# Patient Record
Sex: FEMALE | Race: WHITE | NOT HISPANIC OR LATINO | Employment: OTHER | URBAN - METROPOLITAN AREA
[De-identification: names, ages, dates, MRNs, and addresses within clinical notes are randomized per-mention and may not be internally consistent; named-entity substitution may affect disease eponyms.]

---

## 2017-02-02 ENCOUNTER — ALLSCRIPTS OFFICE VISIT (OUTPATIENT)
Dept: OTHER | Facility: OTHER | Age: 57
End: 2017-02-02

## 2017-03-06 ENCOUNTER — ALLSCRIPTS OFFICE VISIT (OUTPATIENT)
Dept: OTHER | Facility: OTHER | Age: 57
End: 2017-03-06

## 2017-03-06 DIAGNOSIS — J42 CHRONIC BRONCHITIS (HCC): ICD-10-CM

## 2017-03-06 DIAGNOSIS — R06.00 DYSPNEA: ICD-10-CM

## 2017-03-06 DIAGNOSIS — R06.89 OTHER ABNORMALITIES OF BREATHING: ICD-10-CM

## 2017-03-06 DIAGNOSIS — R06.02 SHORTNESS OF BREATH: ICD-10-CM

## 2017-03-13 ENCOUNTER — HOSPITAL ENCOUNTER (OUTPATIENT)
Dept: CT IMAGING | Facility: HOSPITAL | Age: 57
Discharge: HOME | End: 2017-03-13
Attending: INTERNAL MEDICINE
Payer: COMMERCIAL

## 2017-03-13 DIAGNOSIS — R06.02 SHORTNESS OF BREATH: ICD-10-CM

## 2017-03-13 PROCEDURE — 71250 CT THORAX DX C-: CPT

## 2017-04-04 ENCOUNTER — APPOINTMENT (OUTPATIENT)
Dept: LAB | Facility: CLINIC | Age: 57
End: 2017-04-04
Payer: COMMERCIAL

## 2017-04-04 ENCOUNTER — TRANSCRIBE ORDERS (OUTPATIENT)
Dept: LAB | Facility: CLINIC | Age: 57
End: 2017-04-04

## 2017-04-04 DIAGNOSIS — J42 CHRONIC BRONCHITIS (HCC): ICD-10-CM

## 2017-04-04 DIAGNOSIS — R06.89 OTHER ABNORMALITIES OF BREATHING: ICD-10-CM

## 2017-04-04 DIAGNOSIS — R06.00 DYSPNEA: ICD-10-CM

## 2017-04-04 PROCEDURE — 86003 ALLG SPEC IGE CRUDE XTRC EA: CPT

## 2017-04-04 PROCEDURE — 36415 COLL VENOUS BLD VENIPUNCTURE: CPT

## 2017-04-04 PROCEDURE — 82785 ASSAY OF IGE: CPT

## 2017-04-05 LAB
A ALTERNATA IGE QN: <0.1 KUA/I
A FUMIGATUS IGE QN: <0.1 KUA/I
ALLERGEN COMMENT: ABNORMAL
BERMUDA GRASS IGE QN: <0.1 KUA/I
BOXELDER IGE QN: <0.1 KUA/I
C HERBARUM IGE QN: <0.1 KUA/I
CAT DANDER IGE QN: <0.1 KUA/I
CMN PIGWEED IGE QN: <0.1 KUA/I
COMMON RAGWEED IGE QN: <0.1 KUA/I
COTTONWOOD IGE QN: <0.1 KUA/I
D FARINAE IGE QN: <0.1 KUA/I
D PTERONYSS IGE QN: 0.18 KUA/I
DOG DANDER IGE QN: <0.1 KUA/I
LONDON PLANE IGE QN: <0.1 KUA/I
MOUSE URINE PROT IGE QN: <0.1 KUA/I
MT JUNIPER IGE QN: <0.1 KUA/I
MUGWORT IGE QN: <0.1 KUA/I
P NOTATUM IGE QN: <0.1 KUA/I
ROACH IGE QN: <0.1 KUA/I
SHEEP SORREL IGE QN: <0.1 KUA/I
SILVER BIRCH IGE QN: <0.1 KUA/I
TIMOTHY IGE QN: <0.1 KUA/I
TOTAL IGE SMQN RAST: 485 KU/L (ref 0–113)
WALNUT IGE QN: <0.1 KUA/I
WHITE ASH IGE QN: <0.1 KUA/I
WHITE ELM IGE QN: <0.1 KUA/I
WHITE MULBERRY IGE QN: <0.1 KUA/I
WHITE OAK IGE QN: <0.1 KUA/I

## 2017-04-13 ENCOUNTER — ALLSCRIPTS OFFICE VISIT (OUTPATIENT)
Dept: OTHER | Facility: OTHER | Age: 57
End: 2017-04-13

## 2017-04-18 ENCOUNTER — HOSPITAL ENCOUNTER (OUTPATIENT)
Dept: RADIOLOGY | Facility: CLINIC | Age: 57
Discharge: HOME | End: 2017-04-18
Payer: COMMERCIAL

## 2017-04-18 ENCOUNTER — ALLSCRIPTS OFFICE VISIT (OUTPATIENT)
Dept: OTHER | Facility: OTHER | Age: 57
End: 2017-04-18

## 2017-04-18 DIAGNOSIS — M25.552 PAIN IN LEFT HIP: ICD-10-CM

## 2017-04-18 DIAGNOSIS — M25.551 PAIN IN RIGHT HIP: ICD-10-CM

## 2017-04-18 DIAGNOSIS — M54.9 DORSALGIA: ICD-10-CM

## 2017-04-18 PROCEDURE — 73502 X-RAY EXAM HIP UNI 2-3 VIEWS: CPT

## 2017-04-18 PROCEDURE — 72110 X-RAY EXAM L-2 SPINE 4/>VWS: CPT

## 2017-05-04 ENCOUNTER — ALLSCRIPTS OFFICE VISIT (OUTPATIENT)
Dept: OTHER | Facility: OTHER | Age: 57
End: 2017-05-04

## 2017-05-04 ENCOUNTER — APPOINTMENT (OUTPATIENT)
Dept: LAB | Facility: CLINIC | Age: 57
End: 2017-05-04
Payer: COMMERCIAL

## 2017-05-04 ENCOUNTER — TRANSCRIBE ORDERS (OUTPATIENT)
Dept: LAB | Facility: CLINIC | Age: 57
End: 2017-05-04

## 2017-05-04 DIAGNOSIS — M18.12 ARTHRITIS OF CARPOMETACARPAL (CMC) JOINT OF LEFT THUMB: ICD-10-CM

## 2017-05-04 DIAGNOSIS — M18.12 ARTHRITIS OF CARPOMETACARPAL (CMC) JOINT OF LEFT THUMB: Primary | ICD-10-CM

## 2017-05-04 LAB
ALBUMIN SERPL BCP-MCNC: 3.5 G/DL (ref 3.5–5)
ALP SERPL-CCNC: 115 U/L (ref 46–116)
ALT SERPL W P-5'-P-CCNC: 24 U/L (ref 12–78)
ANION GAP SERPL CALCULATED.3IONS-SCNC: 8 MMOL/L (ref 4–13)
AST SERPL W P-5'-P-CCNC: 17 U/L (ref 5–45)
BASOPHILS # BLD AUTO: 0.03 THOUSANDS/ΜL (ref 0–0.1)
BASOPHILS NFR BLD AUTO: 0 % (ref 0–1)
BILIRUB SERPL-MCNC: 0.4 MG/DL (ref 0.2–1)
BUN SERPL-MCNC: 17 MG/DL (ref 5–25)
CALCIUM SERPL-MCNC: 8.7 MG/DL (ref 8.3–10.1)
CHLORIDE SERPL-SCNC: 105 MMOL/L (ref 100–108)
CO2 SERPL-SCNC: 27 MMOL/L (ref 21–32)
CREAT SERPL-MCNC: 0.87 MG/DL (ref 0.6–1.3)
EOSINOPHIL # BLD AUTO: 0.15 THOUSAND/ΜL (ref 0–0.61)
EOSINOPHIL NFR BLD AUTO: 2 % (ref 0–6)
ERYTHROCYTE [DISTWIDTH] IN BLOOD BY AUTOMATED COUNT: 17.8 % (ref 11.6–15.1)
GFR SERPL CREATININE-BSD FRML MDRD: >60 ML/MIN/1.73SQ M
GLUCOSE P FAST SERPL-MCNC: 149 MG/DL (ref 65–99)
HCT VFR BLD AUTO: 34.6 % (ref 34.8–46.1)
HGB BLD-MCNC: 11.2 G/DL (ref 11.5–15.4)
LYMPHOCYTES # BLD AUTO: 3.16 THOUSANDS/ΜL (ref 0.6–4.47)
LYMPHOCYTES NFR BLD AUTO: 33 % (ref 14–44)
MCH RBC QN AUTO: 27.6 PG (ref 26.8–34.3)
MCHC RBC AUTO-ENTMCNC: 32.4 G/DL (ref 31.4–37.4)
MCV RBC AUTO: 85 FL (ref 82–98)
MONOCYTES # BLD AUTO: 0.64 THOUSAND/ΜL (ref 0.17–1.22)
MONOCYTES NFR BLD AUTO: 7 % (ref 4–12)
NEUTROPHILS # BLD AUTO: 5.73 THOUSANDS/ΜL (ref 1.85–7.62)
NEUTS SEG NFR BLD AUTO: 58 % (ref 43–75)
NRBC BLD AUTO-RTO: 0 /100 WBCS
PLATELET # BLD AUTO: 268 THOUSANDS/UL (ref 149–390)
PMV BLD AUTO: 11 FL (ref 8.9–12.7)
POTASSIUM SERPL-SCNC: 4.5 MMOL/L (ref 3.5–5.3)
PROT SERPL-MCNC: 7.3 G/DL (ref 6.4–8.2)
RBC # BLD AUTO: 4.06 MILLION/UL (ref 3.81–5.12)
SODIUM SERPL-SCNC: 140 MMOL/L (ref 136–145)
WBC # BLD AUTO: 9.73 THOUSAND/UL (ref 4.31–10.16)

## 2017-05-04 PROCEDURE — 85025 COMPLETE CBC W/AUTO DIFF WBC: CPT

## 2017-05-04 PROCEDURE — 36415 COLL VENOUS BLD VENIPUNCTURE: CPT

## 2017-05-04 PROCEDURE — 80053 COMPREHEN METABOLIC PANEL: CPT

## 2017-06-15 ENCOUNTER — ALLSCRIPTS OFFICE VISIT (OUTPATIENT)
Dept: OTHER | Facility: OTHER | Age: 57
End: 2017-06-15

## 2017-07-21 ENCOUNTER — APPOINTMENT (OUTPATIENT)
Dept: LAB | Facility: CLINIC | Age: 57
End: 2017-07-21
Payer: COMMERCIAL

## 2017-07-21 ENCOUNTER — ALLSCRIPTS OFFICE VISIT (OUTPATIENT)
Dept: OTHER | Facility: OTHER | Age: 57
End: 2017-07-21

## 2017-07-21 ENCOUNTER — TRANSCRIBE ORDERS (OUTPATIENT)
Dept: ADMINISTRATIVE | Facility: HOSPITAL | Age: 57
End: 2017-07-21

## 2017-07-21 DIAGNOSIS — S09.93XA INJURY OF FACE: ICD-10-CM

## 2017-07-21 DIAGNOSIS — R07.9 CHEST PAIN, UNSPECIFIED TYPE: Primary | ICD-10-CM

## 2017-07-21 DIAGNOSIS — R91.8 OTHER NONSPECIFIC ABNORMAL FINDING OF LUNG FIELD: ICD-10-CM

## 2017-07-21 LAB
ANION GAP SERPL CALCULATED.3IONS-SCNC: 5 MMOL/L (ref 4–13)
BASOPHILS # BLD AUTO: 0.05 THOUSANDS/ΜL (ref 0–0.1)
BASOPHILS NFR BLD AUTO: 1 % (ref 0–1)
BUN SERPL-MCNC: 13 MG/DL (ref 5–25)
CALCIUM SERPL-MCNC: 8.8 MG/DL (ref 8.3–10.1)
CHLORIDE SERPL-SCNC: 106 MMOL/L (ref 100–108)
CO2 SERPL-SCNC: 28 MMOL/L (ref 21–32)
CREAT SERPL-MCNC: 0.75 MG/DL (ref 0.6–1.3)
EOSINOPHIL # BLD AUTO: 0.17 THOUSAND/ΜL (ref 0–0.61)
EOSINOPHIL NFR BLD AUTO: 2 % (ref 0–6)
ERYTHROCYTE [DISTWIDTH] IN BLOOD BY AUTOMATED COUNT: 16.2 % (ref 11.6–15.1)
GFR SERPL CREATININE-BSD FRML MDRD: >60 ML/MIN/1.73SQ M
GLUCOSE SERPL-MCNC: 68 MG/DL (ref 65–140)
HCT VFR BLD AUTO: 34 % (ref 34.8–46.1)
HGB BLD-MCNC: 11 G/DL (ref 11.5–15.4)
LYMPHOCYTES # BLD AUTO: 4.15 THOUSANDS/ΜL (ref 0.6–4.47)
LYMPHOCYTES NFR BLD AUTO: 43 % (ref 14–44)
MCH RBC QN AUTO: 26.7 PG (ref 26.8–34.3)
MCHC RBC AUTO-ENTMCNC: 32.4 G/DL (ref 31.4–37.4)
MCV RBC AUTO: 83 FL (ref 82–98)
MONOCYTES # BLD AUTO: 0.85 THOUSAND/ΜL (ref 0.17–1.22)
MONOCYTES NFR BLD AUTO: 9 % (ref 4–12)
NEUTROPHILS # BLD AUTO: 4.4 THOUSANDS/ΜL (ref 1.85–7.62)
NEUTS SEG NFR BLD AUTO: 45 % (ref 43–75)
NRBC BLD AUTO-RTO: 0 /100 WBCS
PLATELET # BLD AUTO: 269 THOUSANDS/UL (ref 149–390)
PMV BLD AUTO: 11.9 FL (ref 8.9–12.7)
POTASSIUM SERPL-SCNC: 4.8 MMOL/L (ref 3.5–5.3)
RBC # BLD AUTO: 4.12 MILLION/UL (ref 3.81–5.12)
SODIUM SERPL-SCNC: 139 MMOL/L (ref 136–145)
WBC # BLD AUTO: 9.64 THOUSAND/UL (ref 4.31–10.16)

## 2017-07-21 PROCEDURE — 85025 COMPLETE CBC W/AUTO DIFF WBC: CPT

## 2017-07-21 PROCEDURE — 36415 COLL VENOUS BLD VENIPUNCTURE: CPT

## 2017-07-21 PROCEDURE — 80048 BASIC METABOLIC PNL TOTAL CA: CPT

## 2017-07-22 ENCOUNTER — HOSPITAL ENCOUNTER (OUTPATIENT)
Dept: CT IMAGING | Facility: HOSPITAL | Age: 57
Discharge: HOME | End: 2017-07-22
Payer: COMMERCIAL

## 2017-07-22 DIAGNOSIS — R91.8 OTHER NONSPECIFIC ABNORMAL FINDING OF LUNG FIELD: ICD-10-CM

## 2017-07-22 PROCEDURE — 71250 CT THORAX DX C-: CPT

## 2017-07-25 ENCOUNTER — ALLSCRIPTS OFFICE VISIT (OUTPATIENT)
Dept: OTHER | Facility: OTHER | Age: 57
End: 2017-07-25

## 2017-08-07 ENCOUNTER — ALLSCRIPTS OFFICE VISIT (OUTPATIENT)
Dept: OTHER | Facility: OTHER | Age: 57
End: 2017-08-07

## 2017-08-17 ENCOUNTER — ALLSCRIPTS OFFICE VISIT (OUTPATIENT)
Dept: OTHER | Facility: OTHER | Age: 57
End: 2017-08-17

## 2017-11-06 ENCOUNTER — ALLSCRIPTS OFFICE VISIT (OUTPATIENT)
Dept: OTHER | Facility: OTHER | Age: 57
End: 2017-11-06

## 2017-11-06 DIAGNOSIS — Z13.29 ENCOUNTER FOR SCREENING FOR OTHER SUSPECTED ENDOCRINE DISORDER: ICD-10-CM

## 2017-11-06 DIAGNOSIS — N63.20 MASS OF LEFT BREAST: ICD-10-CM

## 2017-11-06 DIAGNOSIS — Z13.228 ENCOUNTER FOR SCREENING FOR OTHER METABOLIC DISORDERS: ICD-10-CM

## 2017-11-06 DIAGNOSIS — R58 HEMORRHAGE, NOT ELSEWHERE CLASSIFIED (CODE): ICD-10-CM

## 2017-11-06 DIAGNOSIS — Z13.220 ENCOUNTER FOR SCREENING FOR LIPOID DISORDERS: ICD-10-CM
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
## 2017-11-14 ENCOUNTER — GENERIC CONVERSION - ENCOUNTER (OUTPATIENT)
Dept: OTHER | Facility: OTHER | Age: 57
End: 2017-11-14

## 2017-11-30 ENCOUNTER — ALLSCRIPTS OFFICE VISIT (OUTPATIENT)
Dept: OTHER | Facility: OTHER | Age: 57
End: 2017-11-30
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
## 2017-12-11 ENCOUNTER — APPOINTMENT (OUTPATIENT)
Dept: LAB | Facility: CLINIC | Age: 57
End: 2017-12-11
Payer: COMMERCIAL

## 2017-12-11 DIAGNOSIS — R58 HEMORRHAGE, NOT ELSEWHERE CLASSIFIED (CODE): ICD-10-CM

## 2017-12-11 DIAGNOSIS — Z13.228 ENCOUNTER FOR SCREENING FOR OTHER METABOLIC DISORDERS: ICD-10-CM

## 2017-12-11 DIAGNOSIS — Z13.220 ENCOUNTER FOR SCREENING FOR LIPOID DISORDERS: ICD-10-CM

## 2017-12-11 DIAGNOSIS — Z13.29 ENCOUNTER FOR SCREENING FOR OTHER SUSPECTED ENDOCRINE DISORDER: ICD-10-CM

## 2017-12-11 LAB
ALBUMIN SERPL BCP-MCNC: 3.3 G/DL (ref 3.5–5)
ALP SERPL-CCNC: 113 U/L (ref 46–116)
ALT SERPL W P-5'-P-CCNC: 15 U/L (ref 12–78)
ANION GAP SERPL CALCULATED.3IONS-SCNC: 5 MMOL/L (ref 4–13)
APTT PPP: 33 SECONDS (ref 23–35)
AST SERPL W P-5'-P-CCNC: 18 U/L (ref 5–45)
BASOPHILS # BLD AUTO: 0.02 THOUSANDS/ΜL (ref 0–0.1)
BASOPHILS NFR BLD AUTO: 0 % (ref 0–1)
BILIRUB SERPL-MCNC: 0.49 MG/DL (ref 0.2–1)
BUN SERPL-MCNC: 16 MG/DL (ref 5–25)
CALCIUM SERPL-MCNC: 8.5 MG/DL (ref 8.3–10.1)
CHLORIDE SERPL-SCNC: 107 MMOL/L (ref 100–108)
CHOLEST SERPL-MCNC: 181 MG/DL (ref 50–200)
CO2 SERPL-SCNC: 27 MMOL/L (ref 21–32)
CREAT SERPL-MCNC: 0.76 MG/DL (ref 0.6–1.3)
EOSINOPHIL # BLD AUTO: 0.12 THOUSAND/ΜL (ref 0–0.61)
EOSINOPHIL NFR BLD AUTO: 1 % (ref 0–6)
ERYTHROCYTE [DISTWIDTH] IN BLOOD BY AUTOMATED COUNT: 17.5 % (ref 11.6–15.1)
GFR SERPL CREATININE-BSD FRML MDRD: 88 ML/MIN/1.73SQ M
GLUCOSE P FAST SERPL-MCNC: 79 MG/DL (ref 65–99)
HCT VFR BLD AUTO: 34.8 % (ref 34.8–46.1)
HDLC SERPL-MCNC: 43 MG/DL (ref 40–60)
HGB BLD-MCNC: 11.1 G/DL (ref 11.5–15.4)
INR PPP: 0.94 (ref 0.86–1.16)
LDLC SERPL CALC-MCNC: 102 MG/DL (ref 0–100)
LYMPHOCYTES # BLD AUTO: 3.81 THOUSANDS/ΜL (ref 0.6–4.47)
LYMPHOCYTES NFR BLD AUTO: 45 % (ref 14–44)
MCH RBC QN AUTO: 26.1 PG (ref 26.8–34.3)
MCHC RBC AUTO-ENTMCNC: 31.9 G/DL (ref 31.4–37.4)
MCV RBC AUTO: 82 FL (ref 82–98)
MONOCYTES # BLD AUTO: 0.56 THOUSAND/ΜL (ref 0.17–1.22)
MONOCYTES NFR BLD AUTO: 7 % (ref 4–12)
NEUTROPHILS # BLD AUTO: 3.9 THOUSANDS/ΜL (ref 1.85–7.62)
NEUTS SEG NFR BLD AUTO: 47 % (ref 43–75)
NRBC BLD AUTO-RTO: 0 /100 WBCS
PLATELET # BLD AUTO: 320 THOUSANDS/UL (ref 149–390)
PMV BLD AUTO: 11.4 FL (ref 8.9–12.7)
POTASSIUM SERPL-SCNC: 4.7 MMOL/L (ref 3.5–5.3)
PROT SERPL-MCNC: 7.2 G/DL (ref 6.4–8.2)
PROTHROMBIN TIME: 12.6 SECONDS (ref 12.1–14.4)
RBC # BLD AUTO: 4.26 MILLION/UL (ref 3.81–5.12)
SODIUM SERPL-SCNC: 139 MMOL/L (ref 136–145)
TRIGL SERPL-MCNC: 181 MG/DL
TSH SERPL DL<=0.05 MIU/L-ACNC: 2.22 UIU/ML (ref 0.36–3.74)
WBC # BLD AUTO: 8.42 THOUSAND/UL (ref 4.31–10.16)

## 2017-12-11 PROCEDURE — 85730 THROMBOPLASTIN TIME PARTIAL: CPT

## 2017-12-11 PROCEDURE — 80053 COMPREHEN METABOLIC PANEL: CPT

## 2017-12-11 PROCEDURE — 80061 LIPID PANEL: CPT

## 2017-12-11 PROCEDURE — 36415 COLL VENOUS BLD VENIPUNCTURE: CPT

## 2017-12-11 PROCEDURE — 84443 ASSAY THYROID STIM HORMONE: CPT

## 2017-12-11 PROCEDURE — 85025 COMPLETE CBC W/AUTO DIFF WBC: CPT

## 2017-12-11 PROCEDURE — 85610 PROTHROMBIN TIME: CPT

## 2017-12-12 ENCOUNTER — ALLSCRIPTS OFFICE VISIT (OUTPATIENT)
Dept: OTHER | Facility: OTHER | Age: 57
End: 2017-12-12

## 2017-12-12 ENCOUNTER — GENERIC CONVERSION - ENCOUNTER (OUTPATIENT)
Dept: OTHER | Facility: OTHER | Age: 57
End: 2017-12-12
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
## 2017-12-18 ENCOUNTER — GENERIC CONVERSION - ENCOUNTER (OUTPATIENT)
Dept: OTHER | Facility: OTHER | Age: 57
End: 2017-12-18

## 2017-12-19 ENCOUNTER — GENERIC CONVERSION - ENCOUNTER (OUTPATIENT)
Dept: OTHER | Facility: OTHER | Age: 57
End: 2017-12-19

## 2017-12-19 DIAGNOSIS — Z00.00 ENCOUNTER FOR GENERAL ADULT MEDICAL EXAMINATION WITHOUT ABNORMAL FINDINGS: ICD-10-CM

## 2017-12-19 DIAGNOSIS — L98.499 NON-PRESSURE CHRONIC ULCER OF SKIN OF OTHER SITES WITH UNSPECIFIED SEVERITY (HCC): ICD-10-CM

## 2017-12-19 DIAGNOSIS — M25.571 PAIN IN RIGHT ANKLE: ICD-10-CM

## 2017-12-19 DIAGNOSIS — M79.675 PAIN OF TOE OF LEFT FOOT: ICD-10-CM

## 2017-12-19 DIAGNOSIS — K91.2 POSTSURGICAL MALABSORPTION, NOT ELSEWHERE CLASSIFIED (CODE): ICD-10-CM

## 2017-12-22 ENCOUNTER — TRANSCRIBE ORDERS (OUTPATIENT)
Dept: MRI IMAGING | Facility: CLINIC | Age: 57
End: 2017-12-22

## 2017-12-22 ENCOUNTER — GENERIC CONVERSION - ENCOUNTER (OUTPATIENT)
Dept: OTHER | Facility: OTHER | Age: 57
End: 2017-12-22

## 2017-12-22 ENCOUNTER — APPOINTMENT (OUTPATIENT)
Dept: LAB | Facility: CLINIC | Age: 57
End: 2017-12-22
Payer: COMMERCIAL

## 2017-12-22 DIAGNOSIS — Z00.00 ENCOUNTER FOR GENERAL ADULT MEDICAL EXAMINATION WITHOUT ABNORMAL FINDINGS: ICD-10-CM

## 2017-12-22 DIAGNOSIS — R19.7 DIARRHEA OF PRESUMED INFECTIOUS ORIGIN: ICD-10-CM

## 2017-12-22 DIAGNOSIS — R19.7 DIARRHEA OF PRESUMED INFECTIOUS ORIGIN: Primary | ICD-10-CM

## 2017-12-22 DIAGNOSIS — R14.0 ABDOMINAL DISTENTION: ICD-10-CM

## 2017-12-22 DIAGNOSIS — K91.2 POSTSURGICAL MALABSORPTION, NOT ELSEWHERE CLASSIFIED (CODE): ICD-10-CM

## 2017-12-22 LAB
EST. AVERAGE GLUCOSE BLD GHB EST-MCNC: 128 MG/DL
HBA1C MFR BLD: 6.1 % (ref 4.2–6.3)

## 2017-12-22 PROCEDURE — 36415 COLL VENOUS BLD VENIPUNCTURE: CPT

## 2017-12-22 PROCEDURE — 87493 C DIFF AMPLIFIED PROBE: CPT

## 2017-12-22 PROCEDURE — 87505 NFCT AGENT DETECTION GI: CPT

## 2017-12-22 PROCEDURE — 83036 HEMOGLOBIN GLYCOSYLATED A1C: CPT

## 2017-12-23 LAB
C DIFF TOX GENS STL QL NAA+PROBE: NORMAL
CAMPYLOBACTER DNA SPEC NAA+PROBE: NORMAL
SALMONELLA DNA SPEC QL NAA+PROBE: NORMAL
SHIGA TOXIN STX GENE SPEC NAA+PROBE: NORMAL
SHIGELLA DNA SPEC QL NAA+PROBE: NORMAL

## 2018-01-12 ENCOUNTER — TRANSCRIBE ORDERS (OUTPATIENT)
Dept: ADMINISTRATIVE | Facility: HOSPITAL | Age: 58
End: 2018-01-12

## 2018-01-12 ENCOUNTER — HOSPITAL ENCOUNTER (OUTPATIENT)
Dept: RADIOLOGY | Facility: HOSPITAL | Age: 58
Discharge: HOME | End: 2018-01-12
Payer: COMMERCIAL

## 2018-01-12 VITALS
BODY MASS INDEX: 26.05 KG/M2 | SYSTOLIC BLOOD PRESSURE: 130 MMHG | HEART RATE: 78 BPM | WEIGHT: 147 LBS | DIASTOLIC BLOOD PRESSURE: 78 MMHG | HEIGHT: 63 IN | OXYGEN SATURATION: 99 %

## 2018-01-12 VITALS
WEIGHT: 146.13 LBS | DIASTOLIC BLOOD PRESSURE: 70 MMHG | BODY MASS INDEX: 25.89 KG/M2 | HEIGHT: 63 IN | OXYGEN SATURATION: 95 % | HEART RATE: 83 BPM | SYSTOLIC BLOOD PRESSURE: 106 MMHG

## 2018-01-12 DIAGNOSIS — M25.571 PAIN IN RIGHT ANKLE: ICD-10-CM

## 2018-01-12 DIAGNOSIS — M79.675 PAIN OF TOE OF LEFT FOOT: ICD-10-CM

## 2018-01-12 PROCEDURE — 73660 X-RAY EXAM OF TOE(S): CPT

## 2018-01-12 PROCEDURE — 73610 X-RAY EXAM OF ANKLE: CPT

## 2018-01-13 VITALS
WEIGHT: 148.13 LBS | BODY MASS INDEX: 26.25 KG/M2 | HEIGHT: 63 IN | SYSTOLIC BLOOD PRESSURE: 124 MMHG | OXYGEN SATURATION: 99 % | HEART RATE: 75 BPM | DIASTOLIC BLOOD PRESSURE: 78 MMHG

## 2018-01-13 VITALS
BODY MASS INDEX: 25.76 KG/M2 | SYSTOLIC BLOOD PRESSURE: 104 MMHG | DIASTOLIC BLOOD PRESSURE: 70 MMHG | HEIGHT: 63 IN | HEART RATE: 80 BPM | WEIGHT: 145.38 LBS | OXYGEN SATURATION: 97 %

## 2018-01-13 VITALS
BODY MASS INDEX: 21.97 KG/M2 | WEIGHT: 124 LBS | HEART RATE: 80 BPM | OXYGEN SATURATION: 97 % | SYSTOLIC BLOOD PRESSURE: 160 MMHG | HEIGHT: 63 IN | DIASTOLIC BLOOD PRESSURE: 100 MMHG

## 2018-01-13 VITALS
SYSTOLIC BLOOD PRESSURE: 110 MMHG | BODY MASS INDEX: 24.8 KG/M2 | DIASTOLIC BLOOD PRESSURE: 78 MMHG | WEIGHT: 140 LBS | OXYGEN SATURATION: 98 % | HEART RATE: 76 BPM

## 2018-01-14 VITALS
SYSTOLIC BLOOD PRESSURE: 118 MMHG | HEIGHT: 63 IN | OXYGEN SATURATION: 97 % | HEART RATE: 95 BPM | DIASTOLIC BLOOD PRESSURE: 72 MMHG | WEIGHT: 145.5 LBS | BODY MASS INDEX: 25.78 KG/M2

## 2018-01-14 VITALS
WEIGHT: 156 LBS | SYSTOLIC BLOOD PRESSURE: 102 MMHG | BODY MASS INDEX: 28.71 KG/M2 | HEIGHT: 62 IN | DIASTOLIC BLOOD PRESSURE: 58 MMHG

## 2018-01-14 VITALS
BODY MASS INDEX: 24.8 KG/M2 | HEART RATE: 79 BPM | DIASTOLIC BLOOD PRESSURE: 75 MMHG | HEIGHT: 63 IN | SYSTOLIC BLOOD PRESSURE: 107 MMHG | WEIGHT: 140 LBS

## 2018-01-14 VITALS
BODY MASS INDEX: 24.45 KG/M2 | DIASTOLIC BLOOD PRESSURE: 78 MMHG | WEIGHT: 138 LBS | SYSTOLIC BLOOD PRESSURE: 110 MMHG | HEART RATE: 97 BPM

## 2018-01-14 VITALS
DIASTOLIC BLOOD PRESSURE: 70 MMHG | TEMPERATURE: 98.8 F | WEIGHT: 140.25 LBS | HEIGHT: 63 IN | SYSTOLIC BLOOD PRESSURE: 110 MMHG | OXYGEN SATURATION: 98 % | HEART RATE: 88 BPM | BODY MASS INDEX: 24.85 KG/M2

## 2018-01-14 VITALS
HEIGHT: 62 IN | SYSTOLIC BLOOD PRESSURE: 100 MMHG | BODY MASS INDEX: 28.89 KG/M2 | TEMPERATURE: 98 F | WEIGHT: 157 LBS | HEART RATE: 78 BPM | DIASTOLIC BLOOD PRESSURE: 66 MMHG | OXYGEN SATURATION: 97 %

## 2018-01-14 NOTE — RESULT NOTES
PFT Results v2:   Diagnosis/Reason For Study: dyspnea   Referring Provider: Dr Samantha Alaniz   Spirometry: Forced vital capacity: 3 28L and 104% Predicted Values  Forced expiratory volume in one second: 2 58L and 105% Predicted Value  FEV1/FVC ratio is 99% Predicted Values  Post Bronchodilator Spirometry: Forced vital capacity : 3 37L and 107% Predicted Values  Forced expiratory volume in one second : 2 73L and 111% Predicted Value  FEV1/FVC ratio is 102% Predicted Values  Lung Volumes: Total lung capacity : 5 98L and 127% Predicted Values  RV: 148% Predicted Values  RV/T% Predicted Values  DLCO:   DLCO 75% Predicted Values  PFT Interpretation:   patient had a full lung function testing with spirometry lung volumes and DLCO  Patient gave a good effort  Results meet the ATS standards for acceptability and repeat ability  The flow volume curve is normal   There is no obstructive or restrictive ventilatory limitation  the lung volumes are normal   The DLCO is mildly decreased  isolated decrease in DLCO consider possible pulmonary hypertension  Clinical correlation is required  Future Appointments    Date/Time Provider Specialty Site   2016 12:30 PM LORI Durand   Pulmonary Medicine [facility]      Electronically signed by : LORI Dumont ; Nov 15 2016  9:14AM EST                       (Author)

## 2018-01-15 ENCOUNTER — GENERIC CONVERSION - ENCOUNTER (OUTPATIENT)
Dept: OTHER | Facility: OTHER | Age: 58
End: 2018-01-15

## 2018-01-22 VITALS
HEIGHT: 62 IN | DIASTOLIC BLOOD PRESSURE: 60 MMHG | SYSTOLIC BLOOD PRESSURE: 106 MMHG | BODY MASS INDEX: 28.59 KG/M2 | WEIGHT: 155.38 LBS

## 2018-01-23 VITALS
WEIGHT: 160.13 LBS | SYSTOLIC BLOOD PRESSURE: 98 MMHG | DIASTOLIC BLOOD PRESSURE: 68 MMHG | OXYGEN SATURATION: 98 % | HEART RATE: 78 BPM | BODY MASS INDEX: 29.47 KG/M2 | HEIGHT: 62 IN

## 2018-01-23 NOTE — RESULT NOTES
Verified Results  (1) CBC/PLT/DIFF 19XHL1511 09:30AM Lisa Gruber    Order Number: [id number]     Test Name Result Flag Reference   WBC COUNT 8 42 Thousand/uL  4 31-10 16   RBC COUNT 4 26 Million/uL  3 81-5 12   HEMOGLOBIN 11 1 g/dL L 11 5-15 4   HEMATOCRIT 34 8 %  34 8-46  1   MCV 82 fL  82-98   MCH 26 1 pg L 26 8-34 3   MCHC 31 9 g/dL  31 4-37 4   RDW 17 5 % H 11 6-15 1   MPV 11 4 fL  8 9-12 7   PLATELET COUNT 342 Thousands/uL  149-390   nRBC AUTOMATED 0 /100 WBCs     NEUTROPHILS RELATIVE PERCENT 47 %  43-75   LYMPHOCYTES RELATIVE PERCENT 45 % H 14-44   MONOCYTES RELATIVE PERCENT 7 %  4-12   EOSINOPHILS RELATIVE PERCENT 1 %  0-6   BASOPHILS RELATIVE PERCENT 0 %  0-1   NEUTROPHILS ABSOLUTE COUNT 3 90 Thousands/? ??L  1 85-7 62   LYMPHOCYTES ABSOLUTE COUNT 3 81 Thousands/? ??L  0 60-4 47   MONOCYTES ABSOLUTE COUNT 0 56 Thousand/? ??L  0 17-1 22   EOSINOPHILS ABSOLUTE COUNT 0 12 Thousand/? ??L  0 00-0 61   BASOPHILS ABSOLUTE COUNT 0 02 Thousands/? ??L  0 00-0 10     (1) COMPREHENSIVE METABOLIC PANEL 88CBI2384 23:35TR Lisa Gruber    Order Number: [id number]     Test Name Result Flag Reference   SODIUM 139 mmol/L  136-145   POTASSIUM 4 7 mmol/L  3 5-5 3   CHLORIDE 107 mmol/L  100-108   CARBON DIOXIDE 27 mmol/L  21-32   ANION GAP (CALC) 5 mmol/L  4-13   BLOOD UREA NITROGEN 16 mg/dL  5-25   CREATININE 0 76 mg/dL  0 60-1 30   Standardized to IDMS reference method   CALCIUM 8 5 mg/dL  8 3-10 1   BILI, TOTAL 0 49 mg/dL  0 20-1 00   ALK PHOSPHATAS 113 U/L     ALT (SGPT) 15 U/L  12-78   Specimen collection should occur prior to Sulfasalazine and/or Sulfapyridine administration due to the potential for falsely depressed results  AST(SGOT) 18 U/L  5-45   Specimen collection should occur prior to Sulfasalazine administration due to the potential for falsely depressed results     ALBUMIN 3 3 g/dL L 3 5-5 0   TOTAL PROTEIN 7 2 g/dL  6 4-8 2   eGFR 88 ml/min/1 73sq m     [employer] Disease Education Program recommendations are as follows:  GFR calculation is accurate only with a steady state creatinine  Chronic Kidney disease less than 60 ml/min/1 73 sq  meters  Kidney failure less than 15 ml/min/1 73 sq  meters  GLUCOSE FASTING 79 mg/dL  65-99   Specimen collection should occur prior to Sulfasalazine administration due to the potential for falsely depressed results  Specimen collection should occur prior to Sulfapyridine administration due to the potential for falsely elevated results  (1) LIPID PANEL, FASTING 23Zqp7189 09:30AM Sharron Ruiz   TW Order Number: [id number]     Test Name Result Flag Reference   CHOLESTEROL 181 mg/dL     HDL,DIRECT 43 mg/dL  40-60   Specimen collection should occur prior to Metamizole administration due to the potential for falsley depressed results  LDL CHOLESTEROL CALCULATED 102 mg/dL H 0-100   Triglyceride:        Normal <150 mg/dl   Borderline High 150-199 mg/dl   High 200-499 mg/dl   Very High >499 mg/dl      Cholesterol:       Desirable <200 mg/dl    Borderline High 200-239 mg/dl    High >239 mg/dl      HDL Cholesterol:       High>59 mg/dL    Low <41 mg/dL      This screening LDL is a calculated result  It does not have the accuracy of the Direct Measured LDL in the monitoring of patients with hyperlipidemia and/or statin therapy  Direct Measure LDL (YJU846) must be ordered separately in these patients  TRIGLYCERIDES 181 mg/dL H <=150   Specimen collection should occur prior to N-Acetylcysteine or Metamizole administration due to the potential for falsely depressed results  (1) TSH 17OEX7199 09:30AM Carlisle Caden Rebolledo   TW Order Number: [id number]     Test Name Result Flag Reference   TSH 2 220 uIU/mL  0 358-3 740   Patients undergoing fluorescein dye angiography may retain small amounts of fluorescein in the body for 48-72 hours post procedure   Samples containing fluorescein can produce falsely depressed TSH values  If the patient had this procedure,a specimen should be resubmitted post fluorescein clearance            The recommended reference ranges for TSH during pregnancy are as follows:  First trimester 0 1 to 2 5 uIU/mL  Second trimester  0 2 to 3 0 uIU/mL  Third trimester 0 3 to 3 0 uIU/m     (1) PT WITH INR 78Uqn2194 09:30AM Pervasip    Order Number: [id number]     Test Name Result Flag Reference   INR 0 94  0 86-1 16   PT 12 6 seconds  12 1-14 4     (1) APTT 70POS0886 09:30AM Pervasip    Order Number: [id number]     Test Name Result Flag Reference   PARTIAL THROMBOPLASTIN TIME 33 seconds  23-35   Therapeutic Heparin Range = 60-90 seconds

## 2018-01-24 VITALS
HEART RATE: 83 BPM | OXYGEN SATURATION: 97 % | DIASTOLIC BLOOD PRESSURE: 80 MMHG | BODY MASS INDEX: 29.47 KG/M2 | HEIGHT: 62 IN | SYSTOLIC BLOOD PRESSURE: 104 MMHG | WEIGHT: 160.13 LBS

## 2018-01-24 VITALS
TEMPERATURE: 97.7 F | DIASTOLIC BLOOD PRESSURE: 70 MMHG | HEART RATE: 78 BPM | HEIGHT: 62 IN | WEIGHT: 160 LBS | SYSTOLIC BLOOD PRESSURE: 110 MMHG | RESPIRATION RATE: 16 BRPM | BODY MASS INDEX: 29.44 KG/M2

## 2018-01-24 VITALS — HEART RATE: 72 BPM | SYSTOLIC BLOOD PRESSURE: 104 MMHG | OXYGEN SATURATION: 98 % | DIASTOLIC BLOOD PRESSURE: 68 MMHG

## 2018-01-24 VITALS — SYSTOLIC BLOOD PRESSURE: 98 MMHG | DIASTOLIC BLOOD PRESSURE: 64 MMHG | HEART RATE: 120 BPM

## 2018-02-07 DIAGNOSIS — R10.13 EPIGASTRIC PAIN: Primary | ICD-10-CM

## 2018-02-07 RX ORDER — PANTOPRAZOLE SODIUM 40 MG/1
TABLET, DELAYED RELEASE ORAL
Qty: 90 TABLET | Refills: 3 | Status: SHIPPED | OUTPATIENT
Start: 2018-02-07 | End: 2019-03-13 | Stop reason: SDUPTHER

## 2018-02-07 RX ORDER — FAMOTIDINE 20 MG/1
TABLET, FILM COATED ORAL
Qty: 90 TABLET | Refills: 3 | Status: SHIPPED | OUTPATIENT
Start: 2018-02-07 | End: 2019-03-11 | Stop reason: SDUPTHER

## 2018-02-14 ENCOUNTER — APPOINTMENT (OUTPATIENT)
Dept: RADIOLOGY | Facility: CLINIC | Age: 58
End: 2018-02-14
Payer: COMMERCIAL

## 2018-02-14 ENCOUNTER — OFFICE VISIT (OUTPATIENT)
Dept: OBGYN CLINIC | Facility: CLINIC | Age: 58
End: 2018-02-14

## 2018-02-14 VITALS
SYSTOLIC BLOOD PRESSURE: 94 MMHG | DIASTOLIC BLOOD PRESSURE: 68 MMHG | HEART RATE: 74 BPM | HEIGHT: 63 IN | BODY MASS INDEX: 28.35 KG/M2 | WEIGHT: 160 LBS

## 2018-02-14 DIAGNOSIS — M79.672 PAIN IN LEFT FOOT: ICD-10-CM

## 2018-02-14 DIAGNOSIS — M25.571 PAIN, JOINT, ANKLE AND FOOT, RIGHT: ICD-10-CM

## 2018-02-14 DIAGNOSIS — S82.64XG CLOSED NONDISPLACED FRACTURE OF LATERAL MALLEOLUS OF RIGHT FIBULA WITH DELAYED HEALING, SUBSEQUENT ENCOUNTER: Primary | ICD-10-CM

## 2018-02-14 DIAGNOSIS — M79.671 PAIN IN RIGHT FOOT: ICD-10-CM

## 2018-02-14 PROCEDURE — 73660 X-RAY EXAM OF TOE(S): CPT

## 2018-02-14 PROCEDURE — 99024 POSTOP FOLLOW-UP VISIT: CPT | Performed by: FAMILY MEDICINE

## 2018-02-14 PROCEDURE — 73610 X-RAY EXAM OF ANKLE: CPT

## 2018-02-14 RX ORDER — LIDOCAINE 50 MG/G
OINTMENT TOPICAL
Refills: 1 | COMMUNITY
Start: 2017-12-18 | End: 2020-02-07 | Stop reason: SDUPTHER

## 2018-02-14 RX ORDER — NAPROXEN 500 MG/1
500 TABLET ORAL EVERY 12 HOURS PRN
Refills: 1 | COMMUNITY
Start: 2018-02-04 | End: 2018-03-16 | Stop reason: SDUPTHER

## 2018-02-14 RX ORDER — ROPINIROLE 0.25 MG/1
0.25 TABLET, FILM COATED ORAL 3 TIMES DAILY
Refills: 4 | COMMUNITY
Start: 2018-02-07 | End: 2018-12-19 | Stop reason: SDUPTHER

## 2018-02-14 RX ORDER — FENTANYL 75 UG/H
PATCH TRANSDERMAL
Refills: 0 | COMMUNITY
Start: 2018-01-20 | End: 2019-05-28 | Stop reason: HOSPADM

## 2018-02-14 RX ORDER — FAMOTIDINE 20 MG/1
1 TABLET, FILM COATED ORAL
COMMUNITY
Start: 2016-08-26 | End: 2018-04-30 | Stop reason: ALTCHOICE

## 2018-02-14 RX ORDER — GABAPENTIN 800 MG/1
800 TABLET ORAL 4 TIMES DAILY
Refills: 3 | COMMUNITY
Start: 2018-01-16

## 2018-02-14 RX ORDER — VENLAFAXINE HYDROCHLORIDE 225 MG/1
225 TABLET, EXTENDED RELEASE ORAL EVERY MORNING
Refills: 1 | COMMUNITY
Start: 2018-01-30 | End: 2020-07-09 | Stop reason: ALTCHOICE

## 2018-02-14 RX ORDER — CLONAZEPAM 0.5 MG/1
0.5 TABLET ORAL 3 TIMES DAILY
Refills: 1 | COMMUNITY
Start: 2018-01-18

## 2018-02-14 RX ORDER — ACETAMINOPHEN 650 MG
TABLET, EXTENDED RELEASE ORAL
COMMUNITY
Start: 2017-12-19 | End: 2018-06-20

## 2018-02-14 RX ORDER — DOXEPIN HYDROCHLORIDE 150 MG/1
150 CAPSULE ORAL
Refills: 1 | COMMUNITY
Start: 2018-01-18 | End: 2020-07-11 | Stop reason: SDUPTHER

## 2018-02-14 RX ORDER — PANTOPRAZOLE SODIUM 40 MG/1
TABLET, DELAYED RELEASE ORAL
COMMUNITY
Start: 2016-02-05 | End: 2018-04-30 | Stop reason: SDUPTHER

## 2018-02-14 RX ORDER — OXYCODONE HYDROCHLORIDE AND ACETAMINOPHEN 5; 325 MG/1; MG/1
1-2 TABLET ORAL EVERY 4 HOURS
COMMUNITY
Start: 2015-11-17 | End: 2019-05-28 | Stop reason: HOSPADM

## 2018-02-14 NOTE — PROGRESS NOTES
Assessment/Plan:  Assessment/Plan   Diagnoses and all orders for this visit:    Closed nondisplaced fracture of lateral malleolus of right fibula with delayed healing, subsequent encounter    Pain in right foot  -     Cancel: XR foot 3+ vw right  -     XR ankle 3+ vw right    Pain in left foot  -     XR toe left great min 2 views; Future    Pain, joint, ankle and foot, right    Other orders  -     povidone-iodine (BETADINE) 10 % external solution; Apply topically  -     fluticasone-salmeterol (ADVAIR DISKUS) 250-50 mcg/dose inhaler; Inhale 1 puff every 12 (twelve) hours  -     Cholecalciferol 1000 UNIT/10ML LIQD;   -     clonazePAM (KlonoPIN) 0 5 mg tablet; Take 0 5 mg by mouth 2 (two) times a day  -     doxepin (SINEquan) 150 MG capsule; 150 mg daily at bedtime  -     fentaNYL (DURAGESIC) 75 mcg/hr; APPLY 1 PATCH EVERY 72 HOURS  -     gabapentin (NEURONTIN) 800 mg tablet; Take 800 mg by mouth 4 (four) times a day  -     lidocaine (XYLOCAINE) 5 % ointment; apply locally FOUR TIMES DAILY  -     oxyCODONE-acetaminophen (PERCOCET) 5-325 mg per tablet; Take 1-2 tablets by mouth every 4 (four) hours  -     naproxen (NAPROSYN) 500 mg tablet; 500 mg every 12 (twelve) hours as needed  -     rOPINIRole (REQUIP) 0 25 mg tablet; Take 0 25 mg by mouth 3 (three) times a day  -     venlafaxine 225 MG TB24; Take 225 mg by mouth daily  -     famotidine (PEPCID) 20 mg tablet; Take 1 tablet by mouth  -     pantoprazole (PROTONIX) 40 mg tablet;         65year old female with multiple injuries from fall at home  Discussed with patient and her accompanying  physical exam, radiographs, impression, and plan  X-rays show healing of the right ankle lateral malleolus, and little change in the left great toe distal phalanx  I will transition her out of CAM boot from the right ankle at this time, and have her start wearing post-op shoe on the left foot   She is to ambulate in the post-op shoe at all times except for hygiene purposes  She is a current smoker and I discussed with her that smoking causes delayed bone healing  She will return for follow-up in 3 weeks at which point she will be re-evaluated  Subjective:   Patient ID: Ben Lo is a 65 y o  female  65year old female accompanied by her  for follow-up of right ankle and left foot injury  She was seen one month ago a few days after a fall injury at home and was found to have fractures of the right lateral malleolus and left great toe distal phalanx fracture  She has history of multiple falls so she was placed in CAM boot and post-op shoe was deferred at that visit, as wearing post-op shoe and CAM boot simultaneously would predispose her to more falls  She's been wearing the CAM boot as directed and states that right ankle pain has improved  She has been wearing sneakers on the left foot and reports there is still pain of the left great toe  She has not had any new injury since previous visit  Ankle Pain   This is a new problem  The current episode started more than 1 month ago  The problem occurs constantly  The problem has been gradually improving  Associated symptoms include arthralgias and joint swelling  Pertinent negatives include no abdominal pain, chest pain, chills, fever, numbness, rash, sore throat or weakness  The symptoms are aggravated by walking  She has tried immobilization, rest and NSAIDs for the symptoms  The treatment provided moderate relief  Review of Systems   Constitutional: Negative for chills and fever  HENT: Negative for sore throat  Eyes: Negative for visual disturbance  Respiratory: Negative for shortness of breath  Cardiovascular: Negative for chest pain  Gastrointestinal: Negative for abdominal pain  Genitourinary: Negative for difficulty urinating  Musculoskeletal: Positive for arthralgias and joint swelling  Skin: Negative for rash and wound  Neurological: Negative for weakness and numbness  Hematological: Does not bruise/bleed easily  Psychiatric/Behavioral: Negative for self-injury  Objective:    Vitals:    02/14/18 1613   BP: 94/68   BP Location: Right arm   Patient Position: Sitting   Cuff Size: Standard   Pulse: 74   Weight: 72 6 kg (160 lb)   Height: 5' 3" (1 6 m)     Right Ankle Exam     Muscle Strength   Dorsiflexion:  4+/5  Plantar flexion:  4+/5      Left Ankle Exam     Muscle Strength   Dorsiflexion:  4+/5   Plantar flexion:  4+/5           Observations   Left Ankle/Foot   Negative for deformity  Right Ankle/Foot   Negative for deformity  Tenderness     Right Ankle/Foot   Tenderness in the lateral malleolus (Mild )  No tenderness in the medial malleolus  Additional Tenderness Details  Left great toe distal phalanx and IP joint tenderness    Active Range of Motion   Left Ankle/Foot   Great toe flexion: WFL and with pain  Great toe extension: WFL and with pain    Right Ankle/Foot   Normal active range of motion    Strength/Myotome Testing     Left Ankle/Foot   Dorsiflexion: 4+  Plantar flexion: 4+  Inversion: 4+  Eversion: 4+  Great toe flexion: 4  Great toe extension: 4    Right Ankle/Foot   Dorsiflexion: 4+  Plantar flexion: 4+  Inversion: 4+  Eversion: 4+      Physical Exam   Constitutional: She is oriented to person, place, and time  She appears well-developed  No distress  HENT:   Head: Normocephalic  Eyes: Conjunctivae are normal    Neck: No tracheal deviation present  Cardiovascular: Normal rate  Pulmonary/Chest: Effort normal  No respiratory distress  Abdominal: She exhibits no distension  Musculoskeletal:        Right ankle: Lateral malleolus (Mild ) tenderness found  No medial malleolus tenderness found  Right foot: There is no deformity  Left foot: There is no deformity  Neurological: She is alert and oriented to person, place, and time  Skin: Skin is warm and dry  Psychiatric: She has a normal mood and affect   Her behavior is normal        I have personally reviewed pertinent films in PACS and my interpretation is Callus formation and healing of right lateral malleolus  Persistent fracture line of left great toe distal phalanx fracture

## 2018-02-23 DIAGNOSIS — R91.8 GROUND GLASS OPACITY PRESENT ON IMAGING OF LUNG: Primary | ICD-10-CM

## 2018-03-14 ENCOUNTER — OFFICE VISIT (OUTPATIENT)
Dept: OBGYN CLINIC | Facility: CLINIC | Age: 58
End: 2018-03-14

## 2018-03-14 VITALS
DIASTOLIC BLOOD PRESSURE: 73 MMHG | WEIGHT: 160.05 LBS | SYSTOLIC BLOOD PRESSURE: 103 MMHG | BODY MASS INDEX: 28.36 KG/M2 | HEIGHT: 63 IN | HEART RATE: 80 BPM

## 2018-03-14 DIAGNOSIS — S82.64XG CLOSED NONDISPLACED FRACTURE OF LATERAL MALLEOLUS OF RIGHT FIBULA WITH DELAYED HEALING, SUBSEQUENT ENCOUNTER: Primary | ICD-10-CM

## 2018-03-14 DIAGNOSIS — R29.898 ANKLE WEAKNESS: ICD-10-CM

## 2018-03-14 PROCEDURE — 99024 POSTOP FOLLOW-UP VISIT: CPT | Performed by: FAMILY MEDICINE

## 2018-03-14 NOTE — PROGRESS NOTES
Assessment/Plan:  Assessment/Plan   Diagnoses and all orders for this visit:    Closed nondisplaced fracture of lateral malleolus of right fibula with delayed healing, subsequent encounter  -     Ambulatory referral to Physical Therapy; Future    Ankle weakness  -     Ambulatory referral to Physical Therapy; Future      15-year-old female with right lateral malleolus fracture  Discussed with patient physical exam, impression, and plan  She does not have bony tenderness of the lateral malleolus  She is noted to have weakness and stiffness of the ankle  At this time I will refer her to physical therapy for ankle rehab and proprioceptive training  Regards to her left great toe fracture, she still continues to have pain with ambulation so recommend she wear her short Cam boot on the left foot  She will follow up with me in 3 weeks at which point she will be re-evaluated  Subjective:   Patient ID: Baylee Brito is a 65 y o  female  Chief Complaint   Patient presents with    Right Knee - Pain, Follow-up    Left Foot - Toe Injury, Pain       15-year-old female here for follow-up of right lateral malleolus fracture and left great toe fracture  Today she reports improved pain of the right ankle  She has been able to bear weight with minimal discomfort  However she reports more discomfort of the left great toe  She has not had any new injury since previous visit  Ankle Injury   This is a new problem  The current episode started more than 1 month ago  The problem occurs daily  The problem has been gradually improving  Associated symptoms include arthralgias  The symptoms are aggravated by walking  She has tried rest and immobilization for the symptoms  The treatment provided moderate relief  Review of Systems   Musculoskeletal: Positive for arthralgias         Objective:  Vitals:    03/14/18 1500   BP: 103/73   Pulse: 80   Weight: 72 6 kg (160 lb 0 9 oz)   Height: 5' 2 99" (1 6 m) Observations   Left Ankle/Foot   Negative for deformity  Right Ankle/Foot   Negative for deformity  Tenderness     Right Ankle/Foot   No tenderness in the anterior talofibular ligament, calcaneofibular ligament and deltoid ligament  Fibula: Mild  Lateral malleolus: Mild  Additional Tenderness Details    Left: Great toe    Active Range of Motion     Right Ankle/Foot   Dorsiflexion (kf): 15 degrees   Plantar flexion: 45 degrees     Passive Range of Motion     Right Ankle/Foot    Dorsiflexion (kf): 30 degrees    Plantar flexion: 60 degrees       Physical Exam   Constitutional: She is oriented to person, place, and time  She appears well-developed  No distress  HENT:   Head: Normocephalic  Eyes: Conjunctivae are normal    Neck: No tracheal deviation present  Cardiovascular: Normal rate  Pulmonary/Chest: Effort normal  No respiratory distress  Abdominal: She exhibits no distension  Musculoskeletal:        Right ankle: No CF ligament tenderness found  Lateral malleolus: Mild  Right foot: There is no deformity  Left foot: There is no deformity  Neurological: She is alert and oriented to person, place, and time  Skin: Skin is warm and dry  Psychiatric: She has a normal mood and affect   Her behavior is normal

## 2018-03-15 ENCOUNTER — TELEPHONE (OUTPATIENT)
Dept: OBGYN CLINIC | Facility: HOSPITAL | Age: 58
End: 2018-03-15

## 2018-03-15 NOTE — TELEPHONE ENCOUNTER
C[place], [facility]   [phone]  Patient sees Dr Laureen Petty is calling requesting a refill of prescription:    naproxen (NAPROSYN) 500 mg tablet [[id number]]

## 2018-03-16 DIAGNOSIS — M25.571 PAIN, JOINT, ANKLE AND FOOT, RIGHT: Primary | ICD-10-CM

## 2018-03-16 RX ORDER — NAPROXEN 500 MG/1
500 TABLET ORAL 2 TIMES DAILY WITH MEALS
Qty: 30 TABLET | Refills: 0 | Status: SHIPPED | OUTPATIENT
Start: 2018-03-16 | End: 2019-02-27 | Stop reason: SDDI

## 2018-03-26 ENCOUNTER — HOSPITAL ENCOUNTER (OUTPATIENT)
Dept: CT IMAGING | Facility: HOSPITAL | Age: 58
Discharge: HOME | End: 2018-03-26
Attending: INTERNAL MEDICINE
Payer: COMMERCIAL

## 2018-03-26 ENCOUNTER — OFFICE VISIT (OUTPATIENT)
Dept: OBGYN CLINIC | Facility: CLINIC | Age: 58
End: 2018-03-26

## 2018-03-26 VITALS
HEART RATE: 81 BPM | DIASTOLIC BLOOD PRESSURE: 63 MMHG | SYSTOLIC BLOOD PRESSURE: 85 MMHG | BODY MASS INDEX: 28.36 KG/M2 | WEIGHT: 160.05 LBS | HEIGHT: 63 IN

## 2018-03-26 DIAGNOSIS — M25.671 ANKLE STIFFNESS, RIGHT: Primary | ICD-10-CM

## 2018-03-26 DIAGNOSIS — S82.64XD CLOSED TRAUMATIC NONDISPLACED FRACTURE OF LATERAL MALLEOLUS OF RIGHT FIBULA WITH ROUTINE HEALING, SUBSEQUENT ENCOUNTER: ICD-10-CM

## 2018-03-26 DIAGNOSIS — R91.8 GROUND GLASS OPACITY PRESENT ON IMAGING OF LUNG: ICD-10-CM

## 2018-03-26 DIAGNOSIS — S92.425D CLOSED NONDISPLACED FRACTURE OF DISTAL PHALANX OF LEFT GREAT TOE WITH ROUTINE HEALING, SUBSEQUENT ENCOUNTER: ICD-10-CM

## 2018-03-26 DIAGNOSIS — M25.571 PAIN, JOINT, ANKLE AND FOOT, RIGHT: ICD-10-CM

## 2018-03-26 PROCEDURE — 73610 X-RAY EXAM OF ANKLE: CPT

## 2018-03-26 PROCEDURE — 71250 CT THORAX DX C-: CPT

## 2018-03-26 PROCEDURE — 99024 POSTOP FOLLOW-UP VISIT: CPT | Performed by: FAMILY MEDICINE

## 2018-03-26 NOTE — PATIENT INSTRUCTIONS
Ankle Exercises   AMBULATORY CARE:   What you need to know about ankle exercises: Ankle exercises help strengthen your ankle and improve its function after injury  These are beginning exercises  Ask your healthcare provider if you need to see a physical therapist for more advanced exercises  · Do these exercises 3 to 5 days a week , or as directed by your healthcare provider  Ask if you should perform the exercises on each ankle  · Do the exercises in the order that your healthcare provider recommends  This will help prevent swelling, chronic pain, and reinjury  Start with range of motion exercises  Then progress to strengthening exercises, and finally to balancing exercises  · Warm up before you do ankle exercises  Walk or ride a stationary bike for 5 to 10 minutes to prepare your ankle for movement  · Stop if you feel pain  It is normal to feel some discomfort at first  Regular exercise will help decrease your discomfort over time  How to perform range of motion exercises safely:  Begin with range of motion exercises to improve flexibility  Ask your healthcare provider when you can progress to strengthening exercises  · Ankle alphabet:  Sit on a chair so that your feet do not touch the floor  Use your big toe to write each letter of the alphabet  Use only your foot and ankle, and keep your movements small  Do 2 sets  · Calf stretches:      ¨ Sitting calf stretches with a towel:  Sit on the floor with both legs out straight in front of you  Loop a towel around the ball of your injured foot  Grasp the ends of the towel and pull it toward you  Keep your leg and back straight  Do not lean forward as you pull the towel  Hold for 30 seconds  Then relax for 30 seconds  Do 2 sets of 10  ¨ Standing calf stretches:  Stand facing a wall with the foot that is not injured forward and your knee slightly bent   Keep the leg with the injured foot straight and behind you with your toes pointed in slightly  With both heels flat on the floor, press your hips forward  Do not arch your back  Hold for 30 seconds, and then relax for 30 seconds  Do 2 sets of 10  Repeat with your leg bent  Do 2 sets of 10  How to perform strengthening exercises safely:  After you can perform range of motion exercises without pain, you may begin strengthening exercises  Ask your healthcare provider when you can progress to balancing exercises  · Ankle movement in 4 directions:  Sit on the floor with your legs straight in front of you  Keep your heels on the floor for support  ¨ Dorsiflexion:  Begin with your toes pointing straight up  Pull your toes toward your body  Slowly return to the starting position  Do 3 sets of 5      ¨ Plantar flexion:  Begin with your toes pointing straight up  Push your toes away from your body  Slowly return to the starting position  Do 3 sets of 5            ¨ Inversion:  Begin with your toes pointing straight up  Push your toes inward, toward each other  Slowly return to the starting position  Do 3 sets of 5      ¨ Eversion:  Begin with your toes pointing straight up  Push your toes outward, away from each other  Slowly return to the starting position  Do 3 sets of 5          · Toe curls with a towel:  Sit on a chair so that both of your feet are flat on the floor  Place a small towel on the floor in front of your injured foot  Grab the center of the towel with your toes and curl the towel toward you  Relax and repeat  Do 1 set of 5            · [place] pick-ups:  Sit on a chair so that both of your feet are flat on the floor  Place 20 marbles on the floor in front of your injured foot  Use your toes to  one marble at a time and place it into a bowl  Repeat until you have picked up all the marbles  Do 1 set  · Heel raises:      ¨ Single leg heel raises:  Stand with your weight evenly on both feet  Hold on to a chair or a wall for balance   Lift the foot that is not injured off the floor so all your weight is placed on your injured foot  Raise the heel of your injured foot as high as you can  Slowly lower your heel to the floor  Do 1 set of 10  ¨ Double leg heel raises:  Stand with your weight evenly on both feet  Hold on to a chair or a wall for balance  Raise both of your heels as high as you can  Slowly lower your heels to the floor  Do 1 set of 10  · Heel and toe walks:      ¨ Heel walks:  Begin in a standing position  Lift your toes off the floor and walk on your heels  Keep your toes lifted as high as possible  Do 2 sets of 10  ¨ Toe walks:  Begin in a standing position  Lift your heels off the floor and walk on the balls and toes of your feet  Keep your heels lifted as high as possible  Do 2 sets of 10  How to perform a balance exercise safely:  After you can perform strengthening exercises without pain, you may do this beginning balancing exercise  Ask your healthcare provider for more advanced balance exercises  · Single leg stance:  Stand with your weight evenly on both feet, or hold on to a chair or a wall  Do not lean to the side  Lift the foot that is not injured off the floor so all your weight is placed on your injured foot  Balance on your injured foot  Ask your healthcare provider how long to hold this position  Contact your healthcare provider if:   · Your pain becomes worse  · You have new pain  · You have questions or concerns about your condition, care, or exercise program   © 2017 [address] [employer] Information is for End User's use only and may not be sold, redistributed or otherwise used for commercial purposes  All illustrations and images included in CareNotes® are the copyrighted property of [employer] , [employer]  or [employer]  The above information is an  only  It is not intended as medical advice for individual conditions or treatments   Talk to your doctor, nurse or pharmacist before following any medical regimen to see if it is safe and effective for you

## 2018-03-26 NOTE — PROGRESS NOTES
Assessment/Plan:  Assessment/Plan   Diagnoses and all orders for this visit:    Ankle stiffness, right    Closed traumatic nondisplaced fracture of lateral malleolus of right fibula with routine healing, subsequent encounter  -     XR ankle 3+ vw right; Future    Closed nondisplaced fracture of distal phalanx of left great toe with routine healing, subsequent encounter    Pain, joint, ankle and foot, right      52-year-old female with right lateral malleolus fracture  Discussed with patient physical exam, radiographs, impression, and plan  Radiographs are noted for healing of the fracture, however there is mild persistence of the fracture line lucency  I recommend the patient resume wearing of Cam walker boot on the right lower extremity  I have also discussed with patient the importance of cardiac workup for the etiology of her fainting/syncopal spells  She is also to follow up with her primary care physician as her blood pressure is usually low and this may also be contributing factor  I will see her for follow-up in 4 weeks  Subjective:   Patient ID: Gatito Dougherty is a 65 y o  female  Chief Complaint   Patient presents with    Right Knee - Pain, Follow-up       52-year-old female following up for fracture right lateral malleolus  Patient states that while at home she fell twice 2 days ago  She was standing in the kitchen when she suddenly felt her legs shaking and she couldn't see  She fell back norman as she walked to try to sit down  She states she was either wearing slippers of nothing  She was on the ground for about 10 minutes  She felt her ankle twist when she fell  She got up and made it to the bed using her walker  Few hours later she woke up and when she got up out of bed she hit her foot on the wheelchair and fell backward  She states she rested in bed for 2 days  She reports swelling has worsened      She has seen neurologist for work-up of her fainting spells but has not been worked up by cardiology      Ankle Pain   This is a new problem  The current episode started more than 1 month ago  The problem occurs intermittently  The problem has been gradually improving  Associated symptoms include arthralgias and joint swelling  The symptoms are aggravated by walking  She has tried rest and immobilization for the symptoms  The treatment provided moderate relief  Review of Systems   Musculoskeletal: Positive for arthralgias and joint swelling  Objective:  Vitals:    03/26/18 1534   BP: (!) 85/63   Pulse: 81   Weight: 72 6 kg (160 lb 0 9 oz)   Height: 5' 2 99" (1 6 m)     Right Ankle Exam     Muscle Strength   Dorsiflexion:  4+/5  Plantar flexion:  4+/5          Observations     Right Ankle/Foot   Negative for deformity  Tenderness     Right Ankle/Foot   Tenderness in the lateral malleolus  No tenderness in the anterior ankle, anterior talofibular ligament, calcaneofibular ligament, deltoid ligament, dorsum foot, fibula, medial malleolus and peroneal tendon  Active Range of Motion     Right Ankle/Foot   Dorsiflexion (kf): 15 degrees   Plantar flexion: 30 degrees     Strength/Myotome Testing     Right Ankle/Foot   Dorsiflexion: 4+  Plantar flexion: 4+  Inversion: 4+  Eversion: 4+  Great toe flexion: 4+  Great toe extension: 4+    Tests     Right Ankle/Foot   Negative for anterior drawer, calcaneal squeeze, posterior drawer, syndesmosis squeeze and syndesmosis external rotation  Physical Exam   Constitutional: She is oriented to person, place, and time  She appears well-developed  No distress  HENT:   Head: Normocephalic  Eyes: Conjunctivae are normal    Neck: No tracheal deviation present  Cardiovascular: Normal rate  Pulmonary/Chest: Effort normal  No respiratory distress  Abdominal: She exhibits no distension  Musculoskeletal:        Right ankle: Lateral malleolus tenderness found  No medial malleolus and no CF ligament tenderness found          Right foot: There is no deformity  Neurological: She is alert and oriented to person, place, and time  Skin: Skin is warm and dry  Psychiatric: She has a normal mood and affect   Her behavior is normal        Review of imaging in PACS:  Healing of right lateral malleolus fracture

## 2018-03-30 ENCOUNTER — TELEPHONE (OUTPATIENT)
Dept: PULMONOLOGY | Facility: CLINIC | Age: 58
End: 2018-03-30

## 2018-03-30 NOTE — TELEPHONE ENCOUNTER
Radiology called to inform you of the significant findings that were found from the ct of the chest    Thank you

## 2018-04-02 ENCOUNTER — TELEPHONE (OUTPATIENT)
Dept: PULMONOLOGY | Facility: CLINIC | Age: 58
End: 2018-04-02

## 2018-04-04 ENCOUNTER — OFFICE VISIT (OUTPATIENT)
Dept: OBGYN CLINIC | Facility: CLINIC | Age: 58
End: 2018-04-04
Payer: COMMERCIAL

## 2018-04-04 VITALS
BODY MASS INDEX: 28.36 KG/M2 | WEIGHT: 160.05 LBS | DIASTOLIC BLOOD PRESSURE: 65 MMHG | HEART RATE: 78 BPM | HEIGHT: 63 IN | SYSTOLIC BLOOD PRESSURE: 100 MMHG

## 2018-04-04 DIAGNOSIS — G89.29 CHRONIC MIDLINE LOW BACK PAIN WITH LEFT-SIDED SCIATICA: Primary | ICD-10-CM

## 2018-04-04 DIAGNOSIS — M54.42 CHRONIC MIDLINE LOW BACK PAIN WITH LEFT-SIDED SCIATICA: Primary | ICD-10-CM

## 2018-04-04 DIAGNOSIS — M62.9 HAMSTRING TIGHTNESS OF BOTH LOWER EXTREMITIES: ICD-10-CM

## 2018-04-04 DIAGNOSIS — G57.02 PIRIFORMIS SYNDROME, LEFT: ICD-10-CM

## 2018-04-04 DIAGNOSIS — M62.830 LUMBAR PARASPINAL MUSCLE SPASM: ICD-10-CM

## 2018-04-04 PROCEDURE — 99213 OFFICE O/P EST LOW 20 MIN: CPT | Performed by: FAMILY MEDICINE

## 2018-04-04 RX ORDER — AMOXICILLIN 500 MG/1
CAPSULE ORAL
Refills: 0 | COMMUNITY
Start: 2018-03-12 | End: 2018-04-30 | Stop reason: ALTCHOICE

## 2018-04-04 RX ORDER — NAPROXEN 500 MG/1
500 TABLET ORAL 2 TIMES DAILY WITH MEALS
Qty: 30 TABLET | Refills: 1 | Status: SHIPPED | OUTPATIENT
Start: 2018-04-04 | End: 2018-04-30 | Stop reason: SDUPTHER

## 2018-04-04 NOTE — PROGRESS NOTES
Assessment/Plan:  Assessment/Plan   Diagnoses and all orders for this visit:    Chronic midline low back pain with left-sided sciatica  -     Ambulatory referral to Physical Therapy; Future  -     naproxen (NAPROSYN) 500 mg tablet; Take 1 tablet (500 mg total) by mouth 2 (two) times a day with meals    Piriformis syndrome, left  -     Ambulatory referral to Physical Therapy; Future    Hamstring tightness of both lower extremities  -     Ambulatory referral to Physical Therapy; Future    Lumbar paraspinal muscle spasm  -     Ambulatory referral to Physical Therapy; Future    Other orders  -     amoxicillin (AMOXIL) 500 mg capsule; TAKE ONE CAPSULE BY MOUTH EVERY 8 HOURS FOR 10 DAYS     78-year-old female with acute worsening of low back pain  Discussed with patient physical exam, impression, and plan  Her physical exam is noted for bilateral lumbar paraspinal hypertonicity, left piriformis tenderness, and tightness of hamstrings of both lower extremities  Clinical impression is lumbar strain, left-sided piriformis syndrome, and hamstring tightness contributing to her pain  I discussed with her treatment in the form of anti-inflammatory and physical therapy  She is to take naproxen 500 milligrams twice daily with food consistently for 2 weeks  She is to start physical therapy as soon as possible and do home exercises as directed  Due to her history of recurrent falls I have deferred muscle relaxant as this can cause drowsiness  She is to keep her appointment with pain management as scheduled  She will follow up with me in 6 weeks at which point she will be re-evaluated  Subjective:   Patient ID: Filomena Ulloa is a 65 y o  female  Chief complaint: Back pain    78-year-old female presents for evaluation of low back pain  Patient reports history of low back pain for more than 10 years and has history of posterior lumbar fusion L3-5    She has history of recurrent falls and states that she fell 2 months ago landing on her buttock and low back area  Since then she has had worsening of back pain described as localized to the lumbosacral area, radiating to the left buttock and lower leg, constant, worse with prolonged standing and twisting  She states that she is currently being followed by pain management and is scheduled for appointment on 04/16/2018  Back Pain   This is a recurrent problem  The current episode started more than 1 month ago  The problem occurs constantly  The problem has been gradually worsening  Pertinent negatives include no numbness  The symptoms are aggravated by twisting and standing  She has tried rest and NSAIDs for the symptoms  The treatment provided mild relief  The following portions of the patient's history were reviewed and updated as appropriate: She  has a past medical history of Anemia; Arthritis; Asthma; Depression; Diabetes insipidus ([address] ); Migraines; and Osteoarthritis  She  has a past surgical history that includes Gallbladder surgery; Tonsillectomy; Gastric bypass; and Back surgery  Her family history includes Cancer in her mother; Heart disease in her brother and father  She  reports that she has been smoking  She uses smokeless tobacco  Her alcohol and drug histories are not on file  She is allergic to morphine and related       Review of Systems   Musculoskeletal: Positive for back pain  Neurological: Negative for numbness  Objective:  Vitals:    04/04/18 1424   BP: 100/65   Pulse: 78   Weight: 72 6 kg (160 lb 0 9 oz)   Height: 5' 2 99" (1 6 m)     Right Hip Exam     Range of Motion   The patient has normal right hip ROM  Muscle Strength   Abduction: 4/5   Adduction: 4/5   Flexion: 4/5     Tests   BRAXTON: negative    Comments:    Negative FADDIR      Left Hip Exam     Tenderness   Left hip tenderness location: Piriformis  Range of Motion   The patient has normal left hip ROM      Muscle Strength   Abduction: 4/5   Adduction: 4/5   Flexion: 4/5 Tests   BRAXTON: negative    Comments:    Negative FADDIR      Back Exam     Tenderness   The patient is experiencing tenderness in the lumbar  Range of Motion   Extension: normal   Flexion: normal   Lateral Bend Right: abnormal   Lateral Bend Left: normal   Rotation Right: abnormal   Rotation Left: normal     Muscle Strength   Right Quadriceps:  4/5   Left Quadriceps:  4/5   Right Hamstrings:  4/5   Left Hamstrings:  4/5     Tests   Straight leg raise right: negative  Straight leg raise left: negative    Reflexes   Patellar: 2/4    Other   Back sensation: Decreased sensation L2 dermatome on the right  Gait: walking with CAM boot on right lower extremity  Comments:    Thoracolumbar levoscoliosis            Physical Exam   Constitutional: She is oriented to person, place, and time  She appears well-developed  No distress  HENT:   Head: Normocephalic  Eyes: Conjunctivae are normal    Neck: No tracheal deviation present  Cardiovascular: Normal rate  Pulmonary/Chest: Effort normal  No respiratory distress  Abdominal: She exhibits no distension  Neurological: She is alert and oriented to person, place, and time  Skin: Skin is warm and dry  Psychiatric: She has a normal mood and affect   Her behavior is normal

## 2018-04-10 NOTE — TELEPHONE ENCOUNTER
Called patient and LVM and asked her to call back to discuss CT results  Please let her know when she calls back that there is a new 7 mm lung nodule on CT chest and needs repeat CT in 6 months  She can get this order at her next appointment with Dr Cleone Boeck 6/11/18       Thanks,  Luis Soto

## 2018-04-18 ENCOUNTER — HOSPITAL ENCOUNTER (OUTPATIENT)
Dept: ULTRASOUND IMAGING | Facility: CLINIC | Age: 58
Discharge: HOME | End: 2018-04-18
Payer: COMMERCIAL

## 2018-04-18 ENCOUNTER — HOSPITAL ENCOUNTER (OUTPATIENT)
Dept: MAMMOGRAPHY | Facility: CLINIC | Age: 58
Discharge: HOME | End: 2018-04-18
Payer: COMMERCIAL

## 2018-04-18 DIAGNOSIS — R92.8 ABNORMAL MAMMOGRAM: ICD-10-CM

## 2018-04-18 DIAGNOSIS — N63.20 MASS OF LEFT BREAST: ICD-10-CM

## 2018-04-18 PROCEDURE — 76642 ULTRASOUND BREAST LIMITED: CPT

## 2018-04-18 PROCEDURE — 77066 DX MAMMO INCL CAD BI: CPT

## 2018-04-18 PROCEDURE — G0279 TOMOSYNTHESIS, MAMMO: HCPCS

## 2018-04-25 ENCOUNTER — OFFICE VISIT (OUTPATIENT)
Dept: OBGYN CLINIC | Facility: CLINIC | Age: 58
End: 2018-04-25
Payer: COMMERCIAL

## 2018-04-25 VITALS
DIASTOLIC BLOOD PRESSURE: 73 MMHG | SYSTOLIC BLOOD PRESSURE: 106 MMHG | HEIGHT: 63 IN | HEART RATE: 90 BPM | BODY MASS INDEX: 28.44 KG/M2 | WEIGHT: 160.5 LBS

## 2018-04-25 DIAGNOSIS — M77.51 TENDINITIS OF RIGHT FOOT: ICD-10-CM

## 2018-04-25 DIAGNOSIS — M79.671 PAIN IN RIGHT FOOT: Primary | ICD-10-CM

## 2018-04-25 PROCEDURE — 99213 OFFICE O/P EST LOW 20 MIN: CPT | Performed by: FAMILY MEDICINE

## 2018-04-25 RX ORDER — NAPROXEN 500 MG/1
500 TABLET ORAL 2 TIMES DAILY WITH MEALS
Qty: 30 TABLET | Refills: 0 | Status: SHIPPED | OUTPATIENT
Start: 2018-04-25 | End: 2018-04-30 | Stop reason: SDUPTHER

## 2018-04-25 NOTE — PROGRESS NOTES
Assessment/Plan:  Assessment/Plan   Diagnoses and all orders for this visit:    Pain in right foot  -     naproxen (NAPROSYN) 500 mg tablet; Take 1 tablet (500 mg total) by mouth 2 (two) times a day with meals    Tendinitis of right foot  -     naproxen (NAPROSYN) 500 mg tablet; Take 1 tablet (500 mg total) by mouth 2 (two) times a day with meals      56-year-old female status post right ankle lateral malleolus fracture from injury in January 2018  Discussed with patient physical exam, impression, and plan  She has been ambulating in regular footwear without pain of the ankle but reports pain in the foot particularly along the dorsum of the 1st and 2nd metatarsals  Clinical impression is she may be developing tendinitis of the foot  She will take naproxen 500 mg twice daily food for 2 weeks  She is to continue with wrapping her feet for comfort and she is continue with ankle and foot exercises as directed  She will follow up with me in 4 weeks for re-evaluation  Subjective:   Patient ID: Parrish King is a 65 y o  female  Chief Complaint   Patient presents with    Right Ankle - Follow-up       56-year-old female following up for fracture of the right lateral malleolus from injury 01/11/2018  She has been ambulating in regular footwear without pain of the ankle, but most reports discomfort/achiness at the dorsum of the foot particularly along the 1st and 2nd metatarsals  She has been wrapping her feet with Ace bandages which she states helps with her pain  She also been doing home exercises has had previous appointment she was provided with printed exercises for foot and ankle  She has no to have history of repeated falls and it was discussed that her at previous appointment she is to follow up with her primary care physician and to get cardiac evaluation  She is yet to see family physician or cardiologist                 Review of Systems   Musculoskeletal: Positive for myalgias  Objective:  Vitals:    04/25/18 1521   BP: 106/73   Pulse: 90   Weight: 72 8 kg (160 lb 7 9 oz)   Height: 5' 3" (1 6 m)     Right Ankle Exam     Muscle Strength   Dorsiflexion:  5/5  Plantar flexion:  4+/5          Observations     Right Ankle/Foot   Negative for deformity  Tenderness     Right Ankle/Foot   Tenderness in the dorsum foot (First and 2nd metatarsal) and lateral malleolus ( mild tenderness)  No tenderness in the anterior ankle, anterior talofibular ligament, fifth metatarsal base, calcaneofibular ligament, deltoid ligament, medial calcaneus, medial malleolus, peroneal tendon and posterior talofibular ligament  Active Range of Motion     Right Ankle/Foot   Normal active range of motion    Strength/Myotome Testing     Right Ankle/Foot   Dorsiflexion: 5  Plantar flexion: 4+  Inversion: 4+  Eversion: 4+  Great toe flexion: 4+  Great toe extension: 4+      Physical Exam   Constitutional: She is oriented to person, place, and time  She appears well-developed  No distress  HENT:   Head: Normocephalic  Eyes: Conjunctivae are normal    Neck: No tracheal deviation present  Cardiovascular: Normal rate  Pulmonary/Chest: Effort normal  No respiratory distress  Abdominal: She exhibits no distension  Musculoskeletal:        Right ankle: Lateral malleolus ( mild tenderness) tenderness found  No medial malleolus, no CF ligament and no posterior TFL tenderness found  Right foot: There is no deformity  Neurological: She is alert and oriented to person, place, and time  Skin: Skin is warm and dry  Psychiatric: She has a normal mood and affect   Her behavior is normal

## 2018-04-30 ENCOUNTER — OFFICE VISIT (OUTPATIENT)
Dept: INTERNAL MEDICINE CLINIC | Facility: CLINIC | Age: 58
End: 2018-04-30
Payer: COMMERCIAL

## 2018-04-30 VITALS
OXYGEN SATURATION: 97 % | SYSTOLIC BLOOD PRESSURE: 80 MMHG | WEIGHT: 163 LBS | RESPIRATION RATE: 18 BRPM | HEART RATE: 91 BPM | BODY MASS INDEX: 28.88 KG/M2 | TEMPERATURE: 97.9 F | HEIGHT: 63 IN | DIASTOLIC BLOOD PRESSURE: 62 MMHG

## 2018-04-30 DIAGNOSIS — G89.4 PAIN SYNDROME, CHRONIC: ICD-10-CM

## 2018-04-30 DIAGNOSIS — K04.7 DENTAL INFECTION: Primary | ICD-10-CM

## 2018-04-30 DIAGNOSIS — F32.2 SEVERE DEPRESSION (HCC): ICD-10-CM

## 2018-04-30 PROBLEM — J42 CHRONIC BRONCHITIS (HCC): Status: ACTIVE | Noted: 2017-03-06

## 2018-04-30 PROBLEM — I73.1 THROMBOANGIITIS OBLITERANS (HCC): Status: ACTIVE | Noted: 2017-12-22

## 2018-04-30 PROBLEM — E78.2 COMBINED HYPERLIPIDEMIA: Status: ACTIVE | Noted: 2017-02-02

## 2018-04-30 PROCEDURE — 99214 OFFICE O/P EST MOD 30 MIN: CPT | Performed by: PHYSICIAN ASSISTANT

## 2018-04-30 RX ORDER — CLINDAMYCIN HYDROCHLORIDE 300 MG/1
300 CAPSULE ORAL 4 TIMES DAILY
Qty: 28 CAPSULE | Refills: 0 | Status: SHIPPED | OUTPATIENT
Start: 2018-04-30 | End: 2018-05-07

## 2018-04-30 NOTE — PROGRESS NOTES
Assessment/Plan:  Obvious dental infection left lower jaw  She is currently looking for a dentist meanwhile treated with clindamycin otherwise she is stable chronic pain anxiety depression pretty well controlled       Diagnoses and all orders for this visit:    Dental infection  -     clindamycin (CLEOCIN) 300 MG capsule; Take 1 capsule (300 mg total) by mouth 4 (four) times a day for 7 days    Pain syndrome, chronic    Severe depression (HCC)        No problem-specific Assessment & Plan notes found for this encounter  Subjective:      Patient ID: Lara Downs is a 65 y o  female  Mouth pain for a week  She has been having problems with dental infections over the past year intermittently  Having trouble finding a dentist who will take care of her a lot of medical problems that are fairly controlled  She has chronic pain chronically low blood pressure occasional falls  Dental Pain    Pertinent negatives include no fever or sinus pressure  The following portions of the patient's history were reviewed and updated as appropriate:   She has a past medical history of Anemia; Arthritis; Asthma; Cardiac disorder; Depression; Diabetes insipidus ([address] ); Malignant neoplasm ([facility] [address] ); Migraines; Osteoarthritis; and Skin disorder  ,   does not have any pertinent problems on file  ,   has a past surgical history that includes Gallbladder surgery; Tonsillectomy; Gastric bypass; Back surgery; Other surgical history (Left); Arthrodesis; Bladder surgery; Foot surgery (Right); Abdominal surgery; Hand surgery; and Adenoidectomy  ,  family history includes Cancer in her mother; Heart attack in her father; Heart disease in her brother and father; Neuropathy in her father; Osteoarthritis in her family; Osteoporosis in her family; Scoliosis in her family  ,   reports that she has been smoking  She uses smokeless tobacco  She reports that she uses drugs, including Marijuana   She reports that she does not drink alcohol ,  is allergic to morphine and related; amoxicillin-pot clavulanate; quetiapine; and sulfa antibiotics     Current Outpatient Prescriptions   Medication Sig Dispense Refill    Cholecalciferol 1000 UNIT/10ML LIQD       clonazePAM (KlonoPIN) 0 5 mg tablet Take 0 5 mg by mouth 2 (two) times a day  1    doxepin (SINEquan) 150 MG capsule 150 mg daily at bedtime  1    fentaNYL (DURAGESIC) 75 mcg/hr APPLY 1 PATCH EVERY 72 HOURS  0    fluticasone-salmeterol (ADVAIR DISKUS) 250-50 mcg/dose inhaler Inhale 1 puff every 12 (twelve) hours      gabapentin (NEURONTIN) 800 mg tablet Take 800 mg by mouth 4 (four) times a day  3    lidocaine (XYLOCAINE) 5 % ointment apply locally FOUR TIMES DAILY  1    oxyCODONE-acetaminophen (PERCOCET) 5-325 mg per tablet Take 1-2 tablets by mouth every 4 (four) hours      pantoprazole (PROTONIX) 40 mg tablet TAKE 1 TABLET BY MOUTH EVERY DAY 90 tablet 3    povidone-iodine (BETADINE) 10 % external solution Apply topically      rOPINIRole (REQUIP) 0 25 mg tablet Take 0 25 mg by mouth 3 (three) times a day  4    venlafaxine 225 MG TB24 Take 225 mg by mouth daily  1    clindamycin (CLEOCIN) 300 MG capsule Take 1 capsule (300 mg total) by mouth 4 (four) times a day for 7 days 28 capsule 0    famotidine (PEPCID) 20 mg tablet TAKE 1 TABLET BY MOUTH EVERY DAY AT BEDTIME 90 tablet 3    naproxen (NAPROSYN) 500 mg tablet Take 1 tablet (500 mg total) by mouth 2 (two) times a day with meals 30 tablet 0     No current facility-administered medications for this visit  Review of Systems   Constitutional: Negative for activity change, appetite change, chills, diaphoresis, fatigue, fever and unexpected weight change  HENT: Negative for congestion, dental problem, drooling, ear discharge, ear pain, facial swelling, hearing loss, nosebleeds, postnasal drip, rhinorrhea, sinus pain, sinus pressure, sneezing, sore throat, tinnitus, trouble swallowing and voice change      Eyes: Negative for photophobia, pain, discharge, redness, itching and visual disturbance  Respiratory: Negative for apnea, cough, choking, chest tightness, shortness of breath, wheezing and stridor  Cardiovascular: Negative for chest pain, palpitations and leg swelling  Gastrointestinal: Negative for abdominal distention, abdominal pain, anal bleeding, blood in stool, constipation, diarrhea, nausea, rectal pain and vomiting  Endocrine: Negative for cold intolerance, heat intolerance, polydipsia, polyphagia and polyuria  Genitourinary: Negative for decreased urine volume, difficulty urinating, dysuria, enuresis, flank pain, frequency, genital sores, hematuria and urgency  Musculoskeletal: Positive for arthralgias, back pain, gait problem, myalgias and neck stiffness  Negative for joint swelling and neck pain  Skin: Negative for color change, pallor, rash and wound  Neurological: Positive for dizziness, tremors and weakness  Negative for seizures, syncope, facial asymmetry, speech difficulty, light-headedness, numbness and headaches  Hematological: Negative for adenopathy  Does not bruise/bleed easily  Psychiatric/Behavioral: Negative for agitation, behavioral problems, confusion, decreased concentration, dysphoric mood, hallucinations, self-injury, sleep disturbance and suicidal ideas  The patient is not nervous/anxious and is not hyperactive  Objective:  Vitals:    04/30/18 1630   BP: (!) 80/62   Pulse: 91   Resp: 18   Temp: 97 9 °F (36 6 °C)   SpO2: 97%   Weight: 73 9 kg (163 lb)   Height: 5' 3" (1 6 m)     Body mass index is 28 87 kg/m²  Physical Exam   Constitutional: She is oriented to person, place, and time  She appears well-developed  HENT:   Head: Normocephalic  Right Ear: External ear normal    Left Ear: External ear normal    Mouth/Throat: Oropharynx is clear and moist  Oropharyngeal exudate: Multiple dental caries with swelling redness left lower the canine area     Eyes: Conjunctivae are normal  Pupils are equal, round, and reactive to light  Neck: Neck supple  No thyromegaly present  Cardiovascular: Normal rate, regular rhythm, normal heart sounds and intact distal pulses  Exam reveals no gallop  No murmur heard  Pulmonary/Chest: Effort normal and breath sounds normal  She has no wheezes  She has no rales  She exhibits no tenderness  Abdominal: Soft  Bowel sounds are normal  She exhibits no distension  Musculoskeletal: Normal range of motion  She exhibits no edema or deformity  Neurological: She is alert and oriented to person, place, and time  She has normal reflexes  Abnormal coordination: Walks with a cane  Skin: Skin is warm and dry

## 2018-05-28 DIAGNOSIS — J45.30 MILD PERSISTENT ASTHMA WITHOUT COMPLICATION: Primary | ICD-10-CM

## 2018-05-30 DIAGNOSIS — J45.30 MILD PERSISTENT ASTHMA WITHOUT COMPLICATION: Primary | ICD-10-CM

## 2018-06-11 ENCOUNTER — TELEPHONE (OUTPATIENT)
Dept: INTERNAL MEDICINE CLINIC | Facility: CLINIC | Age: 58
End: 2018-06-11

## 2018-06-11 ENCOUNTER — OFFICE VISIT (OUTPATIENT)
Dept: PULMONOLOGY | Facility: CLINIC | Age: 58
End: 2018-06-11
Payer: COMMERCIAL

## 2018-06-11 ENCOUNTER — TRANSCRIBE ORDERS (OUTPATIENT)
Dept: NEUROLOGY | Facility: CLINIC | Age: 58
End: 2018-06-11

## 2018-06-11 ENCOUNTER — TELEPHONE (OUTPATIENT)
Dept: NEUROLOGY | Facility: CLINIC | Age: 58
End: 2018-06-11

## 2018-06-11 VITALS
DIASTOLIC BLOOD PRESSURE: 80 MMHG | OXYGEN SATURATION: 91 % | HEART RATE: 80 BPM | SYSTOLIC BLOOD PRESSURE: 104 MMHG | HEIGHT: 63 IN | WEIGHT: 160 LBS | BODY MASS INDEX: 28.35 KG/M2

## 2018-06-11 DIAGNOSIS — Z72.0 TOBACCO USE: ICD-10-CM

## 2018-06-11 DIAGNOSIS — J41.0 SIMPLE CHRONIC BRONCHITIS (HCC): Primary | ICD-10-CM

## 2018-06-11 DIAGNOSIS — R91.1 LUNG NODULE: ICD-10-CM

## 2018-06-11 DIAGNOSIS — R05.3 CHRONIC COUGH: ICD-10-CM

## 2018-06-11 DIAGNOSIS — J45.991 COUGH VARIANT ASTHMA: ICD-10-CM

## 2018-06-11 PROCEDURE — 99214 OFFICE O/P EST MOD 30 MIN: CPT | Performed by: INTERNAL MEDICINE

## 2018-06-11 RX ORDER — ALBUTEROL SULFATE 2.5 MG/3ML
2.5 SOLUTION RESPIRATORY (INHALATION) EVERY 6 HOURS PRN
Qty: 1080 ML | Refills: 3 | Status: SHIPPED | OUTPATIENT
Start: 2018-06-11

## 2018-06-11 NOTE — PROGRESS NOTES
Assessment/Plan:   Diagnoses and all orders for this visit:    Simple chronic bronchitis ([address] )  -     [facility] Allergy Panel, Adult; Future  -     Complete pulmonary function test; Future  -     albuterol (2 5 mg/3 mL) 0 083 % nebulizer solution; Take 1 vial (2 5 mg total) by nebulization every 6 (six) hours as needed for wheezing or shortness of breath    Chronic cough    Cough variant asthma    Tobacco use    Lung nodule  -     CT chest without contrast; Future        Chronic cough likely secondary to the chronic bronchitis but given her significant allergies, likely from cough variant asthma  Discussed with the patient to get the respiratory allergy panel with IgE  PFT with bronchodilators lung volumes and DLCO  She will continue with Advair 250/50 one puff twice daily  Continue with albuterol via the nebulizer 4 times daily as needed  We'll follow-up with the respiratory allergy panel to [facility] if she would benefit from Singulair  Lung nodule, recent CT of the chest demonstrating a new 7 mm lung nodule which needs follow-up in 6 months given her extensive smoking and active smoking history  Smoking cessation discussed  See her back in 6 weeks with the above testing  Return in about 6 weeks (around 7/23/2018)  All questions are answered to the patient's satisfaction and understanding  She verbalizes understanding  She is encouraged to call with any further questions or concerns  Portions of the record may have been created with voice recognition software  Occasional wrong word or "sound a like" substitutions may have occurred due to the inherent limitations of voice recognition software  Read the chart carefully and recognize, using context, where substitutions have occurred      ______________________________________________________________________    Chief Complaint:   Chief Complaint   Patient presents with    Follow-up       Patient ID: Denver Colander is a 65 y o  y o  female has a past medical history of Anemia; Arthritis; Asthma; Cardiac disorder; Chronic bronchitis ([facility] [address] ); Depression; Diabetes insipidus ([facility] [address] ); Malignant neoplasm ([facility] [address] ); Migraines; Mild persistent asthma; Osteoarthritis; Skin disorder; and Tobacco abuse  6/11/2018  Patient is here for follow-up of her COPD  She is has extensive smoking history who has significantly cut down her smoking since her last visit about a year ago  She states she is currently smoking about 10-11 cigarettes a day  And she wants to quit on his own  She is here for follow-up of her CT of the chest with which doesn't demonstrate a lung nodule  Review of Systems   Constitutional: Positive for fatigue  Negative for activity change, appetite change, chills, diaphoresis, fever and unexpected weight change  HENT: Negative for congestion, dental problem, drooling, nosebleeds, postnasal drip, rhinorrhea, sinus pressure, sore throat and voice change  Eyes: Negative for discharge, itching and visual disturbance  Respiratory: Positive for cough (clear sputum, no hemoptysis), shortness of breath and wheezing  Cardiovascular: Negative for chest pain, palpitations and leg swelling  Endocrine: Negative for cold intolerance and heat intolerance  Allergic/Immunologic: Negative for food allergies and immunocompromised state  Neurological: Negative for dizziness, facial asymmetry, speech difficulty and weakness  Hematological: Negative for adenopathy  Psychiatric/Behavioral: Negative for agitation, confusion and sleep disturbance  The patient is not nervous/anxious  Smoking history: She reports that she has been smoking    She uses smokeless tobacco     The following portions of the patient's history were reviewed and updated as appropriate: allergies, current medications, past family history, past medical history, past social history, past surgical history and problem list     Immunization History   Administered Date(s) Administered    Influenza Quadrivalent, 6-35 Months IM 09/23/2017     Current Outpatient Prescriptions   Medication Sig Dispense Refill    ADVAIR DISKUS 250-50 MCG/DOSE inhaler INHALE 1 DOSE BY MOUTH TWICE DAILY  RINSE MOUTH AFTER USE 3 Inhaler 3    Cholecalciferol 1000 UNIT/10ML LIQD       clonazePAM (KlonoPIN) 0 5 mg tablet Take 0 5 mg by mouth 2 (two) times a day  1    doxepin (SINEquan) 150 MG capsule 150 mg daily at bedtime  1    famotidine (PEPCID) 20 mg tablet TAKE 1 TABLET BY MOUTH EVERY DAY AT BEDTIME 90 tablet 3    fentaNYL (DURAGESIC) 75 mcg/hr APPLY 1 PATCH EVERY 72 HOURS  0    gabapentin (NEURONTIN) 800 mg tablet Take 800 mg by mouth 4 (four) times a day  3    lidocaine (XYLOCAINE) 5 % ointment apply locally FOUR TIMES DAILY  1    naproxen (NAPROSYN) 500 mg tablet Take 1 tablet (500 mg total) by mouth 2 (two) times a day with meals 30 tablet 0    oxyCODONE-acetaminophen (PERCOCET) 5-325 mg per tablet Take 1-2 tablets by mouth every 4 (four) hours      pantoprazole (PROTONIX) 40 mg tablet TAKE 1 TABLET BY MOUTH EVERY DAY 90 tablet 3    povidone-iodine (BETADINE) 10 % external solution Apply topically      rOPINIRole (REQUIP) 0 25 mg tablet Take 0 25 mg by mouth 3 (three) times a day  4    venlafaxine 225 MG TB24 Take 225 mg by mouth daily  1    VENTOLIN  (90 Base) MCG/ACT inhaler INHALE 2 PUFFS EVERY 6 HOURS AS NEEDED 18 Inhaler 1    albuterol (2 5 mg/3 mL) 0 083 % nebulizer solution Take 1 vial (2 5 mg total) by nebulization every 6 (six) hours as needed for wheezing or shortness of breath 1080 mL 3     No current facility-administered medications for this visit  Allergies: Morphine and related; Amoxicillin-pot clavulanate; Quetiapine; and Sulfa antibiotics    Objective:  Vitals:    06/11/18 1235   BP: 104/80   Pulse: 80   SpO2: 91%   Weight: 72 6 kg (160 lb)   Height: 5' 3" (1 6 m)   Oxygen Therapy  SpO2: 91 %      Wt Readings from Last 3 Encounters:   06/11/18 72 6 kg (160 lb)   04/30/18 73 9 kg (163 lb) 04/25/18 72 8 kg (160 lb 7 9 oz)     Body mass index is 28 34 kg/m²  Physical Exam   Constitutional: She is oriented to person, place, and time  She appears well-developed and well-nourished  HENT:   Head: Normocephalic and atraumatic  Eyes: Conjunctivae are normal  Pupils are equal, round, and reactive to light  Neck: Normal range of motion  Neck supple  No JVD present  No thyromegaly present  Cardiovascular: Normal rate, regular rhythm and normal heart sounds  Exam reveals no gallop and no friction rub  No murmur heard  Pulmonary/Chest: Effort normal and breath sounds normal  No respiratory distress  She has no wheezes  She has no rales  She exhibits no tenderness  Abdominal: Soft  Bowel sounds are normal    Musculoskeletal: Normal range of motion  She exhibits no edema, tenderness or deformity  Lymphadenopathy:     She has no cervical adenopathy  Neurological: She is alert and oriented to person, place, and time  Skin: Skin is warm and dry  Psychiatric: She has a normal mood and affect  Nursing note and vitals reviewed

## 2018-06-11 NOTE — TELEPHONE ENCOUNTER
PT WOULD LIKE TO CHANGE PCP DR'S FROM DR WILKERSON TO DR Mcneill Blocker    PT DOESN'T FEEL LIKE SHE HAS "GOTTEN ANYWHERE" WITH  [address] [facility], [id number]

## 2018-06-11 NOTE — TELEPHONE ENCOUNTER
Patient stopped by requesting appt with you  Patient last visit was 2013  Patient has seen Jessica Beard in the past and would to see Dr Cydney Tapia instead  Please contact [phone] [facility]  when scheduling appt for patient      Thank you

## 2018-06-13 RX ORDER — PROPRANOLOL HYDROCHLORIDE 10 MG/1
TABLET ORAL
Qty: 270 TABLET | Refills: 3 | OUTPATIENT
Start: 2018-06-13

## 2018-06-15 RX ORDER — ZAFIRLUKAST 20 MG/1
20 TABLET, FILM COATED ORAL
Refills: 3 | COMMUNITY
Start: 2018-05-02 | End: 2018-09-06 | Stop reason: SDUPTHER

## 2018-06-15 RX ORDER — BUSPIRONE HYDROCHLORIDE 10 MG/1
10 TABLET ORAL 2 TIMES DAILY
Refills: 0 | COMMUNITY
Start: 2018-05-30 | End: 2018-08-01 | Stop reason: SDDI

## 2018-06-15 RX ORDER — VENLAFAXINE HYDROCHLORIDE 75 MG/1
75 CAPSULE, EXTENDED RELEASE ORAL DAILY
Refills: 0 | COMMUNITY
Start: 2018-05-02 | End: 2018-08-01 | Stop reason: DRUGHIGH

## 2018-06-15 RX ORDER — FLUTICASONE PROPIONATE 50 MCG
1 SPRAY, SUSPENSION (ML) NASAL 2 TIMES DAILY PRN
Refills: 2 | COMMUNITY
Start: 2018-04-23

## 2018-06-18 ENCOUNTER — TELEPHONE (OUTPATIENT)
Dept: INTERNAL MEDICINE CLINIC | Facility: CLINIC | Age: 58
End: 2018-06-18

## 2018-06-18 ENCOUNTER — HOSPITAL ENCOUNTER (OUTPATIENT)
Dept: RADIOLOGY | Facility: HOSPITAL | Age: 58
Discharge: HOME | End: 2018-06-18
Attending: INTERNAL MEDICINE
Payer: COMMERCIAL

## 2018-06-18 ENCOUNTER — OFFICE VISIT (OUTPATIENT)
Dept: INTERNAL MEDICINE CLINIC | Facility: CLINIC | Age: 58
End: 2018-06-18
Payer: COMMERCIAL

## 2018-06-18 VITALS — DIASTOLIC BLOOD PRESSURE: 64 MMHG | HEIGHT: 63 IN | SYSTOLIC BLOOD PRESSURE: 92 MMHG

## 2018-06-18 DIAGNOSIS — M79.671 PAIN IN BOTH FEET: ICD-10-CM

## 2018-06-18 DIAGNOSIS — M79.672 PAIN IN BOTH FEET: ICD-10-CM

## 2018-06-18 DIAGNOSIS — R25.1 TREMOR, ANXIETY RELATED: ICD-10-CM

## 2018-06-18 DIAGNOSIS — J41.0 SIMPLE CHRONIC BRONCHITIS (HCC): ICD-10-CM

## 2018-06-18 DIAGNOSIS — R49.0 HOARSE: ICD-10-CM

## 2018-06-18 DIAGNOSIS — J45.30 MILD PERSISTENT ASTHMA WITHOUT COMPLICATION: Primary | ICD-10-CM

## 2018-06-18 DIAGNOSIS — F41.9 TREMOR, ANXIETY RELATED: ICD-10-CM

## 2018-06-18 DIAGNOSIS — R29.6 FALLS FREQUENTLY: ICD-10-CM

## 2018-06-18 DIAGNOSIS — I95.1 ORTHOSTASIS: ICD-10-CM

## 2018-06-18 PROCEDURE — 99214 OFFICE O/P EST MOD 30 MIN: CPT | Performed by: INTERNAL MEDICINE

## 2018-06-18 PROCEDURE — 73630 X-RAY EXAM OF FOOT: CPT

## 2018-06-18 NOTE — PATIENT INSTRUCTIONS
Frequent falls  Unknown cause  Concern is for affective polypharmacy on gait and possibly also orthostasis  Recommendation:  Wean off propanolol slowly  Go to the 10 mg twice a day as of now, and in 5 days go to once a day, then after 5 days stop it  X-ray of both feet  Neurological consultation for the frequent falling also  Get the x-ray done today or tomorrow and call me after it is done  See me back here again towards the end of this week

## 2018-06-18 NOTE — PROGRESS NOTES
Assessment/Plan:       Diagnoses and all orders for this visit:    Mild persistent asthma without complication    Falls frequently    Hoarse    Tremor, anxiety related    Other orders  -     busPIRone (BUSPAR) 10 mg tablet; Take 10 mg by mouth 2 (two) times a day  -     fluticasone (FLONASE) 50 mcg/act nasal spray; 1 spray 2 (two) times a day  -     venlafaxine (EFFEXOR-XR) 75 mg 24 hr capsule; Take 75 mg by mouth daily  -     zafirlukast (ACCOLATE) 20 MG tablet; Take 20 mg by mouth 2 (two) times a day before meals              Subjective:      Patient ID: Yosef Vela is a 65 y o  female  A patient presents with report of multiple falls happening up to 30 times a week; the description she gives is 1 of orthostasis where the seem to occur more when she changes position from supine or seated to standing; a few moments after standing up she develops a sensation like a rushing or whooshing in her head and N/C starts to go down  Sometimes she hits the ground, sometimes she can catch herself on, for example, and year by object of furniture  However, she has managed to injure her toes both right and left in falls this week  The patient had been given propanolol 10 p o  T i d  For medication associated tremor and has been trying to cut back that drug with the theory that that might of been contributory     on multiple psychotropics from a psychiatrist    On narcotic pain medication from a pain management specialist for chronic lumbar pain  The following portions of the patient's history were reviewed and updated as appropriate:   She has a past medical history of Anemia; Asthma; Cardiac disorder; Chronic bronchitis ([address] ); Depression; Diabetes insipidus ([address] ); Malignant neoplasm ([address] ); Migraines; Mild persistent asthma; Osteoarthritis; Skin disorder; and Tobacco abuse ,   does not have any pertinent problems on file  ,   has a past surgical history that includes Gallbladder surgery;  Tonsillectomy; Gastric bypass; Back surgery; Other surgical history (Left); Arthrodesis; Bladder surgery; Foot surgery (Right); Abdominal surgery; Hand surgery; and Adenoidectomy  ,  family history includes Cancer in her mother; Heart attack in her father; Heart disease in her brother and father; Neuropathy in her father; Osteoarthritis in her family; Osteoporosis in her family; Scoliosis in her family  ,   reports that she has been smoking  She uses smokeless tobacco  She reports that she uses drugs, including Marijuana  She reports that she does not drink alcohol ,  is allergic to morphine and related; amoxicillin-pot clavulanate; quetiapine; and sulfa antibiotics     Current Outpatient Prescriptions   Medication Sig Dispense Refill    ADVAIR DISKUS 250-50 MCG/DOSE inhaler INHALE 1 DOSE BY MOUTH TWICE DAILY   RINSE MOUTH AFTER USE 3 Inhaler 3    albuterol (2 5 mg/3 mL) 0 083 % nebulizer solution Take 1 vial (2 5 mg total) by nebulization every 6 (six) hours as needed for wheezing or shortness of breath 1080 mL 3    busPIRone (BUSPAR) 10 mg tablet Take 10 mg by mouth 2 (two) times a day  0    Cholecalciferol 1000 UNIT/10ML LIQD       clonazePAM (KlonoPIN) 0 5 mg tablet Take 0 5 mg by mouth 2 (two) times a day  1    doxepin (SINEquan) 150 MG capsule 150 mg daily at bedtime  1    famotidine (PEPCID) 20 mg tablet TAKE 1 TABLET BY MOUTH EVERY DAY AT BEDTIME 90 tablet 3    fentaNYL (DURAGESIC) 75 mcg/hr APPLY 1 PATCH EVERY 72 HOURS  0    fluticasone (FLONASE) 50 mcg/act nasal spray 1 spray 2 (two) times a day  2    gabapentin (NEURONTIN) 800 mg tablet Take 800 mg by mouth 4 (four) times a day  3    lidocaine (XYLOCAINE) 5 % ointment apply locally FOUR TIMES DAILY  1    naproxen (NAPROSYN) 500 mg tablet Take 1 tablet (500 mg total) by mouth 2 (two) times a day with meals 30 tablet 0    oxyCODONE-acetaminophen (PERCOCET) 5-325 mg per tablet Take 1-2 tablets by mouth every 4 (four) hours      pantoprazole (PROTONIX) 40 mg tablet TAKE 1 TABLET BY MOUTH EVERY DAY 90 tablet 3    povidone-iodine (BETADINE) 10 % external solution Apply topically      rOPINIRole (REQUIP) 0 25 mg tablet Take 0 25 mg by mouth 3 (three) times a day  4    venlafaxine (EFFEXOR-XR) 75 mg 24 hr capsule Take 75 mg by mouth daily  0    venlafaxine 225 MG TB24 Take 225 mg by mouth daily  1    VENTOLIN  (90 Base) MCG/ACT inhaler INHALE 2 PUFFS EVERY 6 HOURS AS NEEDED 18 Inhaler 1    zafirlukast (ACCOLATE) 20 MG tablet Take 20 mg by mouth 2 (two) times a day before meals  3     No current facility-administered medications for this visit  Review of Systems   Constitutional: Positive for fatigue  Negative for fever  Respiratory: Positive for cough, shortness of breath and wheezing  Cardiovascular: Negative for palpitations and leg swelling  Genitourinary: Negative for difficulty urinating  Musculoskeletal: Positive for arthralgias, back pain, gait problem and joint swelling  Neurological: Positive for dizziness, tremors, weakness and light-headedness  Negative for seizures  Psychiatric/Behavioral: Positive for dysphoric mood  Objective:  Vitals:    06/18/18 1510   BP: 92/64      Physical Exam   Constitutional: She is oriented to person, place, and time  She appears well-developed  A patient who appears stated age  She is in a wheelchair because she cannot walk without pain of both forefeet  Verbalizing about complaints  HENT:   Head: Normocephalic  Eyes: Pupils are equal, round, and reactive to light  Cardiovascular: Normal rate  Pulmonary/Chest: Effort normal  No respiratory distress  Abdominal: Soft  Musculoskeletal: She exhibits deformity  Soft tissue swelling of the left forefoot; there is ecchymoses around the base of toes 2,3,,4,and 5   Ecchymosis noted on the ventral aspect of the left forefoot along with some soft tissue swelling  This area is tender to palpation      Minimal soft tissue swelling of the right forefoot but also ecchymosis at the base of toes 2 and 3  Neurological: She is alert and oriented to person, place, and time  Skin: Skin is warm  Ecchymoses as noted earlier     Psychiatric: She has a normal mood and affect

## 2018-06-18 NOTE — TELEPHONE ENCOUNTER
----- Message from Glendy Pitt MD sent at 6/18/2018  5:18 PM EDT -----  Multiple metatarsal fractures  I will call the orthopedic doctor

## 2018-06-19 DIAGNOSIS — S92.302A CLOSED NONDISPLACED FRACTURE OF METATARSAL BONE OF LEFT FOOT, UNSPECIFIED METATARSAL, INITIAL ENCOUNTER: Primary | ICD-10-CM

## 2018-06-19 NOTE — TELEPHONE ENCOUNTER
Patient is calling back,, claims no one has called her back yet     Per Ifra she will call  her back later when she has more info

## 2018-06-19 NOTE — TELEPHONE ENCOUNTER
Patient called back, she said she was seeing dr Mehdi Navarro but he is not aggressive enough    She said she has number for dr Sg Luna and is going to call their office

## 2018-06-20 ENCOUNTER — OFFICE VISIT (OUTPATIENT)
Dept: OBGYN CLINIC | Facility: CLINIC | Age: 58
End: 2018-06-20
Payer: COMMERCIAL

## 2018-06-20 DIAGNOSIS — S99.192A OTHER PHYSEAL FRACTURE OF LEFT METATARSAL, INITIAL ENCOUNTER FOR CLOSED FRACTURE: ICD-10-CM

## 2018-06-20 DIAGNOSIS — M80.00XA OSTEOPOROSIS WITH CURRENT PATHOLOGICAL FRACTURE, UNSPECIFIED OSTEOPOROSIS TYPE, INITIAL ENCOUNTER: Primary | ICD-10-CM

## 2018-06-20 PROBLEM — M81.0 OSTEOPOROSIS: Status: ACTIVE | Noted: 2018-06-20

## 2018-06-20 PROCEDURE — 99203 OFFICE O/P NEW LOW 30 MIN: CPT | Performed by: ORTHOPAEDIC SURGERY

## 2018-06-20 RX ORDER — DIPHENHYDRAMINE HCL 25 MG
25 TABLET ORAL AS NEEDED
COMMUNITY

## 2018-06-20 RX ORDER — PROPRANOLOL HYDROCHLORIDE 10 MG/1
10 TABLET ORAL 3 TIMES DAILY
COMMUNITY
End: 2018-07-03

## 2018-06-20 RX ORDER — NYSTATIN 100000 [USP'U]/G
POWDER TOPICAL 4 TIMES DAILY PRN
COMMUNITY
End: 2019-06-03 | Stop reason: SDUPTHER

## 2018-06-20 RX ORDER — BUTALBITAL, ASPIRIN AND CAFFEINE 50; 325; 40 MG/1; MG/1; MG/1
1 TABLET ORAL AS NEEDED
COMMUNITY

## 2018-06-20 NOTE — PROGRESS NOTES
ASSESSMENT/PLAN:    Diagnoses and all orders for this visit:    Osteoporosis with current pathological fracture, unspecified osteoporosis type, initial encounter  -     Ambulatory referral to Endocrinology; Future    Other physeal fracture of left metatarsal, initial encounter for closed fracture  -     Ambulatory referral to Endocrinology; Future        Assessment:   Left foot Minimally displaced fractures of the distal left 2nd, 3rd and 4th metatarsal bones at the metadiaphyseal  There is also a nondisplaced fracture at the medial base of the left 5th proximal phalanx  Contusion to the right foot  Osteoporosis    Plan:   1  Referral placed for endocrine to evaluate for osteoporosis  Patient had a DEXA scan about 2 years ago, but states that she is not on any medication for this, despite her multiple fractures due to low impact injuries  2   Patient was referred to neurology for evaluation of frequent falls and was encouraged to get this worked up  3  Patient has a history of gastric ulcers and gastric bypass  For these reasons, she should not take NSAIDS  I has a lengthy discussion with the patient about this  4   The patient has 2 cam boots, a wheel chair and a walker at home  She should use the cam boot on the left foot and can WB in the cam boot  I recommended that she use the walker for fall prevention  If her right foot also hurts, she can put a cam boot on that foot as well  5   We will see the patient back in 1 week and new xrays of the left foot 3 views will be obtained  If the patient has continues right foot pain, we will also get 3 views of the right foot           _____________________________________________________  CHIEF COMPLAINT:  Chief Complaint   Patient presents with    Left Foot - Pain         SUBJECTIVE:  Fara Adrian is a 65y o  year old female who presents with a recent fall resulting in bilateral foot pain    She has very frequent falls and a referral for neurology is pending  The patient typically does not use any  assistive devices, but she has them at home  She is accompanied with a family member today  Her left foot hurts worse than her right  She currently has Ace bandages wrapped around both feet and is in slippers  She is on  narcotic pain medication from a pain management specialist for chronic lumbar pain        PAST MEDICAL HISTORY:  Past Medical History:   Diagnosis Date    Anemia     Anemia     Arthritis     Asthma     Boils     Cardiac disorder     CHF (congestive heart failure) (HCC)     Chronic bronchitis (HCC)     Constipation     COPD (chronic obstructive pulmonary disease) (HCC)     Depression     Diabetes insipidus (HCC)     Diarrhea     Fibromyalgia     Heart disorder     Malignant neoplasm (HCC)     Memory loss     Migraines     Migraines     Mild persistent asthma     Neuropathy     Occasional tremors     Osteoarthritis     Osteoporosis     Problems with swallowing     Restless leg syndrome     Skin cancer     Skin disorder     Skin disorder     Stomach problems     Tobacco abuse        PAST SURGICAL HISTORY:  Past Surgical History:   Procedure Laterality Date    ABDOMINAL SURGERY      Gastrorrhaphy for perforation of ulcer, last assessed 10/30/2014    ADENOIDECTOMY      ANKLE SURGERY Right     ARTHRODESIS      lumbar by posterolateral approach, last assessed 06/13/2017    BACK SURGERY      BLADDER SURGERY      last assessed 10/30/2014    FEMUR SURGERY Right     FOOT SURGERY Right     GALLBLADDER SURGERY      GASTRIC BYPASS      HAND SURGERY Left     HEMORRHOID SURGERY      OTHER SURGICAL HISTORY Left     arm incision    TOE SURGERY Right     TOENAIL EXCISION      TONSILLECTOMY         FAMILY HISTORY:  Family History   Problem Relation Age of Onset    Cancer Mother         uterine    Hypertension Mother    Kathya Kuhn Arthritis Mother     Obesity Mother     Heart disease Father     Neuropathy Father     Heart attack Father     Hypertension Father     Mental illness Father     Heart disease Brother     Diabetes Brother     Mental illness Brother     Osteoporosis Family     Scoliosis Family     Osteoarthritis Family     Obesity Sister     Cancer Sister        SOCIAL HISTORY:  Social History   Substance Use Topics    Smoking status: Current Every Day Smoker    Smokeless tobacco: Current User    Alcohol use No       MEDICATIONS:    Current Outpatient Prescriptions:     ADVAIR DISKUS 250-50 MCG/DOSE inhaler, INHALE 1 DOSE BY MOUTH TWICE DAILY   RINSE MOUTH AFTER USE, Disp: 3 Inhaler, Rfl: 3    albuterol (2 5 mg/3 mL) 0 083 % nebulizer solution, Take 1 vial (2 5 mg total) by nebulization every 6 (six) hours as needed for wheezing or shortness of breath, Disp: 1080 mL, Rfl: 3    BIOTIN PO, Take by mouth, Disp: , Rfl:     busPIRone (BUSPAR) 10 mg tablet, Take 10 mg by mouth 2 (two) times a day, Disp: , Rfl: 0    butalbital-aspirin-caffeine (FIORINAL) -40 MG per tablet, Take 1 tablet by mouth every 4 (four) hours as needed for headaches, Disp: , Rfl:     clonazePAM (KlonoPIN) 0 5 mg tablet, Take 0 5 mg by mouth 2 (two) times a day, Disp: , Rfl: 1    Diclofenac Sodium 3 % GEL, Place on the skin, Disp: , Rfl:     diphenhydrAMINE (BENADRYL) 25 mg tablet, Take 25 mg by mouth every 6 (six) hours as needed for itching, Disp: , Rfl:     doxepin (SINEquan) 150 MG capsule, 150 mg daily at bedtime, Disp: , Rfl: 1    famotidine (PEPCID) 20 mg tablet, TAKE 1 TABLET BY MOUTH EVERY DAY AT BEDTIME, Disp: 90 tablet, Rfl: 3    fentaNYL (DURAGESIC) 75 mcg/hr, APPLY 1 PATCH EVERY 72 HOURS, Disp: , Rfl: 0    fluticasone (FLONASE) 50 mcg/act nasal spray, 1 spray 2 (two) times a day, Disp: , Rfl: 2    gabapentin (NEURONTIN) 800 mg tablet, Take 800 mg by mouth 4 (four) times a day, Disp: , Rfl: 3    lidocaine (XYLOCAINE) 5 % ointment, apply locally FOUR TIMES DAILY, Disp: , Rfl: 1    mupirocin (BACTROBAN) 2 % ointment, Apply topically 3 (three) times a day, Disp: , Rfl:     naproxen (NAPROSYN) 500 mg tablet, Take 1 tablet (500 mg total) by mouth 2 (two) times a day with meals, Disp: 30 tablet, Rfl: 0    nystatin (MYCOSTATIN) powder, Apply topically 4 (four) times a day, Disp: , Rfl:     oxyCODONE-acetaminophen (PERCOCET) 5-325 mg per tablet, Take 1-2 tablets by mouth every 4 (four) hours, Disp: , Rfl:     pantoprazole (PROTONIX) 40 mg tablet, TAKE 1 TABLET BY MOUTH EVERY DAY, Disp: 90 tablet, Rfl: 3    propranolol (INDERAL) 10 mg tablet, Take 10 mg by mouth 3 (three) times a day, Disp: , Rfl:     rOPINIRole (REQUIP) 0 25 mg tablet, Take 0 25 mg by mouth 3 (three) times a day, Disp: , Rfl: 4    venlafaxine (EFFEXOR-XR) 75 mg 24 hr capsule, Take 75 mg by mouth daily, Disp: , Rfl: 0    venlafaxine 225 MG TB24, Take 225 mg by mouth daily, Disp: , Rfl: 1    zafirlukast (ACCOLATE) 20 MG tablet, Take 20 mg by mouth 2 (two) times a day before meals, Disp: , Rfl: 3    ALLERGIES:  Allergies   Allergen Reactions    Morphine And Related Swelling and Other (See Comments)     Only allergic to IV morphine per patient    Quetiapine     Sulfa Antibiotics Other (See Comments)     "can't take->gastric bypass"       REVIEW OF SYSTEMS:  Pertinent items are noted in HPI  A comprehensive review of systems was negative except for the following:  + loss of hearing, + sore throat, + blurry vision (has been evaluated by an eye doctor), + cough, + SOB, + wheezing (patient has a pulmonologist), + abdominal pain and history of stomach ulcer, + increased thirst, + joint pain, + joint swelling, +muscle pain, + dizziness, + headaches, + numbness (neuro consult pending), + difficulty concentrating, + anxiety, + depression  Patient is currently under the care of her PCP for many of these issues        LABS:  HgA1c:   Lab Results   Component Value Date    HGBA1C 6 1 12/22/2017     BMP:   Lab Results   Component Value Date    GLUCOSE 68 07/21/2017 CALCIUM 8 5 12/11/2017     12/11/2017    K 4 7 12/11/2017    CO2 27 12/11/2017     12/11/2017    BUN 16 12/11/2017    CREATININE 0 76 12/11/2017         _____________________________________________________  PHYSICAL EXAMINATION:  General: well developed and well nourished, alert, oriented times 3 and appears comfortable  She is in a wheelchair and has ace bandages around both feet  Psychiatric: Normal  HEENT: Trachea Midline, No torticollis  Cardiovascular: No discernable arrhythmia  Pulmonary: No wheezing or stridor  Skin: No masses, erthema, lacerations, fluctation, ulcerations    MUSCULOSKELETAL EXAMINATION:  Left foot:  Skin is intact  Minimal swelling and ecchymosis throughout midfoot  No swelling or ecchymosis around the ankle  Capillary refill less than 2 deconds  PPP  Toes are able to flex and extend  Good ankle flexion and extension without pain   + TTP over 2, 3 and 4th Metatarsal heads and at base of 5th toe  Right foot:   Skin is intact and previous surgical incision is well healed  No swelling  Mild ecchymosis at the base of the 2nd toe  No swelling or ecchymosis around the ankle  Capillary refill less than 2 deconds  PPP  Toes are able to flex and extend  Good ankle flexion and extension without pain  Minimal tenderness around base of 2nd toe     _____________________________________________________  STUDIES REVIEWED:  Left foot 3 views from 6-18-18 show acute appearing minimally displaced fractures of the distal left 2nd, 3rd and 4th metatarsal bones at the metadiaphyseal  There is also a nondisplaced fracture at the medial base of the left 5th proximal phalanx  Right foot 3 views from 6-18-18 show stable orthopedic implants from multiple metatarsal fracture that are well healed  No acute fractures noted        Xrays were reviewed with Dr Julien Obrien          PROCEDURES PERFORMED:  Procedures  No Procedures performed today

## 2018-06-26 ENCOUNTER — OFFICE VISIT (OUTPATIENT)
Dept: OBGYN CLINIC | Facility: CLINIC | Age: 58
End: 2018-06-26
Payer: COMMERCIAL

## 2018-06-26 ENCOUNTER — APPOINTMENT (OUTPATIENT)
Dept: RADIOLOGY | Facility: CLINIC | Age: 58
End: 2018-06-26
Payer: COMMERCIAL

## 2018-06-26 VITALS — SYSTOLIC BLOOD PRESSURE: 120 MMHG | HEART RATE: 101 BPM | DIASTOLIC BLOOD PRESSURE: 87 MMHG

## 2018-06-26 DIAGNOSIS — M79.671 PAIN IN BOTH FEET: ICD-10-CM

## 2018-06-26 DIAGNOSIS — M79.671 PAIN IN RIGHT FOOT: ICD-10-CM

## 2018-06-26 DIAGNOSIS — M79.672 PAIN IN BOTH FEET: ICD-10-CM

## 2018-06-26 DIAGNOSIS — S99.192D OTHER PHYSEAL FRACTURE OF LEFT METATARSAL, SUBSEQUENT ENCOUNTER FOR FRACTURE WITH ROUTINE HEALING: ICD-10-CM

## 2018-06-26 DIAGNOSIS — M80.00XD OSTEOPOROSIS WITH CURRENT PATHOLOGICAL FRACTURE WITH ROUTINE HEALING, UNSPECIFIED OSTEOPOROSIS TYPE, SUBSEQUENT ENCOUNTER: Primary | ICD-10-CM

## 2018-06-26 PROCEDURE — 73620 X-RAY EXAM OF FOOT: CPT

## 2018-06-26 PROCEDURE — 73630 X-RAY EXAM OF FOOT: CPT

## 2018-06-26 PROCEDURE — 99213 OFFICE O/P EST LOW 20 MIN: CPT | Performed by: ORTHOPAEDIC SURGERY

## 2018-06-26 NOTE — PROGRESS NOTES
ASSESSMENT/PLAN:    Assessment:   Left foot Minimally displaced fractures of the distal left 2nd, 3rd and 4th metatarsal bones at the metadiaphyseal  There is also a nondisplaced fracture at the medial base of the left 5th proximal phalanx  Contusion to the right foot  Osteoporosis    Plan:   Continue WBAT in the cam boot  She has a tall cam boot at home and will switch to using that one  She should f/u with her PCP about multiple complaints as noted on ROS  Nicotine cessation was discussed as she smokes about 1ppd and this can delay healing  She can also take an OTC calcium and vitamin D pill  F/u in 1 week with xrays 3 views of the left foot only  If her fractures have not changed in alignment at that time, she can then follow up in about 2-3 weeks  _____________________________________________________  CHIEF COMPLAINT:  Chief Complaint   Patient presents with    Left Foot - Follow-up         SUBJECTIVE:  Jaspal Mandel is a 65y o  year old female who presents for follow up regarding Metatarsal head  fractures on the left foot, on  2-4 metatarsal, nondisplaced fractures medial base of 5th PP and a contusion to the right foot  Since last visit, Jaspal Mandel has been using her short cam boot on the left leg, but does not feel like she can get it tight enough        PAST MEDICAL HISTORY:  Past Medical History:   Diagnosis Date    Anemia     Anemia     Arthritis     Asthma     Boils     Cardiac disorder     CHF (congestive heart failure) (HCC)     Chronic bronchitis (HCC)     Constipation     COPD (chronic obstructive pulmonary disease) (HCC)     Depression     Diabetes insipidus (HCC)     Diarrhea     Fibromyalgia     Heart disorder     Malignant neoplasm (HCC)     Memory loss     Migraines     Migraines     Mild persistent asthma     Neuropathy     Occasional tremors     Osteoarthritis     Osteoporosis     Problems with swallowing     Restless leg syndrome     Skin cancer     Skin disorder     Skin disorder     Stomach problems     Tobacco abuse        PAST SURGICAL HISTORY:  Past Surgical History:   Procedure Laterality Date    ABDOMINAL SURGERY      Gastrorrhaphy for perforation of ulcer, last assessed 10/30/2014    ADENOIDECTOMY      ANKLE SURGERY Right     ARTHRODESIS      lumbar by posterolateral approach, last assessed 06/13/2017    BACK SURGERY      BLADDER SURGERY      last assessed 10/30/2014    FEMUR SURGERY Right     FOOT SURGERY Right     GALLBLADDER SURGERY      GASTRIC BYPASS      HAND SURGERY Left     HEMORRHOID SURGERY      OTHER SURGICAL HISTORY Left     arm incision    TOE SURGERY Right     TOENAIL EXCISION      TONSILLECTOMY         FAMILY HISTORY:  Family History   Problem Relation Age of Onset    Cancer Mother         uterine    Hypertension Mother    Goldie Ravel Arthritis Mother     Obesity Mother     Heart disease Father     Neuropathy Father     Heart attack Father     Hypertension Father     Mental illness Father     Heart disease Brother     Diabetes Brother     Mental illness Brother     Osteoporosis Family     Scoliosis Family     Osteoarthritis Family     Obesity Sister     Cancer Sister        SOCIAL HISTORY:  Social History   Substance Use Topics    Smoking status: Current Every Day Smoker    Smokeless tobacco: Current User    Alcohol use No       MEDICATIONS:    Current Outpatient Prescriptions:     ADVAIR DISKUS 250-50 MCG/DOSE inhaler, INHALE 1 DOSE BY MOUTH TWICE DAILY   RINSE MOUTH AFTER USE, Disp: 3 Inhaler, Rfl: 3    albuterol (2 5 mg/3 mL) 0 083 % nebulizer solution, Take 1 vial (2 5 mg total) by nebulization every 6 (six) hours as needed for wheezing or shortness of breath, Disp: 1080 mL, Rfl: 3    BIOTIN PO, Take by mouth, Disp: , Rfl:     busPIRone (BUSPAR) 10 mg tablet, Take 10 mg by mouth 2 (two) times a day, Disp: , Rfl: 0    butalbital-aspirin-caffeine (FIORINAL) -40 MG per tablet, Take 1 tablet by mouth every 4 (four) hours as needed for headaches, Disp: , Rfl:     clonazePAM (KlonoPIN) 0 5 mg tablet, Take 0 5 mg by mouth 2 (two) times a day, Disp: , Rfl: 1    Diclofenac Sodium 3 % GEL, Place on the skin, Disp: , Rfl:     diphenhydrAMINE (BENADRYL) 25 mg tablet, Take 25 mg by mouth every 6 (six) hours as needed for itching, Disp: , Rfl:     doxepin (SINEquan) 150 MG capsule, 150 mg daily at bedtime, Disp: , Rfl: 1    famotidine (PEPCID) 20 mg tablet, TAKE 1 TABLET BY MOUTH EVERY DAY AT BEDTIME, Disp: 90 tablet, Rfl: 3    fentaNYL (DURAGESIC) 75 mcg/hr, APPLY 1 PATCH EVERY 72 HOURS, Disp: , Rfl: 0    fluticasone (FLONASE) 50 mcg/act nasal spray, 1 spray 2 (two) times a day, Disp: , Rfl: 2    gabapentin (NEURONTIN) 800 mg tablet, Take 800 mg by mouth 4 (four) times a day, Disp: , Rfl: 3    lidocaine (XYLOCAINE) 5 % ointment, apply locally FOUR TIMES DAILY, Disp: , Rfl: 1    mupirocin (BACTROBAN) 2 % ointment, Apply topically 3 (three) times a day, Disp: , Rfl:     naproxen (NAPROSYN) 500 mg tablet, Take 1 tablet (500 mg total) by mouth 2 (two) times a day with meals, Disp: 30 tablet, Rfl: 0    nystatin (MYCOSTATIN) powder, Apply topically 4 (four) times a day, Disp: , Rfl:     oxyCODONE-acetaminophen (PERCOCET) 5-325 mg per tablet, Take 1-2 tablets by mouth every 4 (four) hours, Disp: , Rfl:     pantoprazole (PROTONIX) 40 mg tablet, TAKE 1 TABLET BY MOUTH EVERY DAY, Disp: 90 tablet, Rfl: 3    propranolol (INDERAL) 10 mg tablet, Take 10 mg by mouth 3 (three) times a day, Disp: , Rfl:     rOPINIRole (REQUIP) 0 25 mg tablet, Take 0 25 mg by mouth 3 (three) times a day, Disp: , Rfl: 4    venlafaxine (EFFEXOR-XR) 75 mg 24 hr capsule, Take 75 mg by mouth daily, Disp: , Rfl: 0    venlafaxine 225 MG TB24, Take 225 mg by mouth daily, Disp: , Rfl: 1    zafirlukast (ACCOLATE) 20 MG tablet, Take 20 mg by mouth 2 (two) times a day before meals, Disp: , Rfl: 3    ALLERGIES:  Allergies   Allergen Reactions    Morphine And Related Swelling and Other (See Comments)     Only allergic to IV morphine per patient    Quetiapine     Sulfa Antibiotics Other (See Comments)     "can't take->gastric bypass"       REVIEW OF SYSTEMS:  Pertinent items are noted in HPI  A comprehensive review of systems was negative except for the following positive findings:  Sore throat, visual disturbances, cough, shortness of breath, wheezing, with leg syndrome, abdominal pain, thirsty, joint pain, joint swelling, muscle pain, rashes, dizziness, headaches, numbness, difficulty concentrating, anxiety  LABS:  HgA1c:   Lab Results   Component Value Date    HGBA1C 6 1 12/22/2017     BMP:   Lab Results   Component Value Date    GLUCOSE 68 07/21/2017    CALCIUM 8 5 12/11/2017     12/11/2017    K 4 7 12/11/2017    CO2 27 12/11/2017     12/11/2017    BUN 16 12/11/2017    CREATININE 0 76 12/11/2017           _____________________________________________________  PHYSICAL EXAMINATION:  General: well developed and well nourished, alert, oriented times 3 and appears comfortable  Psychiatric: Normal  HEENT: Trachea Midline, No torticollis  Cardiovascular: No discernable arrhythmia  Pulmonary: No wheezing or stridor  Skin: No masses, erthema, lacerations, fluctation, ulcerations      MUSCULOSKELETAL EXAMINATION:  Examination of the left foot shows minimal swelling throughout the foot  Probable pedal pulses  Capillary refill less than 2 seconds  Upgoing and downgoing toes  Good ankle dorsiflexion plantar flexion  Positive tenderness to palpation over 2nd 3rd and 4th metatarsal head  Nontender over the small toe  Skin intact  Examination of the right foot shows no swelling or deformity  No ecchymosis  Very minimally tender over the 2nd 3rd and 4th metatarsal bases, where she had previous surgery down going toes  Sensation intact to light touch  Capillary refill less than 2 seconds    The ankle dorsiflexion plantar flexion  _____________________________________________________  STUDIES REVIEWED:  Three views of the left foot obtained today shows stable alignment of 2nd 3rd and 4th metatarsal metadiaphyseal fractures  No callus formation  Nondisplaced fracture medial base of left 5th proximal phalanx  2 views of the right foot shows stable ORIF of the 2nd 3rd and 4th metatarsal bases  No acute fractures      Radiographs were reviewed with Dr Monica Brock:  Procedures  No Procedures performed today

## 2018-06-27 ENCOUNTER — TELEPHONE (OUTPATIENT)
Dept: NEUROLOGY | Facility: CLINIC | Age: 58
End: 2018-06-27

## 2018-07-03 ENCOUNTER — APPOINTMENT (OUTPATIENT)
Dept: RADIOLOGY | Facility: CLINIC | Age: 58
End: 2018-07-03
Payer: COMMERCIAL

## 2018-07-03 ENCOUNTER — OFFICE VISIT (OUTPATIENT)
Dept: OBGYN CLINIC | Facility: CLINIC | Age: 58
End: 2018-07-03
Payer: COMMERCIAL

## 2018-07-03 VITALS
BODY MASS INDEX: 27.11 KG/M2 | DIASTOLIC BLOOD PRESSURE: 73 MMHG | HEIGHT: 63 IN | SYSTOLIC BLOOD PRESSURE: 107 MMHG | HEART RATE: 94 BPM | WEIGHT: 153 LBS

## 2018-07-03 DIAGNOSIS — S99.192D OTHER PHYSEAL FRACTURE OF LEFT METATARSAL, SUBSEQUENT ENCOUNTER FOR FRACTURE WITH ROUTINE HEALING: Primary | ICD-10-CM

## 2018-07-03 DIAGNOSIS — S99.192D OTHER PHYSEAL FRACTURE OF LEFT METATARSAL, SUBSEQUENT ENCOUNTER FOR FRACTURE WITH ROUTINE HEALING: ICD-10-CM

## 2018-07-03 PROCEDURE — 73630 X-RAY EXAM OF FOOT: CPT

## 2018-07-03 PROCEDURE — 99213 OFFICE O/P EST LOW 20 MIN: CPT | Performed by: ORTHOPAEDIC SURGERY

## 2018-07-03 NOTE — PROGRESS NOTES
ASSESSMENT/PLAN:    Assessment:   Left foot minimally displaced fractures of 2nd, 3rd and 4th metatarsal bones as well as left  5th proximal phalanx fracture  Plan:   Patient instructed that she should be wearing her boot at all times when she is up and moving around  She is able to [facility] in the boot  Follow Up:  3  week(s)    To Do Next Visit:  X-rays of the  left  foot      _____________________________________________________  CHIEF COMPLAINT:  Chief Complaint   Patient presents with    Left Foot - Pain, Numbness, Follow-up         SUBJECTIVE:  Stephon David is a 65y o  year old female who presents for follow up regarding left foot 2nd, 3rd and 4th metatarsal fractures as well as 5th proximal phalanx fracture  Patient states she wears her CAM boot when she is out of her home  Otherwise does not wear it  States she only walks a few steps from her bed to her bathroom, putting weight only on her heel       PAST MEDICAL HISTORY:  Past Medical History:   Diagnosis Date    Anemia     Anemia     Arthritis     Asthma     Boils     Cardiac disorder     CHF (congestive heart failure) (HCC)     Chronic bronchitis (HCC)     Constipation     COPD (chronic obstructive pulmonary disease) (HCC)     Depression     Diabetes insipidus (HCC)     Diarrhea     Fibromyalgia     Heart disorder     Malignant neoplasm (HCC)     Memory loss     Migraines     Migraines     Mild persistent asthma     Neuropathy     Occasional tremors     Osteoarthritis     Osteoporosis     Problems with swallowing     Restless leg syndrome     Skin cancer     Skin disorder     Skin disorder     Stomach problems     Tobacco abuse        PAST SURGICAL HISTORY:  Past Surgical History:   Procedure Laterality Date    ABDOMINAL SURGERY      Gastrorrhaphy for perforation of ulcer, last assessed 10/30/2014    ADENOIDECTOMY      ANKLE SURGERY Right     ARTHRODESIS      lumbar by posterolateral approach, last assessed 06/13/2017    BACK SURGERY      BLADDER SURGERY      last assessed 10/30/2014    FEMUR SURGERY Right     FOOT SURGERY Right     GALLBLADDER SURGERY      GASTRIC BYPASS      HAND SURGERY Left     HEMORRHOID SURGERY      OTHER SURGICAL HISTORY Left     arm incision    TOE SURGERY Right     TOENAIL EXCISION      TONSILLECTOMY         FAMILY HISTORY:  Family History   Problem Relation Age of Onset    Cancer Mother         uterine    Hypertension Mother    Taya Day Arthritis Mother     Obesity Mother     Heart disease Father     Neuropathy Father     Heart attack Father     Hypertension Father     Mental illness Father     Heart disease Brother     Diabetes Brother     Mental illness Brother     Osteoporosis Family     Scoliosis Family     Osteoarthritis Family     Obesity Sister     Cancer Sister        SOCIAL HISTORY:  Social History   Substance Use Topics    Smoking status: Current Every Day Smoker    Smokeless tobacco: Current User    Alcohol use No       MEDICATIONS:    Current Outpatient Prescriptions:     ADVAIR DISKUS 250-50 MCG/DOSE inhaler, INHALE 1 DOSE BY MOUTH TWICE DAILY   RINSE MOUTH AFTER USE, Disp: 3 Inhaler, Rfl: 3    albuterol (2 5 mg/3 mL) 0 083 % nebulizer solution, Take 1 vial (2 5 mg total) by nebulization every 6 (six) hours as needed for wheezing or shortness of breath, Disp: 1080 mL, Rfl: 3    BIOTIN PO, Take by mouth, Disp: , Rfl:     busPIRone (BUSPAR) 10 mg tablet, Take 10 mg by mouth 2 (two) times a day, Disp: , Rfl: 0    butalbital-aspirin-caffeine (FIORINAL) -40 MG per tablet, Take 1 tablet by mouth every 4 (four) hours as needed for headaches, Disp: , Rfl:     clonazePAM (KlonoPIN) 0 5 mg tablet, Take 0 5 mg by mouth 2 (two) times a day, Disp: , Rfl: 1    Diclofenac Sodium 3 % GEL, Place on the skin, Disp: , Rfl:     diphenhydrAMINE (BENADRYL) 25 mg tablet, Take 25 mg by mouth every 6 (six) hours as needed for itching, Disp: , Rfl:     doxepin (SINEquan) 150 MG capsule, 150 mg daily at bedtime, Disp: , Rfl: 1    famotidine (PEPCID) 20 mg tablet, TAKE 1 TABLET BY MOUTH EVERY DAY AT BEDTIME, Disp: 90 tablet, Rfl: 3    fentaNYL (DURAGESIC) 75 mcg/hr, APPLY 1 PATCH EVERY 72 HOURS, Disp: , Rfl: 0    fluticasone (FLONASE) 50 mcg/act nasal spray, 1 spray 2 (two) times a day, Disp: , Rfl: 2    gabapentin (NEURONTIN) 800 mg tablet, Take 800 mg by mouth 4 (four) times a day, Disp: , Rfl: 3    lidocaine (XYLOCAINE) 5 % ointment, apply locally FOUR TIMES DAILY, Disp: , Rfl: 1    mupirocin (BACTROBAN) 2 % ointment, Apply topically 3 (three) times a day, Disp: , Rfl:     naproxen (NAPROSYN) 500 mg tablet, Take 1 tablet (500 mg total) by mouth 2 (two) times a day with meals, Disp: 30 tablet, Rfl: 0    nystatin (MYCOSTATIN) powder, Apply topically 4 (four) times a day, Disp: , Rfl:     oxyCODONE-acetaminophen (PERCOCET) 5-325 mg per tablet, Take 1-2 tablets by mouth every 4 (four) hours, Disp: , Rfl:     pantoprazole (PROTONIX) 40 mg tablet, TAKE 1 TABLET BY MOUTH EVERY DAY, Disp: 90 tablet, Rfl: 3    rOPINIRole (REQUIP) 0 25 mg tablet, Take 0 25 mg by mouth 3 (three) times a day, Disp: , Rfl: 4    venlafaxine (EFFEXOR-XR) 75 mg 24 hr capsule, Take 75 mg by mouth daily, Disp: , Rfl: 0    venlafaxine 225 MG TB24, Take 225 mg by mouth daily, Disp: , Rfl: 1    zafirlukast (ACCOLATE) 20 MG tablet, Take 20 mg by mouth 2 (two) times a day before meals, Disp: , Rfl: 3    ALLERGIES:  Allergies   Allergen Reactions    Morphine And Related Swelling and Other (See Comments)     Only allergic to IV morphine per patient    Quetiapine     Sulfa Antibiotics Other (See Comments)     "can't take->gastric bypass"       REVIEW OF SYSTEMS:  Pertinent items are noted in HPI  A comprehensive review of systems was negative      LABS:  HgA1c:   Lab Results   Component Value Date    HGBA1C 6 1 12/22/2017     BMP:   Lab Results   Component Value Date    GLUCOSE 68 07/21/2017 CALCIUM 8 5 12/11/2017     12/11/2017    K 4 7 12/11/2017    CO2 27 12/11/2017     12/11/2017    BUN 16 12/11/2017    CREATININE 0 76 12/11/2017           _____________________________________________________  PHYSICAL EXAMINATION:  General: well developed and well nourished, alert, oriented times 3 and appears comfortable  Psychiatric: Normal  HEENT: Trachea Midline, No torticollis  Cardiovascular: No discernable arrhythmia  Pulmonary: No wheezing or stridor  Skin: No masses, erthema, lacerations, fluctation, ulcerations  Neurovascular: Patient with baseline neuropathy, states she is able to feel both medial and lateral aspects of the foot to light touch  MUSCULOSKELETAL EXAMINATION:  LEFT: Foot - Patient with minimal edema of the foot compared to contralateral side  She describes ttp throughout the whole foot, even on the 1st metatarsal and even towards the midfoot  Patient has + EHL/FHL  + ankle dorsi/plantar flexion  When asked about ttp of the small toe, she describes her bunion as being painful, but not necessarily the area of the fracture  _____________________________________________________  STUDIES REVIEWED:  Images were reviewd in PACS: Xrays show minimally displaced 2nd, 3rd and 4th metatarsal fractures as well as 5th proximal phalanx fx  There are some signs of healing present, but still not yet healed        PROCEDURES PERFORMED:  Procedures  No Procedures performed today

## 2018-08-01 ENCOUNTER — OFFICE VISIT (OUTPATIENT)
Dept: NEUROLOGY | Facility: CLINIC | Age: 58
End: 2018-08-01
Payer: COMMERCIAL

## 2018-08-01 ENCOUNTER — OFFICE VISIT (OUTPATIENT)
Dept: CARDIOLOGY CLINIC | Facility: CLINIC | Age: 58
End: 2018-08-01
Payer: COMMERCIAL

## 2018-08-01 VITALS
WEIGHT: 157.8 LBS | BODY MASS INDEX: 27.96 KG/M2 | HEART RATE: 111 BPM | SYSTOLIC BLOOD PRESSURE: 98 MMHG | HEIGHT: 63 IN | DIASTOLIC BLOOD PRESSURE: 78 MMHG

## 2018-08-01 VITALS
BODY MASS INDEX: 27.82 KG/M2 | DIASTOLIC BLOOD PRESSURE: 60 MMHG | HEIGHT: 63 IN | WEIGHT: 157 LBS | HEART RATE: 96 BPM | SYSTOLIC BLOOD PRESSURE: 92 MMHG | OXYGEN SATURATION: 98 %

## 2018-08-01 DIAGNOSIS — R07.9 CHEST PAIN, UNSPECIFIED TYPE: ICD-10-CM

## 2018-08-01 DIAGNOSIS — Z98.84 S/P GASTRIC BYPASS: ICD-10-CM

## 2018-08-01 DIAGNOSIS — G25.0 TREMOR, ESSENTIAL: Primary | ICD-10-CM

## 2018-08-01 DIAGNOSIS — R29.6 FALLS FREQUENTLY: Primary | ICD-10-CM

## 2018-08-01 DIAGNOSIS — R55 SYNCOPE, UNSPECIFIED SYNCOPE TYPE: ICD-10-CM

## 2018-08-01 DIAGNOSIS — R06.02 SHORTNESS OF BREATH ON EXERTION: ICD-10-CM

## 2018-08-01 DIAGNOSIS — R25.1 TREMORS OF NERVOUS SYSTEM: ICD-10-CM

## 2018-08-01 DIAGNOSIS — G62.9 PERIPHERAL POLYNEUROPATHY: ICD-10-CM

## 2018-08-01 DIAGNOSIS — R94.31 ABNORMAL ECG: ICD-10-CM

## 2018-08-01 DIAGNOSIS — I95.1 ORTHOSTASIS: ICD-10-CM

## 2018-08-01 DIAGNOSIS — Z72.0 TOBACCO ABUSE: ICD-10-CM
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
## 2018-08-01 PROCEDURE — 99204 OFFICE O/P NEW MOD 45 MIN: CPT | Performed by: PSYCHIATRY & NEUROLOGY

## 2018-08-01 PROCEDURE — 99204 OFFICE O/P NEW MOD 45 MIN: CPT | Performed by: INTERNAL MEDICINE

## 2018-08-01 PROCEDURE — 93000 ELECTROCARDIOGRAM COMPLETE: CPT | Performed by: INTERNAL MEDICINE

## 2018-08-01 RX ORDER — ALBUTEROL SULFATE 90 UG/1
2 AEROSOL, METERED RESPIRATORY (INHALATION) EVERY 6 HOURS PRN
COMMUNITY
End: 2018-12-19 | Stop reason: SDUPTHER

## 2018-08-01 RX ORDER — PRIMIDONE 50 MG/1
TABLET ORAL
Qty: 30 TABLET | Refills: 5 | Status: SHIPPED | OUTPATIENT
Start: 2018-08-01 | End: 2018-12-20 | Stop reason: SDUPTHER

## 2018-08-01 RX ORDER — VENLAFAXINE HYDROCHLORIDE 150 MG/1
150 TABLET, EXTENDED RELEASE ORAL DAILY
Refills: 0 | COMMUNITY
Start: 2018-07-21 | End: 2018-10-30

## 2018-08-01 NOTE — PROGRESS NOTES
Abdulaziz Álvarez is a 63 y o  female who presents with complaints of tremor and syncope    Assessment:  1  Tremor, essential    2  Orthostasis    3  Peripheral polyneuropathy        Plan:  Primidone 50 mg 1/2 to 1 p o  q h s  Keep cardiac appointment  Follow-up 2 months    Discussion:  Suspect Wesley Faustin has a central tremor  She will try primidone 1/2 of a 50 mg pill at bedtime for a week and if her symptoms persisted she otherwise tolerates the medicine she will increase the dose to a full pill  We did discuss potential adverse effects including drowsiness  Suspect she is having symptomatic with static hypotension and agree with cardiac evaluation  May benefit from tilt-table testing  She does have peripheral neuropathy which been present since before her her gastric bypass and was felt secondary to diabetes  She has had vitamin levels checked in the past which have been    Subjective:    HPI  Wesley Faustin presents today with the above complaints  She states that for the last 5 or 6 years she has noticed tremulousness in her upper extremities  She states that the degree of tremor does very  She does find that she is anxious or upset the tremors worse  She has not noticed that the tremors worse associated with caffeine intake or use of her medications such as albuterol  She states that she was given propranolol but because of her syncopal episodes this was discontinued  When she was on the propranolol she did feel that the tremor was up better controlled  Her paternal mother had a history of similar tremor, her father passed away in his late 46s  In addition she reports frequent falls  She states that this typically occur shortly after she stands up  She states that she will go to get something and within a short distance she suddenly becomes very lightheaded, her vision fades in her hearing fades and she falls to the ground without completely losing consciousness    This has happened multiple times and she had fractured bones  She was seen by Dr Marla Moritz and had a neurologic workup including MRI an EEG performed and these were negative    She reports that she has an appointment to see a cardiologist in the near future as her blood pressure is very low and she is orthostatic        Past Medical History:   Diagnosis Date    Anemia     Anemia     Arthritis     Asthma     Boils     Cardiac disorder     [facility]congestive heart failure) ([facility]     [facility]     Constipation     COPD (chronic obstructive pulmonary disease) ([facility] )     Depression     Diabetes insipidus ([facility] )     Diarrhea     Fibromyalgia     Heart disorder     Malignant neoplasm ([facility] )     Memory loss     Migraines     Migraines     Mild persistent asthma     Movement disorder     Neuropathy     Occasional tremors     Osteoarthritis     Osteoporosis     Problems with swallowing     Restless leg syndrome     Skin cancer     Skin disorder     Skin disorder     Stomach problems     Tobacco abuse        Family History:  Family History   Problem Relation Age of Onset    Cancer Mother         uterine    Hypertension Mother     Arthritis Mother     Obesity Mother     Heart disease Father     Neuropathy Father     Heart attack Father     Hypertension Father     Mental illness Father     Heart disease Brother     Diabetes Brother     Mental illness Brother     Osteoporosis Family     Scoliosis Family     Osteoarthritis Family     Obesity Sister     Cancer Sister        Past Surgical History:  Past Surgical History:   Procedure Laterality Date    ABDOMINAL SURGERY      Gastrorrhaphy for perforation of ulcer, last assessed 10/30/2014    ADENOIDECTOMY      ANKLE SURGERY Right     ARTHRODESIS      lumbar by posterolateral approach, last assessed 06/13/2017    BACK SURGERY      BLADDER SURGERY      last assessed 10/30/2014    FEMUR SURGERY Right     FOOT SURGERY Right     GALLBLADDER SURGERY      GASTRIC BYPASS x2    HAND SURGERY Left     HEMORRHOID SURGERY      OTHER SURGICAL HISTORY Left     arm incision    TOE SURGERY Right     TOENAIL EXCISION      TONSILLECTOMY         Social History:   reports that she has been smoking  She has never used smokeless tobacco  She reports that she uses drugs, including Marijuana  She reports that she does not drink alcohol  Allergies:  Morphine and related; Quetiapine; and Sulfa antibiotics      Current Outpatient Prescriptions:     ADVAIR DISKUS 250-50 MCG/DOSE inhaler, INHALE 1 DOSE BY MOUTH TWICE DAILY   RINSE MOUTH AFTER USE, Disp: 3 Inhaler, Rfl: 3    albuterol (2 5 mg/3 mL) 0 083 % nebulizer solution, Take 1 vial (2 5 mg total) by nebulization every 6 (six) hours as needed for wheezing or shortness of breath, Disp: 1080 mL, Rfl: 3    albuterol (PROVENTIL HFA,VENTOLIN HFA) 90 mcg/act inhaler, Inhale 2 puffs every 6 (six) hours as needed for wheezing, Disp: , Rfl:     BIOTIN PO, Take 1 tablet by mouth daily  , Disp: , Rfl:     butalbital-aspirin-caffeine (FIORINAL) -40 MG per tablet, Take 1 tablet by mouth every 4 (four) hours as needed for headaches, Disp: , Rfl:     clonazePAM (KlonoPIN) 0 5 mg tablet, Take 0 5 mg by mouth 3 (three) times a day  , Disp: , Rfl: 1    Diclofenac Sodium 3 % GEL, Place on the skin daily  , Disp: , Rfl:     diphenhydrAMINE (BENADRYL) 25 mg tablet, Take 25 mg by mouth every 6 (six) hours as needed for itching, Disp: , Rfl:     doxepin (SINEquan) 150 MG capsule, 150 mg daily at bedtime, Disp: , Rfl: 1    famotidine (PEPCID) 20 mg tablet, TAKE 1 TABLET BY MOUTH EVERY DAY AT BEDTIME, Disp: 90 tablet, Rfl: 3    fentaNYL (DURAGESIC) 75 mcg/hr, APPLY 1 PATCH EVERY 72 HOURS, Disp: , Rfl: 0    fluticasone (FLONASE) 50 mcg/act nasal spray, 1 spray 2 (two) times a day, Disp: , Rfl: 2    gabapentin (NEURONTIN) 800 mg tablet, Take 800 mg by mouth 4 (four) times a day, Disp: , Rfl: 3    lidocaine (XYLOCAINE) 5 % ointment, apply locally FOUR TIMES DAILY, Disp: , Rfl: 1    mupirocin (BACTROBAN) 2 % ointment, Apply topically 3 (three) times a day, Disp: , Rfl:     naproxen (NAPROSYN) 500 mg tablet, Take 1 tablet (500 mg total) by mouth 2 (two) times a day with meals, Disp: 30 tablet, Rfl: 0    nystatin (MYCOSTATIN) powder, Apply topically 4 (four) times a day as needed  , Disp: , Rfl:     oxyCODONE-acetaminophen (PERCOCET) 5-325 mg per tablet, Take 1-2 tablets by mouth every 4 (four) hours, Disp: , Rfl:     pantoprazole (PROTONIX) 40 mg tablet, TAKE 1 TABLET BY MOUTH EVERY DAY, Disp: 90 tablet, Rfl: 3    rOPINIRole (REQUIP) 0 25 mg tablet, Take 0 25 mg by mouth 3 (three) times a day, Disp: , Rfl: 4    venlafaxine 150 MG TB24, Take 150 mg by mouth daily, Disp: , Rfl: 0    venlafaxine 225 MG TB24, Take 225 mg by mouth daily, Disp: , Rfl: 1    zafirlukast (ACCOLATE) 20 MG tablet, Take 20 mg by mouth 2 (two) times a day before meals, Disp: , Rfl: 3    primidone (MYSOLINE) 50 mg tablet, 1/2 to 1 p o  q h s , Disp: 30 tablet, Rfl: 5    I have reviewed the past medical, social and family history, current medications, allergies, vitals, review of systems and updated this information as appropriate today     Objective:    Vitals:  Blood pressure 98/78, pulse (!) 111, height 5' 3" (1 6 m), weight 71 6 kg (157 lb 12 8 oz)  Physical Exam    Neurological Exam    GENERAL:  Cooperative in no acute distress  Well-developed and well-nourished    HEAD and NECK   Head is atraumatic normocephalic with no lesions or masses  Neck is supple with full range of motion    CARDIOVASCULAR  Carotid Arteries-no carotid bruits  NEUROLOGIC:  Mental Status-the patient is awake alert and oriented without aphasia or apraxia  Cranial Nerves: Visual fields are full to confrontation  Discs are flat  Extraocular movements are full without nystagmus  Pupils are 4 mm and reactive  Face is symmetrical to light touch  Movements of facial expression move symmetrically  Hearing is normal to finger rub bilaterally  Soft palate lifts symmetrically  Shoulder shrug is symmetrical  Tongue is midline without atrophy  Motor: No drift is noted on arm extension  Strength is full in the upper and lower extremities with normal bulk and tone  Sensory:  Diminished temperature and vibratory sensation in the distal lower extremities bilaterally  Cortical function is intact  Coordination: Finger to nose testing reveals an 8-10 hertz tremor with posture maintenance and intention  Romberg is positive with eyes closed  Gait reveals an increased base utilizing a cane with a walking boot on the left foot  Tandem walk was not tested  Reflexes:  1/4 in the biceps, triceps, brachioradialis and knee jerk regions, absent at the right ankle left ankle was not tested Toes are downgoing on the right, not tested on the left due to metatarsal fractures          ROS:    Review of Systems   Constitutional: Positive for fatigue  HENT: Positive for congestion, postnasal drip, tinnitus and trouble swallowing  Eyes: Positive for visual disturbance  Respiratory: Positive for cough, shortness of breath and wheezing  Cardiovascular: Positive for chest pain, palpitations and leg swelling  Gastrointestinal: Positive for abdominal pain and nausea  Genitourinary: Positive for difficulty urinating  Musculoskeletal: Positive for arthralgias, back pain, gait problem, neck pain and neck stiffness  Neurological: Positive for tremors, weakness, light-headedness, numbness and headaches  Hematological: Bruises/bleeds easily  Psychiatric/Behavioral: Positive for confusion, decreased concentration and sleep disturbance  All other systems reviewed and are negative
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
## 2018-08-02 ENCOUNTER — OFFICE VISIT (OUTPATIENT)
Dept: INTERNAL MEDICINE CLINIC | Facility: CLINIC | Age: 58
End: 2018-08-02
Payer: COMMERCIAL

## 2018-08-02 VITALS — SYSTOLIC BLOOD PRESSURE: 92 MMHG | HEART RATE: 96 BPM | DIASTOLIC BLOOD PRESSURE: 54 MMHG | OXYGEN SATURATION: 97 %

## 2018-08-02 DIAGNOSIS — I95.1 ORTHOSTASIS: ICD-10-CM

## 2018-08-02 DIAGNOSIS — G62.9 PERIPHERAL POLYNEUROPATHY: ICD-10-CM

## 2018-08-02 DIAGNOSIS — Z72.0 TOBACCO ABUSE: ICD-10-CM

## 2018-08-02 DIAGNOSIS — K91.2 POSTGASTRECTOMY MALABSORPTION: Primary | ICD-10-CM

## 2018-08-02 DIAGNOSIS — Z90.3 POSTGASTRECTOMY MALABSORPTION: Primary | ICD-10-CM

## 2018-08-02 DIAGNOSIS — I73.1 THROMBOANGIITIS OBLITERANS (HCC): ICD-10-CM

## 2018-08-02 DIAGNOSIS — E78.2 COMBINED HYPERLIPIDEMIA: ICD-10-CM

## 2018-08-02 PROBLEM — K05.10 GINGIVITIS: Status: ACTIVE | Noted: 2018-08-02

## 2018-08-02 PROBLEM — K02.9 DENTAL CARIES: Status: ACTIVE | Noted: 2018-08-02

## 2018-08-02 PROCEDURE — 99214 OFFICE O/P EST MOD 30 MIN: CPT | Performed by: INTERNAL MEDICINE

## 2018-08-02 NOTE — PROGRESS NOTES
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[facility] )    Peripheral polyneuropathy  -     CBC and differential; Future  -     Lipid Panel with Direct LDL reflex; Future  -     Comprehensive metabolic panel; Future  -     TSH, 3rd generation with Free T4 reflex; Future  -     Urinalysis with reflex to microscopic  -     T3, free; Future  -     Vitamin B12; Future  -     Hemoglobin A1C; Future  -     Cortisol Level, AM Specimen; Future    Tobacco abuse  -     CT lung screening program; Future    Combined hyperlipidemia  -     Lipid Panel with Direct LDL reflex; Future    Orthostasis  -     CBC and differential; Future  -     Lipid Panel with Direct LDL reflex; Future  -     Comprehensive metabolic panel; Future  -     TSH, 3rd generation with Free T4 reflex; Future  -     Urinalysis with reflex to microscopic  -     T3, free; Future  -     Vitamin B12; Future  -     Hemoglobin A1C; Future  -     Cortisol Level, AM Specimen; Future              Subjective:      Patient ID: Sonya Adhikari is a 63 y o  female  Follow-up visit of a 20-year-old female  The immediate issue has been syncope near syncope and orthostasis  Seen here about a month ago  Nothing really diagnostic  Suspicion was for polypharmacy  Propanolol was weaned  Has now seen Cardiology  Apparently, orthostasis still demonstrable   Plan and Cardiology is to reassess cardiac function with echocardiogram and also nonwalking stress test   Has seen Neurology    No real change the with the exception of a trial of primidone for tremor  Continues to feel weak  Continues to feel lightheaded with upright posture  Continues to have pain in the feet from bilateral fractures; the left foot is still in a boot  Continues to follow with pain management  On multiple medications including oxycodone which she takes on a regular basis plus fentanyl 75 mcg patch  sees a dentist   She has multiple caries and multiple missing teeth  She has repetitive gingival infections  Complete extraction has been recommended  The following portions of the patient's history were reviewed and updated as appropriate:   She has a past medical history of Anemia; Anemia; Arthritis; Asthma; Boils; Cardiac disorder; CHF (congestive heart failure) ([facility] ); Chronic bronchitis ([facility] ); Constipation; COPD (chronic obstructive pulmonary disease) ([facility] ); Depression; Diabetes insipidus ([facility] ); Diarrhea; Fibromyalgia; Heart disorder; Malignant neoplasm ([facility] ); Memory loss; Migraines; Migraines; Mild persistent asthma; Movement disorder; Neuropathy; Occasional tremors; Osteoarthritis; Osteoporosis; Problems with swallowing; Restless leg syndrome; Skin cancer; Skin disorder; Skin disorder; Stomach problems; and Tobacco abuse ,   does not have any pertinent problems on file  ,   has a past surgical history that includes Gallbladder surgery; Tonsillectomy; Gastric bypass; Back surgery; Other surgical history (Left); Arthrodesis; Bladder surgery; Foot surgery (Right); Abdominal surgery; Hand surgery (Left); Adenoidectomy; Toe Surgery (Right); Ankle surgery (Right); Femur Surgery (Right); Hemorrhoid surgery; and Toenail excision  ,  family history includes Arthritis in her mother; Cancer in her mother and sister; Diabetes in her brother; Heart attack in her father; Heart disease in her brother and father; Hypertension in her father and mother; Mental illness in her brother and father; Neuropathy in her father; Obesity in her mother and sister; Osteoarthritis in her family; Osteoporosis in her family; Scoliosis in her family  ,   reports that she has been smoking  She has never used smokeless tobacco  She reports that she uses drugs, including Marijuana  She reports that she does not drink alcohol ,  is allergic to morphine and related; quetiapine; and sulfa antibiotics     Current Outpatient Prescriptions   Medication Sig Dispense Refill    ADVAIR DISKUS 250-50 MCG/DOSE inhaler INHALE 1 DOSE BY MOUTH TWICE DAILY   RINSE MOUTH AFTER USE 3 Inhaler 3    albuterol (2 5 mg/3 mL) 0 083 % nebulizer solution Take 1 vial (2 5 mg total) by nebulization every 6 (six) hours as needed for wheezing or shortness of breath 1080 mL 3    albuterol (PROVENTIL HFA,VENTOLIN HFA) 90 mcg/act inhaler Inhale 2 puffs every 6 (six) hours as needed for wheezing      BIOTIN PO Take 1 tablet by mouth daily        butalbital-aspirin-caffeine (FIORINAL) -40 MG per tablet Take 1 tablet by mouth every 4 (four) hours as needed for headaches      clonazePAM (KlonoPIN) 0 5 mg tablet Take 0 5 mg by mouth 3 (three) times a day    1    Diclofenac Sodium 3 % GEL Place on the skin daily        diphenhydrAMINE (BENADRYL) 25 mg tablet Take 25 mg by mouth every 6 (six) hours as needed for itching      doxepin (SINEquan) 150 MG capsule 150 mg daily at bedtime  1    famotidine (PEPCID) 20 mg tablet TAKE 1 TABLET BY MOUTH EVERY DAY AT BEDTIME 90 tablet 3    fentaNYL (DURAGESIC) 75 mcg/hr APPLY 1 PATCH EVERY 72 HOURS  0    fluticasone (FLONASE) 50 mcg/act nasal spray 1 spray 2 (two) times a day  2    gabapentin (NEURONTIN) 800 mg tablet Take 800 mg by mouth 4 (four) times a day  3    lidocaine (XYLOCAINE) 5 % ointment apply locally FOUR TIMES DAILY  1    mupirocin (BACTROBAN) 2 % ointment Apply topically 3 (three) times a day      naproxen (NAPROSYN) 500 mg tablet Take 1 tablet (500 mg total) by mouth 2 (two) times a day with meals 30 tablet 0    nystatin (MYCOSTATIN) powder Apply topically 4 (four) times a day as needed        oxyCODONE-acetaminophen (PERCOCET) 5-325 mg per tablet Take 1-2 tablets by mouth every 4 (four) hours      pantoprazole (PROTONIX) 40 mg tablet TAKE 1 TABLET BY MOUTH EVERY DAY 90 tablet 3    primidone (MYSOLINE) 50 mg tablet 1/2 to 1 p o  q h s  30 tablet 5    rOPINIRole (REQUIP) 0 25 mg tablet Take 0 25 mg by mouth 3 (three) times a day  4    venlafaxine 150 MG TB24 Take 150 mg by mouth daily  0    venlafaxine 225 MG TB24 Take 225 mg by mouth daily  1    zafirlukast (ACCOLATE) 20 MG tablet Take 20 mg by mouth 2 (two) times a day before meals  3     No current facility-administered medications for this visit  Review of Systems   Constitutional: Positive for fatigue  Negative for fever  HENT: Positive for dental problem and mouth sores  Respiratory: Negative for cough, shortness of breath and wheezing  Cardiovascular: Negative for palpitations and leg swelling  Genitourinary: Negative for difficulty urinating  Musculoskeletal: Positive for arthralgias, back pain, gait problem and joint swelling  Neurological: Positive for dizziness, tremors, weakness and light-headedness  Negative for seizures  Psychiatric/Behavioral: Positive for dysphoric mood  Objective:  Vitals:    08/02/18 1359   BP: 92/54   Pulse: 96   SpO2: 97%      Physical Exam   Constitutional: She is oriented to person, place, and time  She appears well-developed  A patient who appears stated age  She is in a wheelchair because she cannot walk without pain of both forefeet  Verbalizing about complaints  HENT:   Head: Normocephalic  Multiple broken and missing teeth  Eyes: Pupils are equal, round, and reactive to light  Cardiovascular: Normal rate and regular rhythm  Murmur heard  Decrescendo systolic murmur is present with a grade of 2/6     Grade 2/6 fairly low-grade decrescendo murmur heard best in the right sternal border 2nd intercostal space without radiation  Pulmonary/Chest: Effort normal  No respiratory distress  Abdominal: Soft  Musculoskeletal: She exhibits deformity  Still in a left foot boot   Neurological: She is alert and oriented to person, place, and time  Skin: Skin is warm  Ecchymoses as noted earlier     Psychiatric: She has a normal mood and affect

## 2018-08-02 NOTE — PATIENT INSTRUCTIONS
Frequent falling persists   It is my opinion that your falls are worsened by taking so many medicines  I think the only way you can get away from the lightheadedness is to try to cut back on the narcotics and some of the psychotropics  However, for the falls, a particular thing you can do is physical therapy and gait training along with back class  I will try to set you up with those      I suggest you speak to pain management a about trying  To cut back on the fentanyl in particular     I would like to check the cortisol level in the blood and some other lab work which should be done in the morning although you do not need to fast

## 2018-08-10 ENCOUNTER — TELEPHONE (OUTPATIENT)
Dept: CARDIOLOGY CLINIC | Facility: CLINIC | Age: 58
End: 2018-08-10

## 2018-08-14 NOTE — TELEPHONE ENCOUNTER
Spoke with patient cardionet spoke to her and helped her fix the monitor  She is currently wearing it and is fine

## 2018-08-15 ENCOUNTER — HOSPITAL ENCOUNTER (OUTPATIENT)
Dept: PULMONOLOGY | Facility: HOSPITAL | Age: 58
Discharge: HOME | End: 2018-08-15
Attending: INTERNAL MEDICINE
Payer: COMMERCIAL

## 2018-08-15 ENCOUNTER — HOSPITAL ENCOUNTER (OUTPATIENT)
Dept: CT IMAGING | Facility: HOSPITAL | Age: 58
Discharge: HOME | End: 2018-08-15
Attending: INTERNAL MEDICINE
Payer: COMMERCIAL

## 2018-08-15 DIAGNOSIS — R91.1 LUNG NODULE: ICD-10-CM

## 2018-08-15 DIAGNOSIS — J41.0 SIMPLE CHRONIC BRONCHITIS (HCC): ICD-10-CM

## 2018-08-15 PROCEDURE — 94760 N-INVAS EAR/PLS OXIMETRY 1: CPT

## 2018-08-15 PROCEDURE — 94729 DIFFUSING CAPACITY: CPT | Performed by: INTERNAL MEDICINE

## 2018-08-15 PROCEDURE — 94726 PLETHYSMOGRAPHY LUNG VOLUMES: CPT | Performed by: INTERNAL MEDICINE

## 2018-08-15 PROCEDURE — 71250 CT THORAX DX C-: CPT

## 2018-08-15 PROCEDURE — 94727 GAS DIL/WSHOT DETER LNG VOL: CPT

## 2018-08-15 PROCEDURE — 94060 EVALUATION OF WHEEZING: CPT

## 2018-08-15 PROCEDURE — 94729 DIFFUSING CAPACITY: CPT

## 2018-08-15 PROCEDURE — 94060 EVALUATION OF WHEEZING: CPT | Performed by: INTERNAL MEDICINE

## 2018-08-15 RX ORDER — ALBUTEROL SULFATE 2.5 MG/3ML
2.5 SOLUTION RESPIRATORY (INHALATION) ONCE
Status: COMPLETED | OUTPATIENT
Start: 2018-08-15 | End: 2018-08-15

## 2018-08-15 RX ADMIN — ALBUTEROL SULFATE 2.5 MG: 2.5 SOLUTION RESPIRATORY (INHALATION) at 14:06

## 2018-08-17 ENCOUNTER — OFFICE VISIT (OUTPATIENT)
Dept: PULMONOLOGY | Facility: CLINIC | Age: 58
End: 2018-08-17
Payer: COMMERCIAL

## 2018-08-17 VITALS
OXYGEN SATURATION: 98 % | HEART RATE: 68 BPM | WEIGHT: 157 LBS | HEIGHT: 63 IN | DIASTOLIC BLOOD PRESSURE: 70 MMHG | SYSTOLIC BLOOD PRESSURE: 120 MMHG | BODY MASS INDEX: 27.82 KG/M2

## 2018-08-17 DIAGNOSIS — J41.0 SIMPLE CHRONIC BRONCHITIS (HCC): ICD-10-CM

## 2018-08-17 DIAGNOSIS — R91.1 LUNG NODULE: ICD-10-CM

## 2018-08-17 DIAGNOSIS — Z72.0 TOBACCO ABUSE: ICD-10-CM

## 2018-08-17 DIAGNOSIS — J45.30 MILD PERSISTENT ASTHMA WITHOUT COMPLICATION: Primary | ICD-10-CM

## 2018-08-17 PROCEDURE — 99214 OFFICE O/P EST MOD 30 MIN: CPT | Performed by: PHYSICIAN ASSISTANT

## 2018-08-17 NOTE — PROGRESS NOTES
Assessment:    1  Mild persistent asthma without complication     2  Simple chronic bronchitis ([address] )     3  Tobacco abuse     4  Lung nodule           Plan:     Patient overall stable with her breathing, she will continue with Advair twice per day, albuterol nebs 4 times per day as needed, currently using it usually once per day  She had PFT done, do not have final report though looks to be combination of COPD and asthma  She did not yet  Have her allergy testing done  She also had repeat CT scan done which shows resolution of the perifissural nodule in the LORIN field  Will continue with annual screening CT scan  Smoking cessation discussed - she has increased her smoking again  She is trying to cut down, patches given  Follow up in 4 months or sooner if necessary  Subjective:     Patient ID: Fara Adrian is a 63 y o  female  Chief Complaint:  Patient is a 62 yo female current smoker with PMH of asthma, COPD, CAD, depression  She had CT scan and PFT done and is here for follow up  She is stable with her breathing, is using her Advair twice per day, uses the nebulizer usually once per day  She is still smoking, now back to over 1 pack per day given several stressors over the last month  Review of Systems   Constitutional: Positive for fatigue  HENT: Negative  Respiratory: Positive for cough and shortness of breath  Cardiovascular: Negative  Gastrointestinal: Negative  Genitourinary: Negative  Musculoskeletal: Positive for arthralgias  Skin: Negative  Allergic/Immunologic: Negative  Neurological: Positive for dizziness and light-headedness  Psychiatric/Behavioral: Negative  Objective:    Physical Exam   Constitutional: She is oriented to person, place, and time  She appears well-developed and well-nourished  No distress  HENT:   Mouth/Throat: Oropharynx is clear and moist    Eyes: Pupils are equal, round, and reactive to light     Cardiovascular: Normal rate and regular rhythm  Pulmonary/Chest: Effort normal and breath sounds normal  She has no decreased breath sounds  She has no wheezes  She has no rhonchi  She has no rales  Abdominal: Soft  Musculoskeletal: Normal range of motion  Neurological: She is alert and oriented to person, place, and time  Skin: Skin is warm and dry  Psychiatric: She has a normal mood and affect

## 2018-08-23 ENCOUNTER — TELEPHONE (OUTPATIENT)
Dept: INTERNAL MEDICINE CLINIC | Facility: CLINIC | Age: 58
End: 2018-08-23

## 2018-08-23 DIAGNOSIS — K02.9 DENTAL DECAY: Primary | ICD-10-CM

## 2018-08-23 RX ORDER — AMOXICILLIN 250 MG/1
250 CAPSULE ORAL EVERY 8 HOURS SCHEDULED
Qty: 30 CAPSULE | Refills: 0 | Status: SHIPPED | OUTPATIENT
Start: 2018-08-23 | End: 2018-09-02

## 2018-08-23 NOTE — TELEPHONE ENCOUNTER
Pt called she is having teeth problems and now its going towards her eye wants to know if we can call in a script for an antibiotic   Freya Joyce  Please advise pt

## 2018-08-27 ENCOUNTER — OFFICE VISIT (OUTPATIENT)
Dept: INTERNAL MEDICINE CLINIC | Facility: CLINIC | Age: 58
End: 2018-08-27
Payer: COMMERCIAL

## 2018-08-27 ENCOUNTER — HOSPITAL ENCOUNTER (OUTPATIENT)
Dept: RADIOLOGY | Facility: HOSPITAL | Age: 58
Discharge: HOME | End: 2018-08-27
Attending: INTERNAL MEDICINE
Payer: COMMERCIAL

## 2018-08-27 VITALS
DIASTOLIC BLOOD PRESSURE: 62 MMHG | OXYGEN SATURATION: 96 % | HEART RATE: 89 BPM | HEIGHT: 63 IN | SYSTOLIC BLOOD PRESSURE: 98 MMHG

## 2018-08-27 DIAGNOSIS — G62.9 PERIPHERAL POLYNEUROPATHY: Primary | ICD-10-CM

## 2018-08-27 DIAGNOSIS — R29.6 FALLS FREQUENTLY: ICD-10-CM

## 2018-08-27 DIAGNOSIS — M25.551 RIGHT HIP PAIN: ICD-10-CM

## 2018-08-27 PROBLEM — M19.90 OSTEOARTHRITIS: Status: ACTIVE | Noted: 2018-08-27

## 2018-08-27 PROCEDURE — 73502 X-RAY EXAM HIP UNI 2-3 VIEWS: CPT

## 2018-08-27 PROCEDURE — 99213 OFFICE O/P EST LOW 20 MIN: CPT | Performed by: INTERNAL MEDICINE

## 2018-08-27 RX ORDER — ARIPIPRAZOLE 2 MG/1
2 TABLET ORAL
Refills: 0 | COMMUNITY
Start: 2018-08-22 | End: 2020-07-11 | Stop reason: SDUPTHER

## 2018-08-27 NOTE — PATIENT INSTRUCTIONS
Above problems noted  My suspicion is that her blood pressure being relatively low was really due to polypharmacy with multiple psychotropics and CNS agents    Including her cottage    Patient should have assistance in the home    X-ray of hip

## 2018-08-27 NOTE — PROGRESS NOTES
Assessment/Plan:       Diagnoses and all orders for this visit:    Peripheral polyneuropathy    Falls frequently    Right hip pain  -     XR hip/pelv 2-3 vws right if performed; Future    Other orders  -     ARIPiprazole (ABILIFY) 2 mg tablet; Take 2 mg by mouth daily at bedtime          Patient Instructions   Above problems noted  Patient should have assistance in the home    X-ray of hip            Subjective:      Patient ID: Aki Kuhn is a 63 y o  female  A 56-year-old female with multiple problems    Continuing issue of lightheadedness and frequent falls; she has been seen by various specialists but principally Neurology  Working diagnosis of peripheral neuropathy of uncertain cause variously attributed either to malabsorption due to prior gastric bypass or diabetes  Broken toes left foot secondary to fall in Peds ambulation    A few days ago, the patient twisted in her room and noticed an abrupt onset of a sensation of a "pop" in the lateral  Right hip which has been followed up by pain especially with ambulation  The patient has gait problems and at this point feels that it is unsafe for her to be alone; she feels that she is ambulatory only with 1 person assistance and that she needs a caretaker in her home  The following portions of the patient's history were reviewed and updated as appropriate:   She has a past medical history of Anemia; Anemia; Arthritis; Asthma; Boils; Cardiac disorder; CHF (congestive heart failure) ([address] ); Chronic bronchitis ([address] ); Constipation; COPD (chronic obstructive pulmonary disease) ([address] ); Depression; Diabetes insipidus ([address] ); Diarrhea; Fibromyalgia; Heart disorder; Malignant neoplasm ([address] ); Memory loss; Migraines; Mild persistent asthma; Movement disorder; Neuropathy; Occasional tremors; Osteoarthritis; Osteoporosis; Problems with swallowing; Restless leg syndrome; Skin cancer; Skin disorder; Skin disorder; Stomach problems; and Tobacco abuse  , does not have any pertinent problems on file  ,   has a past surgical history that includes Gallbladder surgery; Tonsillectomy; Gastric bypass; Back surgery; Other surgical history (Left); Arthrodesis; Bladder surgery; Foot surgery (Right); Abdominal surgery; Hand surgery (Left); Adenoidectomy; Toe Surgery (Right); Ankle surgery (Right); Femur Surgery (Right); Hemorrhoid surgery; and Toenail excision  ,  family history includes Arthritis in her mother; Cancer in her mother and sister; Diabetes in her brother; Heart attack in her father; Heart disease in her brother and father; Hypertension in her father and mother; Mental illness in her brother and father; Neuropathy in her father; Obesity in her mother and sister; Osteoarthritis in her family; Osteoporosis in her family; Scoliosis in her family  ,   reports that she has been smoking  She has never used smokeless tobacco  She reports that she uses drugs, including Marijuana  She reports that she does not drink alcohol ,  is allergic to morphine and related; quetiapine; and sulfa antibiotics     Current Outpatient Prescriptions   Medication Sig Dispense Refill    ADVAIR DISKUS 250-50 MCG/DOSE inhaler INHALE 1 DOSE BY MOUTH TWICE DAILY   RINSE MOUTH AFTER USE 3 Inhaler 3    albuterol (2 5 mg/3 mL) 0 083 % nebulizer solution Take 1 vial (2 5 mg total) by nebulization every 6 (six) hours as needed for wheezing or shortness of breath 1080 mL 3    albuterol (PROVENTIL HFA,VENTOLIN HFA) 90 mcg/act inhaler Inhale 2 puffs every 6 (six) hours as needed for wheezing      amoxicillin (AMOXIL) 250 mg capsule Take 1 capsule (250 mg total) by mouth every 8 (eight) hours for 10 days 30 capsule 0    ARIPiprazole (ABILIFY) 2 mg tablet Take 2 mg by mouth daily at bedtime  0    BIOTIN PO Take 1 tablet by mouth daily        butalbital-aspirin-caffeine (FIORINAL) -40 MG per tablet Take 1 tablet by mouth every 4 (four) hours as needed for headaches      clonazePAM (KlonoPIN) 0 5 mg tablet Take 0 5 mg by mouth 3 (three) times a day    1    Diclofenac Sodium 3 % GEL Place on the skin daily        diphenhydrAMINE (BENADRYL) 25 mg tablet Take 25 mg by mouth every 6 (six) hours as needed for itching      doxepin (SINEquan) 150 MG capsule 150 mg daily at bedtime  1    famotidine (PEPCID) 20 mg tablet TAKE 1 TABLET BY MOUTH EVERY DAY AT BEDTIME 90 tablet 3    fentaNYL (DURAGESIC) 75 mcg/hr APPLY 1 PATCH EVERY 72 HOURS  0    fluticasone (FLONASE) 50 mcg/act nasal spray 1 spray 2 (two) times a day  2    gabapentin (NEURONTIN) 800 mg tablet Take 800 mg by mouth 4 (four) times a day  3    lidocaine (XYLOCAINE) 5 % ointment apply locally FOUR TIMES DAILY  1    mupirocin (BACTROBAN) 2 % ointment Apply topically 3 (three) times a day      naproxen (NAPROSYN) 500 mg tablet Take 1 tablet (500 mg total) by mouth 2 (two) times a day with meals 30 tablet 0    nystatin (MYCOSTATIN) powder Apply topically 4 (four) times a day as needed        oxyCODONE-acetaminophen (PERCOCET) 5-325 mg per tablet Take 1-2 tablets by mouth every 4 (four) hours      pantoprazole (PROTONIX) 40 mg tablet TAKE 1 TABLET BY MOUTH EVERY DAY 90 tablet 3    primidone (MYSOLINE) 50 mg tablet 1/2 to 1 p o  q h s  30 tablet 5    rOPINIRole (REQUIP) 0 25 mg tablet Take 0 25 mg by mouth 3 (three) times a day  4    venlafaxine 150 MG TB24 Take 150 mg by mouth daily  0    venlafaxine 225 MG TB24 Take 225 mg by mouth daily  1    zafirlukast (ACCOLATE) 20 MG tablet Take 20 mg by mouth 2 (two) times a day before meals  3     No current facility-administered medications for this visit  Review of Systems   Constitutional: Positive for fatigue  Negative for fever  HENT: Positive for dental problem and mouth sores  Respiratory: Negative for cough, shortness of breath and wheezing  Cardiovascular: Negative for palpitations and leg swelling  Genitourinary: Negative for difficulty urinating     Musculoskeletal: Positive for arthralgias, back pain, gait problem and joint swelling  Neurological: Positive for dizziness, tremors, weakness and light-headedness  Negative for seizures  Psychiatric/Behavioral: Positive for dysphoric mood           Objective:  Vitals:    08/27/18 1758   BP: 98/62   Pulse: 89   SpO2: 96%      Physical Exam

## 2018-08-28 ENCOUNTER — OFFICE VISIT (OUTPATIENT)
Dept: OBGYN CLINIC | Facility: CLINIC | Age: 58
End: 2018-08-28
Payer: COMMERCIAL

## 2018-08-28 ENCOUNTER — APPOINTMENT (OUTPATIENT)
Dept: RADIOLOGY | Facility: CLINIC | Age: 58
End: 2018-08-28
Payer: COMMERCIAL

## 2018-08-28 VITALS — DIASTOLIC BLOOD PRESSURE: 70 MMHG | HEART RATE: 98 BPM | SYSTOLIC BLOOD PRESSURE: 108 MMHG

## 2018-08-28 DIAGNOSIS — S99.192D OTHER PHYSEAL FRACTURE OF LEFT METATARSAL, SUBSEQUENT ENCOUNTER FOR FRACTURE WITH ROUTINE HEALING: ICD-10-CM

## 2018-08-28 DIAGNOSIS — S99.192D OTHER PHYSEAL FRACTURE OF LEFT METATARSAL, SUBSEQUENT ENCOUNTER FOR FRACTURE WITH ROUTINE HEALING: Primary | ICD-10-CM

## 2018-08-28 PROCEDURE — 73630 X-RAY EXAM OF FOOT: CPT

## 2018-08-28 PROCEDURE — 99213 OFFICE O/P EST LOW 20 MIN: CPT | Performed by: ORTHOPAEDIC SURGERY

## 2018-08-28 NOTE — PROGRESS NOTES
_____________________________________________________  CHIEF COMPLAINT:  Chief Complaint   Patient presents with    Left Foot - Follow-up         SUBJECTIVE:  Desean Sousa is a 63y o  year old female who presents 2 months out from left foot 2nd 3rd and 4th metatarsal head fractures  Patient has been walking in a high tide boot and has had some difficulty as it is sticking to her carpet  She states it is a fall risk for her  She also notes having increased in hammertoes of her 2nd 3rd and 4th toe  She is requesting some other additional foot where to help prevent falls while in her house as the high tide boot seems to cause more of an issue for her  She denies any constitutional signs or symptoms                                         PAST MEDICAL HISTORY:  Past Medical History:   Diagnosis Date    Anemia     Anemia     Arthritis     Asthma     Boils     Cardiac disorder     [facility]     Chronic bronchitis (HCC)     Constipation     [facility]     Depression     Diabetes insipidus (HCC)     Diarrhea     Fibromyalgia     Heart disorder     Malignant neoplasm (HCC)     Memory loss     Migraines     Mild persistent asthma     Movement disorder     Neuropathy     Occasional tremors     Osteoarthritis     Osteoporosis     Problems with swallowing     Restless leg syndrome     Skin cancer     Skin disorder     Skin disorder     Stomach problems     Tobacco abuse        PAST SURGICAL HISTORY:  Past Surgical History:   Procedure Laterality Date    ABDOMINAL SURGERY      Gastrorrhaphy for perforation of ulcer, last assessed 10/30/2014    ADENOIDECTOMY      ANKLE SURGERY Right     ARTHRODESIS      lumbar by posterolateral approach, last assessed 06/13/2017    BACK SURGERY      BLADDER SURGERY      last assessed 10/30/2014    FEMUR SURGERY Right     FOOT SURGERY Right     GALLBLADDER SURGERY      GASTRIC BYPASS x2    HAND SURGERY Left     HEMORRHOID SURGERY      OTHER SURGICAL HISTORY Left     arm incision    TOE SURGERY Right     TOENAIL EXCISION      TONSILLECTOMY         FAMILY HISTORY:  Family History   Problem Relation Age of Onset    Cancer Mother         uterine    Hypertension Mother    [employer] Arthritis Mother     Obesity Mother     Heart disease Father     Neuropathy Father     Heart attack Father     Hypertension Father     Mental illness Father     Heart disease Brother     Diabetes Brother     Mental illness Brother     Osteoporosis Family     Scoliosis Family     Osteoarthritis Family     Obesity Sister     Cancer Sister        SOCIAL HISTORY:  Social History   Substance Use Topics    Smoking status: Current Every Day Smoker    Smokeless tobacco: Never Used    Alcohol use No       MEDICATIONS:    Current Outpatient Prescriptions:     ADVAIR DISKUS 250-50 MCG/DOSE inhaler, INHALE 1 DOSE BY MOUTH TWICE DAILY   RINSE MOUTH AFTER USE, Disp: 3 Inhaler, Rfl: 3    albuterol (2 5 mg/3 mL) 0 083 % nebulizer solution, Take 1 vial (2 5 mg total) by nebulization every 6 (six) hours as needed for wheezing or shortness of breath, Disp: 1080 mL, Rfl: 3    albuterol (PROVENTIL HFA,VENTOLIN HFA) 90 mcg/act inhaler, Inhale 2 puffs every 6 (six) hours as needed for wheezing, Disp: , Rfl:     amoxicillin (AMOXIL) 250 mg capsule, Take 1 capsule (250 mg total) by mouth every 8 (eight) hours for 10 days, Disp: 30 capsule, Rfl: 0    ARIPiprazole (ABILIFY) 2 mg tablet, Take 2 mg by mouth daily at bedtime, Disp: , Rfl: 0    BIOTIN PO, Take 1 tablet by mouth daily  , Disp: , Rfl:     butalbital-aspirin-caffeine (FIORINAL) -40 MG per tablet, Take 1 tablet by mouth every 4 (four) hours as needed for headaches, Disp: , Rfl:     clonazePAM (KlonoPIN) 0 5 mg tablet, Take 0 5 mg by mouth 3 (three) times a day  , Disp: , Rfl: 1    Diclofenac Sodium 3 % GEL, Place on the skin daily  , Disp: , Rfl:    diphenhydrAMINE (BENADRYL) 25 mg tablet, Take 25 mg by mouth every 6 (six) hours as needed for itching, Disp: , Rfl:     doxepin (SINEquan) 150 MG capsule, 150 mg daily at bedtime, Disp: , Rfl: 1    famotidine (PEPCID) 20 mg tablet, TAKE 1 TABLET BY MOUTH EVERY DAY AT BEDTIME, Disp: 90 tablet, Rfl: 3    fentaNYL (DURAGESIC) 75 mcg/hr, APPLY 1 PATCH EVERY 72 HOURS, Disp: , Rfl: 0    fluticasone (FLONASE) 50 mcg/act nasal spray, 1 spray 2 (two) times a day, Disp: , Rfl: 2    gabapentin (NEURONTIN) 800 mg tablet, Take 800 mg by mouth 4 (four) times a day, Disp: , Rfl: 3    lidocaine (XYLOCAINE) 5 % ointment, apply locally FOUR TIMES DAILY, Disp: , Rfl: 1    mupirocin (BACTROBAN) 2 % ointment, Apply topically 3 (three) times a day, Disp: , Rfl:     naproxen (NAPROSYN) 500 mg tablet, Take 1 tablet (500 mg total) by mouth 2 (two) times a day with meals, Disp: 30 tablet, Rfl: 0    nystatin (MYCOSTATIN) powder, Apply topically 4 (four) times a day as needed  , Disp: , Rfl:     oxyCODONE-acetaminophen (PERCOCET) 5-325 mg per tablet, Take 1-2 tablets by mouth every 4 (four) hours, Disp: , Rfl:     pantoprazole (PROTONIX) 40 mg tablet, TAKE 1 TABLET BY MOUTH EVERY DAY, Disp: 90 tablet, Rfl: 3    primidone (MYSOLINE) 50 mg tablet, 1/2 to 1 p o  q h s , Disp: 30 tablet, Rfl: 5    rOPINIRole (REQUIP) 0 25 mg tablet, Take 0 25 mg by mouth 3 (three) times a day, Disp: , Rfl: 4    venlafaxine 150 MG TB24, Take 150 mg by mouth daily, Disp: , Rfl: 0    venlafaxine 225 MG TB24, Take 225 mg by mouth daily, Disp: , Rfl: 1    zafirlukast (ACCOLATE) 20 MG tablet, Take 20 mg by mouth 2 (two) times a day before meals, Disp: , Rfl: 3    ALLERGIES:  Allergies   Allergen Reactions    Morphine And Related Swelling and Other (See Comments)     Only allergic to IV morphine per patient    Quetiapine     Sulfa Antibiotics Other (See Comments)     "can't take->gastric bypass"       REVIEW OF SYSTEMS:  General: no fever, no chills  HEENT:  No loss of hearing or eyesight problems  Eyes:  No red eyes  Respiratory:  No coughing, shortness of breath or wheezing  Cardiovascular:  No chest pain, no palpitations  GI:  Abdomen soft nontender, denies nausea  Endocrine:  No muscle weakness, no frequent urination, no excessive thirst  Urinary:  No dysuria, no incontinence  Musculoskeletal: see HPI and PE  SKIN:  No skin rash, no dry skin  Neurological:  No headaches, no confusion  Psychiatric:  No suicide thoughts, no anxiety, no depression  Review of all other systems is negative    LABS:  HgA1c:   Lab Results   Component Value Date    HGBA1C 6 1 12/22/2017     BMP:   Lab Results   Component Value Date    CALCIUM 8 5 12/11/2017     12/11/2017    K 4 7 12/11/2017    CO2 27 12/11/2017     12/11/2017    BUN 16 12/11/2017    CREATININE 0 76 12/11/2017       _____________________________________________________  PHYSICAL EXAMINATION:  General: well developed and well nourished, alert, oriented times 3 and appears comfortable  Psychiatric: Normal  HEENT: Trachea Midline, No torticollis  Cardiovascular: No discernable arrhythmia  Pulmonary: No wheezing or stridor  Skin: No masses, erthema, lacerations, fluctation, ulcerations  Neurovascular:   L1-S1 motor intact, pulses were compared bilaterally and were equal, capillary refill less than 3 seconds  MUSCULOSKELETAL EXAMINATION:  Left foot:  No erythema edema or ecchymosis noted  Patient is tender to palpation over the 2nd 3rd and 4th metatarsal heads  She is able to move her toes with no pain  Hammertoes are appreciated of the 2nd 3rd 4th and 5th toes  _____________________________________________________  STUDIES REVIEWED:  I personally reviewed the x-rays from 08/28/2018  X-rays demonstrate callus formation over the 2nd, 3rd, 4th metatarsal head fractures        ASSESSMENT/PLAN:    Diagnoses and all orders for this visit:    Other physeal fracture of left metatarsal, subsequent encounter for fracture with routine healing  -     XR foot 3+ vw left; Future        Assessment:   Left foot 2nd, 3rd, 4th metatarsal head fractures    Plan:   Patient was given a postop shoe  She may weightbear as tolerated as she is starting to have some healing of her fractures  We will have her follow up in 3 weeks for new x-rays of the left foot  At that time if she would like to possibly be referred to a podiatrist for her hammertoes we can refer her at that time  Follow Up:   Three weeks    PROCEDURES PERFORMED:  none

## 2018-08-29 ENCOUNTER — TELEPHONE (OUTPATIENT)
Dept: INTERNAL MEDICINE CLINIC | Facility: CLINIC | Age: 58
End: 2018-08-29

## 2018-08-30 ENCOUNTER — APPOINTMENT (OUTPATIENT)
Dept: RADIOLOGY | Facility: CLINIC | Age: 58
End: 2018-08-30
Payer: COMMERCIAL

## 2018-08-30 ENCOUNTER — OFFICE VISIT (OUTPATIENT)
Dept: OBGYN CLINIC | Facility: CLINIC | Age: 58
End: 2018-08-30
Payer: COMMERCIAL

## 2018-08-30 VITALS — DIASTOLIC BLOOD PRESSURE: 70 MMHG | SYSTOLIC BLOOD PRESSURE: 108 MMHG | HEART RATE: 93 BPM

## 2018-08-30 DIAGNOSIS — M25.551 RIGHT HIP PAIN: Primary | ICD-10-CM

## 2018-08-30 DIAGNOSIS — M25.571 RIGHT ANKLE PAIN, UNSPECIFIED CHRONICITY: ICD-10-CM

## 2018-08-30 PROCEDURE — 99213 OFFICE O/P EST LOW 20 MIN: CPT | Performed by: PHYSICIAN ASSISTANT

## 2018-08-30 PROCEDURE — 73610 X-RAY EXAM OF ANKLE: CPT

## 2018-08-30 NOTE — PROGRESS NOTES
_____________________________________________________  CHIEF COMPLAINT:  Chief Complaint   Patient presents with    Right Ankle - Pain    Right Hip - Pain         SUBJECTIVE:  Gautam Fofana is a 63y o  year old female who presents ankle and hip pain  Patient had an accident a week ago where she went to turn to get in bed and her lower extremity on the right side state put  It did not move  She noticed a pop sensation in her ankle  Patient has a past medical history of a ankle fracture on the right side  This was back in January  She has been using ice, elevation, Ace wrap to help with swelling and pain  Her pain is more on the lateral aspect of the ankle  She has difficulty with ambulation as she has broken toes on the other foot  She denies any numbness or tingling  She denies any constitutional signs or symptoms                                                PAST MEDICAL HISTORY:  Past Medical History:   Diagnosis Date    Anemia     Anemia     Arthritis     Asthma     Boils     Cardiac disorder     [facility]     [facility]     Constipation     COPD (chronic obstructive pulmonary disease) (HCC)     Depression     Diabetes insipidus (HCC)     Diarrhea     Fibromyalgia     Heart disorder     Malignant neoplasm (HCC)     Memory loss     Migraines     Mild persistent asthma     Movement disorder     Neuropathy     Occasional tremors     Osteoarthritis     Osteoporosis     Problems with swallowing     Restless leg syndrome     Skin cancer     Skin disorder     Skin disorder     Stomach problems     Tobacco abuse        PAST SURGICAL HISTORY:  Past Surgical History:   Procedure Laterality Date    ABDOMINAL SURGERY      Gastrorrhaphy for perforation of ulcer, last assessed 10/30/2014    ADENOIDECTOMY      ANKLE SURGERY Right     ARTHRODESIS      lumbar by posterolateral approach, last assessed 06/13/2017    BACK SURGERY      BLADDER SURGERY      last assessed 10/30/2014    FEMUR SURGERY Right     FOOT SURGERY Right     GALLBLADDER SURGERY      GASTRIC BYPASS      x2    HAND SURGERY Left     HEMORRHOID SURGERY      OTHER SURGICAL HISTORY Left     arm incision    TOE SURGERY Right     TOENAIL EXCISION      TONSILLECTOMY         FAMILY HISTORY:  Family History   Problem Relation Age of Onset    Cancer Mother         uterine    Hypertension Mother    [facility] Arthritis Mother     Obesity Mother     Heart disease Father     Neuropathy Father     Heart attack Father     Hypertension Father     Mental illness Father     Heart disease Brother     Diabetes Brother     Mental illness Brother     Osteoporosis Family     Scoliosis Family     Osteoarthritis Family     Obesity Sister     Cancer Sister        SOCIAL HISTORY:  Social History   Substance Use Topics    Smoking status: Current Every Day Smoker    Smokeless tobacco: Never Used    Alcohol use No       MEDICATIONS:    Current Outpatient Prescriptions:     ADVAIR DISKUS 250-50 MCG/DOSE inhaler, INHALE 1 DOSE BY MOUTH TWICE DAILY   RINSE MOUTH AFTER USE, Disp: 3 Inhaler, Rfl: 3    albuterol (2 5 mg/3 mL) 0 083 % nebulizer solution, Take 1 vial (2 5 mg total) by nebulization every 6 (six) hours as needed for wheezing or shortness of breath, Disp: 1080 mL, Rfl: 3    albuterol (PROVENTIL HFA,VENTOLIN HFA) 90 mcg/act inhaler, Inhale 2 puffs every 6 (six) hours as needed for wheezing, Disp: , Rfl:     amoxicillin (AMOXIL) 250 mg capsule, Take 1 capsule (250 mg total) by mouth every 8 (eight) hours for 10 days, Disp: 30 capsule, Rfl: 0    ARIPiprazole (ABILIFY) 2 mg tablet, Take 2 mg by mouth daily at bedtime, Disp: , Rfl: 0    BIOTIN PO, Take 1 tablet by mouth daily  , Disp: , Rfl:     butalbital-aspirin-caffeine (FIORINAL) -40 MG per tablet, Take 1 tablet by mouth every 4 (four) hours as needed for headaches, Disp: , Rfl:     clonazePAM (KlonoPIN) 0 5 mg tablet, Take 0 5 mg by mouth 3 (three) times a day  , Disp: , Rfl: 1    Diclofenac Sodium 3 % GEL, Place on the skin daily  , Disp: , Rfl:     diphenhydrAMINE (BENADRYL) 25 mg tablet, Take 25 mg by mouth every 6 (six) hours as needed for itching, Disp: , Rfl:     doxepin (SINEquan) 150 MG capsule, 150 mg daily at bedtime, Disp: , Rfl: 1    famotidine (PEPCID) 20 mg tablet, TAKE 1 TABLET BY MOUTH EVERY DAY AT BEDTIME, Disp: 90 tablet, Rfl: 3    fentaNYL (DURAGESIC) 75 mcg/hr, APPLY 1 PATCH EVERY 72 HOURS, Disp: , Rfl: 0    fluticasone (FLONASE) 50 mcg/act nasal spray, 1 spray 2 (two) times a day, Disp: , Rfl: 2    gabapentin (NEURONTIN) 800 mg tablet, Take 800 mg by mouth 4 (four) times a day, Disp: , Rfl: 3    lidocaine (XYLOCAINE) 5 % ointment, apply locally FOUR TIMES DAILY, Disp: , Rfl: 1    mupirocin (BACTROBAN) 2 % ointment, Apply topically 3 (three) times a day, Disp: , Rfl:     naproxen (NAPROSYN) 500 mg tablet, Take 1 tablet (500 mg total) by mouth 2 (two) times a day with meals, Disp: 30 tablet, Rfl: 0    nystatin (MYCOSTATIN) powder, Apply topically 4 (four) times a day as needed  , Disp: , Rfl:     oxyCODONE-acetaminophen (PERCOCET) 5-325 mg per tablet, Take 1-2 tablets by mouth every 4 (four) hours, Disp: , Rfl:     pantoprazole (PROTONIX) 40 mg tablet, TAKE 1 TABLET BY MOUTH EVERY DAY, Disp: 90 tablet, Rfl: 3    primidone (MYSOLINE) 50 mg tablet, 1/2 to 1 p o  q h s , Disp: 30 tablet, Rfl: 5    rOPINIRole (REQUIP) 0 25 mg tablet, Take 0 25 mg by mouth 3 (three) times a day, Disp: , Rfl: 4    venlafaxine 150 MG TB24, Take 150 mg by mouth daily, Disp: , Rfl: 0    venlafaxine 225 MG TB24, Take 225 mg by mouth daily, Disp: , Rfl: 1    zafirlukast (ACCOLATE) 20 MG tablet, Take 20 mg by mouth 2 (two) times a day before meals, Disp: , Rfl: 3    ALLERGIES:  Allergies   Allergen Reactions    Morphine And Related Swelling and Other (See Comments)     Only allergic to IV morphine per patient    Quetiapine     Sulfa Antibiotics Other (See Comments)     "can't take->gastric bypass"       REVIEW OF SYSTEMS:  General: no fever, no chills  HEENT:  No loss of hearing or eyesight problems  Eyes:  No red eyes  Respiratory:  No coughing, shortness of breath or wheezing  Cardiovascular:  No chest pain, no palpitations  GI:  Abdomen soft nontender, denies nausea  Endocrine:  No muscle weakness, no frequent urination, no excessive thirst  Urinary:  No dysuria, no incontinence  Musculoskeletal: see HPI and PE  SKIN:  No skin rash, no dry skin  Neurological:  No headaches, no confusion  Psychiatric:  No suicide thoughts, no anxiety, no depression  Review of all other systems is negative    LABS:  HgA1c:   Lab Results   Component Value Date    HGBA1C 6 1 12/22/2017     BMP:   Lab Results   Component Value Date    CALCIUM 8 5 12/11/2017     12/11/2017    K 4 7 12/11/2017    CO2 27 12/11/2017     12/11/2017    BUN 16 12/11/2017    CREATININE 0 76 12/11/2017       _____________________________________________________  PHYSICAL EXAMINATION:  General: well developed and well nourished, alert, oriented times 3 and appears comfortable  Psychiatric: Normal  HEENT: Trachea Midline, No torticollis  Cardiovascular: No discernable arrhythmia  Pulmonary: No wheezing or stridor  Skin: No masses, erthema, lacerations, fluctation, ulcerations  Neurovascular:   L1-S1 motor and sensory intact, pulses were compared bilaterally and were equal, capillary refill less than 3 sec  MUSCULOSKELETAL EXAMINATION:  Right ankle:  No erythema or ecchymosis noted  Mild edema noted over the lateral aspect of the ankle  Patient is tender to palpation over the lateral malleolus distally  Range of motion at the ankle is limited secondary to pain  Strength is limited secondary to pain  Patient is neurovascularly intact     _____________________________________________________  STUDIES REVIEWED:  I personally reviewed the x-rays from 08/30/2018    X-rays were compared to 03/26/2018  X-rays demonstrate what look like a possible fracture through the lateral malleolus  This is nondisplaced  ASSESSMENT/PLAN:    Diagnoses and all orders for this visit:    Right hip pain    Right ankle pain, unspecified chronicity  -     XR ankle 3+ vw right; Future    Other orders  -     Cancel: XR hip/pelvis 4+ vw right if performed; Future        Assessment:   Possible fracture right lateral malleolus    Plan: We will treat this conservatively and get her a Cam boot  She can be weight-bearing as tolerated  She is to use ice and elevation and anti-inflammatories to help with pain and swelling  We will have her follow up on September 18, 2018  She has already scheduled an appointment for her left foot  At that time we will obtain x-rays of the left foot in the right ankle      Follow Up:  September 18, 2018    PROCEDURES PERFORMED:  None

## 2018-08-30 NOTE — TELEPHONE ENCOUNTER
Spoke with patient, pt is wearing the monitor but is unsure of how to use the monitor otherwise, like charging the batteries and connecting the devices  She already spoke with [employer] who said they would send a rep to help her she is just waiting to hear back from them about when they can come  She will call back after she talks to them

## 2018-08-30 NOTE — TELEPHONE ENCOUNTER
Took a msg off office VM today that patient is waiting for someone from [facility] to call her and give her instructions on how to use monitor  She is asking to call her at [phone]

## 2018-09-04 ENCOUNTER — HOSPITAL ENCOUNTER (OUTPATIENT)
Dept: NON INVASIVE DIAGNOSTICS | Facility: CLINIC | Age: 58
Discharge: HOME | End: 2018-09-04
Payer: COMMERCIAL

## 2018-09-04 DIAGNOSIS — R06.02 SHORTNESS OF BREATH ON EXERTION: ICD-10-CM

## 2018-09-04 DIAGNOSIS — R07.9 CHEST PAIN, UNSPECIFIED TYPE: ICD-10-CM

## 2018-09-04 DIAGNOSIS — R94.31 ABNORMAL ECG: ICD-10-CM

## 2018-09-04 DIAGNOSIS — R29.6 FALLS FREQUENTLY: ICD-10-CM

## 2018-09-04 LAB
MAX DIASTOLIC BP: 64 MMHG
MAX HEART RATE: 96 BPM
MAX PREDICTED HEART RATE: 163 BPM
MAX. SYSTOLIC BP: 126 MMHG
PROTOCOL NAME: NORMAL
REASON FOR TERMINATION: NORMAL
TARGET HR FORMULA: NORMAL
TIME IN EXERCISE PHASE: NORMAL

## 2018-09-04 PROCEDURE — 93306 TTE W/DOPPLER COMPLETE: CPT

## 2018-09-04 PROCEDURE — 93017 CV STRESS TEST TRACING ONLY: CPT

## 2018-09-04 PROCEDURE — 93306 TTE W/DOPPLER COMPLETE: CPT | Performed by: INTERNAL MEDICINE

## 2018-09-04 PROCEDURE — 78452 HT MUSCLE IMAGE SPECT MULT: CPT

## 2018-09-04 PROCEDURE — A9502 TC99M TETROFOSMIN: HCPCS

## 2018-09-04 RX ADMIN — REGADENOSON 0.4 MG: 0.08 INJECTION, SOLUTION INTRAVENOUS at 13:32

## 2018-09-06 DIAGNOSIS — J45.20 MILD INTERMITTENT ASTHMA WITHOUT COMPLICATION: Primary | ICD-10-CM

## 2018-09-06 PROCEDURE — 93016 CV STRESS TEST SUPVJ ONLY: CPT | Performed by: INTERNAL MEDICINE

## 2018-09-06 PROCEDURE — 93018 CV STRESS TEST I&R ONLY: CPT | Performed by: INTERNAL MEDICINE

## 2018-09-06 PROCEDURE — 78452 HT MUSCLE IMAGE SPECT MULT: CPT | Performed by: INTERNAL MEDICINE

## 2018-09-06 RX ORDER — ZAFIRLUKAST 20 MG/1
TABLET, FILM COATED ORAL
Qty: 180 TABLET | Refills: 3 | Status: SHIPPED | OUTPATIENT
Start: 2018-09-06 | End: 2019-06-10 | Stop reason: SDUPTHER

## 2018-09-18 ENCOUNTER — OFFICE VISIT (OUTPATIENT)
Dept: OBGYN CLINIC | Facility: CLINIC | Age: 58
End: 2018-09-18
Payer: COMMERCIAL

## 2018-09-18 ENCOUNTER — APPOINTMENT (OUTPATIENT)
Dept: RADIOLOGY | Facility: CLINIC | Age: 58
End: 2018-09-18
Payer: COMMERCIAL

## 2018-09-18 VITALS — DIASTOLIC BLOOD PRESSURE: 64 MMHG | SYSTOLIC BLOOD PRESSURE: 103 MMHG | HEART RATE: 89 BPM

## 2018-09-18 DIAGNOSIS — S99.192D OTHER PHYSEAL FRACTURE OF LEFT METATARSAL, SUBSEQUENT ENCOUNTER FOR FRACTURE WITH ROUTINE HEALING: ICD-10-CM

## 2018-09-18 DIAGNOSIS — M25.571 RIGHT ANKLE PAIN, UNSPECIFIED CHRONICITY: Primary | ICD-10-CM

## 2018-09-18 DIAGNOSIS — S92.902K NONUNION OF FRACTURE OF FOOT, LEFT: ICD-10-CM

## 2018-09-18 DIAGNOSIS — S82.64XG CLOSED NONDISPLACED FRACTURE OF LATERAL MALLEOLUS OF RIGHT FIBULA WITH DELAYED HEALING, SUBSEQUENT ENCOUNTER: ICD-10-CM

## 2018-09-18 DIAGNOSIS — M25.571 RIGHT ANKLE PAIN, UNSPECIFIED CHRONICITY: ICD-10-CM

## 2018-09-18 PROCEDURE — 73630 X-RAY EXAM OF FOOT: CPT

## 2018-09-18 PROCEDURE — 73610 X-RAY EXAM OF ANKLE: CPT

## 2018-09-18 PROCEDURE — 99213 OFFICE O/P EST LOW 20 MIN: CPT | Performed by: ORTHOPAEDIC SURGERY

## 2018-09-18 NOTE — PROGRESS NOTES
Chief Complaint   Patient presents with    Right Ankle - Follow-up    Left Foot - Follow-up         Subjective     Original injury on the left foot was June 18, 2018 which caused 2nd 3rd and 4th metatarsal fractures  Patient also had a 2nd injury late August 2018 which cause a lateral malleolus fracture of the right ankle  Patient using Cam boot for the right ankle and using hard sole shoe for the left foot  Patient states the right ankle has been feeling better  Having pain but more on the medial anterior aspect of the right ankle  She has no complaints of pain over the lateral aspect of the right ankle today  Denies any numbness or tingling  As far as her left foot goes  She has some mild pain over the top of her foot at times  Patient is a current smoker         ROS:   General: no fever, no chills  HEENT:   Positive for sore throat; positive for eye pain and right eyes   Respiratory:  Positive for cough shortness of breath and wheezing  Cardiovascular:  No chest pain, positive for palpitations  Gastrointestinal:  Positive for abdominal pain and nausea  Musculoskeletal: see HPI and PE  SKIN:  No skin rash, no dry skin  Neurological:  Positive for dizziness headaches and numbness  Psychiatric:  Positive for difficulty concentrating and anxiety  Review of all other systems is negative    Past Medical History:   Diagnosis Date    Anemia     Anemia     Arthritis     Asthma     Boils     Cardiac disorder     [facility]     [facility]     Constipation     COPD [facility]ch[facility] obstructive pulmonary disease) [facility]     Depression     Diabetes insipidus ([facility] [place] [address] [facility]     Diarrhea     Fibromyalgia     Heart disorder     Malignant neoplasm ([facility])     Memory loss     Migraines     Mild persistent asthma     Movement disorder     Neuropathy     Occasional tremors     Osteoarthritis     Osteoporosis     Problems with swallowing     Restless leg syndrome     Skin cancer  Skin disorder     Skin disorder     Stomach problems     Tobacco abuse        Current Outpatient Prescriptions on File Prior to Visit   Medication Sig Dispense Refill    ADVAIR DISKUS 250-50 MCG/DOSE inhaler INHALE 1 DOSE BY MOUTH TWICE DAILY   RINSE MOUTH AFTER USE 3 Inhaler 3    albuterol (2 5 mg/3 mL) 0 083 % nebulizer solution Take 1 vial (2 5 mg total) by nebulization every 6 (six) hours as needed for wheezing or shortness of breath 1080 mL 3    albuterol (PROVENTIL HFA,VENTOLIN HFA) 90 mcg/act inhaler Inhale 2 puffs every 6 (six) hours as needed for wheezing      ARIPiprazole (ABILIFY) 2 mg tablet Take 2 mg by mouth daily at bedtime  0    butalbital-aspirin-caffeine (FIORINAL) -40 MG per tablet Take 1 tablet by mouth every 4 (four) hours as needed for headaches      clonazePAM (KlonoPIN) 0 5 mg tablet Take 0 5 mg by mouth 3 (three) times a day    1    Diclofenac Sodium 3 % GEL Place on the skin daily        diphenhydrAMINE (BENADRYL) 25 mg tablet Take 25 mg by mouth every 6 (six) hours as needed for itching      doxepin (SINEquan) 150 MG capsule 150 mg daily at bedtime  1    famotidine (PEPCID) 20 mg tablet TAKE 1 TABLET BY MOUTH EVERY DAY AT BEDTIME 90 tablet 3    fentaNYL (DURAGESIC) 75 mcg/hr APPLY 1 PATCH EVERY 72 HOURS  0    fluticasone (FLONASE) 50 mcg/act nasal spray 1 spray 2 (two) times a day  2    gabapentin (NEURONTIN) 800 mg tablet Take 800 mg by mouth 4 (four) times a day  3    lidocaine (XYLOCAINE) 5 % ointment apply locally FOUR TIMES DAILY  1    mupirocin (BACTROBAN) 2 % ointment Apply topically 3 (three) times a day      naproxen (NAPROSYN) 500 mg tablet Take 1 tablet (500 mg total) by mouth 2 (two) times a day with meals 30 tablet 0    nystatin (MYCOSTATIN) powder Apply topically 4 (four) times a day as needed        oxyCODONE-acetaminophen (PERCOCET) 5-325 mg per tablet Take 1-2 tablets by mouth every 4 (four) hours      pantoprazole (PROTONIX) 40 mg tablet TAKE 1 TABLET BY MOUTH EVERY DAY 90 tablet 3    primidone (MYSOLINE) 50 mg tablet 1/2 to 1 p o  q h s  30 tablet 5    rOPINIRole (REQUIP) 0 25 mg tablet Take 0 25 mg by mouth 3 (three) times a day  4    venlafaxine 150 MG TB24 Take 150 mg by mouth daily  0    venlafaxine 225 MG TB24 Take 225 mg by mouth daily  1    zafirlukast (ACCOLATE) 20 MG tablet TAKE 1 TABLET BY MOUTH TWICE A DAY BEFORE MEALS 180 tablet 3    [DISCONTINUED] BIOTIN PO Take 1 tablet by mouth daily         No current facility-administered medications on file prior to visit  Allergies   Allergen Reactions    Morphine And Related Swelling and Other (See Comments)     Only allergic to IV morphine per patient    Quetiapine     Sulfa Antibiotics Other (See Comments)     "can't take->gastric bypass"         Physical Exam:    Vitals:    09/18/18 1442   BP: 103/64   Pulse: 89       General Appearance:  Alert, cooperative, no distress, appears stated age   Lungs:   respirations unlabored   Heart:  Normal heart rate noted   Abdomen:   Soft, non-tender,  no masses   Extremities: Extremities normal, atraumatic, no cyanosis or edema   Pulses: 2+ and symmetric   Skin: Skin color, texture, turgor normal, no rashes or lesions   Neurologic: Normal         Ortho Exam  Right ankle examination shows skin intact with no erythema noted  There is no tenderness noted today with palpation over the lateral malleolus  There is slight tenderness noted with palpation over the anterior medial aspect of the ankle  Sensation is intact to light touch L1-S1 distributions  Range of motion is full  Left foot shows full range of motion with just mild swelling throughout foot  Slight tenderness noted over the 2nd 3rd and 4th metatarsals  Sensation is intact to light touch  ASSESSMENT:    Emanuel Schwartz was seen today for follow-up and follow-up      Diagnoses and all orders for this visit:    Right ankle pain, unspecified chronicity  -     XR ankle 3+ vw right; Future    Other physeal fracture of left metatarsal, subsequent encounter for fracture with routine healing  -     XR foot 3+ vw left; Future          PLAN:   Patient is s/p 4 weeks healing fracture of the right ankle  She will only wear the Cam boot when walking outside, full slowly wean out of CAM boot  As far as the left foot goes    Status post three months nonunion left foot;  she will continue with the hard sole shoe  We will order a bone stimulator for patient to use  Patient was encouraged to quit smoking     Patient will follow up in six weeks and have an x-ray of the right ankle and left foot upon arrival

## 2018-10-08 ENCOUNTER — OFFICE VISIT (OUTPATIENT)
Dept: INTERNAL MEDICINE CLINIC | Facility: CLINIC | Age: 58
End: 2018-10-08
Payer: COMMERCIAL

## 2018-10-08 VITALS — SYSTOLIC BLOOD PRESSURE: 110 MMHG | DIASTOLIC BLOOD PRESSURE: 64 MMHG

## 2018-10-08 DIAGNOSIS — J41.0 SIMPLE CHRONIC BRONCHITIS (HCC): ICD-10-CM

## 2018-10-08 DIAGNOSIS — R94.39 ABNORMAL MYOCARDIAL PERFUSION STUDY: ICD-10-CM

## 2018-10-08 DIAGNOSIS — Z23 NEED FOR INFLUENZA VACCINATION: Primary | ICD-10-CM

## 2018-10-08 DIAGNOSIS — K02.9 DENTAL CARIES: Primary | ICD-10-CM

## 2018-10-08 DIAGNOSIS — R49.0 HOARSE: ICD-10-CM

## 2018-10-08 DIAGNOSIS — Z23 NEED FOR IMMUNIZATION AGAINST INFLUENZA: Primary | ICD-10-CM

## 2018-10-08 PROBLEM — J44.9 COPD (CHRONIC OBSTRUCTIVE PULMONARY DISEASE) (HCC): Status: ACTIVE | Noted: 2018-10-08

## 2018-10-08 PROCEDURE — 99214 OFFICE O/P EST MOD 30 MIN: CPT | Performed by: INTERNAL MEDICINE

## 2018-10-08 PROCEDURE — 90471 IMMUNIZATION ADMIN: CPT

## 2018-10-08 PROCEDURE — 90682 RIV4 VACC RECOMBINANT DNA IM: CPT

## 2018-10-08 NOTE — PATIENT INSTRUCTIONS
Lightheadedness and dizziness is improved  Abnormal perfusion study with a fixed deficit; this needs come from a shin with an echocardiogram       Bad teeth  Plan of care is extraction under general anesthesia  I her agree with this plan     Continues to smoke    Getting lab work and I would like to see her back here in about another month or so before the extractions

## 2018-10-15 ENCOUNTER — TRANSCRIBE ORDERS (OUTPATIENT)
Dept: NON INVASIVE DIAGNOSTICS | Facility: CLINIC | Age: 58
End: 2018-10-15

## 2018-10-15 DIAGNOSIS — G62.9 PERIPHERAL NERVE DISORDER: Primary | ICD-10-CM

## 2018-10-26 ENCOUNTER — APPOINTMENT (OUTPATIENT)
Dept: LAB | Facility: CLINIC | Age: 58
End: 2018-10-26
Payer: COMMERCIAL

## 2018-10-26 ENCOUNTER — TELEPHONE (OUTPATIENT)
Dept: INTERNAL MEDICINE CLINIC | Facility: CLINIC | Age: 58
End: 2018-10-26

## 2018-10-26 DIAGNOSIS — Z90.3 POSTGASTRECTOMY MALABSORPTION: ICD-10-CM

## 2018-10-26 DIAGNOSIS — I95.1 ORTHOSTASIS: ICD-10-CM

## 2018-10-26 DIAGNOSIS — G62.9 PERIPHERAL NERVE DISORDER: ICD-10-CM

## 2018-10-26 DIAGNOSIS — K91.2 POSTGASTRECTOMY MALABSORPTION: ICD-10-CM

## 2018-10-26 DIAGNOSIS — G62.9 PERIPHERAL POLYNEUROPATHY: ICD-10-CM

## 2018-10-26 DIAGNOSIS — E78.2 COMBINED HYPERLIPIDEMIA: ICD-10-CM

## 2018-10-26 DIAGNOSIS — J41.0 SIMPLE CHRONIC BRONCHITIS (HCC): ICD-10-CM

## 2018-10-26 LAB
ALBUMIN SERPL BCP-MCNC: 3.3 G/DL (ref 3.5–5)
ALP SERPL-CCNC: 121 U/L (ref 46–116)
ALT SERPL W P-5'-P-CCNC: 15 U/L (ref 12–78)
ANION GAP SERPL CALCULATED.3IONS-SCNC: 5 MMOL/L (ref 4–13)
AST SERPL W P-5'-P-CCNC: 18 U/L (ref 5–45)
BACTERIA UR QL AUTO: ABNORMAL /HPF
BASOPHILS # BLD AUTO: 0.03 THOUSANDS/ΜL (ref 0–0.1)
BASOPHILS NFR BLD AUTO: 0 % (ref 0–1)
BILIRUB SERPL-MCNC: 0.39 MG/DL (ref 0.2–1)
BILIRUB UR QL STRIP: NEGATIVE
BUN SERPL-MCNC: 13 MG/DL (ref 5–25)
CALCIUM SERPL-MCNC: 8.4 MG/DL (ref 8.3–10.1)
CHLORIDE SERPL-SCNC: 104 MMOL/L (ref 100–108)
CHOLEST SERPL-MCNC: 142 MG/DL (ref 50–200)
CLARITY UR: ABNORMAL
CO2 SERPL-SCNC: 25 MMOL/L (ref 21–32)
COLOR UR: YELLOW
CORTIS SERPL-MCNC: 7.6 UG/DL
CREAT SERPL-MCNC: 0.7 MG/DL (ref 0.6–1.3)
EOSINOPHIL # BLD AUTO: 0.06 THOUSAND/ΜL (ref 0–0.61)
EOSINOPHIL NFR BLD AUTO: 1 % (ref 0–6)
ERYTHROCYTE [DISTWIDTH] IN BLOOD BY AUTOMATED COUNT: 19.8 % (ref 11.6–15.1)
EST. AVERAGE GLUCOSE BLD GHB EST-MCNC: 94 MG/DL
GFR SERPL CREATININE-BSD FRML MDRD: 96 ML/MIN/1.73SQ M
GLUCOSE P FAST SERPL-MCNC: 72 MG/DL (ref 65–99)
GLUCOSE UR STRIP-MCNC: NEGATIVE MG/DL
HBA1C MFR BLD: 4.9 % (ref 4.2–6.3)
HCT VFR BLD AUTO: 29.4 % (ref 34.8–46.1)
HDLC SERPL-MCNC: 50 MG/DL (ref 40–60)
HGB BLD-MCNC: 8.9 G/DL (ref 11.5–15.4)
HGB UR QL STRIP.AUTO: NEGATIVE
HYALINE CASTS #/AREA URNS LPF: ABNORMAL /LPF
IMM GRANULOCYTES # BLD AUTO: 0.02 THOUSAND/UL (ref 0–0.2)
IMM GRANULOCYTES NFR BLD AUTO: 0 % (ref 0–2)
KETONES UR STRIP-MCNC: NEGATIVE MG/DL
LDLC SERPL CALC-MCNC: 69 MG/DL (ref 0–100)
LEUKOCYTE ESTERASE UR QL STRIP: NEGATIVE
LYMPHOCYTES # BLD AUTO: 2.81 THOUSANDS/ΜL (ref 0.6–4.47)
LYMPHOCYTES NFR BLD AUTO: 30 % (ref 14–44)
MCH RBC QN AUTO: 22.9 PG (ref 26.8–34.3)
MCHC RBC AUTO-ENTMCNC: 30.3 G/DL (ref 31.4–37.4)
MCV RBC AUTO: 76 FL (ref 82–98)
MONOCYTES # BLD AUTO: 0.56 THOUSAND/ΜL (ref 0.17–1.22)
MONOCYTES NFR BLD AUTO: 6 % (ref 4–12)
NEUTROPHILS # BLD AUTO: 5.95 THOUSANDS/ΜL (ref 1.85–7.62)
NEUTS SEG NFR BLD AUTO: 63 % (ref 43–75)
NITRITE UR QL STRIP: POSITIVE
NON-SQ EPI CELLS URNS QL MICRO: ABNORMAL /HPF
NRBC BLD AUTO-RTO: 0 /100 WBCS
PH UR STRIP.AUTO: 6 [PH] (ref 4.5–8)
PLATELET # BLD AUTO: 224 THOUSANDS/UL (ref 149–390)
PMV BLD AUTO: 11.9 FL (ref 8.9–12.7)
POTASSIUM SERPL-SCNC: 4.5 MMOL/L (ref 3.5–5.3)
PROT SERPL-MCNC: 7.4 G/DL (ref 6.4–8.2)
PROT UR STRIP-MCNC: NEGATIVE MG/DL
RBC # BLD AUTO: 3.89 MILLION/UL (ref 3.81–5.12)
RBC #/AREA URNS AUTO: ABNORMAL /HPF
SODIUM SERPL-SCNC: 134 MMOL/L (ref 136–145)
SP GR UR STRIP.AUTO: 1.02 (ref 1–1.03)
T3FREE SERPL-MCNC: 2.47 PG/ML (ref 2.3–4.2)
TRIGL SERPL-MCNC: 116 MG/DL
TSH SERPL DL<=0.05 MIU/L-ACNC: 1.25 UIU/ML (ref 0.36–3.74)
UROBILINOGEN UR QL STRIP.AUTO: 1 E.U./DL
VIT B12 SERPL-MCNC: 310 PG/ML (ref 100–900)
WBC # BLD AUTO: 9.43 THOUSAND/UL (ref 4.31–10.16)
WBC #/AREA URNS AUTO: ABNORMAL /HPF

## 2018-10-26 PROCEDURE — 82785 ASSAY OF IGE: CPT

## 2018-10-26 PROCEDURE — 85025 COMPLETE CBC W/AUTO DIFF WBC: CPT

## 2018-10-26 PROCEDURE — 84481 FREE ASSAY (FT-3): CPT

## 2018-10-26 PROCEDURE — 80053 COMPREHEN METABOLIC PANEL: CPT

## 2018-10-26 PROCEDURE — 84443 ASSAY THYROID STIM HORMONE: CPT

## 2018-10-26 PROCEDURE — 83036 HEMOGLOBIN GLYCOSYLATED A1C: CPT

## 2018-10-26 PROCEDURE — 36415 COLL VENOUS BLD VENIPUNCTURE: CPT

## 2018-10-26 PROCEDURE — 82607 VITAMIN B-12: CPT

## 2018-10-26 PROCEDURE — 86003 ALLG SPEC IGE CRUDE XTRC EA: CPT

## 2018-10-26 PROCEDURE — 80061 LIPID PANEL: CPT

## 2018-10-26 PROCEDURE — 81001 URINALYSIS AUTO W/SCOPE: CPT | Performed by: INTERNAL MEDICINE

## 2018-10-26 PROCEDURE — 82533 TOTAL CORTISOL: CPT

## 2018-10-26 NOTE — TELEPHONE ENCOUNTER
----- Message from Melonie Kim MD sent at 10/26/2018  2:49 PM EDT -----  Very anemic looks like iron deficiency needs a follow-up visit here next week

## 2018-10-29 LAB
A ALTERNATA IGE QN: <0.1 KUA/I
A FUMIGATUS IGE QN: <0.1 KUA/I
ALLERGEN COMMENT: ABNORMAL
BERMUDA GRASS IGE QN: <0.1 KUA/I
BOXELDER IGE QN: <0.1 KUA/I
C HERBARUM IGE QN: <0.1 KUA/I
CAT DANDER IGE QN: <0.1 KUA/I
CMN PIGWEED IGE QN: <0.1 KUA/I
COMMON RAGWEED IGE QN: 0.12 KUA/I
COTTONWOOD IGE QN: <0.1 KUA/I
D FARINAE IGE QN: 0.11 KUA/I
D PTERONYSS IGE QN: 0.4 KUA/I
DOG DANDER IGE QN: <0.1 KUA/I
LONDON PLANE IGE QN: <0.1 KUA/I
MOUSE URINE PROT IGE QN: <0.1 KUA/I
MT JUNIPER IGE QN: <0.1 KUA/I
MUGWORT IGE QN: <0.1 KUA/I
P NOTATUM IGE QN: <0.1 KUA/I
ROACH IGE QN: <0.1 KUA/I
SHEEP SORREL IGE QN: <0.1 KUA/I
SILVER BIRCH IGE QN: <0.1 KUA/I
TIMOTHY IGE QN: <0.1 KUA/I
TOTAL IGE SMQN RAST: 605 KU/L (ref 0–113)
WALNUT IGE QN: <0.1 KUA/I
WHITE ASH IGE QN: <0.1 KUA/I
WHITE ELM IGE QN: <0.1 KUA/I
WHITE MULBERRY IGE QN: <0.1 KUA/I
WHITE OAK IGE QN: <0.1 KUA/I

## 2018-10-30 ENCOUNTER — APPOINTMENT (OUTPATIENT)
Dept: RADIOLOGY | Facility: CLINIC | Age: 58
End: 2018-10-30
Payer: COMMERCIAL

## 2018-10-30 ENCOUNTER — OFFICE VISIT (OUTPATIENT)
Dept: OBGYN CLINIC | Facility: CLINIC | Age: 58
End: 2018-10-30
Payer: COMMERCIAL

## 2018-10-30 VITALS — SYSTOLIC BLOOD PRESSURE: 86 MMHG | HEART RATE: 106 BPM | DIASTOLIC BLOOD PRESSURE: 55 MMHG

## 2018-10-30 DIAGNOSIS — M25.571 RIGHT ANKLE PAIN, UNSPECIFIED CHRONICITY: ICD-10-CM

## 2018-10-30 DIAGNOSIS — S99.192D OTHER PHYSEAL FRACTURE OF LEFT METATARSAL, SUBSEQUENT ENCOUNTER FOR FRACTURE WITH ROUTINE HEALING: ICD-10-CM

## 2018-10-30 DIAGNOSIS — S92.302D MULTIPLE CLOSED FRACTURES OF METATARSAL BONE OF LEFT FOOT WITH ROUTINE HEALING, SUBSEQUENT ENCOUNTER: Primary | ICD-10-CM

## 2018-10-30 DIAGNOSIS — S82.64XD CLOSED TRAUMATIC NONDISPLACED FRACTURE OF LATERAL MALLEOLUS OF RIGHT FIBULA WITH ROUTINE HEALING, SUBSEQUENT ENCOUNTER: ICD-10-CM

## 2018-10-30 PROCEDURE — 73610 X-RAY EXAM OF ANKLE: CPT

## 2018-10-30 PROCEDURE — 73630 X-RAY EXAM OF FOOT: CPT

## 2018-10-30 PROCEDURE — 99213 OFFICE O/P EST LOW 20 MIN: CPT | Performed by: ORTHOPAEDIC SURGERY

## 2018-10-30 RX ORDER — VENLAFAXINE HYDROCHLORIDE 150 MG/1
150 CAPSULE, EXTENDED RELEASE ORAL DAILY
Refills: 0 | COMMUNITY
Start: 2018-10-13 | End: 2019-05-28 | Stop reason: HOSPADM

## 2018-10-30 NOTE — PROGRESS NOTES
Chief Complaint   Patient presents with    Left Foot - Follow-up    Right Ankle - Follow-up         Subjective  patient here follow-up for her right ankle and left foot  Left foot was injured on June 18, 2018 and was treated for a 2nd, 3rd and 4th metatarsal fracture nonsurgically  Patient originally had a right ankle injury back in May causing a fracture and had a 2nd fall late in August 2018 and fractured the distal aspect of the lateral malleolus right ankle  Both feeling better today  Patient has been using the Cam boot for the right ankle and a hard sole shoe for the left foot  Patient's only complaint is that she feels her left 3rd and 4th toes seem to be curling under at the distal aspect  NO pain or numbness reported         ROS:   General: no fever, no chills  HEENT:   Positive for sore throat   Respiratory:  Positive for cough shortness of breath and wheezing  Cardiovascular:  No chest pain, positive for palpitations  Musculoskeletal: see HPI and PE  SKIN:  No skin rash, no dry skin  Neurological:  Positive for dizziness headaches and numbness  Psychiatric:  Positive for difficulty concentrating, positive for anxiety and positive for depression  Review of all other systems is negative    Past Medical History:   Diagnosis Date    Anemia     Anemia     Asthma     Cardiac disorder     [facility]     [facility])     Constipation     [facility] disease[facility]     Depression     [facility]us [facility] [address]     Diarrhea     Fibromyalgia     Heart disorder     Malignant neoplasm ([facility])     Migraines     Mild persistent asthma     Occasional tremors     Osteoarthritis     Osteoporosis     Problems with swallowing     Restless leg syndrome     Skin cancer     Stomach problems     Tobacco abuse        Current Outpatient Prescriptions on File Prior to Visit   Medication Sig Dispense Refill    ADVAIR DISKUS 250-50 MCG/DOSE inhaler INHALE 1 DOSE BY MOUTH TWICE DAILY   RINSE MOUTH AFTER USE 3 Inhaler 3    albuterol (2 5 mg/3 mL) 0 083 % nebulizer solution Take 1 vial (2 5 mg total) by nebulization every 6 (six) hours as needed for wheezing or shortness of breath 1080 mL 3    albuterol (PROVENTIL HFA,VENTOLIN HFA) 90 mcg/act inhaler Inhale 2 puffs every 6 (six) hours as needed for wheezing      ARIPiprazole (ABILIFY) 2 mg tablet Take 2 mg by mouth daily at bedtime  0    butalbital-aspirin-caffeine (FIORINAL) -40 MG per tablet Take 1 tablet by mouth every 4 (four) hours as needed for headaches      clonazePAM (KlonoPIN) 0 5 mg tablet Take 0 5 mg by mouth 3 (three) times a day    1    Diclofenac Sodium 3 % GEL Place on the skin daily        diphenhydrAMINE (BENADRYL) 25 mg tablet Take 25 mg by mouth every 6 (six) hours as needed for itching      doxepin (SINEquan) 150 MG capsule 150 mg daily at bedtime  1    famotidine (PEPCID) 20 mg tablet TAKE 1 TABLET BY MOUTH EVERY DAY AT BEDTIME 90 tablet 3    fentaNYL (DURAGESIC) 75 mcg/hr APPLY 1 PATCH EVERY 72 HOURS  0    fluticasone (FLONASE) 50 mcg/act nasal spray 1 spray 2 (two) times a day  2    gabapentin (NEURONTIN) 800 mg tablet Take 800 mg by mouth 4 (four) times a day  3    lidocaine (XYLOCAINE) 5 % ointment apply locally FOUR TIMES DAILY  1    mupirocin (BACTROBAN) 2 % ointment Apply topically 3 (three) times a day      naproxen (NAPROSYN) 500 mg tablet Take 1 tablet (500 mg total) by mouth 2 (two) times a day with meals 30 tablet 0    nystatin (MYCOSTATIN) powder Apply topically 4 (four) times a day as needed        oxyCODONE-acetaminophen (PERCOCET) 5-325 mg per tablet Take 1-2 tablets by mouth every 4 (four) hours      pantoprazole (PROTONIX) 40 mg tablet TAKE 1 TABLET BY MOUTH EVERY DAY 90 tablet 3    primidone (MYSOLINE) 50 mg tablet 1/2 to 1 p o  q h s  30 tablet 5    rOPINIRole (REQUIP) 0 25 mg tablet Take 0 25 mg by mouth 3 (three) times a day  4    venlafaxine 225 MG TB24 Take 225 mg by mouth daily  1    zafirlukast (ACCOLATE) 20 MG tablet TAKE 1 TABLET BY MOUTH TWICE A DAY BEFORE MEALS 180 tablet 3    [DISCONTINUED] venlafaxine 150 MG TB24 Take 150 mg by mouth daily  0     No current facility-administered medications on file prior to visit  Allergies   Allergen Reactions    Morphine And Related Swelling and Other (See Comments)     Only allergic to IV morphine per patient    Quetiapine     Sulfa Antibiotics Other (See Comments)     "can't take->gastric bypass"         Physical Exam:    Vitals:    10/30/18 1505   BP: (!) 86/55   Pulse: (!) 106       General Appearance:  Alert, cooperative, no distress, appears stated age   Lungs:   respirations unlabored   Heart:  Normal heart rate noted   Abdomen:   Soft, non-tender,  no masses   Extremities: Extremities normal, atraumatic, no cyanosis or edema   Pulses: 2+ and symmetric   Skin: Skin color, texture, turgor normal, no rashes or lesions   Neurologic: Normal         Ortho Exam   Examination of the right ankle shows skin intact with no erythema noted  No tenderness with palpation over the medial or lateral malleolus  Ankle has full range of motion with slight stiffness in dorsiflexion  Sensation is intact to light touch L1-S1 distributions    Left foot with skin intact with no erythema noted  Full range of motion noted when compared to the opposite side  Sensation is intact light touch  Slight medial rotation from middle phalanx 3rd and 4th toes causing slight curling inward of toes in relaxed position  ASSESSMENT:    Tim Perez was seen today for follow-up and follow-up  Diagnoses and all orders for this visit:    Right ankle pain, unspecified chronicity  -     XR ankle 3+ vw right; Future  -     Ambulatory referral to Physical Therapy; Future    Other physeal fracture of left metatarsal, subsequent encounter for fracture with routine healing  -     XR foot 3+ vw left;  Future  -     Ambulatory referral to Physical Therapy; Future          PLAN:   X-rays today show good callus formation of the left foot  Right distal fibular also with callus formation  Again patient encouraged to quit smoking as that will interfere with the healing process  She will continue using the bone stimulator  She can wean out of the Cam boot and hard sole shoe and be weight-bearing as tolerated    Will follow-up last time in two months and have an x-ray of left foot and right ankle upon arrival

## 2018-10-31 ENCOUNTER — OFFICE VISIT (OUTPATIENT)
Dept: INTERNAL MEDICINE CLINIC | Facility: CLINIC | Age: 58
End: 2018-10-31
Payer: COMMERCIAL

## 2018-10-31 ENCOUNTER — APPOINTMENT (OUTPATIENT)
Dept: LAB | Facility: CLINIC | Age: 58
End: 2018-10-31
Payer: COMMERCIAL

## 2018-10-31 VITALS
BODY MASS INDEX: 26.68 KG/M2 | HEART RATE: 102 BPM | SYSTOLIC BLOOD PRESSURE: 118 MMHG | DIASTOLIC BLOOD PRESSURE: 82 MMHG | OXYGEN SATURATION: 96 % | WEIGHT: 150.6 LBS

## 2018-10-31 DIAGNOSIS — D50.9 IRON DEFICIENCY ANEMIA, UNSPECIFIED IRON DEFICIENCY ANEMIA TYPE: ICD-10-CM

## 2018-10-31 DIAGNOSIS — R10.9 LEFT FLANK PAIN: ICD-10-CM

## 2018-10-31 DIAGNOSIS — D50.9 IRON DEFICIENCY ANEMIA, UNSPECIFIED IRON DEFICIENCY ANEMIA TYPE: Primary | ICD-10-CM

## 2018-10-31 LAB
ANION GAP SERPL CALCULATED.3IONS-SCNC: 4 MMOL/L (ref 4–13)
BILIRUB UR QL STRIP: NEGATIVE
BUN SERPL-MCNC: 12 MG/DL (ref 5–25)
CALCIUM SERPL-MCNC: 8.4 MG/DL (ref 8.3–10.1)
CHLORIDE SERPL-SCNC: 106 MMOL/L (ref 100–108)
CLARITY UR: CLEAR
CO2 SERPL-SCNC: 25 MMOL/L (ref 21–32)
COLOR UR: YELLOW
CREAT SERPL-MCNC: 0.8 MG/DL (ref 0.6–1.3)
FERRITIN SERPL-MCNC: 7 NG/ML (ref 8–388)
GFR SERPL CREATININE-BSD FRML MDRD: 82 ML/MIN/1.73SQ M
GLUCOSE SERPL-MCNC: 114 MG/DL (ref 65–140)
GLUCOSE UR STRIP-MCNC: NEGATIVE MG/DL
HGB UR QL STRIP.AUTO: NEGATIVE
IRON SATN MFR SERPL: 4 %
IRON SERPL-MCNC: 16 UG/DL (ref 50–170)
KETONES UR STRIP-MCNC: NEGATIVE MG/DL
LEUKOCYTE ESTERASE UR QL STRIP: NEGATIVE
NITRITE UR QL STRIP: NEGATIVE
PH UR STRIP.AUTO: 6.5 [PH] (ref 4.5–8)
POTASSIUM SERPL-SCNC: 4.7 MMOL/L (ref 3.5–5.3)
PROT UR STRIP-MCNC: NEGATIVE MG/DL
SODIUM SERPL-SCNC: 135 MMOL/L (ref 136–145)
SP GR UR STRIP.AUTO: 1.01 (ref 1–1.03)
TIBC SERPL-MCNC: 390 UG/DL (ref 250–450)
UROBILINOGEN UR QL STRIP.AUTO: 0.2 E.U./DL

## 2018-10-31 PROCEDURE — 83540 ASSAY OF IRON: CPT

## 2018-10-31 PROCEDURE — 80048 BASIC METABOLIC PNL TOTAL CA: CPT

## 2018-10-31 PROCEDURE — 99213 OFFICE O/P EST LOW 20 MIN: CPT | Performed by: INTERNAL MEDICINE

## 2018-10-31 PROCEDURE — 81003 URINALYSIS AUTO W/O SCOPE: CPT | Performed by: INTERNAL MEDICINE

## 2018-10-31 PROCEDURE — 82728 ASSAY OF FERRITIN: CPT

## 2018-10-31 PROCEDURE — 83550 IRON BINDING TEST: CPT

## 2018-10-31 PROCEDURE — 36415 COLL VENOUS BLD VENIPUNCTURE: CPT

## 2018-10-31 NOTE — PROGRESS NOTES
Assessment/Plan:       Diagnoses and all orders for this visit:    Iron deficiency anemia, unspecified iron deficiency anemia type  -     Iron; Future  -     Iron Saturation %; Future  -     Ferritin; Future  -     Occult Bloood,Fecal Immunochemical; Future    Left flank pain  -     Basic metabolic panel; Future  -     Urinalysis with reflex to microscopic  -     US kidney and bladder; Future          Patient Instructions   Hypochromic microcytic anemia in a patient with a history of Charlie-en-Y bypass; this has occurred in the past   Most likely diagnosis is iron deficiency secondary to malabsorption  We will check iron panel  If it is low, I will send her for injectable iron  Subjective:      Patient ID: Gatito Foote is a 63 y o  female  Follow-up visit of a 15-year-old female    Noted to have hypochromic and microcytic anemia on a recent blood test  History of Charlie-en-Y gastric bypass; this has happened in the past and she has needed parenteral iron  We will recheck the iron level today    Multiple orthopedic issues appear to be getting better and she is walking without a boot and without adaptive devices  She had had multiple fractures of the small bones of the Left foot  A chronic pain/ fibromyalgia syndrome and she follows with a chronic pain specialist for this  However, she needs a new referral because the current pain management physician is going entirely to medical marijuana cash only  Part of the pain syndrome involves back and flank pain and she is worried about the possibility of renal stone  We can check a ultrasound    Orthostasis had been noted in the past   Propanolol was discontinued  This appears to be improved with no recent syncopal episodes  Has now seen Cardiology  A nonwalking stress test was done and reported to demonstrate a moderately large fixed inferior and apical perfusion deficit  Has seen Neurology    No real change the with the exception of a trial of primidone for tremor  She has multiple caries and multiple missing teeth  She has repetitive gingival infections  Complete extraction has been recommended  This will be done and a hospital setting for general anesthesia in December  The following portions of the patient's history were reviewed and updated as appropriate:   She has a past medical history of Anemia; Anemia; Cardiac disorder; CHF (congestive heart failure) ([facility] [place] [address] ); Chronic bronchitis ([facility] [place] [address] ); Constipation; COPD (chronic obstructive pulmonary disease) ([facility] [place] [address] ); Depression; Diabetes insipidus ([facility] [place] [address] ); Diarrhea; Fibromyalgia; Heart disorder; Malignant neoplasm ([facility] [place] [address] ); Migraines; Mild persistent asthma; Occasional tremors; Osteoarthritis; Osteoporosis; Problems with swallowing; Restless leg syndrome; Skin cancer; Stomach problems; and Tobacco abuse ,   does not have any pertinent problems on file  ,   has a past surgical history that includes Gallbladder surgery; Tonsillectomy; Gastric bypass; Back surgery; Other surgical history (Left); Arthrodesis; Bladder surgery; Foot surgery (Right); Abdominal surgery; Hand surgery (Left); Adenoidectomy; Toe Surgery (Right); Ankle surgery (Right); Femur Surgery (Right); Hemorrhoid surgery; and Toenail excision  ,  family history includes Arthritis in her mother; Cancer in her mother and sister; Diabetes in her brother; Heart attack in her father; Heart disease in her brother and father; Hypertension in her father and mother; Mental illness in her brother and father; Neuropathy in her father; Obesity in her mother and sister; Osteoarthritis in her family; Osteoporosis in her family; Scoliosis in her family  ,   reports that she has been smoking  She has never used smokeless tobacco  She reports that she uses drugs, including Marijuana  She reports that she does not drink alcohol ,  is allergic to morphine and related; quetiapine; and sulfa antibiotics     Current Outpatient Prescriptions   Medication Sig Dispense Refill    ADVAIR DISKUS 250-50 MCG/DOSE inhaler INHALE 1 DOSE BY MOUTH TWICE DAILY   RINSE MOUTH AFTER USE 3 Inhaler 3    albuterol (2 5 mg/3 mL) 0 083 % nebulizer solution Take 1 vial (2 5 mg total) by nebulization every 6 (six) hours as needed for wheezing or shortness of breath 1080 mL 3    albuterol (PROVENTIL HFA,VENTOLIN HFA) 90 mcg/act inhaler Inhale 2 puffs every 6 (six) hours as needed for wheezing      ARIPiprazole (ABILIFY) 2 mg tablet Take 2 mg by mouth daily at bedtime  0    butalbital-aspirin-caffeine (FIORINAL) -40 MG per tablet Take 1 tablet by mouth every 4 (four) hours as needed for headaches      clonazePAM (KlonoPIN) 0 5 mg tablet Take 0 5 mg by mouth 3 (three) times a day    1    Diclofenac Sodium 3 % GEL Place on the skin daily        diphenhydrAMINE (BENADRYL) 25 mg tablet Take 25 mg by mouth every 6 (six) hours as needed for itching      doxepin (SINEquan) 150 MG capsule 150 mg daily at bedtime  1    famotidine (PEPCID) 20 mg tablet TAKE 1 TABLET BY MOUTH EVERY DAY AT BEDTIME 90 tablet 3    fentaNYL (DURAGESIC) 75 mcg/hr APPLY 1 PATCH EVERY 72 HOURS  0    fluticasone (FLONASE) 50 mcg/act nasal spray 1 spray 2 (two) times a day  2    gabapentin (NEURONTIN) 800 mg tablet Take 800 mg by mouth 4 (four) times a day  3    lidocaine (XYLOCAINE) 5 % ointment apply locally FOUR TIMES DAILY  1    mupirocin (BACTROBAN) 2 % ointment Apply topically 3 (three) times a day      naproxen (NAPROSYN) 500 mg tablet Take 1 tablet (500 mg total) by mouth 2 (two) times a day with meals 30 tablet 0    nystatin (MYCOSTATIN) powder Apply topically 4 (four) times a day as needed        oxyCODONE-acetaminophen (PERCOCET) 5-325 mg per tablet Take 1-2 tablets by mouth every 4 (four) hours      pantoprazole (PROTONIX) 40 mg tablet TAKE 1 TABLET BY MOUTH EVERY DAY 90 tablet 3    primidone (MYSOLINE) 50 mg tablet 1/2 to 1 p o  q h s  30 tablet 5    rOPINIRole (REQUIP) 0 25 mg tablet Take 0 25 mg by mouth 3 (three) times a day  4    venlafaxine (EFFEXOR-XR) 150 mg 24 hr capsule Take 150 mg by mouth daily  0    venlafaxine 225 MG TB24 Take 225 mg by mouth daily  1    zafirlukast (ACCOLATE) 20 MG tablet TAKE 1 TABLET BY MOUTH TWICE A DAY BEFORE MEALS 180 tablet 3     No current facility-administered medications for this visit  Review of Systems   Constitutional: Positive for fatigue  Negative for fever  HENT: Positive for dental problem  Negative for sore throat and trouble swallowing  Respiratory: Negative for cough, shortness of breath and wheezing  Cardiovascular: Negative for palpitations and leg swelling  Genitourinary: Negative for difficulty urinating  Musculoskeletal: Positive for arthralgias and back pain  Negative for gait problem and joint swelling  Neurological: Negative for dizziness, tremors, seizures, weakness and light-headedness  Psychiatric/Behavioral: Negative for dysphoric mood  Objective:  Vitals:    10/31/18 1423   BP: 118/82   Pulse: 102   SpO2: 96%      Physical Exam   Constitutional:   Alert patient who appears to be stated age  She is a bit pale  However, globally, she looks better than her last visit  Cardiovascular: Normal rate  Pulmonary/Chest: Effort normal  No respiratory distress  Neurological: She is alert  Psychiatric: She has a normal mood and affect   Judgment normal

## 2018-10-31 NOTE — PATIENT INSTRUCTIONS
Hypochromic microcytic anemia in a patient with a history of Charlie-en-Y bypass; this has occurred in the past   Most likely diagnosis is iron deficiency secondary to malabsorption  We will check iron panel  If it is low, I will send her for injectable iron

## 2018-11-05 ENCOUNTER — HOSPITAL ENCOUNTER (OUTPATIENT)
Dept: NON INVASIVE DIAGNOSTICS | Facility: CLINIC | Age: 58
Discharge: HOME | End: 2018-11-05
Payer: COMMERCIAL

## 2018-11-05 DIAGNOSIS — R94.39 ABNORMAL MYOCARDIAL PERFUSION STUDY: ICD-10-CM

## 2018-11-05 PROBLEM — I51.89 DIASTOLIC DYSFUNCTION: Status: ACTIVE | Noted: 2018-11-05

## 2018-11-05 PROCEDURE — 93306 TTE W/DOPPLER COMPLETE: CPT

## 2018-11-05 PROCEDURE — 93306 TTE W/DOPPLER COMPLETE: CPT | Performed by: INTERNAL MEDICINE

## 2018-11-06 ENCOUNTER — HOSPITAL ENCOUNTER (OUTPATIENT)
Dept: ULTRASOUND IMAGING | Facility: HOSPITAL | Age: 58
Discharge: HOME | End: 2018-11-06
Attending: INTERNAL MEDICINE
Payer: COMMERCIAL

## 2018-11-06 DIAGNOSIS — R10.9 LEFT FLANK PAIN: ICD-10-CM

## 2018-11-06 PROCEDURE — 76770 US EXAM ABDO BACK WALL COMP: CPT

## 2018-11-07 DIAGNOSIS — D50.9 IRON DEFICIENCY ANEMIA, UNSPECIFIED IRON DEFICIENCY ANEMIA TYPE: Primary | ICD-10-CM

## 2018-11-08 ENCOUNTER — TELEPHONE (OUTPATIENT)
Dept: OBGYN CLINIC | Facility: HOSPITAL | Age: 58
End: 2018-11-08

## 2018-11-08 NOTE — TELEPHONE ENCOUNTER
Patient called to schedule an appointment for back pain  She states that she is currently seeing Dr Clovis Ramesh but he is switching to medical [facility]  Patient is having her records faxed over for review

## 2018-11-23 NOTE — TELEPHONE ENCOUNTER
S/w pt and told her we have not received the med recs yet  She will call Dr Deyanira Pederson office to get them faxed

## 2018-12-10 ENCOUNTER — OFFICE VISIT (OUTPATIENT)
Dept: CARDIOLOGY CLINIC | Facility: CLINIC | Age: 58
End: 2018-12-10
Payer: COMMERCIAL

## 2018-12-10 VITALS
HEART RATE: 92 BPM | BODY MASS INDEX: 27.46 KG/M2 | OXYGEN SATURATION: 96 % | SYSTOLIC BLOOD PRESSURE: 118 MMHG | DIASTOLIC BLOOD PRESSURE: 80 MMHG | WEIGHT: 155 LBS

## 2018-12-10 DIAGNOSIS — R06.02 SHORTNESS OF BREATH ON EXERTION: ICD-10-CM

## 2018-12-10 DIAGNOSIS — Z72.0 TOBACCO ABUSE: ICD-10-CM

## 2018-12-10 DIAGNOSIS — R29.6 FALLS FREQUENTLY: Primary | ICD-10-CM

## 2018-12-10 DIAGNOSIS — Z98.84 S/P GASTRIC BYPASS: ICD-10-CM

## 2018-12-10 DIAGNOSIS — R25.1 TREMORS OF NERVOUS SYSTEM: ICD-10-CM

## 2018-12-10 PROCEDURE — 99214 OFFICE O/P EST MOD 30 MIN: CPT | Performed by: INTERNAL MEDICINE
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
## 2018-12-11 NOTE — TELEPHONE ENCOUNTER
S/w pt and told her Dr Mihir Ch has nothing to offer her  He does not want to take over med management

## 2018-12-19 ENCOUNTER — OFFICE VISIT (OUTPATIENT)
Dept: INTERNAL MEDICINE CLINIC | Facility: CLINIC | Age: 58
End: 2018-12-19
Payer: COMMERCIAL

## 2018-12-19 VITALS
HEART RATE: 86 BPM | DIASTOLIC BLOOD PRESSURE: 80 MMHG | BODY MASS INDEX: 26.54 KG/M2 | SYSTOLIC BLOOD PRESSURE: 120 MMHG | WEIGHT: 149.8 LBS | OXYGEN SATURATION: 96 %

## 2018-12-19 DIAGNOSIS — I51.89 DIASTOLIC DYSFUNCTION: ICD-10-CM

## 2018-12-19 DIAGNOSIS — J45.30 MILD PERSISTENT ASTHMA WITHOUT COMPLICATION: Primary | ICD-10-CM

## 2018-12-19 DIAGNOSIS — E04.1 THYROID NODULE: ICD-10-CM

## 2018-12-19 DIAGNOSIS — G25.81 RESTLESS LEG: ICD-10-CM

## 2018-12-19 DIAGNOSIS — J38.7 LARYNGEAL NODULE: ICD-10-CM

## 2018-12-19 DIAGNOSIS — J41.0 SIMPLE CHRONIC BRONCHITIS (HCC): ICD-10-CM

## 2018-12-19 DIAGNOSIS — J45.30 MILD PERSISTENT ASTHMA, UNSPECIFIED WHETHER COMPLICATED: ICD-10-CM

## 2018-12-19 PROCEDURE — 99214 OFFICE O/P EST MOD 30 MIN: CPT | Performed by: INTERNAL MEDICINE

## 2018-12-19 RX ORDER — ROPINIROLE 0.25 MG/1
0.25 TABLET, FILM COATED ORAL 3 TIMES DAILY
Qty: 90 TABLET | Refills: 5 | Status: SHIPPED | OUTPATIENT
Start: 2018-12-19 | End: 2019-06-13 | Stop reason: SDUPTHER

## 2018-12-19 RX ORDER — ALBUTEROL SULFATE 90 UG/1
2 AEROSOL, METERED RESPIRATORY (INHALATION) EVERY 6 HOURS PRN
Qty: 1 INHALER | Refills: 5 | Status: SHIPPED | OUTPATIENT
Start: 2018-12-19 | End: 2019-08-21 | Stop reason: SDUPTHER

## 2018-12-19 NOTE — PATIENT INSTRUCTIONS
Multiple chronic problems principally neurological and orthopedic  This story of a thyroid nodule is new to me  We need to recheck it  Hoarseness needs ENT referral  Will be following with Hematology for malabsorption anemia  Revisit with me for her birthday but call if problems develop

## 2018-12-19 NOTE — PROGRESS NOTES
Assessment/Plan:       Diagnoses and all orders for this visit:    Mild persistent asthma without complication    Simple chronic bronchitis (HCC)    Thyroid nodule  -     US thyroid; Future    Diastolic dysfunction    Laryngeal nodule  -     Ambulatory Referral to Otolaryngology; Future    Restless leg  -     rOPINIRole (REQUIP) 0 25 mg tablet; Take 1 tablet (0 25 mg total) by mouth 3 (three) times a day    Mild persistent asthma, unspecified whether complicated  -     albuterol (PROVENTIL HFA,VENTOLIN HFA) 90 mcg/act inhaler; Inhale 2 puffs every 6 (six) hours as needed for wheezing          Patient Instructions   Multiple chronic problems principally neurological and orthopedic  This story of a thyroid nodule is new to me  We need to recheck it  Hoarseness needs ENT referral  Will be following with Hematology for malabsorption anemia  Revisit with me for her birthday but call if problems develop  Subjective:      Patient ID: Ramila Clement is a 63 y o  female  Follow-up visit of a 15-year-old female    Noted to have hypochromic and microcytic anemia   History of Charlie-en-Y gastric bypass; this has happened in the past and she has needed parenteral iron  Iron levels were low  Referred to Hematology  She still has not gone    Multiple orthopedic issues appear to be getting better and she is walking without a boot and without adaptive devices  She had had multiple fractures of the small bones of the Left foot    A chronic pain/ fibromyalgia syndrome and she follows with a chronic pain specialist for this  Failed back syndrome  Status post surgery  Continued pain  Continued hyperesthesia of the plantar surface of the feet  Continued neuropathic symptoms  Orthostasis had been noted in the past   Propanolol was discontinued  This appears to be improved with no recent syncopal episodes  Has now seen Cardiology      A nonwalking stress test was done and reported to demonstrate a moderately large fixed inferior and apical perfusion deficit  Has seen Neurology  No real change the with the exception of a trial of primidone for tremor  She has multiple caries and multiple missing teeth  She has repetitive gingival infections  Complete extraction has been recommended  Today the patient is hoarse  She tells me that she had seen an [address] and had a laryngoscopy about a year ago and a small nodule was found  She does not want to go back to the same provider so I will refer her to Dr Marilyn Milan at [facility]  She also tells me that she had a thyroid nodule found at the same time  This needs a recheck  Apparently she was told a year ago that she had a nodule but it was too small for biopsy          The following portions of the patient's history were reviewed and updated as appropriate:   She has a past medical history of Anemia; Anemia; Cardiac disorder; CHF (congestive heart failure) ([address] ); Constipation; COPD (chronic obstructive pulmonary disease) ([address] ); Depression; Diabetes insipidus ([facility] [address] ); Diarrhea; Fibromyalgia; Heart disorder; Malignant neoplasm ([address] ); Migraines; Mild persistent asthma; Occasional tremors; Osteoarthritis; Osteoporosis; Problems with swallowing; Restless leg syndrome; Skin cancer; Stomach problems; and Tobacco abuse ,   does not have any pertinent problems on file  ,   has a past surgical history that includes Gallbladder surgery; Tonsillectomy; Gastric bypass; Back surgery; Other surgical history (Left); Arthrodesis; Bladder surgery; Foot surgery (Right); Abdominal surgery; Hand surgery (Left); Adenoidectomy; Toe Surgery (Right); Ankle surgery (Right); Femur Surgery (Right); Hemorrhoid surgery; and Toenail excision  ,  family history includes Arthritis in her mother; Cancer in her mother and sister; Diabetes in her brother; Heart attack in her father; Heart disease in her brother and father; Hypertension in her father and mother; Mental illness in her brother and father; Neuropathy in her father; Obesity in her mother and sister; Osteoarthritis in her family; Osteoporosis in her family; Scoliosis in her family  ,   reports that she has been smoking  She has never used smokeless tobacco  She reports that she uses drugs, including Marijuana  She reports that she does not drink alcohol ,  is allergic to morphine and related; quetiapine; and sulfa antibiotics     Current Outpatient Prescriptions   Medication Sig Dispense Refill    ADVAIR DISKUS 250-50 MCG/DOSE inhaler INHALE 1 DOSE BY MOUTH TWICE DAILY   RINSE MOUTH AFTER USE 3 Inhaler 3    albuterol (2 5 mg/3 mL) 0 083 % nebulizer solution Take 1 vial (2 5 mg total) by nebulization every 6 (six) hours as needed for wheezing or shortness of breath 1080 mL 3    albuterol (PROVENTIL HFA,VENTOLIN HFA) 90 mcg/act inhaler Inhale 2 puffs every 6 (six) hours as needed for wheezing 1 Inhaler 5    ARIPiprazole (ABILIFY) 2 mg tablet Take 2 mg by mouth daily at bedtime  0    butalbital-aspirin-caffeine (FIORINAL) -40 MG per tablet Take 1 tablet by mouth every 4 (four) hours as needed for headaches      clonazePAM (KlonoPIN) 0 5 mg tablet Take 0 5 mg by mouth 3 (three) times a day    1    Diclofenac Sodium 3 % GEL Place on the skin daily        diphenhydrAMINE (BENADRYL) 25 mg tablet Take 25 mg by mouth every 6 (six) hours as needed for itching      doxepin (SINEquan) 150 MG capsule 150 mg daily at bedtime  1    famotidine (PEPCID) 20 mg tablet TAKE 1 TABLET BY MOUTH EVERY DAY AT BEDTIME 90 tablet 3    fentaNYL (DURAGESIC) 75 mcg/hr APPLY 1 PATCH EVERY 72 HOURS  0    fluticasone (FLONASE) 50 mcg/act nasal spray 1 spray 2 (two) times a day  2    gabapentin (NEURONTIN) 800 mg tablet Take 800 mg by mouth 4 (four) times a day  3    lidocaine (XYLOCAINE) 5 % ointment apply locally FOUR TIMES DAILY  1    mupirocin (BACTROBAN) 2 % ointment Apply topically 3 (three) times a day      naproxen (NAPROSYN) 500 mg tablet Take 1 tablet (500 mg total) by mouth 2 (two) times a day with meals 30 tablet 0    nystatin (MYCOSTATIN) powder Apply topically 4 (four) times a day as needed        oxyCODONE-acetaminophen (PERCOCET) 5-325 mg per tablet Take 1-2 tablets by mouth every 4 (four) hours      pantoprazole (PROTONIX) 40 mg tablet TAKE 1 TABLET BY MOUTH EVERY DAY 90 tablet 3    primidone (MYSOLINE) 50 mg tablet 1/2 to 1 p o  q h s  30 tablet 5    rOPINIRole (REQUIP) 0 25 mg tablet Take 1 tablet (0 25 mg total) by mouth 3 (three) times a day 90 tablet 5    venlafaxine (EFFEXOR-XR) 150 mg 24 hr capsule Take 150 mg by mouth daily  0    venlafaxine 225 MG TB24 Take 225 mg by mouth daily  1    zafirlukast (ACCOLATE) 20 MG tablet TAKE 1 TABLET BY MOUTH TWICE A DAY BEFORE MEALS 180 tablet 3     No current facility-administered medications for this visit  Review of Systems   Constitutional: Positive for fatigue  Negative for fever  HENT: Positive for dental problem  Negative for sore throat and trouble swallowing  Respiratory: Negative for cough, shortness of breath and wheezing  Cardiovascular: Negative for palpitations and leg swelling  Genitourinary: Negative for difficulty urinating  Musculoskeletal: Positive for arthralgias and back pain  Negative for gait problem and joint swelling  Neurological: Negative for dizziness, tremors, seizures, weakness and light-headedness  Psychiatric/Behavioral: Negative for dysphoric mood  Objective:  Vitals:    12/19/18 1501   BP: 120/80   Pulse: 86   SpO2: 96%      Physical Exam   Constitutional: She is oriented to person, place, and time  She appears well-developed  A patient who appears stated age  No longer wheelchair  Verbalizing above complaints  She is hoarse  HENT:   Head: Normocephalic  Multiple broken and missing teeth  Eyes: Pupils are equal, round, and reactive to light  Cardiovascular: Normal rate and regular rhythm  Murmur heard     Decrescendo systolic murmur is present with a grade of 2/6     Grade 2/6 fairly low-grade decrescendo murmur heard best in the right sternal border 2nd intercostal space without radiation  Pulmonary/Chest: Effort normal  No respiratory distress  Abdominal: Soft  Musculoskeletal: She exhibits deformity  Osteoarthrosis deformities noted   Neurological: She is alert and oriented to person, place, and time  Skin: Skin is warm  Scar noted consistent with prior lumbar surgery   Psychiatric: She has a normal mood and affect

## 2018-12-20 DIAGNOSIS — G25.0 TREMOR, ESSENTIAL: ICD-10-CM

## 2018-12-20 RX ORDER — PRIMIDONE 50 MG/1
TABLET ORAL
Qty: 90 TABLET | Refills: 1 | Status: SHIPPED | OUTPATIENT
Start: 2018-12-20 | End: 2019-02-27 | Stop reason: SDUPTHER

## 2019-01-09 ENCOUNTER — TELEPHONE (OUTPATIENT)
Dept: INTERNAL MEDICINE CLINIC | Facility: CLINIC | Age: 59
End: 2019-01-09

## 2019-01-09 NOTE — TELEPHONE ENCOUNTER
Arnulfo Morton from [facility] called to have patients [id number] and recent lab orders, & Pulmonary Function Test sent over    Oneda Lorraineinkle and told [facility]    Fax # [phone]

## 2019-01-10 ENCOUNTER — TELEPHONE (OUTPATIENT)
Dept: INTERNAL MEDICINE CLINIC | Facility: CLINIC | Age: 59
End: 2019-01-10

## 2019-01-10 NOTE — TELEPHONE ENCOUNTER
I can supply documentation and I concur with general anesthesia but how can high supply any approved that she needs her teeth taken out? They are the dentists!

## 2019-01-10 NOTE — TELEPHONE ENCOUNTER
Patient called requesting a call from [facility],     Patient has gone to 3 different oral surgeons, she needs to get her teeth removed bc she is unable to eat and they are painful, she states Dr Brumfield is aware of this    Looking for a call back to see what else she can do     # [phone]

## 2019-01-10 NOTE — TELEPHONE ENCOUNTER
She went to [address] [place] [address], saw head professor yesterday    And although she told him, in light of her different surgeries in the past  she is still fine to have her teeth removed in a surgical operating room, where she can be put out    rather then another location, with only twilight sedation  The dental surgeon said he needs verification from Dr Earl Jackson for this        Also needs proof of medical necessity for her to have her teeth removed  Charly Ross She will get the Dr's name and where this needs to be sent, and call back with that info  Charly Ross She said it's needed ASAP

## 2019-01-16 ENCOUNTER — TELEPHONE (OUTPATIENT)
Dept: NEUROLOGY | Facility: CLINIC | Age: 59
End: 2019-01-16

## 2019-01-21 ENCOUNTER — TELEPHONE (OUTPATIENT)
Dept: INTERNAL MEDICINE CLINIC | Facility: CLINIC | Age: 59
End: 2019-01-21

## 2019-01-21 NOTE — TELEPHONE ENCOUNTER
LUCILA FOR [facility] IS GETTING ALL OF HER TEETH PULLED ON Monday JAN 28TH AT [facility] ORAL SURGERY UNDER A LOCAL ANAESTHESIA    ANY QUESTIONS PLEASE CONTACT PT AT  [phone]

## 2019-02-25 ENCOUNTER — OFFICE VISIT (OUTPATIENT)
Dept: HEMATOLOGY ONCOLOGY | Facility: CLINIC | Age: 59
End: 2019-02-25
Payer: COMMERCIAL

## 2019-02-25 VITALS — RESPIRATION RATE: 16 BRPM | TEMPERATURE: 98.4 F | BODY MASS INDEX: 25.6 KG/M2 | WEIGHT: 144.5 LBS

## 2019-02-25 DIAGNOSIS — Z98.84 HX OF GASTRIC BYPASS: ICD-10-CM

## 2019-02-25 DIAGNOSIS — Z80.9 FAMILY HISTORY OF CANCER: ICD-10-CM

## 2019-02-25 DIAGNOSIS — F17.200 SMOKER: ICD-10-CM

## 2019-02-25 DIAGNOSIS — D50.9 IRON DEFICIENCY ANEMIA, UNSPECIFIED IRON DEFICIENCY ANEMIA TYPE: Primary | ICD-10-CM

## 2019-02-25 DIAGNOSIS — N63.0 LUMP OR MASS IN BREAST: ICD-10-CM

## 2019-02-25 DIAGNOSIS — R91.1 LUNG NODULE: ICD-10-CM

## 2019-02-25 PROCEDURE — 99204 OFFICE O/P NEW MOD 45 MIN: CPT | Performed by: INTERNAL MEDICINE

## 2019-02-26 NOTE — PROGRESS NOTES
HEMATOLOGY / ONCOLOGY CLINIC NOTE    Primary Care Provider: Chano Yanes MD  Referring Provider: Rhiannonkendall Zaida  MRN: [id number]  : 1960    Reason for Encounter:  Chief Complaint   Patient presents with    Consult     "I am anemic and i need an iron infusion"         Hematology / Oncology History:     Marge Swanson is a 63 y o  female who came in to establish care with hematology    1, iron deficiency anemia  - diagnosed since gastric bypass surgery, the 1st in , 2nd in  due to bleeding ulcer from 1st gastric bypass surgery  - last transfusion in  after surgery  - patient was given IV iron in the past, per patient about a year ago which helped anemia symptoms  - no PICA  Not on other blood thinners, no other bleeding issues  2, lumps in breast  - last mammogram in 2018  Benign nodule was observed  Due for another mammogram     3  Lung nodule  - had multiple screening CT chest, no evidence for malignancy  Assessment / Plan:       1  Iron deficiency anemia, unspecified iron deficiency anemia type  - hemoglobin decreased from baseline 13-8 9  No obvious bleeding, iron panel is consistent with iron deficiency  No significant symptom, plan to give IV iron and follow-up  - we do notice that although patient had a long history of gastric bypass surgery, hemoglobin decreased in the past 3 years, patient not able to recall last colonoscopy, we will put possible GI issues/questionable tumors in the differential, depending on the result, there is a low threshold to refer back to GI for further evaluation     - Ambulatory referral to Hematology / Oncology  - CBC and differential  - Iron Panel; Future    2  Hx of gastric bypass      3  Lump or mass in breast    - Mammo screening bilateral w cad; Future    4  Smoker      5  Lung nodule  -  CT scan showed lung nodules resolved in ventrally  No evidence for malignancy  Continue follow-up    6   Family history of cancer 60     minutes were spent face to face with patient with greater than 50% of the time spent in counseling or coordination of care including discussions of treatment instructions  All of the patient's questions were answered to their satisfactory during this discussion  Advised pt to call if there is any further questions  Interval History:     2/26/2019:  Patient is a 63 year female with history of gastric bypass surgery, anemia, fatigue, came in to establish care with hematology  Reported at baseline health status, no bleeding easy bruise, baseline health status  No new lumps bumps patient could feel  Active smoker  Problem list:     Patient Active Problem List   Diagnosis    Tremor, anxiety related    [facility]ans [facility]    Severe depression [facility]    Postgastrectomy malabsorption    Pain syndrome, chronic    Combined hyperlipidemia    Falls frequently    Hoarseness    Orthostasis    Mild persistent asthma    Pain in both feet    Osteoporosis    Other physeal fracture of left metatarsal, subsequent encounter for fracture with routine healing    Peripheral polyneuropathy    Tobacco abuse    Dental caries    Gingivitis    Lung nodule    Right hip pain    Osteoarthritis    Abnormal myocardial perfusion study    COPD (chronic obstructive pulmonary disease) (HCC)    Left flank pain    Diastolic dysfunction    Thyroid nodule    Laryngeal nodule       PHYSICIAL EXAMINATION:       Vital Signs:   [unfilled]  Body mass index is 25 6 kg/m²  Body surface area is 1 68 meters squared  No significant change compared to previous visit  GEN: Alert, awake oriented x3, in no acute distress  HEENT- No pallor, icterus, cyanosis, no oral mucosal lesions,   LAD - no palpable cervical, clavicle, axillary, inguinal LAD  Heart- normal S1 S2, regular rate and rhythm, No murmur, rubs     Lungs- decreased breathing sound bilateral    Abdomen- soft, Non tender, bowel sounds present  Extremities- No cyanosis, clubbing, edema  Neuro- No focal neurological deficit           PAST MEDICAL HISTORY:   has a past medical history of Anemia, Anemia, Cardiac disorder, [facility]estive [facility] failure) [facility] ), Constipation, COPD [facility]chronic obstructive pulmonary disease) [facility] Depression, Diabetes insipidus ([facility] ), Diarrhea, Fibromyalgia, Heart disorder, Malignant neoplasm ([facility] ), Migraines, Mild persistent asthma, Occasional tremors, Osteoarthritis, Osteoporosis, Problems with swallowing, Restless leg syndrome, Skin cancer, Stomach problems, and Tobacco abuse  PAST SURGICAL HISTORY:   has a past surgical history that includes Gallbladder surgery; Tonsillectomy; Gastric bypass; Back surgery; Other surgical history (Left); Arthrodesis; Bladder surgery; Foot surgery (Right); Abdominal surgery; Hand surgery (Left); Adenoidectomy; Toe Surgery (Right); Ankle surgery (Right); Femur Surgery (Right); Hemorrhoid surgery; and Toenail excision  CURRENT MEDICATIONS:   Current Outpatient Medications   Medication Sig Dispense Refill    ADVAIR DISKUS 250-50 MCG/DOSE inhaler INHALE 1 DOSE BY MOUTH TWICE DAILY   RINSE MOUTH AFTER USE 3 Inhaler 3    albuterol (2 5 mg/3 mL) 0 083 % nebulizer solution Take 1 vial (2 5 mg total) by nebulization every 6 (six) hours as needed for wheezing or shortness of breath 1080 mL 3    albuterol (PROVENTIL HFA,VENTOLIN HFA) 90 mcg/act inhaler Inhale 2 puffs every 6 (six) hours as needed for wheezing 1 Inhaler 5    ARIPiprazole (ABILIFY) 2 mg tablet Take 2 mg by mouth daily at bedtime  0    butalbital-aspirin-caffeine (FIORINAL) -40 MG per tablet Take 1 tablet by mouth every 4 (four) hours as needed for headaches      clonazePAM (KlonoPIN) 0 5 mg tablet Take 0 5 mg by mouth 3 (three) times a day    1    Diclofenac Sodium 3 % GEL Place on the skin daily        diphenhydrAMINE (BENADRYL) 25 mg tablet Take 25 mg by mouth every 6 (six) hours as needed for itching      doxepin (SINEquan) 150 MG capsule 150 mg daily at bedtime  1    famotidine (PEPCID) 20 mg tablet TAKE 1 TABLET BY MOUTH EVERY DAY AT BEDTIME 90 tablet 3    fentaNYL (DURAGESIC) 75 mcg/hr APPLY 1 PATCH EVERY 72 HOURS  0    fluticasone (FLONASE) 50 mcg/act nasal spray 1 spray 2 (two) times a day  2    gabapentin (NEURONTIN) 800 mg tablet Take 800 mg by mouth 4 (four) times a day  3    lidocaine (XYLOCAINE) 5 % ointment apply locally FOUR TIMES DAILY  1    mupirocin (BACTROBAN) 2 % ointment Apply topically 3 (three) times a day      naproxen (NAPROSYN) 500 mg tablet Take 1 tablet (500 mg total) by mouth 2 (two) times a day with meals 30 tablet 0    nystatin (MYCOSTATIN) powder Apply topically 4 (four) times a day as needed        oxyCODONE-acetaminophen (PERCOCET) 5-325 mg per tablet Take 1-2 tablets by mouth every 4 (four) hours      pantoprazole (PROTONIX) 40 mg tablet TAKE 1 TABLET BY MOUTH EVERY DAY 90 tablet 3    primidone (MYSOLINE) 50 mg tablet 1/2 to 1 p o  q h s  90 tablet 1    rOPINIRole (REQUIP) 0 25 mg tablet Take 1 tablet (0 25 mg total) by mouth 3 (three) times a day 90 tablet 5    venlafaxine (EFFEXOR-XR) 150 mg 24 hr capsule Take 150 mg by mouth daily  0    venlafaxine 225 MG TB24 Take 225 mg by mouth daily  1    zafirlukast (ACCOLATE) 20 MG tablet TAKE 1 TABLET BY MOUTH TWICE A DAY BEFORE MEALS 180 tablet 3     No current facility-administered medications for this visit  [unfilled]    SOCIAL HISTORY:   reports that she has been smoking  She has never used smokeless tobacco  She reports that she has current or past drug history  Drug: Marijuana  She reports that she does not drink alcohol       FAMILY HISTORY:  family history includes Arthritis in her mother; Cancer in her mother and sister; Diabetes in her brother; Heart attack in her father; Heart disease in her brother and father; Hypertension in her father and mother; Mental illness in her brother and father; Neuropathy in her father; Obesity in her mother and sister; Osteoarthritis in her family; Osteoporosis in her family; Scoliosis in her family  ALLERGIES:  is allergic to morphine and related; quetiapine; and sulfa antibiotics  REVIEW OF SYSTEMS:  Please note that a 14-point review of systems was performed to include Constitutional, HEENT, Respiratory, CVS, GI, , Musculoskeletal, Integumentary, Neurologic, Rheumatologic, Endocrinologic, Psychiatric, Lymphatic, and Hematologic/Oncologic systems were reviewed and are negative unless otherwise stated in HPI  Positive and negative findings pertinent to this evaluation are incorporated into the history of present illness  LAB:  Lab Results   Component Value Date    WBC 9 43 10/26/2018    HGB 8 9 (L) 10/26/2018    HCT 29 4 (L) 10/26/2018    MCV 76 (L) 10/26/2018     10/26/2018     Lab Results   Component Value Date    K 4 7 10/31/2018     10/31/2018    CO2 25 10/31/2018    AGAP 4 10/31/2018    BUN 12 10/31/2018    CREATININE 0 80 10/31/2018    GLUC 114 10/31/2018    GLUF 72 10/26/2018    CALCIUM 8 4 10/31/2018    AST 18 10/26/2018    ALT 15 10/26/2018    ALKPHOS 121 (H) 10/26/2018    TP 7 4 10/26/2018    TBILI 0 39 10/26/2018    EGFR 82 10/31/2018       CBC with diff:       Invalid input(s):  RBC, TOTALCELLSCOUNTED, SEGS%, GRANS%, LYMPHS%, EOS%, BASO%, ABNEUT, ABGRANS, ABLYMPHS, ABMOMOS, ABEOS, ABBASO    CMP:      Invalid input(s): ALBUMIN        IMAGING:  Mammo screening bilateral w cad    (Results Pending)     No results found

## 2019-02-27 ENCOUNTER — OFFICE VISIT (OUTPATIENT)
Dept: NEUROLOGY | Facility: CLINIC | Age: 59
End: 2019-02-27
Payer: COMMERCIAL

## 2019-02-27 VITALS
DIASTOLIC BLOOD PRESSURE: 72 MMHG | WEIGHT: 143.8 LBS | SYSTOLIC BLOOD PRESSURE: 110 MMHG | BODY MASS INDEX: 25.47 KG/M2 | HEART RATE: 79 BPM

## 2019-02-27 DIAGNOSIS — I95.1 ORTHOSTATIC HYPOTENSION: ICD-10-CM

## 2019-02-27 DIAGNOSIS — G25.0 TREMOR, ESSENTIAL: Primary | ICD-10-CM

## 2019-02-27 DIAGNOSIS — G62.9 PERIPHERAL POLYNEUROPATHY: ICD-10-CM

## 2019-02-27 PROCEDURE — 99214 OFFICE O/P EST MOD 30 MIN: CPT | Performed by: PSYCHIATRY & NEUROLOGY

## 2019-02-27 RX ORDER — PRIMIDONE 50 MG/1
TABLET ORAL
Qty: 180 TABLET | Refills: 1 | Status: SHIPPED | OUTPATIENT
Start: 2019-02-27 | End: 2019-08-24 | Stop reason: SDUPTHER

## 2019-02-27 RX ORDER — MIDODRINE HYDROCHLORIDE 2.5 MG/1
2.5 TABLET ORAL 3 TIMES DAILY
Qty: 90 TABLET | Refills: 5 | Status: SHIPPED | OUTPATIENT
Start: 2019-02-27 | End: 2019-07-10 | Stop reason: SDUPTHER

## 2019-02-27 RX ORDER — OMEGA-3 FATTY ACIDS/FISH OIL 300-1000MG
2 CAPSULE ORAL DAILY PRN
COMMUNITY
End: 2019-05-28 | Stop reason: HOSPADM

## 2019-02-27 RX ORDER — AZELASTINE HYDROCHLORIDE, FLUTICASONE PROPIONATE 137; 50 UG/1; UG/1
SPRAY, METERED NASAL AS NEEDED
COMMUNITY
Start: 2017-06-15 | End: 2020-01-14 | Stop reason: SDDI

## 2019-02-27 RX ORDER — CHLORHEXIDINE GLUCONATE 0.12 MG/ML
RINSE ORAL
Refills: 0 | COMMUNITY
Start: 2019-01-28 | End: 2019-07-10 | Stop reason: ALTCHOICE

## 2019-02-27 NOTE — PROGRESS NOTES
Chelly Logan is a 63 y o  female returns in follow-up today with history of tremor    Assessment:  1  Tremor, essential    2  Peripheral polyneuropathy    3  Orthostatic hypotension        Plan:  Increase primidone to 1-1/2 or 2 pills at bedtime  Initiate ProAmatine 0 25 milligrams 3 times daily  Follow-up 6 weeks    Discussion:  [facility] reports that she has not noticed significant improvement in her tremor with primidone  She does find that she sleeps better  Have recommended that she increase the dose to 1-1/2 pills for a week and if not effective go to 2  She states that cardiology did not find a reason for her passing out having done a Holter monitor and stress test, she states they did not do a tilt table test   On exam today she is severely orthostatic  Will initiate ProAmatine 0 25 milligrams 3 times daily  She understands she should not take her last dose after 5 o'clock        Subjective:    [facility] returns in follow-up today  She reports that she has not noticed a significant improvement in her tremor with the primidone  She has not had any problems tolerating it and in fact reports that she sleeps better at night since she started the medication  Her main concern is she continues to have syncopal episodes  She recently passed out and broke her foot  She states that she is fine 1 minutes and then gets lightheaded, her vision blacks out and then she falls  She did see Cardiology who did a Holter monitor and a stress test   A tilt-table test was not performed  She denies any symptoms of neuropathic pain        Past Medical History:   Diagnosis Date    Anemia     Anemia     Cardiac disorder     [facility]     Constipation     COPD (chronic obstructive pulmonary disease) (HCC)     Depression     Diabetes insipidus (HCC)     Diarrhea     Fibromyalgia     Heart disorder     Malignant neoplasm (HCC)     Migraines     Mild persistent asthma     Occasional tremors  Osteoarthritis     Osteoporosis     Problems with swallowing     Restless leg syndrome     Skin cancer     Stomach problems     Tobacco abuse        Family History:  Family History   Problem Relation Age of Onset    Cancer Mother         uterine    Hypertension Mother     Arthritis Mother     Obesity Mother     Heart disease Father     Neuropathy Father     Heart attack Father     Hypertension Father     Mental illness Father     Heart disease Brother     Diabetes Brother     Mental illness Brother     Osteoporosis Family     Scoliosis Family     Osteoarthritis Family     Obesity Sister     Cancer Sister        Past Surgical History:  Past Surgical History:   Procedure Laterality Date    ABDOMINAL SURGERY      Gastrorrhaphy for perforation of ulcer, last assessed 10/30/2014    ADENOIDECTOMY      ANKLE SURGERY Right     ARTHRODESIS      lumbar by posterolateral approach, last assessed 06/13/2017    BACK SURGERY      BLADDER SURGERY      last assessed 10/30/2014    FEMUR SURGERY Right     FOOT SURGERY Right     GALLBLADDER SURGERY      GASTRIC BYPASS      x2    HAND SURGERY Left     HEMORRHOID SURGERY      MULTIPLE TOOTH EXTRACTIONS      OTHER SURGICAL HISTORY Left     arm incision    TOE SURGERY Right     TOENAIL EXCISION      TONSILLECTOMY         Social History:   reports that she has been smoking  She has never used smokeless tobacco  She reports that she has current or past drug history  Drug: Marijuana  She reports that she does not drink alcohol  Allergies:  Morphine and related; Quetiapine; and Sulfa antibiotics      Current Outpatient Medications:     ADVAIR DISKUS 250-50 MCG/DOSE inhaler, INHALE 1 DOSE BY MOUTH TWICE DAILY   RINSE MOUTH AFTER USE, Disp: 3 Inhaler, Rfl: 3    albuterol (2 5 mg/3 mL) 0 083 % nebulizer solution, Take 1 vial (2 5 mg total) by nebulization every 6 (six) hours as needed for wheezing or shortness of breath, Disp: 1080 mL, Rfl: 3   albuterol (PROVENTIL HFA,VENTOLIN HFA) 90 mcg/act inhaler, Inhale 2 puffs every 6 (six) hours as needed for wheezing, Disp: 1 Inhaler, Rfl: 5    ARIPiprazole (ABILIFY) 2 mg tablet, Take 2 mg by mouth daily at bedtime, Disp: , Rfl: 0    Azelastine-Fluticasone (DYMISTA) 137-50 MCG/ACT SUSP, into each nostril as needed , Disp: , Rfl:     butalbital-aspirin-caffeine (FIORINAL) -40 MG per tablet, Take 1 tablet by mouth every 4 (four) hours as needed for headaches, Disp: , Rfl:     chlorhexidine (PERIDEX) 0 12 % solution, SWISH AND SPIT 15MLS TWICE DAILY, Disp: , Rfl: 0    Cholecalciferol (VITAMIN D3 PO), Take 1 tablet by mouth daily as needed, Disp: , Rfl:     clonazePAM (KlonoPIN) 0 5 mg tablet, Take 0 5 mg by mouth 2 (two) times a day , Disp: , Rfl: 1    Diclofenac Sodium 3 % GEL, Place on the skin daily  , Disp: , Rfl:     diphenhydrAMINE (BENADRYL) 25 mg tablet, Take 25 mg by mouth every 6 (six) hours as needed for itching, Disp: , Rfl:     doxepin (SINEquan) 150 MG capsule, 150 mg daily at bedtime, Disp: , Rfl: 1    famotidine (PEPCID) 20 mg tablet, TAKE 1 TABLET BY MOUTH EVERY DAY AT BEDTIME, Disp: 90 tablet, Rfl: 3    fentaNYL (DURAGESIC) 75 mcg/hr, APPLY 1 PATCH EVERY 72 HOURS, Disp: , Rfl: 0    fluticasone (FLONASE) 50 mcg/act nasal spray, 1 spray 2 (two) times a day, Disp: , Rfl: 2    gabapentin (NEURONTIN) 800 mg tablet, Take 800 mg by mouth 4 (four) times a day, Disp: , Rfl: 3    Ibuprofen 200 MG CAPS, Take 2 capsules by mouth daily as needed, Disp: , Rfl:     lidocaine (XYLOCAINE) 5 % ointment, apply locally FOUR TIMES DAILY, Disp: , Rfl: 1    mupirocin (BACTROBAN) 2 % ointment, Apply topically 3 (three) times a day, Disp: , Rfl:     nystatin (MYCOSTATIN) powder, Apply topically 4 (four) times a day as needed  , Disp: , Rfl:     oxyCODONE-acetaminophen (PERCOCET) 5-325 mg per tablet, Take 1-2 tablets by mouth every 4 (four) hours, Disp: , Rfl:     pantoprazole (PROTONIX) 40 mg tablet, TAKE 1 TABLET BY MOUTH EVERY DAY, Disp: 90 tablet, Rfl: 3    primidone (MYSOLINE) 50 mg tablet, Two p o  Q h s , Disp: 180 tablet, Rfl: 1    rOPINIRole (REQUIP) 0 25 mg tablet, Take 1 tablet (0 25 mg total) by mouth 3 (three) times a day, Disp: 90 tablet, Rfl: 5    Simethicone (GAS-X PO), Take 1 tablet by mouth as needed, Disp: , Rfl:     venlafaxine (EFFEXOR-XR) 150 mg 24 hr capsule, Take 150 mg by mouth daily, Disp: , Rfl: 0    venlafaxine 225 MG TB24, Take 225 mg by mouth daily, Disp: , Rfl: 1    zafirlukast (ACCOLATE) 20 MG tablet, TAKE 1 TABLET BY MOUTH TWICE A DAY BEFORE MEALS, Disp: 180 tablet, Rfl: 3    midodrine (PROAMATINE) 2 5 mg tablet, Take 1 tablet (2 5 mg total) by mouth 3 (three) times a day, Disp: 90 tablet, Rfl: 5    I have reviewed the past medical, social and family history, current medications, allergies, vitals, review of systems and updated this information as appropriate today     Objective:    Vitals:  Blood pressure 110/72, pulse 79, weight 65 2 kg (143 lb 12 8 oz)  Physical Exam    Neurological Exam  GENERAL:  Well-developed well-nourished woman in no acute distress  HEENT/NECK: Head is atraumatic normocephalic, neck is supple  NEUROLOGIC:  Mental Status: Awake and alert without aphasia  Cranial Nerves: Extraocular movements are full  Face is symmetrical  Motor:  No drift is noted on arm extension  Coordination:  8 hertz tremor is noted with posture maintenance and intention  Blood pressure in the seated position was 104/70 and was standing dropped to 70/50            ROS:    Review of Systems   Constitutional: Positive for fatigue  Negative for appetite change and fever  HENT: Positive for trouble swallowing (just had teeth pulled)  Negative for hearing loss, tinnitus and voice change  Eyes: Positive for pain (shooting pain) and visual disturbance  Negative for photophobia  Respiratory: Positive for shortness of breath and wheezing      Cardiovascular: Positive for chest pain (behind left breast )  Negative for palpitations  Gastrointestinal: Positive for nausea  Negative for vomiting  Endocrine: Negative  Negative for cold intolerance and heat intolerance  Genitourinary: Negative  Negative for dysuria, frequency and urgency  Musculoskeletal: Positive for arthralgias, back pain (with back burning, left), gait problem (falls), neck pain and neck stiffness  Negative for myalgias  Skin: Negative  Negative for rash  Neurological: Positive for tremors, syncope (post fall), weakness, light-headedness, numbness (arms and legs) and headaches  Negative for dizziness, seizures, facial asymmetry and speech difficulty  Hematological: Bruises/bleeds easily  Psychiatric/Behavioral: Negative  Negative for confusion, hallucinations and sleep disturbance  All other systems reviewed and are negative

## 2019-03-01 ENCOUNTER — TELEPHONE (OUTPATIENT)
Dept: PAIN MEDICINE | Facility: CLINIC | Age: 59
End: 2019-03-01

## 2019-03-11 DIAGNOSIS — R10.13 EPIGASTRIC PAIN: ICD-10-CM

## 2019-03-11 RX ORDER — FAMOTIDINE 20 MG/1
TABLET, FILM COATED ORAL
Qty: 90 TABLET | Refills: 3 | OUTPATIENT
Start: 2019-03-11

## 2019-03-11 RX ORDER — FAMOTIDINE 20 MG/1
20 TABLET, FILM COATED ORAL
Qty: 30 TABLET | Refills: 0 | Status: SHIPPED | OUTPATIENT
Start: 2019-03-11 | End: 2019-05-06 | Stop reason: SDUPTHER

## 2019-03-13 DIAGNOSIS — R10.13 EPIGASTRIC PAIN: ICD-10-CM

## 2019-03-13 RX ORDER — PANTOPRAZOLE SODIUM 40 MG/1
40 TABLET, DELAYED RELEASE ORAL DAILY
Qty: 30 TABLET | Refills: 0 | Status: SHIPPED | OUTPATIENT
Start: 2019-03-13 | End: 2019-05-29 | Stop reason: CLARIF

## 2019-03-13 RX ORDER — PANTOPRAZOLE SODIUM 40 MG/1
TABLET, DELAYED RELEASE ORAL
Qty: 90 TABLET | Refills: 3 | OUTPATIENT
Start: 2019-03-13

## 2019-03-14 RX ORDER — ONDANSETRON 2 MG/ML
4 INJECTION INTRAMUSCULAR; INTRAVENOUS ONCE AS NEEDED
Status: DISCONTINUED | OUTPATIENT
Start: 2019-03-15 | End: 2019-03-18 | Stop reason: HOSPADM

## 2019-03-14 RX ORDER — SODIUM CHLORIDE 9 MG/ML
20 INJECTION, SOLUTION INTRAVENOUS CONTINUOUS
Status: DISCONTINUED | OUTPATIENT
Start: 2019-03-15 | End: 2019-03-18 | Stop reason: HOSPADM

## 2019-03-15 ENCOUNTER — HOSPITAL ENCOUNTER (OUTPATIENT)
Dept: INFUSION CENTER | Facility: CLINIC | Age: 59
Discharge: HOME | End: 2019-03-15
Payer: COMMERCIAL

## 2019-03-15 VITALS
DIASTOLIC BLOOD PRESSURE: 66 MMHG | SYSTOLIC BLOOD PRESSURE: 97 MMHG | TEMPERATURE: 97.8 F | RESPIRATION RATE: 20 BRPM | HEART RATE: 87 BPM

## 2019-03-15 PROCEDURE — 96366 THER/PROPH/DIAG IV INF ADDON: CPT

## 2019-03-15 PROCEDURE — 96365 THER/PROPH/DIAG IV INF INIT: CPT

## 2019-03-15 RX ADMIN — SODIUM CHLORIDE 20 ML/HR: 0.9 INJECTION, SOLUTION INTRAVENOUS at 14:00

## 2019-03-15 RX ADMIN — IRON SUCROSE 300 MG: 20 INJECTION, SOLUTION INTRAVENOUS at 14:02

## 2019-03-15 NOTE — PROGRESS NOTES
Pt here for weekly venofer 1 of 4  Pt offers no complaints, resting comfortably  Vitals stable  Call bell in reach, will continue to monitor

## 2019-03-20 ENCOUNTER — OFFICE VISIT (OUTPATIENT)
Dept: INTERNAL MEDICINE CLINIC | Facility: CLINIC | Age: 59
End: 2019-03-20
Payer: COMMERCIAL

## 2019-03-20 VITALS
HEART RATE: 76 BPM | WEIGHT: 144.4 LBS | BODY MASS INDEX: 25.58 KG/M2 | DIASTOLIC BLOOD PRESSURE: 70 MMHG | SYSTOLIC BLOOD PRESSURE: 118 MMHG | OXYGEN SATURATION: 98 %

## 2019-03-20 DIAGNOSIS — G89.4 PAIN SYNDROME, CHRONIC: ICD-10-CM

## 2019-03-20 DIAGNOSIS — R07.81 PLEURITIC CHEST PAIN: ICD-10-CM

## 2019-03-20 DIAGNOSIS — I73.1 THROMBOANGIITIS OBLITERANS (HCC): ICD-10-CM

## 2019-03-20 DIAGNOSIS — J41.0 SIMPLE CHRONIC BRONCHITIS (HCC): Primary | ICD-10-CM

## 2019-03-20 PROCEDURE — 99214 OFFICE O/P EST MOD 30 MIN: CPT | Performed by: INTERNAL MEDICINE

## 2019-03-21 RX ORDER — SODIUM CHLORIDE 9 MG/ML
20 INJECTION, SOLUTION INTRAVENOUS CONTINUOUS
Status: DISCONTINUED | OUTPATIENT
Start: 2019-03-22 | End: 2019-03-25 | Stop reason: HOSPADM

## 2019-03-21 RX ORDER — ONDANSETRON 2 MG/ML
4 INJECTION INTRAMUSCULAR; INTRAVENOUS ONCE AS NEEDED
Status: DISCONTINUED | OUTPATIENT
Start: 2019-03-22 | End: 2019-03-25 | Stop reason: HOSPADM

## 2019-03-22 ENCOUNTER — HOSPITAL ENCOUNTER (OUTPATIENT)
Dept: INFUSION CENTER | Facility: CLINIC | Age: 59
Discharge: HOME | End: 2019-03-22
Payer: COMMERCIAL

## 2019-03-22 VITALS
RESPIRATION RATE: 16 BRPM | SYSTOLIC BLOOD PRESSURE: 118 MMHG | DIASTOLIC BLOOD PRESSURE: 67 MMHG | OXYGEN SATURATION: 94 % | TEMPERATURE: 97.6 F | HEART RATE: 67 BPM

## 2019-03-22 PROBLEM — G89.29 CHRONIC LEFT-SIDED THORACIC BACK PAIN: Status: ACTIVE | Noted: 2019-03-22

## 2019-03-22 PROBLEM — M54.6 CHRONIC LEFT-SIDED THORACIC BACK PAIN: Status: ACTIVE | Noted: 2019-03-22

## 2019-03-22 PROBLEM — S99.192D: Status: RESOLVED | Noted: 2018-06-20 | Resolved: 2019-03-22

## 2019-03-22 PROCEDURE — 96365 THER/PROPH/DIAG IV INF INIT: CPT

## 2019-03-22 PROCEDURE — 96366 THER/PROPH/DIAG IV INF ADDON: CPT

## 2019-03-22 RX ADMIN — IRON SUCROSE 300 MG: 20 INJECTION, SOLUTION INTRAVENOUS at 14:13

## 2019-03-22 RX ADMIN — SODIUM CHLORIDE 20 ML/HR: 0.9 INJECTION, SOLUTION INTRAVENOUS at 14:13

## 2019-03-22 NOTE — PROGRESS NOTES
Patient tolerated Venofer infusion without incident  Peripheral IV removed  Patient aware of next appointment  AVS offered and declined  Discharged with significant other via wheelchair

## 2019-03-22 NOTE — PROGRESS NOTES
Assessment/Plan:       Diagnoses and all orders for this visit:    Simple chronic bronchitis ([address] )  -     CT chest wo contrast; Future  -     XR spine thoracic 3 vw; Future  -     XR spine cervical complete 4 or 5 vw non injury; Future    Thromboangiitis obliterans (HCC)    Pleuritic chest pain  -     CT chest wo contrast; Future  -     XR spine thoracic 3 vw; Future  -     XR spine cervical complete 4 or 5 vw non injury; Future    Pain syndrome, chronic  -     XR spine thoracic 3 vw; Future  -     XR spine cervical complete 4 or 5 vw non injury; Future          Patient Instructions   Chronic back pain in the context of chronic lung disease and continued smoking  Due for chest CT  Thoracic imaging  Revisit in several weeks            Subjective:      Patient ID: Gatito Foote is a 63 y o  female  Complaint of thoracic pain  Pain felt mid back left side adjacent to regions [id number]  Worsening with range of motion but also worsening with deep breaths  Duration months    No fever chills or sweats  Known chronic lung patient with chronic dyspnea but no worsening  Noted to have hypochromic and microcytic anemia   History of Charlie-en-Y gastric bypass; this has happened in the past and she has needed parenteral iron  Iron levels were low  Referred to Hematology  She still has not gone     Multiple orthopedic issues   She had had multiple fractures of the small bones of the left foot    A chronic pain/ fibromyalgia syndrome and she follows with a chronic pain specialist for this  Failed back syndrome  Status post surgery  Continued pain  Continued hyperesthesia of the plantar surface of the feet  Continued neuropathic symptoms      Orthostasis had been noted in the past   Propanolol was discontinued  This appears to be improved with no recent syncopal episodes  Has now seen Cardiology      A nonwalking stress test was done and reported to demonstrate a moderately large fixed inferior and apical perfusion deficit      Has seen Neurology  No real change the with the exception of a trial of primidone for tremor          She has multiple caries and multiple missing teeth  She has repetitive gingival infections  Complete extraction has been recommended        Follows with ENT for hoarseness  Recent laryngoscopy normal   ENT has ordered a thyroid ultrasound                   The following portions of the patient's history were reviewed and updated as appropriate:   She has a past medical history of Anemia, Anemia, Cardiac disorder, CHF (congestive heart failure) ([facility] [address] ), Constipation, COPD (chronic obstructive pulmonary disease) ([facility] [address] ), Depression, Diabetes insipidus ([facility] [address] ), Fibromyalgia, Heart disorder, Malignant neoplasm ([facility] [address] ), Migraines, Occasional tremors, Osteoarthritis, Osteoporosis, Problems with swallowing, Restless leg syndrome, Skin cancer, Stomach problems, and Tobacco abuse ,  does not have any pertinent problems on file  ,   has a past surgical history that includes Gallbladder surgery; Tonsillectomy; Gastric bypass; Back surgery; Other surgical history (Left); Arthrodesis; Bladder surgery; Foot surgery (Right); Abdominal surgery; Hand surgery (Left); Adenoidectomy; Toe Surgery (Right); Ankle surgery (Right); Femur Surgery (Right); Hemorrhoid surgery; Toenail excision; and Multiple tooth extractions  ,  family history includes Arthritis in her mother; Cancer in her mother and sister; Diabetes in her brother; Heart attack in her father; Heart disease in her brother and father; Hypertension in her father and mother; Mental illness in her brother and father; Neuropathy in her father; Obesity in her mother and sister; Osteoarthritis in her family; Osteoporosis in her family; Scoliosis in her family  ,   reports that she has been smoking  She has a 42 00 pack-year smoking history  She has never used smokeless tobacco  She reports that she has current or past drug history  Drug: Marijuana   She reports that she does not drink alcohol ,  is allergic to morphine and related; quetiapine; and sulfa antibiotics     Current Outpatient Medications   Medication Sig Dispense Refill    ADVAIR DISKUS 250-50 MCG/DOSE inhaler INHALE 1 DOSE BY MOUTH TWICE DAILY   RINSE MOUTH AFTER USE 3 Inhaler 3    albuterol (2 5 mg/3 mL) 0 083 % nebulizer solution Take 1 vial (2 5 mg total) by nebulization every 6 (six) hours as needed for wheezing or shortness of breath 1080 mL 3    albuterol (PROVENTIL HFA,VENTOLIN HFA) 90 mcg/act inhaler Inhale 2 puffs every 6 (six) hours as needed for wheezing 1 Inhaler 5    ARIPiprazole (ABILIFY) 2 mg tablet Take 2 mg by mouth daily at bedtime  0    Azelastine-Fluticasone (DYMISTA) 137-50 MCG/ACT SUSP into each nostril as needed       butalbital-aspirin-caffeine (FIORINAL) -40 MG per tablet Take 1 tablet by mouth every 4 (four) hours as needed for headaches      chlorhexidine (PERIDEX) 0 12 % solution SWISH AND SPIT 15MLS TWICE DAILY  0    Cholecalciferol (VITAMIN D3 PO) Take 1 tablet by mouth daily as needed      clonazePAM (KlonoPIN) 0 5 mg tablet Take 0 5 mg by mouth 2 (two) times a day   1    Diclofenac Sodium 3 % GEL Place on the skin daily        diphenhydrAMINE (BENADRYL) 25 mg tablet Take 25 mg by mouth every 6 (six) hours as needed for itching      doxepin (SINEquan) 150 MG capsule 150 mg daily at bedtime  1    famotidine (PEPCID) 20 mg tablet Take 1 tablet (20 mg total) by mouth daily at bedtime 30 tablet 0    fentaNYL (DURAGESIC) 75 mcg/hr APPLY 1 PATCH EVERY 72 HOURS  0    gabapentin (NEURONTIN) 800 mg tablet Take 800 mg by mouth 4 (four) times a day  3    Ibuprofen 200 MG CAPS Take 2 capsules by mouth daily as needed      midodrine (PROAMATINE) 2 5 mg tablet Take 1 tablet (2 5 mg total) by mouth 3 (three) times a day 90 tablet 5    mupirocin (BACTROBAN) 2 % ointment Apply topically 3 (three) times a day      nystatin (MYCOSTATIN) powder Apply topically 4 (four) times a day as needed  pantoprazole (PROTONIX) 40 mg tablet Take 1 tablet (40 mg total) by mouth daily 30 tablet 0    primidone (MYSOLINE) 50 mg tablet Two p o  Q h s  180 tablet 1    rOPINIRole (REQUIP) 0 25 mg tablet Take 1 tablet (0 25 mg total) by mouth 3 (three) times a day 90 tablet 5    Simethicone (GAS-X PO) Take 1 tablet by mouth as needed      venlafaxine (EFFEXOR-XR) 150 mg 24 hr capsule Take 150 mg by mouth daily  0    venlafaxine 225 MG TB24 Take 225 mg by mouth daily  1    zafirlukast (ACCOLATE) 20 MG tablet TAKE 1 TABLET BY MOUTH TWICE A DAY BEFORE MEALS 180 tablet 3    fluticasone (FLONASE) 50 mcg/act nasal spray 1 spray 2 (two) times a day  2    lidocaine (XYLOCAINE) 5 % ointment apply locally FOUR TIMES DAILY  1    oxyCODONE-acetaminophen (PERCOCET) 5-325 mg per tablet Take 1-2 tablets by mouth every 4 (four) hours       No current facility-administered medications for this visit  Facility-Administered Medications Ordered in Other Visits   Medication Dose Route Frequency Provider Last Rate Last Dose    ondansetron (ZOFRAN) injection 4 mg  4 mg Intravenous Once PRN Williams Ganser, MD PhD        sodium chloride 0 9 % infusion  20 mL/hr Intravenous Continuous Williams Ganser, MD PhD 20 mL/hr at 03/22/19 1413 20 mL/hr at 03/22/19 1413       Review of Systems   Constitutional: Positive for fatigue  Negative for fever  HENT: Positive for dental problem  Negative for sore throat and trouble swallowing  Respiratory: Negative for cough, shortness of breath and wheezing  Cardiovascular: Negative for palpitations and leg swelling  Genitourinary: Negative for difficulty urinating  Musculoskeletal: Positive for arthralgias and back pain  Negative for gait problem and joint swelling  Neurological: Negative for dizziness, tremors, seizures, weakness and light-headedness  Psychiatric/Behavioral: Negative for dysphoric mood           Objective:  Vitals:    03/20/19 1905   BP: 118/70   Pulse: 76   SpO2: 98% Physical Exam   Constitutional: She is oriented to person, place, and time  She appears well-developed  A patient who appears stated age  No longer wheelchair  Verbalizing above complaints  She is hoarse  Looks a bit unkempt and disheveled   HENT:   Head: Normocephalic  Multiple broken and missing teeth  Eyes: Pupils are equal, round, and reactive to light  Cardiovascular: Normal rate and regular rhythm  Murmur heard  Decrescendo systolic murmur is present with a grade of 2/6  Grade 2/6 fairly low-grade decrescendo murmur heard best in the right sternal border 2nd intercostal space without radiation  Pulmonary/Chest: Effort normal  No respiratory distress  Abdominal: Soft  Musculoskeletal: She exhibits deformity  Osteoarthrosis deformities noted   Neurological: She is alert and oriented to person, place, and time  Skin: Skin is warm  Scar noted consistent with prior lumbar surgery   Psychiatric: She has a normal mood and affect

## 2019-03-22 NOTE — PROGRESS NOTES
Patient presents today for Venofer infusion  Patient reports some mild abdominal pain that she reports is her "norm"  VSS  No lab parameters ordered  Peripheral IV inserted without incident

## 2019-03-22 NOTE — PATIENT INSTRUCTIONS
Chronic back pain in the context of chronic lung disease and continued smoking    Due for chest CT  Thoracic imaging  Revisit in several weeks

## 2019-03-27 ENCOUNTER — TELEPHONE (OUTPATIENT)
Dept: INTERNAL MEDICINE CLINIC | Facility: CLINIC | Age: 59
End: 2019-03-27

## 2019-03-28 RX ORDER — ONDANSETRON 2 MG/ML
4 INJECTION INTRAMUSCULAR; INTRAVENOUS ONCE AS NEEDED
Status: DISCONTINUED | OUTPATIENT
Start: 2019-03-29 | End: 2019-04-01 | Stop reason: HOSPADM

## 2019-03-28 RX ORDER — SODIUM CHLORIDE 9 MG/ML
20 INJECTION, SOLUTION INTRAVENOUS CONTINUOUS
Status: DISCONTINUED | OUTPATIENT
Start: 2019-03-29 | End: 2019-04-01 | Stop reason: HOSPADM

## 2019-03-29 ENCOUNTER — HOSPITAL ENCOUNTER (OUTPATIENT)
Dept: INFUSION CENTER | Facility: CLINIC | Age: 59
Discharge: HOME | End: 2019-03-29
Payer: COMMERCIAL

## 2019-03-29 VITALS
DIASTOLIC BLOOD PRESSURE: 55 MMHG | RESPIRATION RATE: 18 BRPM | SYSTOLIC BLOOD PRESSURE: 92 MMHG | TEMPERATURE: 97.8 F | HEART RATE: 67 BPM

## 2019-03-29 PROCEDURE — 96365 THER/PROPH/DIAG IV INF INIT: CPT

## 2019-03-29 RX ADMIN — IRON SUCROSE 300 MG: 20 INJECTION, SOLUTION INTRAVENOUS at 13:41

## 2019-03-29 RX ADMIN — SODIUM CHLORIDE 20 ML/HR: 0.9 INJECTION, SOLUTION INTRAVENOUS at 13:32

## 2019-03-29 NOTE — PROGRESS NOTES
Pt here for Venofer 3 of 4, resting comfortably  Vitals stable  Pt tolerated treatment well without any adverse reactions  Aware of next appointments   Declines AVS

## 2019-04-04 RX ORDER — ONDANSETRON 2 MG/ML
4 INJECTION INTRAMUSCULAR; INTRAVENOUS ONCE AS NEEDED
Status: DISCONTINUED | OUTPATIENT
Start: 2019-04-05 | End: 2019-04-08 | Stop reason: HOSPADM

## 2019-04-04 RX ORDER — SODIUM CHLORIDE 9 MG/ML
20 INJECTION, SOLUTION INTRAVENOUS CONTINUOUS
Status: DISCONTINUED | OUTPATIENT
Start: 2019-04-05 | End: 2019-04-08 | Stop reason: HOSPADM

## 2019-04-05 ENCOUNTER — HOSPITAL ENCOUNTER (OUTPATIENT)
Dept: INFUSION CENTER | Facility: CLINIC | Age: 59
Discharge: HOME | End: 2019-04-05
Payer: COMMERCIAL

## 2019-04-05 VITALS
SYSTOLIC BLOOD PRESSURE: 89 MMHG | HEART RATE: 78 BPM | DIASTOLIC BLOOD PRESSURE: 53 MMHG | TEMPERATURE: 97.8 F | RESPIRATION RATE: 20 BRPM

## 2019-04-05 DIAGNOSIS — R10.13 EPIGASTRIC PAIN: ICD-10-CM

## 2019-04-05 PROCEDURE — 96365 THER/PROPH/DIAG IV INF INIT: CPT

## 2019-04-05 RX ORDER — PANTOPRAZOLE SODIUM 40 MG/1
TABLET, DELAYED RELEASE ORAL
Qty: 30 TABLET | Refills: 0 | OUTPATIENT
Start: 2019-04-05

## 2019-04-05 RX ORDER — PANTOPRAZOLE SODIUM 40 MG/1
40 TABLET, DELAYED RELEASE ORAL DAILY
Qty: 30 TABLET | Refills: 0 | Status: SHIPPED | OUTPATIENT
Start: 2019-04-05 | End: 2019-04-30 | Stop reason: SDUPTHER

## 2019-04-05 RX ADMIN — IRON SUCROSE 300 MG: 20 INJECTION, SOLUTION INTRAVENOUS at 13:58

## 2019-04-05 RX ADMIN — SODIUM CHLORIDE 20 ML/HR: 0.9 INJECTION, SOLUTION INTRAVENOUS at 13:58

## 2019-04-05 NOTE — PROGRESS NOTES
Pt comes to infusion center for 4/4 IV venofer   Pt tolerated treatment without difficulty or complaint, Pt d brittany in stable condition AVS provided

## 2019-04-07 DIAGNOSIS — R10.13 EPIGASTRIC PAIN: ICD-10-CM

## 2019-04-08 RX ORDER — FAMOTIDINE 20 MG/1
20 TABLET, FILM COATED ORAL 2 TIMES DAILY
Qty: 30 TABLET | Refills: 0 | Status: SHIPPED | OUTPATIENT
Start: 2019-04-08 | End: 2019-06-06 | Stop reason: SDUPTHER

## 2019-04-08 RX ORDER — FAMOTIDINE 20 MG/1
TABLET, FILM COATED ORAL
Qty: 30 TABLET | Refills: 0 | OUTPATIENT
Start: 2019-04-08

## 2019-04-16 ENCOUNTER — HOSPITAL ENCOUNTER (OUTPATIENT)
Dept: ULTRASOUND IMAGING | Facility: HOSPITAL | Age: 59
Discharge: HOME | End: 2019-04-16
Attending: OTOLARYNGOLOGY
Payer: COMMERCIAL

## 2019-04-16 DIAGNOSIS — E04.1 THYROID NODULE: ICD-10-CM

## 2019-04-16 PROCEDURE — 76536 US EXAM OF HEAD AND NECK: CPT

## 2019-04-30 ENCOUNTER — APPOINTMENT (EMERGENCY)
Dept: RADIOLOGY | Facility: HOSPITAL | Age: 59
End: 2019-04-30
Payer: COMMERCIAL

## 2019-04-30 ENCOUNTER — OFFICE VISIT (OUTPATIENT)
Dept: INTERNAL MEDICINE CLINIC | Facility: CLINIC | Age: 59
End: 2019-04-30
Payer: COMMERCIAL

## 2019-04-30 ENCOUNTER — HOSPITAL ENCOUNTER (EMERGENCY)
Facility: HOSPITAL | Age: 59
Discharge: HOME | End: 2019-04-30
Attending: EMERGENCY MEDICINE | Admitting: EMERGENCY MEDICINE
Payer: COMMERCIAL

## 2019-04-30 ENCOUNTER — APPOINTMENT (EMERGENCY)
Dept: CT IMAGING | Facility: HOSPITAL | Age: 59
End: 2019-04-30
Payer: COMMERCIAL

## 2019-04-30 VITALS
HEART RATE: 83 BPM | OXYGEN SATURATION: 98 % | TEMPERATURE: 98 F | DIASTOLIC BLOOD PRESSURE: 71 MMHG | SYSTOLIC BLOOD PRESSURE: 93 MMHG | RESPIRATION RATE: 18 BRPM

## 2019-04-30 VITALS
SYSTOLIC BLOOD PRESSURE: 100 MMHG | BODY MASS INDEX: 25.02 KG/M2 | HEART RATE: 88 BPM | HEIGHT: 63 IN | WEIGHT: 141.2 LBS | OXYGEN SATURATION: 96 % | DIASTOLIC BLOOD PRESSURE: 62 MMHG

## 2019-04-30 DIAGNOSIS — M25.511 ACUTE PAIN OF RIGHT SHOULDER: Primary | ICD-10-CM

## 2019-04-30 DIAGNOSIS — S40.011A CONTUSION OF RIGHT SHOULDER, INITIAL ENCOUNTER: ICD-10-CM

## 2019-04-30 DIAGNOSIS — R51.9 HEADACHE: ICD-10-CM
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
## 2019-04-30 DIAGNOSIS — R10.13 EPIGASTRIC PAIN: ICD-10-CM

## 2019-04-30 PROCEDURE — 99283 EMERGENCY DEPT VISIT LOW MDM: CPT | Performed by: EMERGENCY MEDICINE

## 2019-04-30 PROCEDURE — 71046 X-RAY EXAM CHEST 2 VIEWS: CPT

## 2019-04-30 PROCEDURE — 70450 CT HEAD/BRAIN W/O DYE: CPT

## 2019-04-30 PROCEDURE — 72125 CT NECK SPINE W/O DYE: CPT

## 2019-04-30 PROCEDURE — 73030 X-RAY EXAM OF SHOULDER: CPT

## 2019-04-30 PROCEDURE — 99284 EMERGENCY DEPT VISIT MOD MDM: CPT

## 2019-04-30 PROCEDURE — 99213 OFFICE O/P EST LOW 20 MIN: CPT | Performed by: INTERNAL MEDICINE

## 2019-04-30 RX ORDER — BUPRENORPHINE HYDROCHLORIDE AND NALOXONE HYDROCHLORIDE DIHYDRATE 2; .5 MG/1; MG/1
TABLET SUBLINGUAL 2 TIMES DAILY
COMMUNITY

## 2019-04-30 RX ORDER — PANTOPRAZOLE SODIUM 40 MG/1
TABLET, DELAYED RELEASE ORAL
Qty: 30 TABLET | Refills: 0 | Status: SHIPPED | OUTPATIENT
Start: 2019-04-30 | End: 2019-06-06 | Stop reason: SDUPTHER

## 2019-05-05 DIAGNOSIS — R10.13 EPIGASTRIC PAIN: ICD-10-CM

## 2019-05-06 RX ORDER — FAMOTIDINE 20 MG/1
20 TABLET, FILM COATED ORAL
Qty: 30 TABLET | Refills: 0 | Status: SHIPPED | OUTPATIENT
Start: 2019-05-06 | End: 2019-05-21

## 2019-05-06 RX ORDER — FAMOTIDINE 20 MG/1
TABLET, FILM COATED ORAL
Qty: 30 TABLET | Refills: 0 | OUTPATIENT
Start: 2019-05-06

## 2019-05-21 ENCOUNTER — HOSPITAL ENCOUNTER (INPATIENT)
Facility: HOSPITAL | Age: 59
LOS: 7 days | Discharge: HOME HEALTH | DRG: 193 | End: 2019-05-28
Attending: EMERGENCY MEDICINE | Admitting: ANESTHESIOLOGY
Payer: COMMERCIAL

## 2019-05-21 ENCOUNTER — APPOINTMENT (EMERGENCY)
Dept: CT IMAGING | Facility: HOSPITAL | Age: 59
DRG: 193 | End: 2019-05-21
Payer: COMMERCIAL

## 2019-05-21 ENCOUNTER — APPOINTMENT (EMERGENCY)
Dept: RADIOLOGY | Facility: HOSPITAL | Age: 59
DRG: 193 | End: 2019-05-21
Payer: COMMERCIAL

## 2019-05-21 ENCOUNTER — OFFICE VISIT (OUTPATIENT)
Dept: INTERNAL MEDICINE CLINIC | Facility: CLINIC | Age: 59
End: 2019-05-21
Payer: COMMERCIAL

## 2019-05-21 VITALS
OXYGEN SATURATION: 82 % | DIASTOLIC BLOOD PRESSURE: 68 MMHG | SYSTOLIC BLOOD PRESSURE: 102 MMHG | TEMPERATURE: 99.3 F | HEART RATE: 103 BPM

## 2019-05-21 DIAGNOSIS — M80.00XA OSTEOPOROSIS WITH CURRENT PATHOLOGICAL FRACTURE, UNSPECIFIED OSTEOPOROSIS TYPE, INITIAL ENCOUNTER: ICD-10-CM

## 2019-05-21 DIAGNOSIS — R73.9 HYPERGLYCEMIA: ICD-10-CM

## 2019-05-21 DIAGNOSIS — J41.0 SIMPLE CHRONIC BRONCHITIS (HCC): Primary | ICD-10-CM

## 2019-05-21 DIAGNOSIS — J96.01 ACUTE RESPIRATORY FAILURE WITH HYPOXIA (HCC): ICD-10-CM

## 2019-05-21 DIAGNOSIS — Z72.0 TOBACCO ABUSE: ICD-10-CM

## 2019-05-21 DIAGNOSIS — J18.9 PNEUMONIA: ICD-10-CM

## 2019-05-21 DIAGNOSIS — R29.6 FALLS FREQUENTLY: ICD-10-CM

## 2019-05-21 DIAGNOSIS — J96.00 ACUTE RESPIRATORY FAILURE (HCC): Primary | ICD-10-CM

## 2019-05-21 PROBLEM — J44.1 COPD WITH EXACERBATION (HCC): Status: ACTIVE | Noted: 2018-10-08

## 2019-05-21 PROBLEM — R05.9 COUGH: Status: ACTIVE | Noted: 2019-05-21

## 2019-05-21 LAB
ALBUMIN SERPL BCP-MCNC: 2.8 G/DL (ref 3.5–5)
ALP SERPL-CCNC: 130 U/L (ref 46–116)
ALT SERPL W P-5'-P-CCNC: 23 U/L (ref 12–78)
ANION GAP SERPL CALCULATED.3IONS-SCNC: 11 MMOL/L (ref 4–13)
AST SERPL W P-5'-P-CCNC: 56 U/L (ref 5–45)
BASE EX.OXY STD BLDV CALC-SCNC: 72.8 % (ref 60–80)
BASE EXCESS BLDV CALC-SCNC: -3.7 MMOL/L
BASOPHILS # BLD AUTO: 0.02 THOUSANDS/ΜL (ref 0–0.1)
BASOPHILS NFR BLD AUTO: 0 % (ref 0–1)
BILIRUB SERPL-MCNC: 0.7 MG/DL (ref 0.2–1)
BUN SERPL-MCNC: 18 MG/DL (ref 5–25)
CALCIUM SERPL-MCNC: 8.9 MG/DL (ref 8.3–10.1)
CHLORIDE SERPL-SCNC: 104 MMOL/L (ref 100–108)
CO2 SERPL-SCNC: 25 MMOL/L (ref 21–32)
CREAT SERPL-MCNC: 0.85 MG/DL (ref 0.6–1.3)
EOSINOPHIL # BLD AUTO: 0.02 THOUSAND/ΜL (ref 0–0.61)
EOSINOPHIL NFR BLD AUTO: 0 % (ref 0–6)
ERYTHROCYTE [DISTWIDTH] IN BLOOD BY AUTOMATED COUNT: 22.8 % (ref 11.6–15.1)
GFR SERPL CREATININE-BSD FRML MDRD: 76 ML/MIN/1.73SQ M
GLUCOSE SERPL-MCNC: 124 MG/DL (ref 65–140)
HCO3 BLDV-SCNC: 21.1 MMOL/L (ref 24–30)
HCT VFR BLD AUTO: 34.5 % (ref 34.8–46.1)
HGB BLD-MCNC: 11.6 G/DL (ref 11.5–15.4)
IMM GRANULOCYTES # BLD AUTO: 0.08 THOUSAND/UL (ref 0–0.2)
IMM GRANULOCYTES NFR BLD AUTO: 1 % (ref 0–2)
LACTATE SERPL-SCNC: 1.2 MMOL/L (ref 0.5–2)
LYMPHOCYTES # BLD AUTO: 1.76 THOUSANDS/ΜL (ref 0.6–4.47)
LYMPHOCYTES NFR BLD AUTO: 16 % (ref 14–44)
MCH RBC QN AUTO: 28 PG (ref 26.8–34.3)
MCHC RBC AUTO-ENTMCNC: 33.6 G/DL (ref 31.4–37.4)
MCV RBC AUTO: 83 FL (ref 82–98)
MONOCYTES # BLD AUTO: 0.43 THOUSAND/ΜL (ref 0.17–1.22)
MONOCYTES NFR BLD AUTO: 4 % (ref 4–12)
NEUTROPHILS # BLD AUTO: 8.99 THOUSANDS/ΜL (ref 1.85–7.62)
NEUTS SEG NFR BLD AUTO: 79 % (ref 43–75)
NRBC BLD AUTO-RTO: 0 /100 WBCS
O2 CT BLDV-SCNC: 12.7 ML/DL
PCO2 BLDV: 37.6 MM HG (ref 42–50)
PH BLDV: 7.37 [PH] (ref 7.3–7.4)
PLATELET # BLD AUTO: 250 THOUSANDS/UL (ref 149–390)
PMV BLD AUTO: 10.6 FL (ref 8.9–12.7)
PO2 BLDV: 43.7 MM HG (ref 35–45)
POTASSIUM SERPL-SCNC: 4.4 MMOL/L (ref 3.5–5.3)
PROT SERPL-MCNC: 7.2 G/DL (ref 6.4–8.2)
RBC # BLD AUTO: 4.15 MILLION/UL (ref 3.81–5.12)
SODIUM SERPL-SCNC: 140 MMOL/L (ref 136–145)
TROPONIN I SERPL-MCNC: 0.26 NG/ML
WBC # BLD AUTO: 11.3 THOUSAND/UL (ref 4.31–10.16)

## 2019-05-21 PROCEDURE — 94760 N-INVAS EAR/PLS OXIMETRY 1: CPT

## 2019-05-21 PROCEDURE — 99213 OFFICE O/P EST LOW 20 MIN: CPT | Performed by: INTERNAL MEDICINE

## 2019-05-21 PROCEDURE — 71045 X-RAY EXAM CHEST 1 VIEW: CPT

## 2019-05-21 PROCEDURE — 36415 COLL VENOUS BLD VENIPUNCTURE: CPT | Performed by: EMERGENCY MEDICINE

## 2019-05-21 PROCEDURE — 84145 PROCALCITONIN (PCT): CPT | Performed by: EMERGENCY MEDICINE

## 2019-05-21 PROCEDURE — 96366 THER/PROPH/DIAG IV INF ADDON: CPT

## 2019-05-21 PROCEDURE — 84484 ASSAY OF TROPONIN QUANT: CPT | Performed by: EMERGENCY MEDICINE

## 2019-05-21 PROCEDURE — 87040 BLOOD CULTURE FOR BACTERIA: CPT | Performed by: EMERGENCY MEDICINE

## 2019-05-21 PROCEDURE — 83880 ASSAY OF NATRIURETIC PEPTIDE: CPT | Performed by: NURSE PRACTITIONER

## 2019-05-21 PROCEDURE — 96365 THER/PROPH/DIAG IV INF INIT: CPT

## 2019-05-21 PROCEDURE — 85025 COMPLETE CBC W/AUTO DIFF WBC: CPT | Performed by: EMERGENCY MEDICINE

## 2019-05-21 PROCEDURE — 99285 EMERGENCY DEPT VISIT HI MDM: CPT | Performed by: EMERGENCY MEDICINE

## 2019-05-21 PROCEDURE — 96368 THER/DIAG CONCURRENT INF: CPT

## 2019-05-21 PROCEDURE — 83605 ASSAY OF LACTIC ACID: CPT | Performed by: EMERGENCY MEDICINE

## 2019-05-21 PROCEDURE — 93005 ELECTROCARDIOGRAM TRACING: CPT

## 2019-05-21 PROCEDURE — 99285 EMERGENCY DEPT VISIT HI MDM: CPT

## 2019-05-21 PROCEDURE — 71250 CT THORAX DX C-: CPT

## 2019-05-21 PROCEDURE — 96375 TX/PRO/DX INJ NEW DRUG ADDON: CPT

## 2019-05-21 PROCEDURE — 80053 COMPREHEN METABOLIC PANEL: CPT | Performed by: EMERGENCY MEDICINE

## 2019-05-21 PROCEDURE — 82805 BLOOD GASES W/O2 SATURATION: CPT | Performed by: EMERGENCY MEDICINE

## 2019-05-21 PROCEDURE — 94640 AIRWAY INHALATION TREATMENT: CPT

## 2019-05-21 RX ORDER — MAGNESIUM SULFATE HEPTAHYDRATE 40 MG/ML
2 INJECTION, SOLUTION INTRAVENOUS ONCE
Status: COMPLETED | OUTPATIENT
Start: 2019-05-21 | End: 2019-05-21

## 2019-05-21 RX ORDER — AZITHROMYCIN 250 MG/1
500 TABLET, FILM COATED ORAL ONCE
Status: DISCONTINUED | OUTPATIENT
Start: 2019-05-21 | End: 2019-05-21

## 2019-05-21 RX ORDER — METHYLPREDNISOLONE SODIUM SUCCINATE 125 MG/2ML
125 INJECTION, POWDER, LYOPHILIZED, FOR SOLUTION INTRAMUSCULAR; INTRAVENOUS ONCE
Status: COMPLETED | OUTPATIENT
Start: 2019-05-21 | End: 2019-05-21

## 2019-05-21 RX ORDER — SODIUM CHLORIDE FOR INHALATION 0.9 %
3 VIAL, NEBULIZER (ML) INHALATION ONCE
Status: COMPLETED | OUTPATIENT
Start: 2019-05-21 | End: 2019-05-21

## 2019-05-21 RX ORDER — LEVALBUTEROL 1.25 MG/.5ML
1.25 SOLUTION, CONCENTRATE RESPIRATORY (INHALATION)
Status: DISCONTINUED | OUTPATIENT
Start: 2019-05-22 | End: 2019-05-25

## 2019-05-21 RX ORDER — DOXYCYCLINE HYCLATE 100 MG/1
100 CAPSULE ORAL EVERY 12 HOURS SCHEDULED
Qty: 14 CAPSULE | Refills: 0 | Status: SHIPPED | OUTPATIENT
Start: 2019-05-21 | End: 2019-05-28 | Stop reason: HOSPADM

## 2019-05-21 RX ORDER — AZITHROMYCIN 250 MG/1
500 TABLET, FILM COATED ORAL ONCE
Status: COMPLETED | OUTPATIENT
Start: 2019-05-21 | End: 2019-05-21

## 2019-05-21 RX ORDER — PREDNISONE 10 MG/1
TABLET ORAL
Qty: 30 TABLET | Refills: 0 | Status: SHIPPED | OUTPATIENT
Start: 2019-05-21 | End: 2019-06-04

## 2019-05-21 RX ORDER — CEFTRIAXONE 2 G/50ML
2000 INJECTION, SOLUTION INTRAVENOUS ONCE
Status: COMPLETED | OUTPATIENT
Start: 2019-05-21 | End: 2019-05-21

## 2019-05-21 RX ORDER — LEVALBUTEROL 1.25 MG/.5ML
1.25 SOLUTION, CONCENTRATE RESPIRATORY (INHALATION) EVERY 6 HOURS
Status: DISCONTINUED | OUTPATIENT
Start: 2019-05-21 | End: 2019-05-21

## 2019-05-21 RX ORDER — NICOTINE 21 MG/24HR
14 PATCH, TRANSDERMAL 24 HOURS TRANSDERMAL ONCE
Status: COMPLETED | OUTPATIENT
Start: 2019-05-21 | End: 2019-05-22

## 2019-05-21 RX ADMIN — METHYLPREDNISOLONE SODIUM SUCCINATE 125 MG: 125 INJECTION, POWDER, FOR SOLUTION INTRAMUSCULAR; INTRAVENOUS at 21:53

## 2019-05-21 RX ADMIN — MAGNESIUM SULFATE HEPTAHYDRATE 2 G: 40 INJECTION, SOLUTION INTRAVENOUS at 21:56

## 2019-05-21 RX ADMIN — IPRATROPIUM BROMIDE 1 MG: 0.5 SOLUTION RESPIRATORY (INHALATION) at 21:50

## 2019-05-21 RX ADMIN — NICOTINE 14 MG: 14 PATCH TRANSDERMAL at 22:00

## 2019-05-21 RX ADMIN — CEFTRIAXONE 2000 MG: 2 INJECTION, SOLUTION INTRAVENOUS at 22:52

## 2019-05-21 RX ADMIN — ISODIUM CHLORIDE 3 ML: 0.03 SOLUTION RESPIRATORY (INHALATION) at 21:50

## 2019-05-21 RX ADMIN — AZITHROMYCIN 500 MG: 250 TABLET, FILM COATED ORAL at 22:52

## 2019-05-21 RX ADMIN — ALBUTEROL SULFATE 10 MG: 2.5 SOLUTION RESPIRATORY (INHALATION) at 21:50

## 2019-05-22 PROBLEM — R93.89 ABNORMAL CHEST CT: Status: ACTIVE | Noted: 2019-05-22

## 2019-05-22 PROBLEM — J96.01 ACUTE RESPIRATORY FAILURE WITH HYPOXIA (HCC): Status: ACTIVE | Noted: 2019-05-22

## 2019-05-22 PROBLEM — J18.9 COMMUNITY ACQUIRED PNEUMONIA: Status: ACTIVE | Noted: 2019-05-22

## 2019-05-22 LAB
ALBUMIN SERPL BCP-MCNC: 2.4 G/DL (ref 3.5–5)
ALP SERPL-CCNC: 119 U/L (ref 46–116)
ALT SERPL W P-5'-P-CCNC: 18 U/L (ref 12–78)
ANION GAP SERPL CALCULATED.3IONS-SCNC: 11 MMOL/L (ref 4–13)
ANISOCYTOSIS BLD QL SMEAR: PRESENT
AST SERPL W P-5'-P-CCNC: 48 U/L (ref 5–45)
ATRIAL RATE: 100 BPM
ATRIAL RATE: 104 BPM
BASOPHILS # BLD MANUAL: 0 THOUSAND/UL (ref 0–0.1)
BASOPHILS NFR MAR MANUAL: 0 % (ref 0–1)
BILIRUB SERPL-MCNC: 0.5 MG/DL (ref 0.2–1)
BUN SERPL-MCNC: 15 MG/DL (ref 5–25)
CALCIUM SERPL-MCNC: 8.5 MG/DL (ref 8.3–10.1)
CHLORIDE SERPL-SCNC: 100 MMOL/L (ref 100–108)
CO2 SERPL-SCNC: 22 MMOL/L (ref 21–32)
CREAT SERPL-MCNC: 0.65 MG/DL (ref 0.6–1.3)
EOSINOPHIL # BLD MANUAL: 0 THOUSAND/UL (ref 0–0.4)
EOSINOPHIL NFR BLD MANUAL: 0 % (ref 0–6)
ERYTHROCYTE [DISTWIDTH] IN BLOOD BY AUTOMATED COUNT: 22.3 % (ref 11.6–15.1)
GFR SERPL CREATININE-BSD FRML MDRD: 99 ML/MIN/1.73SQ M
GLUCOSE SERPL-MCNC: 120 MG/DL (ref 65–140)
GLUCOSE SERPL-MCNC: 169 MG/DL (ref 65–140)
GLUCOSE SERPL-MCNC: 197 MG/DL (ref 65–140)
GLUCOSE SERPL-MCNC: 209 MG/DL (ref 65–140)
HCT VFR BLD AUTO: 29.9 % (ref 34.8–46.1)
HGB BLD-MCNC: 10.3 G/DL (ref 11.5–15.4)
LYMPHOCYTES # BLD AUTO: 0.47 THOUSAND/UL (ref 0.6–4.47)
LYMPHOCYTES # BLD AUTO: 5 % (ref 14–44)
MAGNESIUM SERPL-MCNC: 2.2 MG/DL (ref 1.6–2.6)
MCH RBC QN AUTO: 28.4 PG (ref 26.8–34.3)
MCHC RBC AUTO-ENTMCNC: 34.4 G/DL (ref 31.4–37.4)
MCV RBC AUTO: 82 FL (ref 82–98)
MONOCYTES # BLD AUTO: 0.19 THOUSAND/UL (ref 0–1.22)
MONOCYTES NFR BLD: 2 % (ref 4–12)
NEUTROPHILS # BLD MANUAL: 8.61 THOUSAND/UL (ref 1.85–7.62)
NEUTS BAND NFR BLD MANUAL: 3 % (ref 0–8)
NEUTS SEG NFR BLD AUTO: 89 % (ref 43–75)
NRBC BLD AUTO-RTO: 0 /100 WBCS
NT-PROBNP SERPL-MCNC: 5994 PG/ML
P AXIS: 61 DEGREES
P AXIS: 65 DEGREES
PHOSPHATE SERPL-MCNC: 4 MG/DL (ref 2.7–4.5)
PLATELET # BLD AUTO: 246 THOUSANDS/UL (ref 149–390)
PLATELET # BLD AUTO: 253 THOUSANDS/UL (ref 149–390)
PLATELET BLD QL SMEAR: ADEQUATE
PMV BLD AUTO: 10.7 FL (ref 8.9–12.7)
PMV BLD AUTO: 10.7 FL (ref 8.9–12.7)
POTASSIUM SERPL-SCNC: 4.8 MMOL/L (ref 3.5–5.3)
PR INTERVAL: 146 MS
PR INTERVAL: 156 MS
PROCALCITONIN SERPL-MCNC: 1.06 NG/ML
PROCALCITONIN SERPL-MCNC: 1.31 NG/ML
PROT SERPL-MCNC: 6.7 G/DL (ref 6.4–8.2)
QRS AXIS: 1 DEGREES
QRS AXIS: 50 DEGREES
QRSD INTERVAL: 94 MS
QRSD INTERVAL: 94 MS
QT INTERVAL: 350 MS
QT INTERVAL: 358 MS
QTC INTERVAL: 460 MS
QTC INTERVAL: 461 MS
RBC # BLD AUTO: 3.63 MILLION/UL (ref 3.81–5.12)
SODIUM SERPL-SCNC: 133 MMOL/L (ref 136–145)
T WAVE AXIS: 50 DEGREES
T WAVE AXIS: 56 DEGREES
TOTAL CELLS COUNTED SPEC: 100
TROPONIN I SERPL-MCNC: 0.19 NG/ML
VARIANT LYMPHS # BLD AUTO: 1 %
VENTRICULAR RATE: 100 BPM
VENTRICULAR RATE: 104 BPM
WBC # BLD AUTO: 9.36 THOUSAND/UL (ref 4.31–10.16)

## 2019-05-22 PROCEDURE — 85027 COMPLETE CBC AUTOMATED: CPT | Performed by: NURSE PRACTITIONER

## 2019-05-22 PROCEDURE — 83735 ASSAY OF MAGNESIUM: CPT | Performed by: NURSE PRACTITIONER

## 2019-05-22 PROCEDURE — 99223 1ST HOSP IP/OBS HIGH 75: CPT | Performed by: ANESTHESIOLOGY

## 2019-05-22 PROCEDURE — 82948 REAGENT STRIP/BLOOD GLUCOSE: CPT

## 2019-05-22 PROCEDURE — 94640 AIRWAY INHALATION TREATMENT: CPT

## 2019-05-22 PROCEDURE — 84145 PROCALCITONIN (PCT): CPT | Performed by: NURSE PRACTITIONER

## 2019-05-22 PROCEDURE — 85049 AUTOMATED PLATELET COUNT: CPT | Performed by: NURSE PRACTITIONER

## 2019-05-22 PROCEDURE — 87205 SMEAR GRAM STAIN: CPT | Performed by: NURSE PRACTITIONER

## 2019-05-22 PROCEDURE — 85007 BL SMEAR W/DIFF WBC COUNT: CPT | Performed by: NURSE PRACTITIONER

## 2019-05-22 PROCEDURE — 80053 COMPREHEN METABOLIC PANEL: CPT | Performed by: NURSE PRACTITIONER

## 2019-05-22 PROCEDURE — 84100 ASSAY OF PHOSPHORUS: CPT | Performed by: NURSE PRACTITIONER

## 2019-05-22 PROCEDURE — 84484 ASSAY OF TROPONIN QUANT: CPT | Performed by: NURSE PRACTITIONER

## 2019-05-22 PROCEDURE — 94760 N-INVAS EAR/PLS OXIMETRY 1: CPT

## 2019-05-22 PROCEDURE — 93010 ELECTROCARDIOGRAM REPORT: CPT | Performed by: INTERNAL MEDICINE

## 2019-05-22 PROCEDURE — 94660 CPAP INITIATION&MGMT: CPT

## 2019-05-22 PROCEDURE — G8979 MOBILITY GOAL STATUS: HCPCS

## 2019-05-22 PROCEDURE — 97163 PT EVAL HIGH COMPLEX 45 MIN: CPT

## 2019-05-22 PROCEDURE — G8978 MOBILITY CURRENT STATUS: HCPCS

## 2019-05-22 RX ORDER — BUPRENORPHINE AND NALOXONE 2; .5 MG/1; MG/1
1 FILM, SOLUBLE BUCCAL; SUBLINGUAL DAILY
Status: DISCONTINUED | OUTPATIENT
Start: 2019-05-22 | End: 2019-05-23

## 2019-05-22 RX ORDER — HEPARIN SODIUM 5000 [USP'U]/ML
5000 INJECTION, SOLUTION INTRAVENOUS; SUBCUTANEOUS EVERY 8 HOURS SCHEDULED
Status: DISCONTINUED | OUTPATIENT
Start: 2019-05-22 | End: 2019-05-28 | Stop reason: HOSPADM

## 2019-05-22 RX ORDER — PANTOPRAZOLE SODIUM 40 MG/1
40 TABLET, DELAYED RELEASE ORAL
Status: DISCONTINUED | OUTPATIENT
Start: 2019-05-22 | End: 2019-05-28 | Stop reason: HOSPADM

## 2019-05-22 RX ORDER — LIDOCAINE 50 MG/G
1 PATCH TOPICAL DAILY
Status: DISCONTINUED | OUTPATIENT
Start: 2019-05-22 | End: 2019-05-28 | Stop reason: HOSPADM

## 2019-05-22 RX ORDER — METHYLPREDNISOLONE SODIUM SUCCINATE 40 MG/ML
40 INJECTION, POWDER, LYOPHILIZED, FOR SOLUTION INTRAMUSCULAR; INTRAVENOUS EVERY 8 HOURS SCHEDULED
Status: DISCONTINUED | OUTPATIENT
Start: 2019-05-22 | End: 2019-05-26

## 2019-05-22 RX ORDER — FAMOTIDINE 20 MG/1
20 TABLET, FILM COATED ORAL 2 TIMES DAILY
Status: DISCONTINUED | OUTPATIENT
Start: 2019-05-22 | End: 2019-05-28 | Stop reason: HOSPADM

## 2019-05-22 RX ORDER — DIPHENHYDRAMINE HCL 25 MG
25 TABLET ORAL EVERY 6 HOURS PRN
Status: DISCONTINUED | OUTPATIENT
Start: 2019-05-22 | End: 2019-05-28 | Stop reason: HOSPADM

## 2019-05-22 RX ORDER — FLUTICASONE PROPIONATE 50 MCG
1 SPRAY, SUSPENSION (ML) NASAL 2 TIMES DAILY
Status: DISCONTINUED | OUTPATIENT
Start: 2019-05-22 | End: 2019-05-28 | Stop reason: HOSPADM

## 2019-05-22 RX ORDER — BUTALBITAL, ACETAMINOPHEN AND CAFFEINE 50; 325; 40 MG/1; MG/1; MG/1
1 TABLET ORAL EVERY 4 HOURS PRN
Status: DISCONTINUED | OUTPATIENT
Start: 2019-05-22 | End: 2019-05-28 | Stop reason: HOSPADM

## 2019-05-22 RX ORDER — ROPINIROLE 0.25 MG/1
0.25 TABLET, FILM COATED ORAL 3 TIMES DAILY
Status: DISCONTINUED | OUTPATIENT
Start: 2019-05-22 | End: 2019-05-28 | Stop reason: HOSPADM

## 2019-05-22 RX ORDER — NYSTATIN 100000 [USP'U]/G
POWDER TOPICAL 4 TIMES DAILY PRN
Status: DISCONTINUED | OUTPATIENT
Start: 2019-05-22 | End: 2019-05-28 | Stop reason: HOSPADM

## 2019-05-22 RX ORDER — NICOTINE 21 MG/24HR
14 PATCH, TRANSDERMAL 24 HOURS TRANSDERMAL DAILY
Status: DISCONTINUED | OUTPATIENT
Start: 2019-05-22 | End: 2019-05-28 | Stop reason: HOSPADM

## 2019-05-22 RX ORDER — PRIMIDONE 50 MG/1
100 TABLET ORAL
Status: DISCONTINUED | OUTPATIENT
Start: 2019-05-22 | End: 2019-05-28 | Stop reason: HOSPADM

## 2019-05-22 RX ORDER — CLONAZEPAM 0.5 MG/1
0.5 TABLET ORAL 2 TIMES DAILY
Status: DISCONTINUED | OUTPATIENT
Start: 2019-05-22 | End: 2019-05-28 | Stop reason: HOSPADM

## 2019-05-22 RX ORDER — OMEGA-3S/DHA/EPA/FISH OIL/D3 300MG-1000
400 CAPSULE ORAL DAILY
Status: DISCONTINUED | OUTPATIENT
Start: 2019-05-22 | End: 2019-05-28 | Stop reason: HOSPADM

## 2019-05-22 RX ORDER — SIMETHICONE 80 MG
80 TABLET,CHEWABLE ORAL EVERY 6 HOURS PRN
Status: DISCONTINUED | OUTPATIENT
Start: 2019-05-22 | End: 2019-05-28 | Stop reason: HOSPADM

## 2019-05-22 RX ORDER — GABAPENTIN 400 MG/1
800 CAPSULE ORAL 4 TIMES DAILY
Status: DISCONTINUED | OUTPATIENT
Start: 2019-05-22 | End: 2019-05-28 | Stop reason: HOSPADM

## 2019-05-22 RX ORDER — MIDODRINE HYDROCHLORIDE 5 MG/1
2.5 TABLET ORAL 3 TIMES DAILY
Status: DISCONTINUED | OUTPATIENT
Start: 2019-05-22 | End: 2019-05-28 | Stop reason: HOSPADM

## 2019-05-22 RX ORDER — ARIPIPRAZOLE 2 MG/1
2 TABLET ORAL
Status: DISCONTINUED | OUTPATIENT
Start: 2019-05-22 | End: 2019-05-28 | Stop reason: HOSPADM

## 2019-05-22 RX ORDER — CHLORHEXIDINE GLUCONATE 0.12 MG/ML
15 RINSE ORAL EVERY 12 HOURS SCHEDULED
Status: DISCONTINUED | OUTPATIENT
Start: 2019-05-22 | End: 2019-05-28 | Stop reason: HOSPADM

## 2019-05-22 RX ORDER — IBUPROFEN 400 MG/1
400 TABLET ORAL EVERY 8 HOURS PRN
Status: DISCONTINUED | OUTPATIENT
Start: 2019-05-22 | End: 2019-05-28 | Stop reason: HOSPADM

## 2019-05-22 RX ADMIN — LEVALBUTEROL HYDROCHLORIDE 1.25 MG: 1.25 SOLUTION, CONCENTRATE RESPIRATORY (INHALATION) at 13:14

## 2019-05-22 RX ADMIN — ROPINIROLE HYDROCHLORIDE 0.25 MG: 0.25 TABLET, FILM COATED ORAL at 09:08

## 2019-05-22 RX ADMIN — IPRATROPIUM BROMIDE 0.5 MG: 0.5 SOLUTION RESPIRATORY (INHALATION) at 00:40

## 2019-05-22 RX ADMIN — CLONAZEPAM 0.5 MG: 0.5 TABLET ORAL at 17:22

## 2019-05-22 RX ADMIN — FAMOTIDINE 20 MG: 20 TABLET ORAL at 09:07

## 2019-05-22 RX ADMIN — HEPARIN SODIUM 5000 UNITS: 5000 INJECTION INTRAVENOUS; SUBCUTANEOUS at 13:26

## 2019-05-22 RX ADMIN — FLUTICASONE PROPIONATE 1 SPRAY: 50 SPRAY, METERED NASAL at 09:07

## 2019-05-22 RX ADMIN — NICOTINE 14 MG: 14 PATCH TRANSDERMAL at 09:05

## 2019-05-22 RX ADMIN — ARIPIPRAZOLE 2 MG: 2 TABLET ORAL at 02:39

## 2019-05-22 RX ADMIN — PRIMIDONE 100 MG: 50 TABLET ORAL at 02:39

## 2019-05-22 RX ADMIN — CHLORHEXIDINE GLUCONATE 0.12% ORAL RINSE 15 ML: 1.2 LIQUID ORAL at 02:39

## 2019-05-22 RX ADMIN — MIDODRINE HYDROCHLORIDE 2.5 MG: 5 TABLET ORAL at 09:06

## 2019-05-22 RX ADMIN — FLUTICASONE PROPIONATE 1 SPRAY: 50 SPRAY, METERED NASAL at 17:23

## 2019-05-22 RX ADMIN — METHYLPREDNISOLONE SODIUM SUCCINATE 40 MG: 40 INJECTION, POWDER, FOR SOLUTION INTRAMUSCULAR; INTRAVENOUS at 13:26

## 2019-05-22 RX ADMIN — HEPARIN SODIUM 5000 UNITS: 5000 INJECTION INTRAVENOUS; SUBCUTANEOUS at 06:02

## 2019-05-22 RX ADMIN — ROPINIROLE HYDROCHLORIDE 0.25 MG: 0.25 TABLET, FILM COATED ORAL at 16:32

## 2019-05-22 RX ADMIN — MIDODRINE HYDROCHLORIDE 2.5 MG: 5 TABLET ORAL at 22:07

## 2019-05-22 RX ADMIN — HEPARIN SODIUM 5000 UNITS: 5000 INJECTION INTRAVENOUS; SUBCUTANEOUS at 22:07

## 2019-05-22 RX ADMIN — PANTOPRAZOLE SODIUM 40 MG: 40 TABLET, DELAYED RELEASE ORAL at 06:02

## 2019-05-22 RX ADMIN — MUPIROCIN: 20 OINTMENT TOPICAL at 09:08

## 2019-05-22 RX ADMIN — CHOLECALCIFEROL TAB 10 MCG (400 UNIT) 400 UNITS: 10 TAB at 09:07

## 2019-05-22 RX ADMIN — IPRATROPIUM BROMIDE 0.5 MG: 0.5 SOLUTION RESPIRATORY (INHALATION) at 07:08

## 2019-05-22 RX ADMIN — GABAPENTIN 800 MG: 400 CAPSULE ORAL at 09:07

## 2019-05-22 RX ADMIN — DOXEPIN HYDROCHLORIDE 150 MG: 100 CAPSULE ORAL at 02:39

## 2019-05-22 RX ADMIN — MIDODRINE HYDROCHLORIDE 2.5 MG: 5 TABLET ORAL at 16:32

## 2019-05-22 RX ADMIN — FAMOTIDINE 20 MG: 20 TABLET ORAL at 17:22

## 2019-05-22 RX ADMIN — LEVALBUTEROL HYDROCHLORIDE 1.25 MG: 1.25 SOLUTION, CONCENTRATE RESPIRATORY (INHALATION) at 07:08

## 2019-05-22 RX ADMIN — LIDOCAINE 1 PATCH: 50 PATCH TOPICAL at 09:06

## 2019-05-22 RX ADMIN — IPRATROPIUM BROMIDE 0.5 MG: 0.5 SOLUTION RESPIRATORY (INHALATION) at 20:14

## 2019-05-22 RX ADMIN — ARIPIPRAZOLE 2 MG: 2 TABLET ORAL at 22:03

## 2019-05-22 RX ADMIN — METHYLPREDNISOLONE SODIUM SUCCINATE 40 MG: 40 INJECTION, POWDER, FOR SOLUTION INTRAMUSCULAR; INTRAVENOUS at 09:18

## 2019-05-22 RX ADMIN — ROPINIROLE HYDROCHLORIDE 0.25 MG: 0.25 TABLET, FILM COATED ORAL at 22:10

## 2019-05-22 RX ADMIN — DOXEPIN HYDROCHLORIDE 150 MG: 100 CAPSULE ORAL at 22:03

## 2019-05-22 RX ADMIN — INSULIN LISPRO 1 UNITS: 100 INJECTION, SOLUTION INTRAVENOUS; SUBCUTANEOUS at 17:23

## 2019-05-22 RX ADMIN — METHYLPREDNISOLONE SODIUM SUCCINATE 40 MG: 40 INJECTION, POWDER, FOR SOLUTION INTRAMUSCULAR; INTRAVENOUS at 22:07

## 2019-05-22 RX ADMIN — INSULIN LISPRO 2 UNITS: 100 INJECTION, SOLUTION INTRAVENOUS; SUBCUTANEOUS at 13:27

## 2019-05-22 RX ADMIN — LEVALBUTEROL HYDROCHLORIDE 1.25 MG: 1.25 SOLUTION, CONCENTRATE RESPIRATORY (INHALATION) at 20:14

## 2019-05-22 RX ADMIN — LEVALBUTEROL HYDROCHLORIDE 1.25 MG: 1.25 SOLUTION, CONCENTRATE RESPIRATORY (INHALATION) at 00:40

## 2019-05-22 RX ADMIN — IPRATROPIUM BROMIDE 0.5 MG: 0.5 SOLUTION RESPIRATORY (INHALATION) at 13:14

## 2019-05-22 RX ADMIN — GABAPENTIN 800 MG: 400 CAPSULE ORAL at 22:08

## 2019-05-22 RX ADMIN — CLONAZEPAM 0.5 MG: 0.5 TABLET ORAL at 09:07

## 2019-05-22 RX ADMIN — GABAPENTIN 800 MG: 400 CAPSULE ORAL at 11:41

## 2019-05-22 RX ADMIN — MUPIROCIN: 20 OINTMENT TOPICAL at 16:33

## 2019-05-22 RX ADMIN — CHLORHEXIDINE GLUCONATE 0.12% ORAL RINSE 15 ML: 1.2 LIQUID ORAL at 22:06

## 2019-05-22 RX ADMIN — BUTALBITAL, ACETAMINOPHEN AND CAFFEINE 1 TABLET: 50; 325; 40 TABLET ORAL at 22:05

## 2019-05-22 RX ADMIN — BUPRENORPHINE HYDROCHLORIDE, NALOXONE HYDROCHLORIDE 1 FILM: 2; .5 FILM, SOLUBLE BUCCAL; SUBLINGUAL at 09:06

## 2019-05-22 RX ADMIN — CHLORHEXIDINE GLUCONATE 0.12% ORAL RINSE 15 ML: 1.2 LIQUID ORAL at 09:06

## 2019-05-22 RX ADMIN — PRIMIDONE 100 MG: 50 TABLET ORAL at 22:09

## 2019-05-22 RX ADMIN — GABAPENTIN 800 MG: 400 CAPSULE ORAL at 17:23

## 2019-05-23 ENCOUNTER — TELEPHONE (OUTPATIENT)
Dept: HEMATOLOGY ONCOLOGY | Facility: CLINIC | Age: 59
End: 2019-05-23

## 2019-05-23 LAB
ANION GAP SERPL CALCULATED.3IONS-SCNC: 9 MMOL/L (ref 4–13)
ATRIAL RATE: 86 BPM
BACTERIA SPT RESP CULT: ABNORMAL
BUN SERPL-MCNC: 11 MG/DL (ref 5–25)
CALCIUM SERPL-MCNC: 9 MG/DL (ref 8.3–10.1)
CHLORIDE SERPL-SCNC: 101 MMOL/L (ref 100–108)
CO2 SERPL-SCNC: 26 MMOL/L (ref 21–32)
CREAT SERPL-MCNC: 0.78 MG/DL (ref 0.6–1.3)
ERYTHROCYTE [DISTWIDTH] IN BLOOD BY AUTOMATED COUNT: 23.4 % (ref 11.6–15.1)
GFR SERPL CREATININE-BSD FRML MDRD: 85 ML/MIN/1.73SQ M
GLUCOSE SERPL-MCNC: 104 MG/DL (ref 65–140)
GLUCOSE SERPL-MCNC: 119 MG/DL (ref 65–140)
GLUCOSE SERPL-MCNC: 123 MG/DL (ref 65–140)
GLUCOSE SERPL-MCNC: 138 MG/DL (ref 65–140)
GLUCOSE SERPL-MCNC: 262 MG/DL (ref 65–140)
GLUCOSE SERPL-MCNC: 96 MG/DL (ref 65–140)
GRAM STN SPEC: ABNORMAL
HCT VFR BLD AUTO: 34.7 % (ref 34.8–46.1)
HGB BLD-MCNC: 11.9 G/DL (ref 11.5–15.4)
L PNEUMO1 AG UR QL IA.RAPID: NEGATIVE
MCH RBC QN AUTO: 29 PG (ref 26.8–34.3)
MCHC RBC AUTO-ENTMCNC: 34.3 G/DL (ref 31.4–37.4)
MCV RBC AUTO: 84 FL (ref 82–98)
P AXIS: 46 DEGREES
PLATELET # BLD AUTO: 299 THOUSANDS/UL (ref 149–390)
PMV BLD AUTO: 11.2 FL (ref 8.9–12.7)
POTASSIUM SERPL-SCNC: 5.5 MMOL/L (ref 3.5–5.3)
PR INTERVAL: 134 MS
PROCALCITONIN SERPL-MCNC: 0.47 NG/ML
QRS AXIS: 22 DEGREES
QRSD INTERVAL: 82 MS
QT INTERVAL: 370 MS
QTC INTERVAL: 442 MS
RBC # BLD AUTO: 4.11 MILLION/UL (ref 3.81–5.12)
S PNEUM AG UR QL: NEGATIVE
SODIUM SERPL-SCNC: 136 MMOL/L (ref 136–145)
T WAVE AXIS: 30 DEGREES
VENTRICULAR RATE: 86 BPM
WBC # BLD AUTO: 12.87 THOUSAND/UL (ref 4.31–10.16)

## 2019-05-23 PROCEDURE — 94640 AIRWAY INHALATION TREATMENT: CPT

## 2019-05-23 PROCEDURE — 99233 SBSQ HOSP IP/OBS HIGH 50: CPT | Performed by: ANESTHESIOLOGY

## 2019-05-23 PROCEDURE — 93010 ELECTROCARDIOGRAM REPORT: CPT | Performed by: INTERNAL MEDICINE

## 2019-05-23 PROCEDURE — 93005 ELECTROCARDIOGRAM TRACING: CPT

## 2019-05-23 PROCEDURE — 82948 REAGENT STRIP/BLOOD GLUCOSE: CPT

## 2019-05-23 PROCEDURE — 80048 BASIC METABOLIC PNL TOTAL CA: CPT | Performed by: NURSE PRACTITIONER

## 2019-05-23 PROCEDURE — 94760 N-INVAS EAR/PLS OXIMETRY 1: CPT

## 2019-05-23 PROCEDURE — 94660 CPAP INITIATION&MGMT: CPT

## 2019-05-23 PROCEDURE — 85027 COMPLETE CBC AUTOMATED: CPT | Performed by: NURSE PRACTITIONER

## 2019-05-23 PROCEDURE — 84145 PROCALCITONIN (PCT): CPT | Performed by: NURSE PRACTITIONER

## 2019-05-23 PROCEDURE — 87449 NOS EACH ORGANISM AG IA: CPT | Performed by: NURSE PRACTITIONER

## 2019-05-23 RX ORDER — AZITHROMYCIN 250 MG/1
500 TABLET, FILM COATED ORAL EVERY 24 HOURS
Status: DISCONTINUED | OUTPATIENT
Start: 2019-05-23 | End: 2019-05-24

## 2019-05-23 RX ORDER — BUPRENORPHINE AND NALOXONE 2; .5 MG/1; MG/1
1 FILM, SOLUBLE BUCCAL; SUBLINGUAL 2 TIMES DAILY
Status: DISCONTINUED | OUTPATIENT
Start: 2019-05-23 | End: 2019-05-28 | Stop reason: HOSPADM

## 2019-05-23 RX ADMIN — LEVALBUTEROL HYDROCHLORIDE 1.25 MG: 1.25 SOLUTION, CONCENTRATE RESPIRATORY (INHALATION) at 01:07

## 2019-05-23 RX ADMIN — IPRATROPIUM BROMIDE 0.5 MG: 0.5 SOLUTION RESPIRATORY (INHALATION) at 15:10

## 2019-05-23 RX ADMIN — PANTOPRAZOLE SODIUM 40 MG: 40 TABLET, DELAYED RELEASE ORAL at 06:50

## 2019-05-23 RX ADMIN — FLUTICASONE PROPIONATE 1 SPRAY: 50 SPRAY, METERED NASAL at 08:29

## 2019-05-23 RX ADMIN — LEVALBUTEROL HYDROCHLORIDE 1.25 MG: 1.25 SOLUTION, CONCENTRATE RESPIRATORY (INHALATION) at 21:05

## 2019-05-23 RX ADMIN — LIDOCAINE 1 PATCH: 50 PATCH TOPICAL at 08:29

## 2019-05-23 RX ADMIN — CLONAZEPAM 0.5 MG: 0.5 TABLET ORAL at 18:42

## 2019-05-23 RX ADMIN — METHYLPREDNISOLONE SODIUM SUCCINATE 40 MG: 40 INJECTION, POWDER, FOR SOLUTION INTRAMUSCULAR; INTRAVENOUS at 06:51

## 2019-05-23 RX ADMIN — GABAPENTIN 800 MG: 400 CAPSULE ORAL at 18:43

## 2019-05-23 RX ADMIN — AZITHROMYCIN 500 MG: 250 TABLET, FILM COATED ORAL at 04:30

## 2019-05-23 RX ADMIN — CEFTRIAXONE SODIUM 1000 MG: 10 INJECTION, POWDER, FOR SOLUTION INTRAVENOUS at 04:30

## 2019-05-23 RX ADMIN — MIDODRINE HYDROCHLORIDE 2.5 MG: 5 TABLET ORAL at 15:36

## 2019-05-23 RX ADMIN — CLONAZEPAM 0.5 MG: 0.5 TABLET ORAL at 08:25

## 2019-05-23 RX ADMIN — ARIPIPRAZOLE 2 MG: 2 TABLET ORAL at 21:52

## 2019-05-23 RX ADMIN — ROPINIROLE HYDROCHLORIDE 0.25 MG: 0.25 TABLET, FILM COATED ORAL at 21:52

## 2019-05-23 RX ADMIN — MUPIROCIN: 20 OINTMENT TOPICAL at 21:54

## 2019-05-23 RX ADMIN — GABAPENTIN 800 MG: 400 CAPSULE ORAL at 08:29

## 2019-05-23 RX ADMIN — FAMOTIDINE 20 MG: 20 TABLET ORAL at 18:42

## 2019-05-23 RX ADMIN — METHYLPREDNISOLONE SODIUM SUCCINATE 40 MG: 40 INJECTION, POWDER, FOR SOLUTION INTRAMUSCULAR; INTRAVENOUS at 21:49

## 2019-05-23 RX ADMIN — IPRATROPIUM BROMIDE 0.5 MG: 0.5 SOLUTION RESPIRATORY (INHALATION) at 01:07

## 2019-05-23 RX ADMIN — CHOLECALCIFEROL TAB 10 MCG (400 UNIT) 400 UNITS: 10 TAB at 08:29

## 2019-05-23 RX ADMIN — MIDODRINE HYDROCHLORIDE 2.5 MG: 5 TABLET ORAL at 21:50

## 2019-05-23 RX ADMIN — CHLORHEXIDINE GLUCONATE 0.12% ORAL RINSE 15 ML: 1.2 LIQUID ORAL at 21:49

## 2019-05-23 RX ADMIN — LEVALBUTEROL HYDROCHLORIDE 1.25 MG: 1.25 SOLUTION, CONCENTRATE RESPIRATORY (INHALATION) at 15:10

## 2019-05-23 RX ADMIN — ROPINIROLE HYDROCHLORIDE 0.25 MG: 0.25 TABLET, FILM COATED ORAL at 15:37

## 2019-05-23 RX ADMIN — HEPARIN SODIUM 5000 UNITS: 5000 INJECTION INTRAVENOUS; SUBCUTANEOUS at 06:50

## 2019-05-23 RX ADMIN — GABAPENTIN 800 MG: 400 CAPSULE ORAL at 21:53

## 2019-05-23 RX ADMIN — FAMOTIDINE 20 MG: 20 TABLET ORAL at 08:25

## 2019-05-23 RX ADMIN — GABAPENTIN 800 MG: 400 CAPSULE ORAL at 12:44

## 2019-05-23 RX ADMIN — MIDODRINE HYDROCHLORIDE 2.5 MG: 5 TABLET ORAL at 08:25

## 2019-05-23 RX ADMIN — METHYLPREDNISOLONE SODIUM SUCCINATE 40 MG: 40 INJECTION, POWDER, FOR SOLUTION INTRAMUSCULAR; INTRAVENOUS at 15:35

## 2019-05-23 RX ADMIN — IPRATROPIUM BROMIDE 0.5 MG: 0.5 SOLUTION RESPIRATORY (INHALATION) at 21:05

## 2019-05-23 RX ADMIN — HEPARIN SODIUM 5000 UNITS: 5000 INJECTION INTRAVENOUS; SUBCUTANEOUS at 21:49

## 2019-05-23 RX ADMIN — BUPRENORPHINE HYDROCHLORIDE, NALOXONE HYDROCHLORIDE 1 FILM: 2; .5 FILM, SOLUBLE BUCCAL; SUBLINGUAL at 08:30

## 2019-05-23 RX ADMIN — HEPARIN SODIUM 5000 UNITS: 5000 INJECTION INTRAVENOUS; SUBCUTANEOUS at 15:35

## 2019-05-23 RX ADMIN — IPRATROPIUM BROMIDE 0.5 MG: 0.5 SOLUTION RESPIRATORY (INHALATION) at 09:33

## 2019-05-23 RX ADMIN — BUTALBITAL, ACETAMINOPHEN AND CAFFEINE 1 TABLET: 50; 325; 40 TABLET ORAL at 15:34

## 2019-05-23 RX ADMIN — ROPINIROLE HYDROCHLORIDE 0.25 MG: 0.25 TABLET, FILM COATED ORAL at 08:28

## 2019-05-23 RX ADMIN — PRIMIDONE 100 MG: 50 TABLET ORAL at 21:54

## 2019-05-23 RX ADMIN — CHLORHEXIDINE GLUCONATE 0.12% ORAL RINSE 15 ML: 1.2 LIQUID ORAL at 08:25

## 2019-05-23 RX ADMIN — LEVALBUTEROL HYDROCHLORIDE 1.25 MG: 1.25 SOLUTION, CONCENTRATE RESPIRATORY (INHALATION) at 09:33

## 2019-05-23 RX ADMIN — BUPRENORPHINE HYDROCHLORIDE, NALOXONE HYDROCHLORIDE 1 FILM: 2; .5 FILM, SOLUBLE BUCCAL; SUBLINGUAL at 18:42

## 2019-05-23 RX ADMIN — NICOTINE 14 MG: 14 PATCH TRANSDERMAL at 08:27

## 2019-05-23 RX ADMIN — MUPIROCIN 1 APPLICATION: 20 OINTMENT TOPICAL at 15:38

## 2019-05-23 RX ADMIN — MUPIROCIN: 20 OINTMENT TOPICAL at 08:29

## 2019-05-23 RX ADMIN — DOXEPIN HYDROCHLORIDE 150 MG: 100 CAPSULE ORAL at 21:52

## 2019-05-24 ENCOUNTER — APPOINTMENT (INPATIENT)
Dept: RADIOLOGY | Facility: HOSPITAL | Age: 59
DRG: 193 | End: 2019-05-24
Payer: COMMERCIAL

## 2019-05-24 LAB
ANION GAP SERPL CALCULATED.3IONS-SCNC: 10 MMOL/L (ref 4–13)
BASOPHILS # BLD AUTO: 0.01 THOUSANDS/ΜL (ref 0–0.1)
BASOPHILS NFR BLD AUTO: 0 % (ref 0–1)
BUN SERPL-MCNC: 13 MG/DL (ref 5–25)
CALCIUM SERPL-MCNC: 8.9 MG/DL (ref 8.3–10.1)
CHLORIDE SERPL-SCNC: 104 MMOL/L (ref 100–108)
CO2 SERPL-SCNC: 24 MMOL/L (ref 21–32)
CREAT SERPL-MCNC: 0.74 MG/DL (ref 0.6–1.3)
EOSINOPHIL # BLD AUTO: 0.06 THOUSAND/ΜL (ref 0–0.61)
EOSINOPHIL NFR BLD AUTO: 1 % (ref 0–6)
ERYTHROCYTE [DISTWIDTH] IN BLOOD BY AUTOMATED COUNT: 23.3 % (ref 11.6–15.1)
GFR SERPL CREATININE-BSD FRML MDRD: 90 ML/MIN/1.73SQ M
GLUCOSE SERPL-MCNC: 106 MG/DL (ref 65–140)
GLUCOSE SERPL-MCNC: 110 MG/DL (ref 65–140)
GLUCOSE SERPL-MCNC: 111 MG/DL (ref 65–140)
GLUCOSE SERPL-MCNC: 174 MG/DL (ref 65–140)
GLUCOSE SERPL-MCNC: 207 MG/DL (ref 65–140)
HCT VFR BLD AUTO: 34.6 % (ref 34.8–46.1)
HGB BLD-MCNC: 11.4 G/DL (ref 11.5–15.4)
IMM GRANULOCYTES # BLD AUTO: 0.13 THOUSAND/UL (ref 0–0.2)
IMM GRANULOCYTES NFR BLD AUTO: 1 % (ref 0–2)
LYMPHOCYTES # BLD AUTO: 2.71 THOUSANDS/ΜL (ref 0.6–4.47)
LYMPHOCYTES NFR BLD AUTO: 30 % (ref 14–44)
MAGNESIUM SERPL-MCNC: 2 MG/DL (ref 1.6–2.6)
MCH RBC QN AUTO: 28.7 PG (ref 26.8–34.3)
MCHC RBC AUTO-ENTMCNC: 32.9 G/DL (ref 31.4–37.4)
MCV RBC AUTO: 87 FL (ref 82–98)
MONOCYTES # BLD AUTO: 0.35 THOUSAND/ΜL (ref 0.17–1.22)
MONOCYTES NFR BLD AUTO: 4 % (ref 4–12)
NEUTROPHILS # BLD AUTO: 5.78 THOUSANDS/ΜL (ref 1.85–7.62)
NEUTS SEG NFR BLD AUTO: 64 % (ref 43–75)
NRBC BLD AUTO-RTO: 0 /100 WBCS
PLATELET # BLD AUTO: 296 THOUSANDS/UL (ref 149–390)
PMV BLD AUTO: 10.6 FL (ref 8.9–12.7)
POTASSIUM SERPL-SCNC: 4.7 MMOL/L (ref 3.5–5.3)
RBC # BLD AUTO: 3.97 MILLION/UL (ref 3.81–5.12)
SODIUM SERPL-SCNC: 138 MMOL/L (ref 136–145)
WBC # BLD AUTO: 9.04 THOUSAND/UL (ref 4.31–10.16)

## 2019-05-24 PROCEDURE — G8987 SELF CARE CURRENT STATUS: HCPCS

## 2019-05-24 PROCEDURE — 94640 AIRWAY INHALATION TREATMENT: CPT

## 2019-05-24 PROCEDURE — 97167 OT EVAL HIGH COMPLEX 60 MIN: CPT

## 2019-05-24 PROCEDURE — 80048 BASIC METABOLIC PNL TOTAL CA: CPT | Performed by: PHYSICIAN ASSISTANT

## 2019-05-24 PROCEDURE — 71045 X-RAY EXAM CHEST 1 VIEW: CPT

## 2019-05-24 PROCEDURE — G8988 SELF CARE GOAL STATUS: HCPCS

## 2019-05-24 PROCEDURE — 94760 N-INVAS EAR/PLS OXIMETRY 1: CPT

## 2019-05-24 PROCEDURE — 99222 1ST HOSP IP/OBS MODERATE 55: CPT | Performed by: INTERNAL MEDICINE

## 2019-05-24 PROCEDURE — 85025 COMPLETE CBC W/AUTO DIFF WBC: CPT | Performed by: PHYSICIAN ASSISTANT

## 2019-05-24 PROCEDURE — 97530 THERAPEUTIC ACTIVITIES: CPT

## 2019-05-24 PROCEDURE — 82948 REAGENT STRIP/BLOOD GLUCOSE: CPT

## 2019-05-24 PROCEDURE — 99232 SBSQ HOSP IP/OBS MODERATE 35: CPT | Performed by: ANESTHESIOLOGY

## 2019-05-24 PROCEDURE — 83735 ASSAY OF MAGNESIUM: CPT | Performed by: PHYSICIAN ASSISTANT

## 2019-05-24 RX ORDER — BENZONATATE 100 MG/1
100 CAPSULE ORAL 3 TIMES DAILY PRN
Status: DISCONTINUED | OUTPATIENT
Start: 2019-05-24 | End: 2019-05-28 | Stop reason: HOSPADM

## 2019-05-24 RX ORDER — FUROSEMIDE 10 MG/ML
20 INJECTION INTRAMUSCULAR; INTRAVENOUS ONCE
Status: COMPLETED | OUTPATIENT
Start: 2019-05-24 | End: 2019-05-24

## 2019-05-24 RX ADMIN — MIDODRINE HYDROCHLORIDE 2.5 MG: 5 TABLET ORAL at 15:45

## 2019-05-24 RX ADMIN — GABAPENTIN 800 MG: 400 CAPSULE ORAL at 12:40

## 2019-05-24 RX ADMIN — METHYLPREDNISOLONE SODIUM SUCCINATE 40 MG: 40 INJECTION, POWDER, FOR SOLUTION INTRAMUSCULAR; INTRAVENOUS at 15:00

## 2019-05-24 RX ADMIN — IPRATROPIUM BROMIDE 0.5 MG: 0.5 SOLUTION RESPIRATORY (INHALATION) at 19:51

## 2019-05-24 RX ADMIN — METHYLPREDNISOLONE SODIUM SUCCINATE 40 MG: 40 INJECTION, POWDER, FOR SOLUTION INTRAMUSCULAR; INTRAVENOUS at 06:48

## 2019-05-24 RX ADMIN — FLUTICASONE PROPIONATE 1 SPRAY: 50 SPRAY, METERED NASAL at 08:32

## 2019-05-24 RX ADMIN — ARIPIPRAZOLE 2 MG: 2 TABLET ORAL at 21:19

## 2019-05-24 RX ADMIN — MUPIROCIN: 20 OINTMENT TOPICAL at 17:00

## 2019-05-24 RX ADMIN — DOXEPIN HYDROCHLORIDE 150 MG: 100 CAPSULE ORAL at 21:19

## 2019-05-24 RX ADMIN — BUPRENORPHINE HYDROCHLORIDE, NALOXONE HYDROCHLORIDE 1 FILM: 2; .5 FILM, SOLUBLE BUCCAL; SUBLINGUAL at 17:52

## 2019-05-24 RX ADMIN — HEPARIN SODIUM 5000 UNITS: 5000 INJECTION INTRAVENOUS; SUBCUTANEOUS at 06:50

## 2019-05-24 RX ADMIN — LIDOCAINE 1 PATCH: 50 PATCH TOPICAL at 08:30

## 2019-05-24 RX ADMIN — CHOLECALCIFEROL TAB 10 MCG (400 UNIT) 400 UNITS: 10 TAB at 08:32

## 2019-05-24 RX ADMIN — PRIMIDONE 100 MG: 50 TABLET ORAL at 21:19

## 2019-05-24 RX ADMIN — MUPIROCIN: 20 OINTMENT TOPICAL at 08:32

## 2019-05-24 RX ADMIN — MIDODRINE HYDROCHLORIDE 2.5 MG: 5 TABLET ORAL at 08:30

## 2019-05-24 RX ADMIN — GABAPENTIN 800 MG: 400 CAPSULE ORAL at 08:32

## 2019-05-24 RX ADMIN — ROPINIROLE HYDROCHLORIDE 0.25 MG: 0.25 TABLET, FILM COATED ORAL at 21:19

## 2019-05-24 RX ADMIN — GABAPENTIN 800 MG: 400 CAPSULE ORAL at 17:53

## 2019-05-24 RX ADMIN — LEVALBUTEROL HYDROCHLORIDE 1.25 MG: 1.25 SOLUTION, CONCENTRATE RESPIRATORY (INHALATION) at 01:12

## 2019-05-24 RX ADMIN — FAMOTIDINE 20 MG: 20 TABLET ORAL at 17:52

## 2019-05-24 RX ADMIN — CLONAZEPAM 0.5 MG: 0.5 TABLET ORAL at 08:30

## 2019-05-24 RX ADMIN — CLONAZEPAM 0.5 MG: 0.5 TABLET ORAL at 17:52

## 2019-05-24 RX ADMIN — INSULIN LISPRO 1 UNITS: 100 INJECTION, SOLUTION INTRAVENOUS; SUBCUTANEOUS at 21:22

## 2019-05-24 RX ADMIN — CEFTRIAXONE SODIUM 1000 MG: 10 INJECTION, POWDER, FOR SOLUTION INTRAVENOUS at 03:10

## 2019-05-24 RX ADMIN — IPRATROPIUM BROMIDE 0.5 MG: 0.5 SOLUTION RESPIRATORY (INHALATION) at 13:24

## 2019-05-24 RX ADMIN — NYSTATIN: 100000 POWDER TOPICAL at 08:32

## 2019-05-24 RX ADMIN — IPRATROPIUM BROMIDE 0.5 MG: 0.5 SOLUTION RESPIRATORY (INHALATION) at 08:10

## 2019-05-24 RX ADMIN — MUPIROCIN: 20 OINTMENT TOPICAL at 21:21

## 2019-05-24 RX ADMIN — IPRATROPIUM BROMIDE 0.5 MG: 0.5 SOLUTION RESPIRATORY (INHALATION) at 01:12

## 2019-05-24 RX ADMIN — CHLORHEXIDINE GLUCONATE 0.12% ORAL RINSE 15 ML: 1.2 LIQUID ORAL at 08:30

## 2019-05-24 RX ADMIN — INSULIN LISPRO 1 UNITS: 100 INJECTION, SOLUTION INTRAVENOUS; SUBCUTANEOUS at 12:30

## 2019-05-24 RX ADMIN — MIDODRINE HYDROCHLORIDE 2.5 MG: 5 TABLET ORAL at 21:18

## 2019-05-24 RX ADMIN — HEPARIN SODIUM 5000 UNITS: 5000 INJECTION INTRAVENOUS; SUBCUTANEOUS at 15:00

## 2019-05-24 RX ADMIN — LEVALBUTEROL HYDROCHLORIDE 1.25 MG: 1.25 SOLUTION, CONCENTRATE RESPIRATORY (INHALATION) at 13:24

## 2019-05-24 RX ADMIN — FAMOTIDINE 20 MG: 20 TABLET ORAL at 08:31

## 2019-05-24 RX ADMIN — BUPRENORPHINE HYDROCHLORIDE, NALOXONE HYDROCHLORIDE 1 FILM: 2; .5 FILM, SOLUBLE BUCCAL; SUBLINGUAL at 08:31

## 2019-05-24 RX ADMIN — FLUTICASONE PROPIONATE 1 SPRAY: 50 SPRAY, METERED NASAL at 17:53

## 2019-05-24 RX ADMIN — FUROSEMIDE 20 MG: 10 INJECTION, SOLUTION INTRAMUSCULAR; INTRAVENOUS at 12:40

## 2019-05-24 RX ADMIN — CHLORHEXIDINE GLUCONATE 0.12% ORAL RINSE 15 ML: 1.2 LIQUID ORAL at 21:17

## 2019-05-24 RX ADMIN — GABAPENTIN 800 MG: 400 CAPSULE ORAL at 21:20

## 2019-05-24 RX ADMIN — METHYLPREDNISOLONE SODIUM SUCCINATE 40 MG: 40 INJECTION, POWDER, FOR SOLUTION INTRAMUSCULAR; INTRAVENOUS at 21:18

## 2019-05-24 RX ADMIN — ROPINIROLE HYDROCHLORIDE 0.25 MG: 0.25 TABLET, FILM COATED ORAL at 17:00

## 2019-05-24 RX ADMIN — HEPARIN SODIUM 5000 UNITS: 5000 INJECTION INTRAVENOUS; SUBCUTANEOUS at 21:17

## 2019-05-24 RX ADMIN — AZITHROMYCIN 500 MG: 250 TABLET, FILM COATED ORAL at 03:10

## 2019-05-24 RX ADMIN — BENZONATATE 100 MG: 100 CAPSULE ORAL at 08:38

## 2019-05-24 RX ADMIN — NICOTINE 14 MG: 14 PATCH TRANSDERMAL at 08:30

## 2019-05-24 RX ADMIN — PANTOPRAZOLE SODIUM 40 MG: 40 TABLET, DELAYED RELEASE ORAL at 06:50

## 2019-05-24 RX ADMIN — ROPINIROLE HYDROCHLORIDE 0.25 MG: 0.25 TABLET, FILM COATED ORAL at 08:32

## 2019-05-24 RX ADMIN — LEVALBUTEROL HYDROCHLORIDE 1.25 MG: 1.25 SOLUTION, CONCENTRATE RESPIRATORY (INHALATION) at 08:10

## 2019-05-24 RX ADMIN — LEVALBUTEROL HYDROCHLORIDE 1.25 MG: 1.25 SOLUTION, CONCENTRATE RESPIRATORY (INHALATION) at 19:51

## 2019-05-25 ENCOUNTER — APPOINTMENT (INPATIENT)
Dept: RADIOLOGY | Facility: HOSPITAL | Age: 59
DRG: 193 | End: 2019-05-25
Payer: COMMERCIAL

## 2019-05-25 LAB
ANION GAP SERPL CALCULATED.3IONS-SCNC: 10 MMOL/L (ref 4–13)
BUN SERPL-MCNC: 18 MG/DL (ref 5–25)
CALCIUM SERPL-MCNC: 9 MG/DL (ref 8.3–10.1)
CHLORIDE SERPL-SCNC: 99 MMOL/L (ref 100–108)
CO2 SERPL-SCNC: 28 MMOL/L (ref 21–32)
CREAT SERPL-MCNC: 0.79 MG/DL (ref 0.6–1.3)
ERYTHROCYTE [DISTWIDTH] IN BLOOD BY AUTOMATED COUNT: 22.7 % (ref 11.6–15.1)
GFR SERPL CREATININE-BSD FRML MDRD: 83 ML/MIN/1.73SQ M
GLUCOSE SERPL-MCNC: 131 MG/DL (ref 65–140)
GLUCOSE SERPL-MCNC: 141 MG/DL (ref 65–140)
GLUCOSE SERPL-MCNC: 178 MG/DL (ref 65–140)
GLUCOSE SERPL-MCNC: 184 MG/DL (ref 65–140)
HCT VFR BLD AUTO: 34.9 % (ref 34.8–46.1)
HGB BLD-MCNC: 11.9 G/DL (ref 11.5–15.4)
MAGNESIUM SERPL-MCNC: 2.1 MG/DL (ref 1.6–2.6)
MCH RBC QN AUTO: 29.1 PG (ref 26.8–34.3)
MCHC RBC AUTO-ENTMCNC: 34.1 G/DL (ref 31.4–37.4)
MCV RBC AUTO: 85 FL (ref 82–98)
PLATELET # BLD AUTO: 316 THOUSANDS/UL (ref 149–390)
PMV BLD AUTO: 10.6 FL (ref 8.9–12.7)
POTASSIUM SERPL-SCNC: 4.7 MMOL/L (ref 3.5–5.3)
RBC # BLD AUTO: 4.09 MILLION/UL (ref 3.81–5.12)
SODIUM SERPL-SCNC: 137 MMOL/L (ref 136–145)
WBC # BLD AUTO: 10.7 THOUSAND/UL (ref 4.31–10.16)

## 2019-05-25 PROCEDURE — 71045 X-RAY EXAM CHEST 1 VIEW: CPT

## 2019-05-25 PROCEDURE — 94640 AIRWAY INHALATION TREATMENT: CPT

## 2019-05-25 PROCEDURE — 82948 REAGENT STRIP/BLOOD GLUCOSE: CPT

## 2019-05-25 PROCEDURE — 83735 ASSAY OF MAGNESIUM: CPT | Performed by: PHYSICIAN ASSISTANT

## 2019-05-25 PROCEDURE — 80048 BASIC METABOLIC PNL TOTAL CA: CPT | Performed by: PHYSICIAN ASSISTANT

## 2019-05-25 PROCEDURE — 85027 COMPLETE CBC AUTOMATED: CPT | Performed by: PHYSICIAN ASSISTANT

## 2019-05-25 PROCEDURE — 94760 N-INVAS EAR/PLS OXIMETRY 1: CPT

## 2019-05-25 PROCEDURE — 99232 SBSQ HOSP IP/OBS MODERATE 35: CPT | Performed by: INTERNAL MEDICINE

## 2019-05-25 RX ORDER — VENLAFAXINE HYDROCHLORIDE 75 MG/1
150 CAPSULE, EXTENDED RELEASE ORAL DAILY
Status: DISCONTINUED | OUTPATIENT
Start: 2019-05-25 | End: 2019-05-28 | Stop reason: HOSPADM

## 2019-05-25 RX ORDER — ALBUTEROL SULFATE 2.5 MG/3ML
2.5 SOLUTION RESPIRATORY (INHALATION) EVERY 6 HOURS PRN
Status: DISCONTINUED | OUTPATIENT
Start: 2019-05-25 | End: 2019-05-25

## 2019-05-25 RX ORDER — ALBUTEROL SULFATE 2.5 MG/3ML
2.5 SOLUTION RESPIRATORY (INHALATION) EVERY 4 HOURS PRN
Status: DISCONTINUED | OUTPATIENT
Start: 2019-05-25 | End: 2019-05-28 | Stop reason: HOSPADM

## 2019-05-25 RX ORDER — LEVALBUTEROL 1.25 MG/.5ML
1.25 SOLUTION, CONCENTRATE RESPIRATORY (INHALATION)
Status: DISCONTINUED | OUTPATIENT
Start: 2019-05-25 | End: 2019-05-28 | Stop reason: HOSPADM

## 2019-05-25 RX ADMIN — FAMOTIDINE 20 MG: 20 TABLET ORAL at 16:49

## 2019-05-25 RX ADMIN — CHLORHEXIDINE GLUCONATE 0.12% ORAL RINSE 15 ML: 1.2 LIQUID ORAL at 21:12

## 2019-05-25 RX ADMIN — BUPRENORPHINE HYDROCHLORIDE, NALOXONE HYDROCHLORIDE 1 FILM: 2; .5 FILM, SOLUBLE BUCCAL; SUBLINGUAL at 16:53

## 2019-05-25 RX ADMIN — CEFTRIAXONE SODIUM 1000 MG: 10 INJECTION, POWDER, FOR SOLUTION INTRAVENOUS at 05:00

## 2019-05-25 RX ADMIN — MIDODRINE HYDROCHLORIDE 2.5 MG: 5 TABLET ORAL at 21:12

## 2019-05-25 RX ADMIN — BENZONATATE 100 MG: 100 CAPSULE ORAL at 02:00

## 2019-05-25 RX ADMIN — VENLAFAXINE HYDROCHLORIDE 150 MG: 75 CAPSULE, EXTENDED RELEASE ORAL at 08:39

## 2019-05-25 RX ADMIN — BUPRENORPHINE HYDROCHLORIDE, NALOXONE HYDROCHLORIDE 1 FILM: 2; .5 FILM, SOLUBLE BUCCAL; SUBLINGUAL at 09:42

## 2019-05-25 RX ADMIN — GABAPENTIN 800 MG: 400 CAPSULE ORAL at 16:50

## 2019-05-25 RX ADMIN — CHOLECALCIFEROL TAB 10 MCG (400 UNIT) 400 UNITS: 10 TAB at 08:47

## 2019-05-25 RX ADMIN — MUPIROCIN 1 APPLICATION: 20 OINTMENT TOPICAL at 08:49

## 2019-05-25 RX ADMIN — MUPIROCIN 1 APPLICATION: 20 OINTMENT TOPICAL at 16:47

## 2019-05-25 RX ADMIN — CLONAZEPAM 0.5 MG: 0.5 TABLET ORAL at 16:52

## 2019-05-25 RX ADMIN — METHYLPREDNISOLONE SODIUM SUCCINATE 40 MG: 40 INJECTION, POWDER, FOR SOLUTION INTRAMUSCULAR; INTRAVENOUS at 14:21

## 2019-05-25 RX ADMIN — HEPARIN SODIUM 5000 UNITS: 5000 INJECTION INTRAVENOUS; SUBCUTANEOUS at 14:26

## 2019-05-25 RX ADMIN — FAMOTIDINE 20 MG: 20 TABLET ORAL at 08:41

## 2019-05-25 RX ADMIN — INSULIN LISPRO 1 UNITS: 100 INJECTION, SOLUTION INTRAVENOUS; SUBCUTANEOUS at 21:17

## 2019-05-25 RX ADMIN — PRIMIDONE 100 MG: 50 TABLET ORAL at 21:16

## 2019-05-25 RX ADMIN — FLUTICASONE PROPIONATE 1 SPRAY: 50 SPRAY, METERED NASAL at 08:51

## 2019-05-25 RX ADMIN — HEPARIN SODIUM 5000 UNITS: 5000 INJECTION INTRAVENOUS; SUBCUTANEOUS at 05:22

## 2019-05-25 RX ADMIN — ARIPIPRAZOLE 2 MG: 2 TABLET ORAL at 21:14

## 2019-05-25 RX ADMIN — CLONAZEPAM 0.5 MG: 0.5 TABLET ORAL at 08:44

## 2019-05-25 RX ADMIN — GABAPENTIN 800 MG: 400 CAPSULE ORAL at 21:15

## 2019-05-25 RX ADMIN — LEVALBUTEROL HYDROCHLORIDE 1.25 MG: 1.25 SOLUTION, CONCENTRATE RESPIRATORY (INHALATION) at 19:24

## 2019-05-25 RX ADMIN — BUTALBITAL, ACETAMINOPHEN AND CAFFEINE 1 TABLET: 50; 325; 40 TABLET ORAL at 20:42

## 2019-05-25 RX ADMIN — DOXEPIN HYDROCHLORIDE 150 MG: 100 CAPSULE ORAL at 21:14

## 2019-05-25 RX ADMIN — LEVALBUTEROL HYDROCHLORIDE 1.25 MG: 1.25 SOLUTION, CONCENTRATE RESPIRATORY (INHALATION) at 08:17

## 2019-05-25 RX ADMIN — GABAPENTIN 800 MG: 400 CAPSULE ORAL at 14:23

## 2019-05-25 RX ADMIN — CHLORHEXIDINE GLUCONATE 0.12% ORAL RINSE 15 ML: 1.2 LIQUID ORAL at 08:39

## 2019-05-25 RX ADMIN — METHYLPREDNISOLONE SODIUM SUCCINATE 40 MG: 40 INJECTION, POWDER, FOR SOLUTION INTRAMUSCULAR; INTRAVENOUS at 21:13

## 2019-05-25 RX ADMIN — METHYLPREDNISOLONE SODIUM SUCCINATE 40 MG: 40 INJECTION, POWDER, FOR SOLUTION INTRAMUSCULAR; INTRAVENOUS at 05:22

## 2019-05-25 RX ADMIN — ROPINIROLE HYDROCHLORIDE 0.25 MG: 0.25 TABLET, FILM COATED ORAL at 21:16

## 2019-05-25 RX ADMIN — INSULIN LISPRO 1 UNITS: 100 INJECTION, SOLUTION INTRAVENOUS; SUBCUTANEOUS at 16:46

## 2019-05-25 RX ADMIN — PANTOPRAZOLE SODIUM 40 MG: 40 TABLET, DELAYED RELEASE ORAL at 05:22

## 2019-05-25 RX ADMIN — IPRATROPIUM BROMIDE 0.5 MG: 0.5 SOLUTION RESPIRATORY (INHALATION) at 13:21

## 2019-05-25 RX ADMIN — MIDODRINE HYDROCHLORIDE 2.5 MG: 5 TABLET ORAL at 08:42

## 2019-05-25 RX ADMIN — LIDOCAINE 1 PATCH: 50 PATCH TOPICAL at 08:52

## 2019-05-25 RX ADMIN — ROPINIROLE HYDROCHLORIDE 0.25 MG: 0.25 TABLET, FILM COATED ORAL at 16:48

## 2019-05-25 RX ADMIN — BENZONATATE 100 MG: 100 CAPSULE ORAL at 20:41

## 2019-05-25 RX ADMIN — NICOTINE 14 MG: 14 PATCH TRANSDERMAL at 08:48

## 2019-05-25 RX ADMIN — GABAPENTIN 800 MG: 400 CAPSULE ORAL at 08:46

## 2019-05-25 RX ADMIN — MUPIROCIN 1 APPLICATION: 20 OINTMENT TOPICAL at 21:17

## 2019-05-25 RX ADMIN — MIDODRINE HYDROCHLORIDE 2.5 MG: 5 TABLET ORAL at 18:00

## 2019-05-25 RX ADMIN — IPRATROPIUM BROMIDE 0.5 MG: 0.5 SOLUTION RESPIRATORY (INHALATION) at 19:24

## 2019-05-25 RX ADMIN — LEVALBUTEROL HYDROCHLORIDE 1.25 MG: 1.25 SOLUTION, CONCENTRATE RESPIRATORY (INHALATION) at 13:21

## 2019-05-25 RX ADMIN — HEPARIN SODIUM 5000 UNITS: 5000 INJECTION INTRAVENOUS; SUBCUTANEOUS at 21:12

## 2019-05-25 RX ADMIN — IPRATROPIUM BROMIDE 0.5 MG: 0.5 SOLUTION RESPIRATORY (INHALATION) at 08:17

## 2019-05-25 RX ADMIN — ROPINIROLE HYDROCHLORIDE 0.25 MG: 0.25 TABLET, FILM COATED ORAL at 08:50

## 2019-05-26 PROBLEM — R73.9 HYPERGLYCEMIA: Status: ACTIVE | Noted: 2019-05-26

## 2019-05-26 LAB
GLUCOSE SERPL-MCNC: 101 MG/DL (ref 65–140)
GLUCOSE SERPL-MCNC: 127 MG/DL (ref 65–140)
GLUCOSE SERPL-MCNC: 175 MG/DL (ref 65–140)

## 2019-05-26 PROCEDURE — 94640 AIRWAY INHALATION TREATMENT: CPT

## 2019-05-26 PROCEDURE — 99232 SBSQ HOSP IP/OBS MODERATE 35: CPT | Performed by: INTERNAL MEDICINE

## 2019-05-26 PROCEDURE — 94760 N-INVAS EAR/PLS OXIMETRY 1: CPT

## 2019-05-26 PROCEDURE — 82948 REAGENT STRIP/BLOOD GLUCOSE: CPT

## 2019-05-26 RX ORDER — METHYLPREDNISOLONE SODIUM SUCCINATE 40 MG/ML
40 INJECTION, POWDER, LYOPHILIZED, FOR SOLUTION INTRAMUSCULAR; INTRAVENOUS EVERY 12 HOURS SCHEDULED
Status: DISCONTINUED | OUTPATIENT
Start: 2019-05-26 | End: 2019-05-28 | Stop reason: HOSPADM

## 2019-05-26 RX ADMIN — GABAPENTIN 800 MG: 400 CAPSULE ORAL at 08:59

## 2019-05-26 RX ADMIN — IPRATROPIUM BROMIDE 0.5 MG: 0.5 SOLUTION RESPIRATORY (INHALATION) at 20:39

## 2019-05-26 RX ADMIN — DOXEPIN HYDROCHLORIDE 150 MG: 100 CAPSULE ORAL at 21:25

## 2019-05-26 RX ADMIN — GABAPENTIN 800 MG: 400 CAPSULE ORAL at 21:24

## 2019-05-26 RX ADMIN — METHYLPREDNISOLONE SODIUM SUCCINATE 40 MG: 40 INJECTION, POWDER, FOR SOLUTION INTRAMUSCULAR; INTRAVENOUS at 05:50

## 2019-05-26 RX ADMIN — CHLORHEXIDINE GLUCONATE 0.12% ORAL RINSE 15 ML: 1.2 LIQUID ORAL at 08:46

## 2019-05-26 RX ADMIN — ARIPIPRAZOLE 2 MG: 2 TABLET ORAL at 21:25

## 2019-05-26 RX ADMIN — LEVALBUTEROL HYDROCHLORIDE 1.25 MG: 1.25 SOLUTION, CONCENTRATE RESPIRATORY (INHALATION) at 20:39

## 2019-05-26 RX ADMIN — PRIMIDONE 100 MG: 50 TABLET ORAL at 21:25

## 2019-05-26 RX ADMIN — PANTOPRAZOLE SODIUM 40 MG: 40 TABLET, DELAYED RELEASE ORAL at 05:50

## 2019-05-26 RX ADMIN — GABAPENTIN 800 MG: 400 CAPSULE ORAL at 12:39

## 2019-05-26 RX ADMIN — FAMOTIDINE 20 MG: 20 TABLET ORAL at 17:10

## 2019-05-26 RX ADMIN — LEVALBUTEROL HYDROCHLORIDE 1.25 MG: 1.25 SOLUTION, CONCENTRATE RESPIRATORY (INHALATION) at 08:28

## 2019-05-26 RX ADMIN — CHOLECALCIFEROL TAB 10 MCG (400 UNIT) 400 UNITS: 10 TAB at 08:57

## 2019-05-26 RX ADMIN — METHYLPREDNISOLONE SODIUM SUCCINATE 40 MG: 40 INJECTION, POWDER, FOR SOLUTION INTRAMUSCULAR; INTRAVENOUS at 21:20

## 2019-05-26 RX ADMIN — INSULIN LISPRO 1 UNITS: 100 INJECTION, SOLUTION INTRAVENOUS; SUBCUTANEOUS at 21:25

## 2019-05-26 RX ADMIN — IPRATROPIUM BROMIDE 0.5 MG: 0.5 SOLUTION RESPIRATORY (INHALATION) at 13:26

## 2019-05-26 RX ADMIN — HEPARIN SODIUM 5000 UNITS: 5000 INJECTION INTRAVENOUS; SUBCUTANEOUS at 05:50

## 2019-05-26 RX ADMIN — HEPARIN SODIUM 5000 UNITS: 5000 INJECTION INTRAVENOUS; SUBCUTANEOUS at 21:19

## 2019-05-26 RX ADMIN — MUPIROCIN 1 APPLICATION: 20 OINTMENT TOPICAL at 09:02

## 2019-05-26 RX ADMIN — MIDODRINE HYDROCHLORIDE 2.5 MG: 5 TABLET ORAL at 08:58

## 2019-05-26 RX ADMIN — LEVALBUTEROL HYDROCHLORIDE 1.25 MG: 1.25 SOLUTION, CONCENTRATE RESPIRATORY (INHALATION) at 13:26

## 2019-05-26 RX ADMIN — NICOTINE 14 MG: 14 PATCH TRANSDERMAL at 09:03

## 2019-05-26 RX ADMIN — MIDODRINE HYDROCHLORIDE 2.5 MG: 5 TABLET ORAL at 21:19

## 2019-05-26 RX ADMIN — ROPINIROLE HYDROCHLORIDE 0.25 MG: 0.25 TABLET, FILM COATED ORAL at 17:14

## 2019-05-26 RX ADMIN — VENLAFAXINE HYDROCHLORIDE 150 MG: 75 CAPSULE, EXTENDED RELEASE ORAL at 09:01

## 2019-05-26 RX ADMIN — MIDODRINE HYDROCHLORIDE 2.5 MG: 5 TABLET ORAL at 17:12

## 2019-05-26 RX ADMIN — FAMOTIDINE 20 MG: 20 TABLET ORAL at 09:00

## 2019-05-26 RX ADMIN — HEPARIN SODIUM 5000 UNITS: 5000 INJECTION INTRAVENOUS; SUBCUTANEOUS at 12:36

## 2019-05-26 RX ADMIN — ROPINIROLE HYDROCHLORIDE 0.25 MG: 0.25 TABLET, FILM COATED ORAL at 21:24

## 2019-05-26 RX ADMIN — MUPIROCIN: 20 OINTMENT TOPICAL at 21:25

## 2019-05-26 RX ADMIN — IPRATROPIUM BROMIDE 0.5 MG: 0.5 SOLUTION RESPIRATORY (INHALATION) at 08:28

## 2019-05-26 RX ADMIN — ROPINIROLE HYDROCHLORIDE 0.25 MG: 0.25 TABLET, FILM COATED ORAL at 09:00

## 2019-05-26 RX ADMIN — CHLORHEXIDINE GLUCONATE 0.12% ORAL RINSE 15 ML: 1.2 LIQUID ORAL at 21:19

## 2019-05-26 RX ADMIN — CEFTRIAXONE SODIUM 1000 MG: 10 INJECTION, POWDER, FOR SOLUTION INTRAVENOUS at 01:55

## 2019-05-26 RX ADMIN — CLONAZEPAM 0.5 MG: 0.5 TABLET ORAL at 08:56

## 2019-05-26 RX ADMIN — MUPIROCIN: 20 OINTMENT TOPICAL at 17:13

## 2019-05-26 RX ADMIN — BUPRENORPHINE HYDROCHLORIDE, NALOXONE HYDROCHLORIDE 1 FILM: 2; .5 FILM, SOLUBLE BUCCAL; SUBLINGUAL at 08:54

## 2019-05-26 RX ADMIN — LIDOCAINE 1 PATCH: 50 PATCH TOPICAL at 09:03

## 2019-05-26 RX ADMIN — CLONAZEPAM 0.5 MG: 0.5 TABLET ORAL at 17:10

## 2019-05-26 RX ADMIN — BUPRENORPHINE HYDROCHLORIDE, NALOXONE HYDROCHLORIDE 1 FILM: 2; .5 FILM, SOLUBLE BUCCAL; SUBLINGUAL at 17:08

## 2019-05-26 RX ADMIN — GABAPENTIN 800 MG: 400 CAPSULE ORAL at 17:11

## 2019-05-27 LAB
ANION GAP SERPL CALCULATED.3IONS-SCNC: 9 MMOL/L (ref 4–13)
BACTERIA BLD CULT: NORMAL
BACTERIA BLD CULT: NORMAL
BUN SERPL-MCNC: 20 MG/DL (ref 5–25)
CALCIUM SERPL-MCNC: 8.9 MG/DL (ref 8.3–10.1)
CHLORIDE SERPL-SCNC: 100 MMOL/L (ref 100–108)
CO2 SERPL-SCNC: 30 MMOL/L (ref 21–32)
CREAT SERPL-MCNC: 0.77 MG/DL (ref 0.6–1.3)
ERYTHROCYTE [DISTWIDTH] IN BLOOD BY AUTOMATED COUNT: 22.5 % (ref 11.6–15.1)
EST. AVERAGE GLUCOSE BLD GHB EST-MCNC: 117 MG/DL
GFR SERPL CREATININE-BSD FRML MDRD: 86 ML/MIN/1.73SQ M
GLUCOSE SERPL-MCNC: 114 MG/DL (ref 65–140)
GLUCOSE SERPL-MCNC: 128 MG/DL (ref 65–140)
GLUCOSE SERPL-MCNC: 146 MG/DL (ref 65–140)
GLUCOSE SERPL-MCNC: 149 MG/DL (ref 65–140)
GLUCOSE SERPL-MCNC: 42 MG/DL (ref 65–140)
GLUCOSE SERPL-MCNC: 91 MG/DL (ref 65–140)
HBA1C MFR BLD: 5.7 % (ref 4.2–6.3)
HCT VFR BLD AUTO: 41.3 % (ref 34.8–46.1)
HGB BLD-MCNC: 13 G/DL (ref 11.5–15.4)
MCH RBC QN AUTO: 27.9 PG (ref 26.8–34.3)
MCHC RBC AUTO-ENTMCNC: 31.5 G/DL (ref 31.4–37.4)
MCV RBC AUTO: 89 FL (ref 82–98)
PLATELET # BLD AUTO: 342 THOUSANDS/UL (ref 149–390)
PMV BLD AUTO: 10.5 FL (ref 8.9–12.7)
POTASSIUM SERPL-SCNC: 4.6 MMOL/L (ref 3.5–5.3)
RBC # BLD AUTO: 4.66 MILLION/UL (ref 3.81–5.12)
SODIUM SERPL-SCNC: 139 MMOL/L (ref 136–145)
WBC # BLD AUTO: 11.29 THOUSAND/UL (ref 4.31–10.16)

## 2019-05-27 PROCEDURE — 83036 HEMOGLOBIN GLYCOSYLATED A1C: CPT | Performed by: INTERNAL MEDICINE

## 2019-05-27 PROCEDURE — 80048 BASIC METABOLIC PNL TOTAL CA: CPT | Performed by: INTERNAL MEDICINE

## 2019-05-27 PROCEDURE — 85027 COMPLETE CBC AUTOMATED: CPT | Performed by: INTERNAL MEDICINE

## 2019-05-27 PROCEDURE — 94640 AIRWAY INHALATION TREATMENT: CPT

## 2019-05-27 PROCEDURE — 82948 REAGENT STRIP/BLOOD GLUCOSE: CPT

## 2019-05-27 PROCEDURE — 99232 SBSQ HOSP IP/OBS MODERATE 35: CPT | Performed by: INTERNAL MEDICINE

## 2019-05-27 PROCEDURE — 94760 N-INVAS EAR/PLS OXIMETRY 1: CPT

## 2019-05-27 RX ORDER — B-COMPLEX WITH VITAMIN C
1 TABLET ORAL
Status: DISCONTINUED | OUTPATIENT
Start: 2019-05-28 | End: 2019-05-28 | Stop reason: HOSPADM

## 2019-05-27 RX ADMIN — BUPRENORPHINE HYDROCHLORIDE, NALOXONE HYDROCHLORIDE 1 FILM: 2; .5 FILM, SOLUBLE BUCCAL; SUBLINGUAL at 17:47

## 2019-05-27 RX ADMIN — MIDODRINE HYDROCHLORIDE 2.5 MG: 5 TABLET ORAL at 17:47

## 2019-05-27 RX ADMIN — BUPRENORPHINE HYDROCHLORIDE, NALOXONE HYDROCHLORIDE 1 FILM: 2; .5 FILM, SOLUBLE BUCCAL; SUBLINGUAL at 08:05

## 2019-05-27 RX ADMIN — GABAPENTIN 800 MG: 400 CAPSULE ORAL at 17:49

## 2019-05-27 RX ADMIN — FLUTICASONE PROPIONATE 1 SPRAY: 50 SPRAY, METERED NASAL at 17:49

## 2019-05-27 RX ADMIN — DOXEPIN HYDROCHLORIDE 150 MG: 100 CAPSULE ORAL at 21:36

## 2019-05-27 RX ADMIN — PRIMIDONE 100 MG: 50 TABLET ORAL at 21:36

## 2019-05-27 RX ADMIN — LIDOCAINE HYDROCHLORIDE 15 ML: 20 SOLUTION ORAL; TOPICAL at 08:18

## 2019-05-27 RX ADMIN — CHOLECALCIFEROL TAB 10 MCG (400 UNIT) 400 UNITS: 10 TAB at 08:07

## 2019-05-27 RX ADMIN — METHYLPREDNISOLONE SODIUM SUCCINATE 40 MG: 40 INJECTION, POWDER, FOR SOLUTION INTRAMUSCULAR; INTRAVENOUS at 08:04

## 2019-05-27 RX ADMIN — MUPIROCIN: 20 OINTMENT TOPICAL at 08:08

## 2019-05-27 RX ADMIN — METHYLPREDNISOLONE SODIUM SUCCINATE 40 MG: 40 INJECTION, POWDER, FOR SOLUTION INTRAMUSCULAR; INTRAVENOUS at 21:36

## 2019-05-27 RX ADMIN — HEPARIN SODIUM 5000 UNITS: 5000 INJECTION INTRAVENOUS; SUBCUTANEOUS at 05:01

## 2019-05-27 RX ADMIN — NICOTINE 14 MG: 14 PATCH TRANSDERMAL at 08:04

## 2019-05-27 RX ADMIN — FLUTICASONE PROPIONATE 1 SPRAY: 50 SPRAY, METERED NASAL at 08:06

## 2019-05-27 RX ADMIN — CLONAZEPAM 0.5 MG: 0.5 TABLET ORAL at 08:05

## 2019-05-27 RX ADMIN — ROPINIROLE HYDROCHLORIDE 0.25 MG: 0.25 TABLET, FILM COATED ORAL at 17:48

## 2019-05-27 RX ADMIN — MUPIROCIN: 20 OINTMENT TOPICAL at 21:35

## 2019-05-27 RX ADMIN — PANTOPRAZOLE SODIUM 40 MG: 40 TABLET, DELAYED RELEASE ORAL at 05:01

## 2019-05-27 RX ADMIN — MUPIROCIN: 20 OINTMENT TOPICAL at 17:49

## 2019-05-27 RX ADMIN — ARIPIPRAZOLE 2 MG: 2 TABLET ORAL at 21:36

## 2019-05-27 RX ADMIN — ROPINIROLE HYDROCHLORIDE 0.25 MG: 0.25 TABLET, FILM COATED ORAL at 08:07

## 2019-05-27 RX ADMIN — CEFTRIAXONE SODIUM 1000 MG: 10 INJECTION, POWDER, FOR SOLUTION INTRAVENOUS at 01:52

## 2019-05-27 RX ADMIN — CHLORHEXIDINE GLUCONATE 0.12% ORAL RINSE 15 ML: 1.2 LIQUID ORAL at 21:35

## 2019-05-27 RX ADMIN — LIDOCAINE 1 PATCH: 50 PATCH TOPICAL at 08:05

## 2019-05-27 RX ADMIN — CHLORHEXIDINE GLUCONATE 0.12% ORAL RINSE 15 ML: 1.2 LIQUID ORAL at 08:05

## 2019-05-27 RX ADMIN — MIDODRINE HYDROCHLORIDE 2.5 MG: 5 TABLET ORAL at 08:04

## 2019-05-27 RX ADMIN — CLONAZEPAM 0.5 MG: 0.5 TABLET ORAL at 17:47

## 2019-05-27 RX ADMIN — HEPARIN SODIUM 5000 UNITS: 5000 INJECTION INTRAVENOUS; SUBCUTANEOUS at 14:19

## 2019-05-27 RX ADMIN — HEPARIN SODIUM 5000 UNITS: 5000 INJECTION INTRAVENOUS; SUBCUTANEOUS at 21:36

## 2019-05-27 RX ADMIN — LEVALBUTEROL HYDROCHLORIDE 1.25 MG: 1.25 SOLUTION, CONCENTRATE RESPIRATORY (INHALATION) at 07:34

## 2019-05-27 RX ADMIN — IPRATROPIUM BROMIDE 0.5 MG: 0.5 SOLUTION RESPIRATORY (INHALATION) at 07:34

## 2019-05-27 RX ADMIN — VENLAFAXINE HYDROCHLORIDE 150 MG: 75 CAPSULE, EXTENDED RELEASE ORAL at 08:04

## 2019-05-27 RX ADMIN — IPRATROPIUM BROMIDE 0.5 MG: 0.5 SOLUTION RESPIRATORY (INHALATION) at 20:03

## 2019-05-27 RX ADMIN — FAMOTIDINE 20 MG: 20 TABLET ORAL at 08:04

## 2019-05-27 RX ADMIN — IPRATROPIUM BROMIDE 0.5 MG: 0.5 SOLUTION RESPIRATORY (INHALATION) at 13:04

## 2019-05-27 RX ADMIN — LEVALBUTEROL HYDROCHLORIDE 1.25 MG: 1.25 SOLUTION, CONCENTRATE RESPIRATORY (INHALATION) at 13:04

## 2019-05-27 RX ADMIN — GABAPENTIN 800 MG: 400 CAPSULE ORAL at 12:23

## 2019-05-27 RX ADMIN — LIDOCAINE HYDROCHLORIDE 15 ML: 20 SOLUTION ORAL; TOPICAL at 21:38

## 2019-05-27 RX ADMIN — LEVALBUTEROL HYDROCHLORIDE 1.25 MG: 1.25 SOLUTION, CONCENTRATE RESPIRATORY (INHALATION) at 20:03

## 2019-05-27 RX ADMIN — MIDODRINE HYDROCHLORIDE 2.5 MG: 5 TABLET ORAL at 21:40

## 2019-05-27 RX ADMIN — GABAPENTIN 800 MG: 400 CAPSULE ORAL at 08:06

## 2019-05-27 RX ADMIN — ROPINIROLE HYDROCHLORIDE 0.25 MG: 0.25 TABLET, FILM COATED ORAL at 21:37

## 2019-05-27 RX ADMIN — FAMOTIDINE 20 MG: 20 TABLET ORAL at 17:47

## 2019-05-27 RX ADMIN — GABAPENTIN 800 MG: 400 CAPSULE ORAL at 21:37

## 2019-05-28 ENCOUNTER — TRANSITIONAL CARE MANAGEMENT (OUTPATIENT)
Dept: INTERNAL MEDICINE CLINIC | Facility: CLINIC | Age: 59
End: 2019-05-28

## 2019-05-28 VITALS
RESPIRATION RATE: 18 BRPM | HEART RATE: 79 BPM | TEMPERATURE: 98.2 F | WEIGHT: 139.55 LBS | OXYGEN SATURATION: 95 % | SYSTOLIC BLOOD PRESSURE: 142 MMHG | DIASTOLIC BLOOD PRESSURE: 86 MMHG | BODY MASS INDEX: 24.73 KG/M2 | HEIGHT: 63 IN

## 2019-05-28 LAB — GLUCOSE SERPL-MCNC: 93 MG/DL (ref 65–140)

## 2019-05-28 PROCEDURE — 94760 N-INVAS EAR/PLS OXIMETRY 1: CPT

## 2019-05-28 PROCEDURE — 82948 REAGENT STRIP/BLOOD GLUCOSE: CPT

## 2019-05-28 PROCEDURE — 94640 AIRWAY INHALATION TREATMENT: CPT

## 2019-05-28 PROCEDURE — 94761 N-INVAS EAR/PLS OXIMETRY MLT: CPT

## 2019-05-28 PROCEDURE — 99239 HOSP IP/OBS DSCHRG MGMT >30: CPT | Performed by: FAMILY MEDICINE

## 2019-05-28 RX ORDER — LANCETS 28 GAUGE
EACH MISCELLANEOUS
Qty: 100 EACH | Refills: 0 | Status: SHIPPED | OUTPATIENT
Start: 2019-05-28 | End: 2019-05-29 | Stop reason: SDUPTHER

## 2019-05-28 RX ORDER — B-COMPLEX WITH VITAMIN C
2 TABLET ORAL
Qty: 60 TABLET | Refills: 0 | Status: SHIPPED | OUTPATIENT
Start: 2019-05-29 | End: 2020-07-09 | Stop reason: ALTCHOICE

## 2019-05-28 RX ORDER — NICOTINE 21 MG/24HR
1 PATCH, TRANSDERMAL 24 HOURS TRANSDERMAL DAILY
Qty: 28 PATCH | Refills: 0 | Status: SHIPPED | OUTPATIENT
Start: 2019-05-29 | End: 2019-06-04 | Stop reason: SDUPTHER

## 2019-05-28 RX ADMIN — LEVALBUTEROL HYDROCHLORIDE 1.25 MG: 1.25 SOLUTION, CONCENTRATE RESPIRATORY (INHALATION) at 07:25

## 2019-05-28 RX ADMIN — IPRATROPIUM BROMIDE 0.5 MG: 0.5 SOLUTION RESPIRATORY (INHALATION) at 07:26

## 2019-05-28 RX ADMIN — MIDODRINE HYDROCHLORIDE 2.5 MG: 5 TABLET ORAL at 08:11

## 2019-05-28 RX ADMIN — OYSTER SHELL CALCIUM WITH VITAMIN D 1 TABLET: 500; 200 TABLET, FILM COATED ORAL at 08:11

## 2019-05-28 RX ADMIN — FLUTICASONE PROPIONATE 1 SPRAY: 50 SPRAY, METERED NASAL at 08:13

## 2019-05-28 RX ADMIN — PANTOPRAZOLE SODIUM 40 MG: 40 TABLET, DELAYED RELEASE ORAL at 05:48

## 2019-05-28 RX ADMIN — LIDOCAINE 1 PATCH: 50 PATCH TOPICAL at 08:12

## 2019-05-28 RX ADMIN — MUPIROCIN: 20 OINTMENT TOPICAL at 08:13

## 2019-05-28 RX ADMIN — BUPRENORPHINE HYDROCHLORIDE, NALOXONE HYDROCHLORIDE 1 FILM: 2; .5 FILM, SOLUBLE BUCCAL; SUBLINGUAL at 08:13

## 2019-05-28 RX ADMIN — VENLAFAXINE HYDROCHLORIDE 150 MG: 75 CAPSULE, EXTENDED RELEASE ORAL at 08:11

## 2019-05-28 RX ADMIN — FAMOTIDINE 20 MG: 20 TABLET ORAL at 08:11

## 2019-05-28 RX ADMIN — CHOLECALCIFEROL TAB 10 MCG (400 UNIT) 400 UNITS: 10 TAB at 08:13

## 2019-05-28 RX ADMIN — GABAPENTIN 800 MG: 400 CAPSULE ORAL at 08:12

## 2019-05-28 RX ADMIN — CLONAZEPAM 0.5 MG: 0.5 TABLET ORAL at 08:11

## 2019-05-28 RX ADMIN — HEPARIN SODIUM 5000 UNITS: 5000 INJECTION INTRAVENOUS; SUBCUTANEOUS at 05:48

## 2019-05-28 RX ADMIN — ROPINIROLE HYDROCHLORIDE 0.25 MG: 0.25 TABLET, FILM COATED ORAL at 08:13

## 2019-05-28 RX ADMIN — NICOTINE 14 MG: 14 PATCH TRANSDERMAL at 08:12

## 2019-05-28 RX ADMIN — CHLORHEXIDINE GLUCONATE 0.12% ORAL RINSE 15 ML: 1.2 LIQUID ORAL at 08:11

## 2019-05-28 RX ADMIN — METHYLPREDNISOLONE SODIUM SUCCINATE 40 MG: 40 INJECTION, POWDER, FOR SOLUTION INTRAMUSCULAR; INTRAVENOUS at 08:11

## 2019-05-28 RX ADMIN — CEFTRIAXONE SODIUM 1000 MG: 10 INJECTION, POWDER, FOR SOLUTION INTRAVENOUS at 01:53

## 2019-05-29 ENCOUNTER — TELEPHONE (OUTPATIENT)
Dept: PULMONOLOGY | Facility: CLINIC | Age: 59
End: 2019-05-29

## 2019-05-29 ENCOUNTER — OFFICE VISIT (OUTPATIENT)
Dept: INTERNAL MEDICINE CLINIC | Facility: CLINIC | Age: 59
End: 2019-05-29
Payer: COMMERCIAL

## 2019-05-29 ENCOUNTER — TELEPHONE (OUTPATIENT)
Dept: INTERNAL MEDICINE CLINIC | Facility: CLINIC | Age: 59
End: 2019-05-29

## 2019-05-29 VITALS
SYSTOLIC BLOOD PRESSURE: 140 MMHG | DIASTOLIC BLOOD PRESSURE: 90 MMHG | BODY MASS INDEX: 24.41 KG/M2 | OXYGEN SATURATION: 95 % | HEART RATE: 89 BPM | HEIGHT: 63 IN | WEIGHT: 137.8 LBS

## 2019-05-29 DIAGNOSIS — M79.10 MYALGIA: ICD-10-CM

## 2019-05-29 DIAGNOSIS — J41.0 SIMPLE CHRONIC BRONCHITIS (HCC): Primary | ICD-10-CM

## 2019-05-29 DIAGNOSIS — R73.9 HYPERGLYCEMIA: ICD-10-CM

## 2019-05-29 DIAGNOSIS — R21 RASH: Primary | ICD-10-CM

## 2019-05-29 PROBLEM — J44.1 COPD WITH EXACERBATION (HCC): Status: RESOLVED | Noted: 2018-10-08 | Resolved: 2019-05-29

## 2019-05-29 PROBLEM — J18.9 COMMUNITY ACQUIRED PNEUMONIA: Status: RESOLVED | Noted: 2019-05-22 | Resolved: 2019-05-29

## 2019-05-29 PROBLEM — J44.9 COPD (CHRONIC OBSTRUCTIVE PULMONARY DISEASE) (HCC): Status: ACTIVE | Noted: 2019-05-29

## 2019-05-29 PROBLEM — J96.01 ACUTE RESPIRATORY FAILURE WITH HYPOXIA (HCC): Status: RESOLVED | Noted: 2019-05-22 | Resolved: 2019-05-29

## 2019-05-29 PROCEDURE — 99496 TRANSJ CARE MGMT HIGH F2F 7D: CPT | Performed by: INTERNAL MEDICINE

## 2019-05-29 RX ORDER — TIZANIDINE HYDROCHLORIDE 2 MG/1
2 CAPSULE, GELATIN COATED ORAL 3 TIMES DAILY PRN
Qty: 60 CAPSULE | Refills: 0 | Status: SHIPPED | OUTPATIENT
Start: 2019-05-29 | End: 2019-06-21 | Stop reason: SDUPTHER

## 2019-05-30 RX ORDER — LANCETS 28 GAUGE
EACH MISCELLANEOUS
Qty: 100 EACH | Refills: 0 | Status: SHIPPED | OUTPATIENT
Start: 2019-05-30 | End: 2020-01-14 | Stop reason: SDUPTHER

## 2019-05-31 ENCOUNTER — TELEPHONE (OUTPATIENT)
Dept: INTERNAL MEDICINE CLINIC | Facility: CLINIC | Age: 59
End: 2019-05-31

## 2019-06-03 DIAGNOSIS — Z72.0 TOBACCO ABUSE: ICD-10-CM

## 2019-06-03 DIAGNOSIS — R73.9 HYPERGLYCEMIA: Primary | ICD-10-CM

## 2019-06-03 DIAGNOSIS — B37.9 CANDIDIASIS: Primary | ICD-10-CM

## 2019-06-03 RX ORDER — NICOTINE 21 MG/24HR
1 PATCH, TRANSDERMAL 24 HOURS TRANSDERMAL DAILY
Qty: 28 PATCH | Refills: 0 | Status: CANCELLED | OUTPATIENT
Start: 2019-06-03

## 2019-06-04 DIAGNOSIS — Z72.0 TOBACCO ABUSE: ICD-10-CM

## 2019-06-04 RX ORDER — LANCETS
EACH MISCELLANEOUS
Qty: 100 EACH | Refills: 3 | Status: SHIPPED | OUTPATIENT
Start: 2019-06-04 | End: 2019-07-03 | Stop reason: SDUPTHER

## 2019-06-04 RX ORDER — NICOTINE 21 MG/24HR
1 PATCH, TRANSDERMAL 24 HOURS TRANSDERMAL DAILY
Qty: 28 PATCH | Refills: 0 | Status: SHIPPED | OUTPATIENT
Start: 2019-06-04 | End: 2019-10-03 | Stop reason: ALTCHOICE

## 2019-06-04 RX ORDER — NYSTATIN 100000 [USP'U]/G
POWDER TOPICAL 2 TIMES DAILY
Qty: 454 G | Refills: 2 | Status: SHIPPED | OUTPATIENT
Start: 2019-06-04 | End: 2019-09-23

## 2019-06-04 RX ORDER — BLOOD-GLUCOSE METER
EACH MISCELLANEOUS
Qty: 1 EACH | Refills: 0 | Status: SHIPPED | OUTPATIENT
Start: 2019-06-04

## 2019-06-06 ENCOUNTER — OFFICE VISIT (OUTPATIENT)
Dept: GASTROENTEROLOGY | Facility: CLINIC | Age: 59
End: 2019-06-06
Payer: COMMERCIAL

## 2019-06-06 VITALS
RESPIRATION RATE: 18 BRPM | BODY MASS INDEX: 24.45 KG/M2 | SYSTOLIC BLOOD PRESSURE: 98 MMHG | WEIGHT: 138 LBS | DIASTOLIC BLOOD PRESSURE: 64 MMHG | HEIGHT: 63 IN | HEART RATE: 94 BPM

## 2019-06-06 DIAGNOSIS — R11.0 NAUSEA: ICD-10-CM

## 2019-06-06 DIAGNOSIS — R10.13 EPIGASTRIC PAIN: Primary | ICD-10-CM

## 2019-06-06 PROCEDURE — 99213 OFFICE O/P EST LOW 20 MIN: CPT | Performed by: INTERNAL MEDICINE

## 2019-06-06 RX ORDER — PANTOPRAZOLE SODIUM 40 MG/1
40 TABLET, DELAYED RELEASE ORAL DAILY
Qty: 90 TABLET | Refills: 3 | Status: SHIPPED | OUTPATIENT
Start: 2019-06-06 | End: 2019-08-15 | Stop reason: SDUPTHER

## 2019-06-06 RX ORDER — FAMOTIDINE 20 MG/1
20 TABLET, FILM COATED ORAL 2 TIMES DAILY
Qty: 90 TABLET | Refills: 5 | Status: SHIPPED | OUTPATIENT
Start: 2019-06-06 | End: 2019-08-15

## 2019-06-07 ENCOUNTER — TELEPHONE (OUTPATIENT)
Dept: INTERNAL MEDICINE CLINIC | Facility: CLINIC | Age: 59
End: 2019-06-07

## 2019-06-07 DIAGNOSIS — R10.13 EPIGASTRIC PAIN: ICD-10-CM

## 2019-06-07 RX ORDER — FAMOTIDINE 20 MG/1
TABLET, FILM COATED ORAL
Qty: 30 TABLET | Refills: 0 | Status: SHIPPED | OUTPATIENT
Start: 2019-06-07 | End: 2019-06-18

## 2019-06-10 ENCOUNTER — OFFICE VISIT (OUTPATIENT)
Dept: PULMONOLOGY | Facility: CLINIC | Age: 59
End: 2019-06-10
Payer: COMMERCIAL

## 2019-06-10 VITALS
DIASTOLIC BLOOD PRESSURE: 60 MMHG | BODY MASS INDEX: 24.8 KG/M2 | WEIGHT: 140 LBS | SYSTOLIC BLOOD PRESSURE: 98 MMHG | OXYGEN SATURATION: 94 % | HEART RATE: 101 BPM | HEIGHT: 63 IN

## 2019-06-10 DIAGNOSIS — R06.02 SHORTNESS OF BREATH: Primary | ICD-10-CM

## 2019-06-10 DIAGNOSIS — Z72.0 TOBACCO ABUSE: ICD-10-CM

## 2019-06-10 DIAGNOSIS — J45.20 MILD INTERMITTENT ASTHMA WITHOUT COMPLICATION: ICD-10-CM

## 2019-06-10 DIAGNOSIS — J41.0 SIMPLE CHRONIC BRONCHITIS (HCC): ICD-10-CM

## 2019-06-10 DIAGNOSIS — R93.89 ABNORMAL CHEST CT: ICD-10-CM

## 2019-06-10 PROCEDURE — 99214 OFFICE O/P EST MOD 30 MIN: CPT | Performed by: PHYSICIAN ASSISTANT

## 2019-06-10 RX ORDER — ZAFIRLUKAST 20 MG/1
20 TABLET, FILM COATED ORAL
Qty: 180 TABLET | Refills: 3 | Status: SHIPPED | OUTPATIENT
Start: 2019-06-10 | End: 2019-09-16 | Stop reason: SDUPTHER

## 2019-06-12 DIAGNOSIS — R26.89 LOSS OF BALANCE: Primary | ICD-10-CM

## 2019-06-12 DIAGNOSIS — R73.9 HYPERGLYCEMIA: ICD-10-CM

## 2019-06-13 DIAGNOSIS — G25.81 RESTLESS LEG: ICD-10-CM

## 2019-06-13 RX ORDER — ROPINIROLE 0.25 MG/1
0.25 TABLET, FILM COATED ORAL 3 TIMES DAILY
Qty: 90 TABLET | Refills: 5 | Status: SHIPPED | OUTPATIENT
Start: 2019-06-13 | End: 2019-12-12 | Stop reason: SDUPTHER

## 2019-06-14 ENCOUNTER — PATIENT OUTREACH (OUTPATIENT)
Dept: CASE MANAGEMENT | Facility: OTHER | Age: 59
End: 2019-06-14

## 2019-06-17 ENCOUNTER — TELEPHONE (OUTPATIENT)
Dept: INTERNAL MEDICINE CLINIC | Facility: CLINIC | Age: 59
End: 2019-06-17

## 2019-06-18 ENCOUNTER — OFFICE VISIT (OUTPATIENT)
Dept: INTERNAL MEDICINE CLINIC | Facility: CLINIC | Age: 59
End: 2019-06-18
Payer: COMMERCIAL

## 2019-06-18 ENCOUNTER — TELEPHONE (OUTPATIENT)
Dept: INTERNAL MEDICINE CLINIC | Facility: CLINIC | Age: 59
End: 2019-06-18

## 2019-06-18 VITALS
WEIGHT: 151 LBS | OXYGEN SATURATION: 96 % | HEART RATE: 85 BPM | HEIGHT: 63 IN | SYSTOLIC BLOOD PRESSURE: 122 MMHG | BODY MASS INDEX: 26.75 KG/M2 | DIASTOLIC BLOOD PRESSURE: 78 MMHG

## 2019-06-18 DIAGNOSIS — J41.0 SIMPLE CHRONIC BRONCHITIS (HCC): Primary | ICD-10-CM

## 2019-06-18 DIAGNOSIS — Z72.0 TOBACCO ABUSE: ICD-10-CM

## 2019-06-18 PROCEDURE — 3008F BODY MASS INDEX DOCD: CPT | Performed by: INTERNAL MEDICINE

## 2019-06-18 PROCEDURE — 1036F TOBACCO NON-USER: CPT | Performed by: INTERNAL MEDICINE

## 2019-06-18 PROCEDURE — 99213 OFFICE O/P EST LOW 20 MIN: CPT | Performed by: INTERNAL MEDICINE

## 2019-06-18 RX ORDER — NEBULIZER ACCESSORIES
KIT MISCELLANEOUS
Qty: 1 EACH | Refills: 3 | Status: SHIPPED | OUTPATIENT
Start: 2019-06-18

## 2019-06-19 DIAGNOSIS — J45.30 MILD PERSISTENT ASTHMA WITHOUT COMPLICATION: ICD-10-CM

## 2019-06-21 DIAGNOSIS — M79.10 MYALGIA: ICD-10-CM

## 2019-06-22 RX ORDER — TIZANIDINE HYDROCHLORIDE 2 MG/1
2 CAPSULE, GELATIN COATED ORAL 3 TIMES DAILY PRN
Qty: 60 CAPSULE | Refills: 0 | Status: SHIPPED | OUTPATIENT
Start: 2019-06-22 | End: 2019-07-10 | Stop reason: SDUPTHER

## 2019-06-24 ENCOUNTER — TELEPHONE (OUTPATIENT)
Dept: INTERNAL MEDICINE CLINIC | Facility: CLINIC | Age: 59
End: 2019-06-24

## 2019-06-24 NOTE — TELEPHONE ENCOUNTER
I do not know this patient and I do not see a diagnosis that would justify or necessitate bed rails  Does the patient actually have a hospital bed? This can either wait for Dr John Conway who knows her better she will have to come in for a visit

## 2019-06-24 NOTE — TELEPHONE ENCOUNTER
[facility] is seeing patient for PT  Patient is ready for outpatient therapy and would like to go to Outpatient Rehab in [facility]  Patient will need a script faxed to facility at [phone]  The script should indicate aqua therapy, strengthening and message

## 2019-06-24 NOTE — TELEPHONE ENCOUNTER
Pt spoke with her insurance company    She needs a new rx for:  One touch test strips and lancets  - Must say medically necessary on the scripts-    Please send to [facility] on [address]    Pt is also trying to get bed rails for her bed  Because she is falling out of bed  Order needs to say medically necessary  There also needs to have a procedure code on script    Pt will  rx for bed rails  Call back upon completion

## 2019-07-03 DIAGNOSIS — R73.9 HYPERGLYCEMIA: ICD-10-CM

## 2019-07-03 DIAGNOSIS — R64 INANITION (HCC): Primary | ICD-10-CM

## 2019-07-03 RX ORDER — LANCETS
EACH MISCELLANEOUS
Qty: 200 EACH | Refills: 3 | Status: SHIPPED | OUTPATIENT
Start: 2019-07-03

## 2019-07-03 NOTE — TELEPHONE ENCOUNTER
patient called asking for her One Touch Ultra test strips and Lancets- she doesn't have any and the pharmacy has no orders for her  She tests twice a day    Pharmacy: [address] [place] [address]    She would like to use [facility] for the bed rails  See previous note      Please call her with updates [phone]

## 2019-07-10 ENCOUNTER — OFFICE VISIT (OUTPATIENT)
Dept: NEUROLOGY | Facility: CLINIC | Age: 59
End: 2019-07-10
Payer: COMMERCIAL

## 2019-07-10 VITALS
BODY MASS INDEX: 27.61 KG/M2 | HEIGHT: 63 IN | SYSTOLIC BLOOD PRESSURE: 118 MMHG | DIASTOLIC BLOOD PRESSURE: 70 MMHG | HEART RATE: 84 BPM | WEIGHT: 155.8 LBS

## 2019-07-10 DIAGNOSIS — G25.0 TREMOR, ESSENTIAL: Primary | ICD-10-CM

## 2019-07-10 DIAGNOSIS — G62.9 PERIPHERAL POLYNEUROPATHY: ICD-10-CM

## 2019-07-10 DIAGNOSIS — M79.10 MYALGIA: ICD-10-CM

## 2019-07-10 DIAGNOSIS — I95.1 ORTHOSTATIC HYPOTENSION: ICD-10-CM

## 2019-07-10 PROCEDURE — 99213 OFFICE O/P EST LOW 20 MIN: CPT | Performed by: PSYCHIATRY & NEUROLOGY

## 2019-07-10 RX ORDER — TIZANIDINE HYDROCHLORIDE 2 MG/1
2 CAPSULE, GELATIN COATED ORAL 3 TIMES DAILY PRN
Qty: 60 CAPSULE | Refills: 0 | Status: SHIPPED | OUTPATIENT
Start: 2019-07-10 | End: 2019-07-31 | Stop reason: SDUPTHER

## 2019-07-10 RX ORDER — MIDODRINE HYDROCHLORIDE 5 MG/1
5 TABLET ORAL 3 TIMES DAILY
Qty: 90 TABLET | Refills: 5 | Status: SHIPPED | OUTPATIENT
Start: 2019-07-10 | End: 2020-01-06

## 2019-07-10 NOTE — PROGRESS NOTES
Casimiro Clark is a 63 y o  female returns in follow-up today with history of tremor, neuropathy and hypotension    Assessment:  1  Tremor, essential    2  Peripheral polyneuropathy    3  Orthostatic hypotension        Plan:  Continue primidone 100 mg daily  Increase ProAmatine 5 mg 3 times daily  Check blood pressure regularly  Follow-up 2 months    Discussion:  Violet rUias reports that there has been some improvement in her lightheadedness/dizziness when she stands but still has it on occasion and has had some falls  Her most recent fall occurred when she fell out of bed, (not related)  She continues to have issues with tremor intermittently and is most likely amplified by her respiratory medications  She will continue with her current dose of primidone and have recommended increasing her dose of ProAmatine as she remains orthostatic (dropped 28 points systolic today)  She will check her blood pressure regularly at home in different positions and record these  I will see her back in follow-up in 2 months      Subjective:    HPI  Violet Adonay reports she continues to have issues with lightheadedness and falling  She states her lightheadedness is better since she started the ProAmatine but is still occurring  Her most recent fall occurred when she fell out of bed and hit her chin  She has been having some issues with pneumonia and the medications that she is taking for this are most likely amplifying her tremor  She states that her tremors under good control most days but it does sometimes become an issue    She denies any symptoms of neuropathic pain in her feet      Past Medical History:   Diagnosis Date    Anemia     Anemia     Cardiac disorder     CHF (congestive heart failure) (HCC)     Constipation     COPD (chronic obstructive pulmonary disease) (HCC)     Depression     Diabetes insipidus (HCC)     Fibromyalgia     Heart disorder     Malignant neoplasm (HCC)     Migraines     Occasional tremors     Osteoarthritis     Osteoporosis     Problems with swallowing     Restless leg syndrome     Skin cancer     Stomach problems     Tobacco abuse        Family History:  Family History   Problem Relation Age of Onset    Cancer Mother         uterine    Hypertension Mother     Arthritis Mother     Obesity Mother     Heart disease Father     Neuropathy Father     Heart attack Father     Hypertension Father     Mental illness Father     Heart disease Brother     Diabetes Brother     Mental illness Brother     Osteoporosis Family     Scoliosis Family     Osteoarthritis Family     Obesity Sister     Cancer Sister        Past Surgical History:  Past Surgical History:   Procedure Laterality Date    ABDOMINAL SURGERY      Gastrorrhaphy for perforation of ulcer, last assessed 10/30/2014    ADENOIDECTOMY      ANKLE SURGERY Right     ARTHRODESIS      lumbar by posterolateral approach, last assessed 06/13/2017    BACK SURGERY      BLADDER SURGERY      last assessed 10/30/2014    FEMUR SURGERY Right     FOOT SURGERY Right     GALLBLADDER SURGERY      GASTRIC BYPASS      x2    HAND SURGERY Left     HEMORRHOID SURGERY      MULTIPLE TOOTH EXTRACTIONS      OTHER SURGICAL HISTORY Left     arm incision    TOE SURGERY Right     TOENAIL EXCISION      TONSILLECTOMY         Social History:   reports that she quit smoking about 7 weeks ago  Her smoking use included cigarettes  She has a 21 00 pack-year smoking history  She has never used smokeless tobacco  She reports that she has current or past drug history  Drug: Marijuana  Frequency: 7 00 times per week  She reports that she does not drink alcohol      Allergies:  Morphine and related; Quetiapine; and Sulfa antibiotics      Current Outpatient Medications:     albuterol (2 5 mg/3 mL) 0 083 % nebulizer solution, Take 1 vial (2 5 mg total) by nebulization every 6 (six) hours as needed for wheezing or shortness of breath, Disp: 1080 mL, Rfl: 3   albuterol (PROVENTIL HFA,VENTOLIN HFA) 90 mcg/act inhaler, Inhale 2 puffs every 6 (six) hours as needed for wheezing, Disp: 1 Inhaler, Rfl: 5    ARIPiprazole (ABILIFY) 2 mg tablet, Take 2 mg by mouth daily at bedtime, Disp: , Rfl: 0    Azelastine-Fluticasone (DYMISTA) 137-50 MCG/ACT SUSP, into each nostril as needed , Disp: , Rfl:     Blood Glucose Monitoring Suppl (ONE TOUCH ULTRA 2) w/Device KIT, Test daily and as instructed, Disp: 1 each, Rfl: 0    buprenorphine-naloxone (SUBOXONE) 2-0 5 mg per SL tablet, Place under the tongue daily, Disp: , Rfl:     butalbital-aspirin-caffeine (FIORINAL) -40 MG per tablet, Take 1 tablet by mouth every 4 (four) hours as needed for headaches, Disp: , Rfl:     calcium carbonate-vitamin D (OSCAL-D) 500 mg-200 units per tablet, Take 2 tablets by mouth daily with breakfast, Disp: 60 tablet, Rfl: 0    Cholecalciferol (VITAMIN D3 PO), Take 1 tablet by mouth daily as needed, Disp: , Rfl:     clonazePAM (KlonoPIN) 0 5 mg tablet, Take 0 5 mg by mouth 3 (three) times a day , Disp: , Rfl: 1    diphenhydrAMINE (BENADRYL) 25 mg tablet, Take 25 mg by mouth every 6 (six) hours as needed for itching, Disp: , Rfl:     doxepin (SINEquan) 150 MG capsule, 150 mg daily at bedtime, Disp: , Rfl: 1    famotidine (PEPCID) 20 mg tablet, Take 1 tablet (20 mg total) by mouth 2 (two) times a day (Patient taking differently: Take 20 mg by mouth daily at bedtime ), Disp: 90 tablet, Rfl: 5    fluticasone (FLONASE) 50 mcg/act nasal spray, 1 spray 2 (two) times a day as needed for rhinitis , Disp: , Rfl: 2    gabapentin (NEURONTIN) 800 mg tablet, Take 800 mg by mouth 4 (four) times a day, Disp: , Rfl: 3    glucose blood (FREESTYLE LITE) test strip, Daily and as instructed, Disp: 100 each, Rfl: 0    glucose blood (ONE TOUCH ULTRA TEST) test strip, Patient to test two times daily, Disp: 200 each, Rfl: 3    Lancets (FREESTYLE) lancets, Test daily and  as instructed, Disp: 100 each, Rfl: 0   Lancets (ONETOUCH ULTRASOFT) lancets, Patient to test two times daily, Disp: 200 each, Rfl: 3    lidocaine (XYLOCAINE) 5 % ointment, apply locally FOUR TIMES DAILY, Disp: , Rfl: 1    midodrine (PROAMATINE) 2 5 mg tablet, Take 1 tablet (2 5 mg total) by mouth 3 (three) times a day, Disp: 90 tablet, Rfl: 5    mupirocin (BACTROBAN) 2 % ointment, Apply topically 3 (three) times a day (Patient taking differently: Apply topically 3 (three) times a day as needed ), Disp: 22 g, Rfl: 1    nicotine (NICODERM CQ) 14 mg/24hr TD 24 hr patch, Place 1 patch on the skin daily, Disp: 28 patch, Rfl: 0    nystatin (MYCOSTATIN) powder, Apply topically 2 (two) times a day for 100 days, Disp: 454 g, Rfl: 2    pantoprazole (PROTONIX) 40 mg tablet, Take 1 tablet (40 mg total) by mouth daily, Disp: 90 tablet, Rfl: 3    primidone (MYSOLINE) 50 mg tablet, Two p o  Q h s , Disp: 180 tablet, Rfl: 1    Respiratory Therapy Supplies (NEBULIZER/TUBING/MOUTHPIECE) KIT, Use up to 6 times daily as needed for lung symptoms, Disp: 1 each, Rfl: 3    rOPINIRole (REQUIP) 0 25 mg tablet, TAKE 1 TABLET (0 25 MG TOTAL) BY MOUTH 3 (THREE) TIMES A DAY, Disp: 90 tablet, Rfl: 5    Simethicone (GAS-X PO), Take 1 tablet by mouth as needed, Disp: , Rfl:     TiZANidine (ZANAFLEX) 2 MG capsule, TAKE 1 CAPSULE (2 MG TOTAL) BY MOUTH 3 (THREE) TIMES A DAY AS NEEDED FOR MUSCLE SPASMS, Disp: 60 capsule, Rfl: 0    venlafaxine 225 MG TB24, Take 225 mg by mouth daily, Disp: , Rfl: 1    WIXELA INHUB 250-50 MCG/DOSE inhaler, INHALE 1 PUFF TWICE A DAY, RINSE MOUTH AFTER USE, Disp: 1 Inhaler, Rfl: 3    zafirlukast (ACCOLATE) 20 MG tablet, Take 1 tablet (20 mg total) by mouth 2 (two) times a day before meals, Disp: 180 tablet, Rfl: 3    I have reviewed the past medical, social and family history, current medications, allergies, vitals, review of systems and updated this information as appropriate today     Objective:    Vitals:  Blood pressure 118/70, pulse 84, height 5' 3" (1 6 m), weight 70 7 kg (155 lb 12 8 oz)  Physical Exam    Neurological Exam  Blood pressure sitting was 118/80 and upon standing dropped to 90/60  GENERAL:  Well-developed well-nourished woman in no acute distress  HEENT/NECK: Head is atraumatic normocephalic, neck is supple  NEUROLOGIC:  Mental Status: Awake and alert without aphasia  Cranial Nerves: Extraocular movements are full  Face is symmetrical  Motor:  No drift is noted on arm extension  Coordination:  An 8-10 hertz tremors noted minimally with posture maintenance and somewhat more prominently with intention  Gait reveals an increased base            ROS:    Review of Systems   Constitutional: Positive for fatigue  Negative for appetite change and fever  HENT: Positive for tinnitus  Negative for hearing loss, trouble swallowing and voice change  Eyes: Positive for photophobia  Negative for pain  Respiratory: Positive for shortness of breath and wheezing  Cardiovascular: Negative  Negative for chest pain and palpitations  Gastrointestinal: Negative  Negative for nausea and vomiting  Endocrine: Negative  Negative for cold intolerance and heat intolerance  Genitourinary: Negative  Negative for dysuria, frequency and urgency  Musculoskeletal: Positive for arthralgias, back pain, gait problem (fall on 05/2019), neck pain and neck stiffness  Negative for myalgias  Skin: Negative  Negative for rash  Allergic/Immunologic: Negative  Neurological: Positive for dizziness, tremors, weakness, light-headedness, numbness (right leg, hands) and headaches  Negative for seizures, syncope, facial asymmetry and speech difficulty  Hematological: Negative  Does not bruise/bleed easily  Psychiatric/Behavioral: Negative  Negative for confusion, hallucinations and sleep disturbance

## 2019-07-18 ENCOUNTER — TELEPHONE (OUTPATIENT)
Dept: INTERNAL MEDICINE CLINIC | Facility: CLINIC | Age: 59
End: 2019-07-18

## 2019-07-18 ENCOUNTER — OFFICE VISIT (OUTPATIENT)
Dept: INTERNAL MEDICINE CLINIC | Facility: CLINIC | Age: 59
End: 2019-07-18
Payer: COMMERCIAL

## 2019-07-18 VITALS
HEIGHT: 63 IN | BODY MASS INDEX: 28.35 KG/M2 | OXYGEN SATURATION: 94 % | DIASTOLIC BLOOD PRESSURE: 60 MMHG | HEART RATE: 80 BPM | WEIGHT: 160 LBS | SYSTOLIC BLOOD PRESSURE: 90 MMHG

## 2019-07-18 DIAGNOSIS — Z72.0 TOBACCO ABUSE: ICD-10-CM

## 2019-07-18 DIAGNOSIS — J41.0 SIMPLE CHRONIC BRONCHITIS (HCC): Primary | ICD-10-CM

## 2019-07-18 DIAGNOSIS — R29.898 SEVERE MUSCLE DECONDITIONING: Primary | ICD-10-CM

## 2019-07-18 PROCEDURE — 99213 OFFICE O/P EST LOW 20 MIN: CPT | Performed by: INTERNAL MEDICINE

## 2019-07-18 NOTE — TELEPHONE ENCOUNTER
Lashell Bowers came in for her appt with Dr Ilana Heaton   At checkout she mentioned that she needed orders for [facility]  She forgot to mention it to Dr Ilana Heaton      Please  Mail to patient's home at  [facility]

## 2019-07-18 NOTE — PROGRESS NOTES
Assessment/Plan:       Diagnoses and all orders for this visit:    Simple chronic bronchitis ([facility] [address] )    Tobacco abuse          Patient Instructions    You are doing better  However, you need to stop smoking completely  Subjective:      Patient ID: Lara Downs is a 63 y o  female  A patient with severe chronic bronchitic COPD secondary to cigarette smoking  Almost  about 2 months ago weeks ago from acute hypoxemic respiratory failure  Discharged back to home  Still smoking half a pack a day  Patient states she "lost "her nebulizer tubing and has been using her nebulizer; complaint of "congestion "  No complaint of dyspnea orthopnea  She is not in distress and not toxic in appearance but remains a bit hoarse  However, this too was a bit improved  The patient apparently globally feels better  She looks better she is not as she gray  She is not tachypneic nor distress and her lungs are clear with no rales rhonchi or wheeze  This is the 1st time she has been this way and quite a while  She reports enough energy to do some basic house work  Still smoking  Nicotine patches of being use but she finds it hard to get the stick  Unfortunately, she fell Chantix and number of years ago because she had a suicidal ideation on the drug  Having said that, her cigarette consumption is down a lot  The following portions of the patient's history were reviewed and updated as appropriate:   She has a past medical history of Anemia, Anemia, Cardiac disorder, CHF (congestive heart failure) ([facility] [address] ), Constipation, COPD (chronic obstructive pulmonary disease) ([facility] [address] ), Depression, Fibromyalgia, Heart disorder, Malignant neoplasm ([facility] [address] ), Migraines, Occasional tremors, Osteoarthritis, Osteoporosis, Problems with swallowing, Restless leg syndrome, Skin cancer, Stomach problems, and Tobacco abuse ,  does not have any pertinent problems on file  ,   has a past surgical history that includes Gallbladder surgery; Tonsillectomy; Gastric bypass; Back surgery; Other surgical history (Left); Arthrodesis; Bladder surgery; Foot surgery (Right); Abdominal surgery; Hand surgery (Left); Adenoidectomy; Toe Surgery (Right); Ankle surgery (Right); Femur Surgery (Right); Hemorrhoid surgery; Toenail excision; and Multiple tooth extractions  ,  family history includes Arthritis in her mother; Cancer in her mother and sister; Diabetes in her brother; Heart attack in her father; Heart disease in her brother and father; Hypertension in her father and mother; Mental illness in her brother and father; Neuropathy in her father; Obesity in her mother and sister; Osteoarthritis in her family; Osteoporosis in her family; Scoliosis in her family  ,   reports that she quit smoking about 1 months ago  Her smoking use included cigarettes  She has a 21 00 pack-year smoking history  She has never used smokeless tobacco  She reports that she has current or past drug history  Drug: Marijuana  Frequency: 7 00 times per week  She reports that she does not drink alcohol ,  is allergic to morphine and related; quetiapine; and sulfa antibiotics     Current Outpatient Medications   Medication Sig Dispense Refill    albuterol (2 5 mg/3 mL) 0 083 % nebulizer solution Take 1 vial (2 5 mg total) by nebulization every 6 (six) hours as needed for wheezing or shortness of breath 1080 mL 3    albuterol (PROVENTIL HFA,VENTOLIN HFA) 90 mcg/act inhaler Inhale 2 puffs every 6 (six) hours as needed for wheezing 1 Inhaler 5    ARIPiprazole (ABILIFY) 2 mg tablet Take 2 mg by mouth daily at bedtime  0    Azelastine-Fluticasone (DYMISTA) 137-50 MCG/ACT SUSP into each nostril as needed       Blood Glucose Monitoring Suppl (ONE TOUCH ULTRA 2) w/Device KIT Test daily and as instructed 1 each 0    Blood Pressure Monitoring (SPHYGMOMANOMETER) MISC Check blood pressure daily   Medically necessary 1 each 0    buprenorphine-naloxone (SUBOXONE) 2-0 5 mg per SL tablet Place under the tongue daily      butalbital-aspirin-caffeine (FIORINAL) -40 MG per tablet Take 1 tablet by mouth every 4 (four) hours as needed for headaches      calcium carbonate-vitamin D (OSCAL-D) 500 mg-200 units per tablet Take 2 tablets by mouth daily with breakfast 60 tablet 0    Cholecalciferol (VITAMIN D3 PO) Take 1 tablet by mouth daily as needed      clonazePAM (KlonoPIN) 0 5 mg tablet Take 0 5 mg by mouth 3 (three) times a day   1    diphenhydrAMINE (BENADRYL) 25 mg tablet Take 25 mg by mouth every 6 (six) hours as needed for itching      doxepin (SINEquan) 150 MG capsule 150 mg daily at bedtime  1    famotidine (PEPCID) 20 mg tablet Take 1 tablet (20 mg total) by mouth 2 (two) times a day (Patient taking differently: Take 20 mg by mouth daily at bedtime ) 90 tablet 5    fluticasone (FLONASE) 50 mcg/act nasal spray 1 spray 2 (two) times a day as needed for rhinitis   2    gabapentin (NEURONTIN) 800 mg tablet Take 800 mg by mouth 4 (four) times a day  3    glucose blood (FREESTYLE LITE) test strip Daily and as instructed 100 each 0    glucose blood (ONE TOUCH ULTRA TEST) test strip Patient to test two times daily 200 each 3    Lancets (FREESTYLE) lancets Test daily and  as instructed 100 each 0    Lancets (ONETOUCH ULTRASOFT) lancets Patient to test two times daily 200 each 3    lidocaine (XYLOCAINE) 5 % ointment apply locally FOUR TIMES DAILY  1    midodrine (PROAMATINE) 5 mg tablet Take 1 tablet (5 mg total) by mouth 3 (three) times a day 90 tablet 5    mupirocin (BACTROBAN) 2 % ointment Apply topically 3 (three) times a day (Patient taking differently: Apply topically 3 (three) times a day as needed ) 22 g 1    nicotine (NICODERM CQ) 14 mg/24hr TD 24 hr patch Place 1 patch on the skin daily 28 patch 0    nystatin (MYCOSTATIN) powder Apply topically 2 (two) times a day for 100 days 454 g 2    pantoprazole (PROTONIX) 40 mg tablet Take 1 tablet (40 mg total) by mouth daily 90 tablet 3    primidone (MYSOLINE) 50 mg tablet Two p o  Q h s  180 tablet 1    Respiratory Therapy Supplies (NEBULIZER/TUBING/MOUTHPIECE) KIT Use up to 6 times daily as needed for lung symptoms 1 each 3    rOPINIRole (REQUIP) 0 25 mg tablet TAKE 1 TABLET (0 25 MG TOTAL) BY MOUTH 3 (THREE) TIMES A DAY 90 tablet 5    Simethicone (GAS-X PO) Take 1 tablet by mouth as needed      TiZANidine (ZANAFLEX) 2 MG capsule TAKE 1 CAPSULE (2 MG TOTAL) BY MOUTH 3 (THREE) TIMES A DAY AS NEEDED FOR MUSCLE SPASMS 60 capsule 0    venlafaxine 225 MG TB24 Take 225 mg by mouth daily  1    WIXELA INHUB 250-50 MCG/DOSE inhaler INHALE 1 PUFF TWICE A DAY, RINSE MOUTH AFTER USE 1 Inhaler 3    zafirlukast (ACCOLATE) 20 MG tablet Take 1 tablet (20 mg total) by mouth 2 (two) times a day before meals 180 tablet 3     No current facility-administered medications for this visit  Review of Systems   Constitutional: Positive for fatigue  Negative for fever  Respiratory: Positive for cough and shortness of breath  Negative for wheezing  Cardiovascular: Negative for leg swelling  Neurological: Negative for headaches  Objective:  Vitals:    07/18/19 1804   BP: 90/60   Pulse: 80   SpO2: 94%      Physical Exam   Constitutional:   Alert female patient who appears 8years older than stated age but not tachypneic not dyspneic and not using accessory muscles  Able to speak in full sentences  Cardiovascular: Regular rhythm and normal heart sounds  Pulmonary/Chest: Effort normal  No stridor  No respiratory distress  She has decreased breath sounds  She has no wheezes  She has no rhonchi  She has no rales

## 2019-07-26 ENCOUNTER — TELEPHONE (OUTPATIENT)
Dept: GASTROENTEROLOGY | Facility: CLINIC | Age: 59
End: 2019-07-26

## 2019-07-26 NOTE — TELEPHONE ENCOUNTER
JAXSONI: Spoke with patient  H/O abdominal pain     Patient c/o reflux symptoms every day  Burning in her throat, sore throat, and midepigastric pain for 6-8 weeks  Patient is taking pantoprazole 40mg daily, and pepcid 20mg at bedtime  She is eating and drinking well  Advised patient to increase pepcid to BID, follow anti-reflux measures, and follow-up in OV

## 2019-07-26 NOTE — TELEPHONE ENCOUNTER
ptn has been experiencing discomfort which she thinks is reflux and she wants to know what to do   Please advise

## 2019-07-31 DIAGNOSIS — M79.10 MYALGIA: ICD-10-CM

## 2019-07-31 RX ORDER — TIZANIDINE HYDROCHLORIDE 2 MG/1
2 CAPSULE, GELATIN COATED ORAL 3 TIMES DAILY PRN
Qty: 60 CAPSULE | Refills: 0 | Status: SHIPPED | OUTPATIENT
Start: 2019-07-31 | End: 2019-08-16 | Stop reason: SDUPTHER

## 2019-08-01 ENCOUNTER — TELEPHONE (OUTPATIENT)
Dept: INTERNAL MEDICINE CLINIC | Facility: CLINIC | Age: 59
End: 2019-08-01

## 2019-08-01 NOTE — TELEPHONE ENCOUNTER
PARKING PLACARD FORM GIVEN TO [employer] AND WELLNESS PAPERS GIVEN TO [address] [place] [address] [address]

## 2019-08-01 NOTE — TELEPHONE ENCOUNTER
[facility] CALLED AND SAID THAT PATIENT IS ON A OPIOD AND A BENZOIDE AND WANTS TO MAKE SURE THAT IT IS OKAY TO FILL THE TIZANIDINE    [facility]   [phone]

## 2019-08-01 NOTE — TELEPHONE ENCOUNTER
Pt's  dropped off a handicap parking placard application for Dr Vlad Alas to sign as well as her wellness papers for her appointment in October   Put both forms in bin     Call when parking placard ready   [phone]

## 2019-08-03 DIAGNOSIS — R10.13 EPIGASTRIC PAIN: ICD-10-CM

## 2019-08-03 DIAGNOSIS — M79.10 MYALGIA: ICD-10-CM

## 2019-08-05 ENCOUNTER — TELEPHONE (OUTPATIENT)
Dept: INTERNAL MEDICINE CLINIC | Facility: CLINIC | Age: 59
End: 2019-08-05

## 2019-08-05 RX ORDER — FAMOTIDINE 20 MG/1
TABLET, FILM COATED ORAL
Qty: 30 TABLET | Refills: 0 | Status: SHIPPED | OUTPATIENT
Start: 2019-08-05 | End: 2019-08-15

## 2019-08-05 RX ORDER — TIZANIDINE HYDROCHLORIDE 2 MG/1
2 CAPSULE, GELATIN COATED ORAL 3 TIMES DAILY PRN
Qty: 60 CAPSULE | Refills: 0 | Status: SHIPPED | OUTPATIENT
Start: 2019-08-05 | End: 2019-10-24 | Stop reason: SDUPTHER

## 2019-08-06 NOTE — TELEPHONE ENCOUNTER
I did the parking placard and the [place] document    I have no "wellness "document from them to sign

## 2019-08-09 ENCOUNTER — HOSPITAL ENCOUNTER (OUTPATIENT)
Dept: CT IMAGING | Facility: HOSPITAL | Age: 59
Discharge: HOME | End: 2019-08-09
Payer: COMMERCIAL

## 2019-08-09 ENCOUNTER — HOSPITAL ENCOUNTER (OUTPATIENT)
Dept: PULMONOLOGY | Facility: HOSPITAL | Age: 59
Discharge: HOME | End: 2019-08-09
Payer: COMMERCIAL

## 2019-08-09 DIAGNOSIS — R93.89 ABNORMAL CHEST CT: ICD-10-CM

## 2019-08-09 DIAGNOSIS — R06.02 SHORTNESS OF BREATH: ICD-10-CM

## 2019-08-09 PROCEDURE — 94761 N-INVAS EAR/PLS OXIMETRY MLT: CPT

## 2019-08-09 PROCEDURE — 94060 EVALUATION OF WHEEZING: CPT

## 2019-08-09 PROCEDURE — 94727 GAS DIL/WSHOT DETER LNG VOL: CPT | Performed by: INTERNAL MEDICINE

## 2019-08-09 PROCEDURE — 71250 CT THORAX DX C-: CPT

## 2019-08-09 PROCEDURE — 94618 PULMONARY STRESS TESTING: CPT | Performed by: INTERNAL MEDICINE

## 2019-08-09 PROCEDURE — 94729 DIFFUSING CAPACITY: CPT | Performed by: INTERNAL MEDICINE

## 2019-08-09 PROCEDURE — 94060 EVALUATION OF WHEEZING: CPT | Performed by: INTERNAL MEDICINE

## 2019-08-09 PROCEDURE — 94729 DIFFUSING CAPACITY: CPT

## 2019-08-09 PROCEDURE — 94727 GAS DIL/WSHOT DETER LNG VOL: CPT

## 2019-08-09 RX ORDER — ALBUTEROL SULFATE 2.5 MG/3ML
2.5 SOLUTION RESPIRATORY (INHALATION) ONCE
Status: COMPLETED | OUTPATIENT
Start: 2019-08-09 | End: 2019-08-09

## 2019-08-09 RX ADMIN — ALBUTEROL SULFATE 2.5 MG: 2.5 SOLUTION RESPIRATORY (INHALATION) at 10:33

## 2019-08-15 ENCOUNTER — PREP FOR PROCEDURE (OUTPATIENT)
Dept: GASTROENTEROLOGY | Facility: CLINIC | Age: 59
End: 2019-08-15

## 2019-08-15 ENCOUNTER — OFFICE VISIT (OUTPATIENT)
Dept: GASTROENTEROLOGY | Facility: CLINIC | Age: 59
End: 2019-08-15
Payer: COMMERCIAL

## 2019-08-15 VITALS
WEIGHT: 165.6 LBS | HEIGHT: 63 IN | HEART RATE: 91 BPM | BODY MASS INDEX: 29.34 KG/M2 | DIASTOLIC BLOOD PRESSURE: 68 MMHG | SYSTOLIC BLOOD PRESSURE: 98 MMHG

## 2019-08-15 DIAGNOSIS — R10.13 EPIGASTRIC PAIN: Primary | ICD-10-CM

## 2019-08-15 DIAGNOSIS — K21.9 GASTROESOPHAGEAL REFLUX DISEASE, ESOPHAGITIS PRESENCE NOT SPECIFIED: ICD-10-CM

## 2019-08-15 PROCEDURE — 99213 OFFICE O/P EST LOW 20 MIN: CPT | Performed by: PHYSICIAN ASSISTANT

## 2019-08-15 RX ORDER — FAMOTIDINE 20 MG/1
TABLET, FILM COATED ORAL
Qty: 60 TABLET | Refills: 1 | Status: SHIPPED | OUTPATIENT
Start: 2019-08-15 | End: 2019-12-30 | Stop reason: SDUPTHER

## 2019-08-15 RX ORDER — PANTOPRAZOLE SODIUM 40 MG/1
40 TABLET, DELAYED RELEASE ORAL DAILY
Qty: 60 TABLET | Refills: 3 | Status: SHIPPED | OUTPATIENT
Start: 2019-08-15 | End: 2019-12-30 | Stop reason: SDUPTHER

## 2019-08-16 DIAGNOSIS — M79.10 MYALGIA: ICD-10-CM

## 2019-08-16 RX ORDER — TIZANIDINE HYDROCHLORIDE 2 MG/1
2 CAPSULE, GELATIN COATED ORAL 3 TIMES DAILY PRN
Qty: 60 CAPSULE | Refills: 0 | Status: SHIPPED | OUTPATIENT
Start: 2019-08-16 | End: 2019-09-16 | Stop reason: SDUPTHER

## 2019-08-16 NOTE — TELEPHONE ENCOUNTER
NEEDS  TIZANIDINE 2MG TID #90  [facility]   [phone]  NEEDS THIS TODAY BECAUSE PATIENT IS GOING AWAY FOR THE WEEKEND

## 2019-08-21 DIAGNOSIS — J45.30 MILD PERSISTENT ASTHMA, UNSPECIFIED WHETHER COMPLICATED: ICD-10-CM

## 2019-08-22 RX ORDER — ALBUTEROL SULFATE 90 UG/1
2 AEROSOL, METERED RESPIRATORY (INHALATION) EVERY 6 HOURS PRN
Qty: 1 INHALER | Refills: 5 | Status: SHIPPED | OUTPATIENT
Start: 2019-08-22

## 2019-08-24 DIAGNOSIS — G25.0 TREMOR, ESSENTIAL: ICD-10-CM

## 2019-08-24 DIAGNOSIS — I95.1 ORTHOSTATIC HYPOTENSION: ICD-10-CM

## 2019-08-26 RX ORDER — MIDODRINE HYDROCHLORIDE 2.5 MG/1
TABLET ORAL
Qty: 270 TABLET | Refills: 1 | Status: SHIPPED | OUTPATIENT
Start: 2019-08-26 | End: 2019-10-03 | Stop reason: SDUPTHER

## 2019-08-26 RX ORDER — PRIMIDONE 50 MG/1
TABLET ORAL
Qty: 180 TABLET | Refills: 1 | Status: SHIPPED | OUTPATIENT
Start: 2019-08-26 | End: 2020-02-25

## 2019-08-29 ENCOUNTER — TELEPHONE (OUTPATIENT)
Dept: GASTROENTEROLOGY | Facility: CLINIC | Age: 59
End: 2019-08-29

## 2019-09-10 ENCOUNTER — TELEPHONE (OUTPATIENT)
Dept: GASTROENTEROLOGY | Facility: CLINIC | Age: 59
End: 2019-09-10

## 2019-09-16 ENCOUNTER — OFFICE VISIT (OUTPATIENT)
Dept: PULMONOLOGY | Facility: CLINIC | Age: 59
End: 2019-09-16
Payer: COMMERCIAL

## 2019-09-16 VITALS
OXYGEN SATURATION: 95 % | HEIGHT: 63 IN | SYSTOLIC BLOOD PRESSURE: 100 MMHG | BODY MASS INDEX: 29.41 KG/M2 | HEART RATE: 98 BPM | DIASTOLIC BLOOD PRESSURE: 60 MMHG | WEIGHT: 166 LBS

## 2019-09-16 DIAGNOSIS — Z72.0 TOBACCO ABUSE: ICD-10-CM

## 2019-09-16 DIAGNOSIS — J43.9 PULMONARY EMPHYSEMA, UNSPECIFIED EMPHYSEMA TYPE (HCC): ICD-10-CM

## 2019-09-16 DIAGNOSIS — R93.89 ABNORMAL CHEST CT: Primary | ICD-10-CM

## 2019-09-16 DIAGNOSIS — J45.20 MILD INTERMITTENT ASTHMA WITHOUT COMPLICATION: ICD-10-CM

## 2019-09-16 DIAGNOSIS — L02.92 BOIL: ICD-10-CM

## 2019-09-16 PROCEDURE — 99214 OFFICE O/P EST MOD 30 MIN: CPT | Performed by: PHYSICIAN ASSISTANT

## 2019-09-16 RX ORDER — ZAFIRLUKAST 20 MG/1
20 TABLET, FILM COATED ORAL
Qty: 180 TABLET | Refills: 3 | Status: SHIPPED | OUTPATIENT
Start: 2019-09-16 | End: 2020-12-02 | Stop reason: SDUPTHER

## 2019-09-16 NOTE — PROGRESS NOTES
Assessment:    1  Abnormal chest CT  CT chest without contrast   2  Pulmonary emphysema, unspecified emphysema type ([address] )     3  Mild intermittent asthma without complication  zafirlukast (ACCOLATE) 20 MG tablet   4  Tobacco abuse     5  Boil  Ambulatory referral to Dermatology         Plan:   Patient presents today for follow-up  Breathing has remained overall stable  Reviewed PFTs with improvement in FEV1 to 87% predicted however there was worsening DLCO  Also reviewed follow-up chest CT with resolution of previously seen ground-glass opacities and persistently enlarged pretracheal node, 1 2 cm in size  Ordered 3 month follow-up chest CT to assess stability given her ongoing tobacco abuse  She is currently smoking more than her previous visit, has increased to 3/4 pack a day  Counseling regarding smoking cessation  She states she will try again with either nicotine patches or lozenges  She continues with Wixela, albuterol MDI/via nebulizer as needed, accolate  She also states she will follow-up with cardiology soon    All of the patient's and her 's questions were answered to their satisfaction and understanding, which was verbalized  Patient was advised to call the office prior to next appointment should they have any questions or concerns prior  Patient made aware of worrisome symptoms that should prompt a visit to the ER or call to 911 such as chest pain, severe shortness of breath, cyanosis, throat closing, syncope, etc     Subjective:     Patient ID: Ruchi Rowley is a 63 y o  female  Chief Complaint:  Denver Colander is a 58-year-old female current everyday smoker with past medical history including but not limited to COPD, asthma, GERD, bariatric surgery, anemia, depression, anxiety, thrombo angiitis obliterans, and frequent falls  She presents today for follow-up  Reports that her breathing has been overall stable  Seems to be worse when humidity is high    She has been requiring the Ventolin less than prior  She rarely uses the nebulizer  She has recently increased her smoking to 3/4 pack a day and stopped using nicotine patches  She reports she has been very anxious so she has been smoking and eating more  She also gained weight but denies LE edema or orthopnea  The following portions of the patient's history were reviewed in this encounter and updated as appropriate:Past medical, social, surgical, family, allergies     Review of Systems   Constitutional: Negative for activity change, chills and fever  HENT: Positive for congestion and postnasal drip  Negative for sore throat and trouble swallowing  Respiratory: Positive for cough and shortness of breath  Cardiovascular: Negative for chest pain  Gastrointestinal:        +chronic gi issues s/p gastric bypass   Musculoskeletal: Positive for gait problem  Neurological: Positive for tremors  Negative for syncope  Psychiatric/Behavioral: Positive for sleep disturbance  The patient is nervous/anxious  All other systems reviewed and are negative  Objective:    Physical Exam   Constitutional: She is oriented to person, place, and time  She appears well-developed and well-nourished  No distress  Appears older than stated age   HENT:   Head: Normocephalic and atraumatic  Right Ear: External ear normal    Left Ear: External ear normal    Nose: Nose normal    Mouth/Throat: Oropharynx is clear and moist  No oropharyngeal exudate  Eyes: Conjunctivae and EOM are normal  Right eye exhibits no discharge  Left eye exhibits no discharge  No scleral icterus  Neck: Normal range of motion  Neck supple  No tracheal deviation present  Cardiovascular: Normal rate, regular rhythm and normal heart sounds  Pulmonary/Chest: Effort normal  No stridor  No respiratory distress  She has no wheezes  She has no rales  Decreased breath sounds   Abdominal: She exhibits no distension  There is no guarding     Musculoskeletal: Normal range of motion  She exhibits no edema, tenderness or deformity  Neurological: She is alert and oriented to person, place, and time  No cranial nerve deficit    +tremor   Skin: Skin is warm and dry  No rash noted  She is not diaphoretic  No erythema  No pallor  Psychiatric: Her behavior is normal  Judgment and thought content normal    anxious   Nursing note and vitals reviewed  Lab Review:   No visits with results within 2 Month(s) from this visit  Latest known visit with results is:   No results displayed because visit has over 200 results

## 2019-09-23 ENCOUNTER — OFFICE VISIT (OUTPATIENT)
Dept: GASTROENTEROLOGY | Facility: CLINIC | Age: 59
End: 2019-09-23
Payer: COMMERCIAL

## 2019-09-23 ENCOUNTER — OFFICE VISIT (OUTPATIENT)
Dept: DERMATOLOGY | Facility: CLINIC | Age: 59
End: 2019-09-23
Payer: COMMERCIAL

## 2019-09-23 VITALS
SYSTOLIC BLOOD PRESSURE: 110 MMHG | DIASTOLIC BLOOD PRESSURE: 70 MMHG | BODY MASS INDEX: 29.95 KG/M2 | RESPIRATION RATE: 18 BRPM | HEIGHT: 63 IN | HEART RATE: 111 BPM | WEIGHT: 169 LBS

## 2019-09-23 DIAGNOSIS — Z13.89 SCREENING FOR SKIN CONDITION: ICD-10-CM

## 2019-09-23 DIAGNOSIS — L82.1 SEBORRHEIC KERATOSIS: ICD-10-CM

## 2019-09-23 DIAGNOSIS — R10.13 EPIGASTRIC PAIN: Primary | ICD-10-CM

## 2019-09-23 DIAGNOSIS — L02.92 BOIL: ICD-10-CM

## 2019-09-23 DIAGNOSIS — L73.2 HIDRADENITIS: Primary | ICD-10-CM

## 2019-09-23 DIAGNOSIS — K59.04 CHRONIC IDIOPATHIC CONSTIPATION: ICD-10-CM

## 2019-09-23 DIAGNOSIS — K21.9 GASTROESOPHAGEAL REFLUX DISEASE, ESOPHAGITIS PRESENCE NOT SPECIFIED: ICD-10-CM

## 2019-09-23 PROCEDURE — 99213 OFFICE O/P EST LOW 20 MIN: CPT | Performed by: PHYSICIAN ASSISTANT

## 2019-09-23 PROCEDURE — 99203 OFFICE O/P NEW LOW 30 MIN: CPT | Performed by: DERMATOLOGY

## 2019-09-23 RX ORDER — SUCRALFATE 1 G/1
1 TABLET ORAL 4 TIMES DAILY PRN
Qty: 90 TABLET | Refills: 1 | Status: SHIPPED | OUTPATIENT
Start: 2019-09-23 | End: 2019-10-03 | Stop reason: ALTCHOICE

## 2019-09-23 RX ORDER — DOXYCYCLINE HYCLATE 100 MG/1
100 CAPSULE ORAL EVERY 12 HOURS SCHEDULED
Qty: 60 CAPSULE | Refills: 1 | Status: SHIPPED | OUTPATIENT
Start: 2019-09-23 | End: 2019-11-16 | Stop reason: SDUPTHER

## 2019-09-23 NOTE — PROGRESS NOTES
[facility] DERMATOLOGY  [address] [place] [id number]  [phone]  [phone]     MRN: [id number] : 1960  Encounter: [id number]  Patient Information: Ambika Damon  Chief complaint:  A boil    History of present illness: 60-year-old female presents secondary to a recurrence boil in the left groin area patient notes that she has also had lesions under her breasts past he has had some of the axilla no other concerns noted patient notes also stacy anal area as well that becomes involved    Patient also with history of skin cancers well  Past Medical History:   Diagnosis Date    Anemia     Anemia     Cardiac disorder     CHF (congestive heart failure) (HCC)     Constipation     COPD (chronic obstructive pulmonary disease) (HCC)     Depression     Fibromyalgia     Heart disorder     Malignant neoplasm (HCC)     Migraines     Occasional tremors     Osteoarthritis     Osteoporosis     Problems with swallowing     Restless leg syndrome     Skin cancer     Stomach problems     Tobacco abuse      Past Surgical History:   Procedure Laterality Date    ABDOMINAL SURGERY      Gastrorrhaphy for perforation of ulcer, last assessed 10/30/2014    ADENOIDECTOMY      ANKLE SURGERY Right     ARTHRODESIS      lumbar by posterolateral approach, last assessed 2017    BACK SURGERY      BLADDER SURGERY      last assessed 10/30/2014    FEMUR SURGERY Right     FOOT SURGERY Right     GALLBLADDER SURGERY      GASTRIC BYPASS      x2    HAND SURGERY Left     HEMORRHOID SURGERY      MULTIPLE TOOTH EXTRACTIONS      OTHER SURGICAL HISTORY Left     arm incision    TOE SURGERY Right     TOENAIL EXCISION      TONSILLECTOMY       Social History   Social History     Substance and Sexual Activity   Alcohol Use Never    Alcohol/week: 0 0 standard drinks    Frequency: Never     Social History     Substance and Sexual Activity   Drug Use Not Currently    Frequency: 7 0 times per week    Comment: daily     Social History     Tobacco Use   Smoking Status Current Every Day Smoker    Packs/day: 0 50    Years: 42 00    Pack years: 21 00    Types: Cigarettes    Last attempt to quit: 2019    Years since quittin 3   Smokeless Tobacco Never Used   Tobacco Comment    1/2 pack a day      Family History   Problem Relation Age of Onset    Cancer Mother         uterine    Hypertension Mother     Arthritis Mother     Obesity Mother     Heart disease Father     Neuropathy Father     Heart attack Father     Hypertension Father     Mental illness Father     Heart disease Brother     Diabetes Brother     Mental illness Brother     Osteoporosis Family     Scoliosis Family     Osteoarthritis Family     Obesity Sister     Cancer Sister      Meds/Allergies   Allergies   Allergen Reactions    Morphine And Related Swelling and Other (See Comments)     Only allergic to IV morphine per patient    Quetiapine      Aka(seroquel)    Sulfa Antibiotics Other (See Comments)     "can't take->gastric bypass"       Meds:  Prior to Admission medications    Medication Sig Start Date End Date Taking?  Authorizing Provider   albuterol (2 5 mg/3 mL) 0 083 % nebulizer solution Take 1 vial (2 5 mg total) by nebulization every 6 (six) hours as needed for wheezing or shortness of breath 18   Kasandra Cevallos MD   albuterol (PROVENTIL HFA,VENTOLIN HFA) 90 mcg/act inhaler Inhale 2 puffs every 6 (six) hours as needed for wheezing 19   Lashell Johnston PA-C   ARIPiprazole (ABILIFY) 2 mg tablet Take 2 mg by mouth daily at bedtime 18   Historical Provider, MD   Azelastine-Fluticasone [facility]) 137-50 MCG/ACT SUSP into each nostril as needed  6/15/17   Historical Provider, MD   Blood Glucose Monitoring Suppl (ONE TOUCH ULTRA 2) w/Device KIT Test daily and as instructed 19   Eunice Ontiveros MD   Blood Pressure Monitoring [facility]) MISC Check blood pressure daily  Medically necessary 7/10/19   Malathi Alvarado MD   buprenorphine-naloxone (SUBOXONE) 2-0 5 mg per SL tablet Place under the tongue daily    Historical Provider, MD   butalbital-aspirin-caffeine [facility]) -40 MG per tablet Take 1 tablet by mouth every 4 (four) hours as needed for headaches    Historical Provider, MD   calcium carbonate-vitamin D (OSCAL-D) 500 mg-200 units per tablet Take 2 tablets by mouth daily with breakfast  Patient not taking: Reported on 9/16/2019 5/29/19   Laure Bell MD   Cholecalciferol (VITAMIN D3 PO) Take 1 tablet by mouth daily as needed    Historical Provider, MD   clonazePAM (KlonoPIN) 0 5 mg tablet Take 0 5 mg by mouth 3 (three) times a day  1/18/18   Historical Provider, MD   diphenhydrAMINE (BENADRYL) 25 mg tablet Take 25 mg by mouth every 6 (six) hours as needed for itching    Historical Provider, MD   doxepin (SINEquan) 150 MG capsule 150 mg daily at bedtime 1/18/18   Historical Provider, MD   famotidine (PEPCID) 20 mg tablet 1 PO BID 8/15/19   Dwayne Nix PA-C   fluticasone [facility]) 50 mcg/act nasal spray 1 spray 2 (two) times a day as needed for rhinitis  4/23/18   Historical Provider, MD   gabapentin (NEURONTIN) 800 mg tablet Take 800 mg by mouth 4 (four) times a day 1/16/18   Historical Provider, MD   glucose blood (FREESTYLE LITE) test strip Daily and as instructed 5/30/19   Carry MD Carlos   glucose blood (ONE TOUCH ULTRA TEST) test strip Patient to test two times daily 7/3/19   Carry MD Carlos   Lancets (FREESTYLE) lancets Test daily and  as instructed 5/30/19   Carry MD Carlos   Lancets [facility] ULTRASOFT) lancets Patient to test two times daily 7/3/19   Carry MD Carlos   lidocaine (XYLOCAINE) 5 % ointment apply locally FOUR TIMES DAILY 12/18/17   Historical Provider, MD   midodrine (PROAMATINE) 2 5 mg tablet TAKE 1 TABLET BY MOUTH 3 TIMES A DAY  Patient not taking: Reported on 9/16/2019 8/26/19   MD solange Montes (PROAMATINE) 5 mg tablet Take 1 tablet (5 mg total) by mouth 3 (three) times a day 7/10/19   Shantal Magdaleno MD   [facility]) 2 % ointment Apply topically 3 (three) times a day  Patient taking differently: Apply topically 3 (three) times a day as needed  5/29/19   Kobe Harvey MD   nicotine (NICODERM CQ) 14 mg/24hr TD 24 hr patch Place 1 patch on the skin daily  Patient not taking: Reported on 9/16/2019 6/4/19   Kobe Harvey MD   nystatin (MYCOSTATIN) powder Apply topically 2 (two) times a day for 100 days  Patient not taking: Reported on 9/16/2019 6/4/19 9/12/19  Kobe Harvey MD   pantoprazole (PROTONIX) 40 mg tablet Take 1 tablet (40 mg total) by mouth daily 8/15/19   Gloria Nava PA-C   primidone (MYSOLINE) 50 mg tablet TAKE 2 TABLETS BY MOUTH AT BEDTIME 8/26/19   Shantal Magdaleno MD   Respiratory Therapy Supplies (NEBULIZER/TUBING/MOUTHPIECE) KIT Use up to 6 times daily as needed for lung symptoms 6/18/19   Kobe Harvey MD   rOPINIRole (REQUIP) 0 25 mg tablet TAKE 1 TABLET (0 25 MG TOTAL) BY MOUTH 3 (THREE) TIMES A DAY 6/13/19   Kobe Harvey MD   Simethicone (GAS-X PO) Take 1 tablet by mouth as needed    Historical Provider, MD   TiZANidine (ZANAFLEX) 2 MG capsule TAKE 1 CAPSULE (2 MG TOTAL) BY MOUTH 3 (THREE) TIMES A DAY AS NEEDED FOR MUSCLE SPASMS 8/5/19   Kobe Harvey MD   venlafaxine 225 MG TB24 Take 225 mg by mouth daily 1/30/18   Historical Provider, MD Duboista Sake 250-50 MCG/DOSE inhaler INHALE 1 PUFF TWICE A DAY, RINSE MOUTH AFTER USE 6/19/19   Kobe Harvey MD   zafirlukast (ACCOLATE) 20 MG tablet Take 1 tablet (20 mg total) by mouth 2 (two) times a day before meals 9/16/19   Randa Alexander PA-C       Subjective:     Review of Systems:    General: negative for - chills, fatigue, fever,  weight gain or weight loss  Psychological: negative for - anxiety, behavioral disorder, concentration difficulties, decreased libido, depression, irritability, memory difficulties, mood swings, sleep disturbances or suicidal ideation  ENT: negative for - hearing difficulties , nasal congestion, nasal discharge, oral lesions, sinus pain, sneezing, sore throat  Allergy and Immunology: negative for - hives, insect bite sensitivity,  Hematological and Lymphatic: negative for - bleeding problems, blood clots,bruising, swollen lymph nodes  Endocrine: negative for - hair pattern changes, hot flashes, malaise/lethargy, mood swings, palpitations, polydipsia/polyuria, skin changes, temperature intolerance or unexpected weight change  Respiratory: negative for - cough, hemoptysis, orthopnea, shortness of breath, or wheezing  Cardiovascular: negative for - chest pain, dyspnea on exertion, edema,  Gastrointestinal: negative for - abdominal pain, nausea/vomiting  Genito-Urinary: negative for - dysuria, incontinence, irregular/heavy menses or urinary frequency/urgency  Musculoskeletal: negative for - gait disturbance, joint pain, joint stiffness, joint swelling, muscle pain, muscular weakness  Dermatological:  As in HPI  Neurological: negative for confusion, dizziness, headaches, impaired coordination/balance, memory loss, numbness/tingling, seizures, speech problems, tremors or weakness       Objective: There were no vitals taken for this visit      Physical Exam:    General Appearance:    Alert, cooperative, no distress   Head:    Normocephalic, without obvious abnormality, atraumatic           Skin:   A full skin exam was performed including scalp, head scalp, eyes, ears, nose, lips, neck, chest, axilla, abdomen, back, buttocks, bilateral upper extremities, bilateral lower extremities, hands, feet, fingers, toes, fingernails, and toenails cystic nodule with inflamed left thigh several other scars and open pores noted in the area nothing else of concern noted that small pustule noted any inframammary area normal keratotic papules greasy stuck on appearance nothing else remarkable noted exam previous site of skin cancer well healed without recurrence     Assessment:     1  Hidradenitis     2  Boil  Ambulatory referral to Dermatology   3  Screening for skin condition     4  Seborrheic keratosis           Plan:   Hidradenitis advise that this is something that we see off and on without is exacerbated by smoking related to an acneiform process in the axilla inframammary area sometimes the on and the groin will see if that a course of doxycycline will help to get this under control  Seborrheic keratosis patient reassured these are normal growths we acquire with age no treatment needed  History of skin cancer in no recurrence nothing else atypical sunblock recommended follow-up in 6 months  Screening for dermatologic disorders nothing else of concern noted on complete exam follow-up in 6 months    Kenji Agosto MD  9/23/2019,11:49 AM    Portions of the record may have been created with voice recognition software   Occasional wrong word or "sound a like" substitutions may have occurred due to the inherent limitations of voice recognition software   Read the chart carefully and recognize, using context, where substitutions have occurred

## 2019-09-23 NOTE — H&P (VIEW-ONLY)
[facility] - Outpatient Follow-up Note  Emily Memory 63 y o  female MRN: [id number]  Encounter: [id number]          ASSESSMENT AND PLAN:      1  Epigastric pain  She is taking Pantoprazole 80mg in the morning and pepcid 40mg at bedtime  I asked her to split her Pantoprazole dose apart  Will reschedule EGD    2  Chronic idiopathic constipation  She is on Suboxone  Will try Movantik 25mg daily    3  Gastroesophageal reflux disease, esophagitis presence not specified  Use carafate prn for now    ______________________________________________________________________    SUBJECTIVE:  63year old female presents for follow up evaluation for GERD and constipation  She reports no improvement in symptoms after her last visit  She is taking Pantoprazole 80mg once daily and Pepcid 40mg at bedtime  She reports cramping upper abdominal pain  She continues to be constipated  She has failed several OTC medications for this  She denies rectal bleeding, melena, nausea/vomiting, hematemesis  She had an EGD scheduled for 9/10 which she forgot about  She has her CT scheduled for this Wednesday  REVIEW OF SYSTEMS IS OTHERWISE NEGATIVE        Historical Information   Past Medical History:   Diagnosis Date    Anemia     Anemia     Cardiac disorder     [facility]est[facility]) [facility])     Constipation     COPD (chronic obstructive pulmonary disease) ([facility])     Depression     Fibromyalgia     Heart disorder     Malignant neoplasm ([facility])     Migraines     Occasional tremors     Osteoarthritis     Osteoporosis     Problems with swallowing     Restless leg syndrome     Skin cancer     Stomach problems     Tobacco abuse      Past Surgical History:   Procedure Laterality Date    ABDOMINAL SURGERY      Gastrorrhaphy for perforation of ulcer, last assessed 10/30/2014    ADENOIDECTOMY      ANKLE SURGERY Right     ARTHRODESIS      lumbar by posterolateral approach, last assessed 2017    BACK SURGERY      BLADDER SURGERY      last assessed 10/30/2014    FEMUR SURGERY Right     FOOT SURGERY Right     GALLBLADDER SURGERY      GASTRIC BYPASS      x2    HAND SURGERY Left     HEMORRHOID SURGERY      MULTIPLE TOOTH EXTRACTIONS      OTHER SURGICAL HISTORY Left     arm incision    TOE SURGERY Right     TOENAIL EXCISION      TONSILLECTOMY       Social History   Social History     Substance and Sexual Activity   Alcohol Use Never    Alcohol/week: 0 0 standard drinks    Frequency: Never     Social History     Substance and Sexual Activity   Drug Use Not Currently    Frequency: 7 0 times per week    Comment: daily     Social History     Tobacco Use   Smoking Status Current Every Day Smoker    Packs/day: 0 50    Years: 42 00    Pack years: 21 00    Types: Cigarettes    Last attempt to quit: 2019    Years since quittin 3   Smokeless Tobacco Never Used   Tobacco Comment    1/2 pack a day      Family History   Problem Relation Age of Onset    Cancer Mother         uterine    Hypertension Mother     Arthritis Mother     Obesity Mother     Heart disease Father     Neuropathy Father     Heart attack Father     Hypertension Father     Mental illness Father     Heart disease Brother     Diabetes Brother     Mental illness Brother     Osteoporosis Family     Scoliosis Family     Osteoarthritis Family     Obesity Sister     Cancer Sister        Meds/Allergies       Current Outpatient Medications:     albuterol (2 5 mg/3 mL) 0 083 % nebulizer solution    albuterol (PROVENTIL HFA,VENTOLIN HFA) 90 mcg/act inhaler    ARIPiprazole (ABILIFY) 2 mg tablet    Azelastine-Fluticasone (DYMISTA) 137-50 MCG/ACT SUSP    Blood Glucose Monitoring Suppl (ONE TOUCH ULTRA 2) w/Device KIT    Blood Pressure Monitoring (SPHYGMOMANOMETER) MISC    buprenorphine-naloxone (SUBOXONE) 2-0 5 mg per SL tablet    butalbital-aspirin-caffeine (FIORINAL) -40 MG per tablet   calcium carbonate-vitamin D (OSCAL-D) 500 mg-200 units per tablet    Cholecalciferol (VITAMIN D3 PO)    clonazePAM (KlonoPIN) 0 5 mg tablet    diphenhydrAMINE (BENADRYL) 25 mg tablet    doxepin (SINEquan) 150 MG capsule    doxycycline hyclate (VIBRAMYCIN) 100 mg capsule    famotidine (PEPCID) 20 mg tablet    fluticasone (FLONASE) 50 mcg/act nasal spray    gabapentin (NEURONTIN) 800 mg tablet    glucose blood (FREESTYLE LITE) test strip    glucose blood (ONE TOUCH ULTRA TEST) test strip    Lancets (FREESTYLE) lancets    Lancets (ONETOUCH ULTRASOFT) lancets    lidocaine (XYLOCAINE) 5 % ointment    midodrine (PROAMATINE) 2 5 mg tablet    midodrine (PROAMATINE) 5 mg tablet    mupirocin (BACTROBAN) 2 % ointment    nicotine (NICODERM CQ) 14 mg/24hr TD 24 hr patch    pantoprazole (PROTONIX) 40 mg tablet    primidone (MYSOLINE) 50 mg tablet    Respiratory Therapy Supplies (NEBULIZER/TUBING/MOUTHPIECE) KIT    rOPINIRole (REQUIP) 0 25 mg tablet    Simethicone (GAS-X PO)    TiZANidine (ZANAFLEX) 2 MG capsule    venlafaxine 225 MG TB24    WIXELA INHUB 250-50 MCG/DOSE inhaler    zafirlukast (ACCOLATE) 20 MG tablet    sucralfate (CARAFATE) 1 g tablet    Allergies   Allergen Reactions    Morphine And Related Swelling and Other (See Comments)     Only allergic to IV morphine per patient    Quetiapine      Aka(seroquel)    Sulfa Antibiotics Other (See Comments)     "can't take->gastric bypass"           Objective     Blood pressure 110/70, pulse (!) 111, resp  rate 18, height 5' 3" (1 6 m), weight 76 7 kg (169 lb)  Body mass index is 29 94 kg/m²  PHYSICAL EXAM:      General Appearance:   Alert, cooperative, no distress   HEENT:   Normocephalic, atraumatic, anicteric      Neck:  Supple, symmetrical, trachea midline   Lungs:   Clear to auscultation bilaterally; no rales, rhonchi or wheezing; respirations unlabored    Heart[de-identified]   Regular rate and rhythm; no murmur, rub, or gallop     Abdomen:   Soft, non-tender, non-distended; normal bowel sounds; no masses, no organomegaly    Genitalia:   Deferred    Rectal:   Deferred    Extremities:  No cyanosis, clubbing or edema    Pulses:  2+ and symmetric    Skin:  No jaundice, rashes, or lesions    Lymph nodes:  No palpable cervical lymphadenopathy        Lab Results:   No visits with results within 1 Day(s) from this visit  Latest known visit with results is:   No results displayed because visit has over 200 results  Radiology Results:   No results found

## 2019-09-23 NOTE — PROGRESS NOTES
[facility] - Outpatient Follow-up Note  Katarina Mistry 63 y o  female MRN: [id number]  Encounter: [id number]          ASSESSMENT AND PLAN:      1  Epigastric pain  Increase pantoprazole to BID  Continue Pepcid  Will plan for CT and repeat EGD      2  Gastroesophageal reflux disease, esophagitis presence not specified  - CT abdomen pelvis w contrast; Future  - Basic metabolic panel; Future    ______________________________________________________________________    SUBJECTIVE:  63year old female with a history of Charlie-En-Y gastric bypass complicated by a marginal ulcer presents for follow up evaluation  She continues to report abdominal pain and heartburn  She is taking Pantoprazole 40mg once daily and Pepcid 20mg once daily  She denies dysphagia, odynophagia, hematemesis, melena, unexpected weight loss or rectal bleeding  Her last EGD was in 2016 and showed mild esophagitis  Her last colonoscopy in 2016 showed several polyps  She is due for a recall in 2021      REVIEW OF SYSTEMS IS OTHERWISE NEGATIVE        Historical Information   Past Medical History:   Diagnosis Date    Anemia     Anemia     Cardiac disorder     CHF (congestive heart failure) (HCC)     Constipation     COPD (chronic obstructive pulmonary disease) (HCC)     Depression     Fibromyalgia     Heart disorder     Malignant neoplasm (HCC)     Migraines     Occasional tremors     Osteoarthritis     Osteoporosis     Problems with swallowing     Restless leg syndrome     Skin cancer     Stomach problems     Tobacco abuse      Past Surgical History:   Procedure Laterality Date    ABDOMINAL SURGERY      Gastrorrhaphy for perforation of ulcer, last assessed 10/30/2014    ADENOIDECTOMY      ANKLE SURGERY Right     ARTHRODESIS      lumbar by posterolateral approach, last assessed 06/13/2017    BACK SURGERY      BLADDER SURGERY      last assessed 10/30/2014    FEMUR SURGERY Right     FOOT SURGERY Right  GALLBLADDER SURGERY      GASTRIC BYPASS      x2    HAND SURGERY Left     HEMORRHOID SURGERY      MULTIPLE TOOTH EXTRACTIONS      OTHER SURGICAL HISTORY Left     arm incision    TOE SURGERY Right     TOENAIL EXCISION      TONSILLECTOMY       Social History   Social History     Substance and Sexual Activity   Alcohol Use Never    Alcohol/week: 0 0 standard drinks    Frequency: Never     Social History     Substance and Sexual Activity   Drug Use Not Currently    Frequency: 7 0 times per week    Comment: daily     Social History     Tobacco Use   Smoking Status Current Every Day Smoker    Packs/day: 0 50    Years: 42 00    Pack years: 21 00    Types: Cigarettes    Last attempt to quit: 2019    Years since quittin 3   Smokeless Tobacco Never Used   Tobacco Comment    1/2 pack a day      Family History   Problem Relation Age of Onset    Cancer Mother         uterine    Hypertension Mother     Arthritis Mother     Obesity Mother     Heart disease Father     Neuropathy Father     Heart attack Father     Hypertension Father     Mental illness Father     Heart disease Brother     Diabetes Brother     Mental illness Brother     Osteoporosis Family     Scoliosis Family     Osteoarthritis Family     Obesity Sister     Cancer Sister        Meds/Allergies       Current Outpatient Medications:     albuterol (2 5 mg/3 mL) 0 083 % nebulizer solution    ARIPiprazole (ABILIFY) 2 mg tablet    Azelastine-Fluticasone (DYMISTA) 137-50 MCG/ACT SUSP    Blood Glucose Monitoring Suppl (ONE TOUCH ULTRA 2) w/Device KIT    Blood Pressure Monitoring (SPHYGMOMANOMETER) MISC    buprenorphine-naloxone (SUBOXONE) 2-0 5 mg per SL tablet    butalbital-aspirin-caffeine (FIORINAL) -40 MG per tablet    calcium carbonate-vitamin D (OSCAL-D) 500 mg-200 units per tablet    Cholecalciferol (VITAMIN D3 PO)    clonazePAM (KlonoPIN) 0 5 mg tablet    diphenhydrAMINE (BENADRYL) 25 mg tablet   doxepin (SINEquan) 150 MG capsule    famotidine (PEPCID) 20 mg tablet    fluticasone (FLONASE) 50 mcg/act nasal spray    gabapentin (NEURONTIN) 800 mg tablet    glucose blood (FREESTYLE LITE) test strip    glucose blood (ONE TOUCH ULTRA TEST) test strip    Lancets (FREESTYLE) lancets    Lancets (ONETOUCH ULTRASOFT) lancets    lidocaine (XYLOCAINE) 5 % ointment    midodrine (PROAMATINE) 5 mg tablet    mupirocin (BACTROBAN) 2 % ointment    nicotine (NICODERM CQ) 14 mg/24hr TD 24 hr patch    pantoprazole (PROTONIX) 40 mg tablet    Respiratory Therapy Supplies (NEBULIZER/TUBING/MOUTHPIECE) KIT    rOPINIRole (REQUIP) 0 25 mg tablet    Simethicone (GAS-X PO)    TiZANidine (ZANAFLEX) 2 MG capsule    venlafaxine 225 MG TB24    WIXELA INHUB 250-50 MCG/DOSE inhaler    albuterol (PROVENTIL HFA,VENTOLIN HFA) 90 mcg/act inhaler    doxycycline hyclate (VIBRAMYCIN) 100 mg capsule    midodrine (PROAMATINE) 2 5 mg tablet    primidone (MYSOLINE) 50 mg tablet    sucralfate (CARAFATE) 1 g tablet    zafirlukast (ACCOLATE) 20 MG tablet    Allergies   Allergen Reactions    Morphine And Related Swelling and Other (See Comments)     Only allergic to IV morphine per patient    Quetiapine      Aka(seroquel)    Sulfa Antibiotics Other (See Comments)     "can't take->gastric bypass"           Objective     Blood pressure 98/68, pulse 91, height 5' 3" (1 6 m), weight 75 1 kg (165 lb 9 6 oz)  Body mass index is 29 33 kg/m²  PHYSICAL EXAM:      General Appearance:   Alert, cooperative, no distress   HEENT:   Normocephalic, atraumatic, anicteric      Neck:  Supple, symmetrical, trachea midline   Lungs:   Clear to auscultation bilaterally; no rales, rhonchi or wheezing; respirations unlabored    Heart[de-identified]   Regular rate and rhythm; no murmur, rub, or gallop     Abdomen:   Soft, non-tender, non-distended; normal bowel sounds; no masses, no organomegaly    Genitalia:   Deferred    Rectal:   Deferred    Extremities:  No cyanosis, clubbing or edema    Pulses:  2+ and symmetric    Skin:  No jaundice, rashes, or lesions    Lymph nodes:  No palpable cervical lymphadenopathy        Lab Results:   No visits with results within 1 Day(s) from this visit  Latest known visit with results is:   No results displayed because visit has over 200 results  Radiology Results:   No results found

## 2019-09-23 NOTE — PROGRESS NOTES
[facility] - Outpatient Follow-up Note  Marito Quinn 63 y o  female MRN: [id number]  Encounter: [id number]          ASSESSMENT AND PLAN:      1  Epigastric pain  She is taking Pantoprazole 80mg in the morning and pepcid 40mg at bedtime  I asked her to split her Pantoprazole dose apart  Will reschedule EGD    2  Chronic idiopathic constipation  She is on Suboxone  Will try Movantik 25mg daily    3  Gastroesophageal reflux disease, esophagitis presence not specified  Use carafate prn for now    ______________________________________________________________________    SUBJECTIVE:  63year old female presents for follow up evaluation for GERD and constipation  She reports no improvement in symptoms after her last visit  She is taking Pantoprazole 80mg once daily and Pepcid 40mg at bedtime  She reports cramping upper abdominal pain  She continues to be constipated  She has failed several OTC medications for this  She denies rectal bleeding, melena, nausea/vomiting, hematemesis  She had an EGD scheduled for 9/10 which she forgot about  She has her CT scheduled for this Wednesday  REVIEW OF SYSTEMS IS OTHERWISE NEGATIVE        Historical Information   Past Medical History:   Diagnosis Date    Anemia     Anemia     Cardiac disorder     [facility]     Constipation     COPD (chronic obstructive pulmonary disease) (HCC)     Depression     Fibromyalgia     Heart disorder     Malignant neoplasm (HCC)     Migraines     Occasional tremors     Osteoarthritis     Osteoporosis     Problems with swallowing     Restless leg syndrome     Skin cancer     Stomach problems     Tobacco abuse      Past Surgical History:   Procedure Laterality Date    ABDOMINAL SURGERY      Gastrorrhaphy for perforation of ulcer, last assessed 10/30/2014    ADENOIDECTOMY      ANKLE SURGERY Right     ARTHRODESIS      lumbar by posterolateral approach, last assessed 2017    BACK SURGERY      BLADDER SURGERY      last assessed 10/30/2014    FEMUR SURGERY Right     FOOT SURGERY Right     GALLBLADDER SURGERY      GASTRIC BYPASS      x2    HAND SURGERY Left     HEMORRHOID SURGERY      MULTIPLE TOOTH EXTRACTIONS      OTHER SURGICAL HISTORY Left     arm incision    TOE SURGERY Right     TOENAIL EXCISION      TONSILLECTOMY       Social History   Social History     Substance and Sexual Activity   Alcohol Use Never    Alcohol/week: 0 0 standard drinks    Frequency: Never     Social History     Substance and Sexual Activity   Drug Use Not Currently    Frequency: 7 0 times per week    Comment: daily     Social History     Tobacco Use   Smoking Status Current Every Day Smoker    Packs/day: 0 50    Years: 42 00    Pack years: 21 00    Types: Cigarettes    Last attempt to quit: 2019    Years since quittin 3   Smokeless Tobacco Never Used   Tobacco Comment    1/2 pack a day      Family History   Problem Relation Age of Onset    Cancer Mother         uterine    Hypertension Mother     Arthritis Mother     Obesity Mother     Heart disease Father     Neuropathy Father     Heart attack Father     Hypertension Father     Mental illness Father     Heart disease Brother     Diabetes Brother     Mental illness Brother     Osteoporosis Family     Scoliosis Family     Osteoarthritis Family     Obesity Sister     Cancer Sister        Meds/Allergies       Current Outpatient Medications:     albuterol (2 5 mg/3 mL) 0 083 % nebulizer solution    albuterol (PROVENTIL HFA,VENTOLIN HFA) 90 mcg/act inhaler    ARIPiprazole (ABILIFY) 2 mg tablet    Azelastine-Fluticasone (DYMISTA) 137-50 MCG/ACT SUSP    Blood Glucose Monitoring Suppl (ONE TOUCH ULTRA 2) w/Device KIT    Blood Pressure Monitoring (SPHYGMOMANOMETER) MISC    buprenorphine-naloxone (SUBOXONE) 2-0 5 mg per SL tablet    butalbital-aspirin-caffeine (FIORINAL) -40 MG per tablet   calcium carbonate-vitamin D (OSCAL-D) 500 mg-200 units per tablet    Cholecalciferol (VITAMIN D3 PO)    clonazePAM (KlonoPIN) 0 5 mg tablet    diphenhydrAMINE (BENADRYL) 25 mg tablet    doxepin (SINEquan) 150 MG capsule    doxycycline hyclate (VIBRAMYCIN) 100 mg capsule    famotidine (PEPCID) 20 mg tablet    fluticasone (FLONASE) 50 mcg/act nasal spray    gabapentin (NEURONTIN) 800 mg tablet    glucose blood (FREESTYLE LITE) test strip    glucose blood (ONE TOUCH ULTRA TEST) test strip    Lancets (FREESTYLE) lancets    Lancets (ONETOUCH ULTRASOFT) lancets    lidocaine (XYLOCAINE) 5 % ointment    midodrine (PROAMATINE) 2 5 mg tablet    midodrine (PROAMATINE) 5 mg tablet    mupirocin (BACTROBAN) 2 % ointment    nicotine (NICODERM CQ) 14 mg/24hr TD 24 hr patch    pantoprazole (PROTONIX) 40 mg tablet    primidone (MYSOLINE) 50 mg tablet    Respiratory Therapy Supplies (NEBULIZER/TUBING/MOUTHPIECE) KIT    rOPINIRole (REQUIP) 0 25 mg tablet    Simethicone (GAS-X PO)    TiZANidine (ZANAFLEX) 2 MG capsule    venlafaxine 225 MG TB24    WIXELA INHUB 250-50 MCG/DOSE inhaler    zafirlukast (ACCOLATE) 20 MG tablet    sucralfate (CARAFATE) 1 g tablet    Allergies   Allergen Reactions    Morphine And Related Swelling and Other (See Comments)     Only allergic to IV morphine per patient    Quetiapine      Aka(seroquel)    Sulfa Antibiotics Other (See Comments)     "can't take->gastric bypass"           Objective     Blood pressure 110/70, pulse (!) 111, resp  rate 18, height 5' 3" (1 6 m), weight 76 7 kg (169 lb)  Body mass index is 29 94 kg/m²  PHYSICAL EXAM:      General Appearance:   Alert, cooperative, no distress   HEENT:   Normocephalic, atraumatic, anicteric      Neck:  Supple, symmetrical, trachea midline   Lungs:   Clear to auscultation bilaterally; no rales, rhonchi or wheezing; respirations unlabored    Heart[de-identified]   Regular rate and rhythm; no murmur, rub, or gallop     Abdomen:   Soft, non-tender, non-distended; normal bowel sounds; no masses, no organomegaly    Genitalia:   Deferred    Rectal:   Deferred    Extremities:  No cyanosis, clubbing or edema    Pulses:  2+ and symmetric    Skin:  No jaundice, rashes, or lesions    Lymph nodes:  No palpable cervical lymphadenopathy        Lab Results:   No visits with results within 1 Day(s) from this visit  Latest known visit with results is:   No results displayed because visit has over 200 results  Radiology Results:   No results found

## 2019-09-23 NOTE — PATIENT INSTRUCTIONS
Hidradenitis advise that this is something that we see off and on without is exacerbated by smoking related to an acneiform process in the axilla inframammary area sometimes the on and the groin will see if that a course of doxycycline will help to get this under control  Seborrheic keratosis patient reassured these are normal growths we acquire with age no treatment needed  History of skin cancer in no recurrence nothing else atypical sunblock recommended follow-up in 6 months  Screening for dermatologic disorders nothing else of concern noted on complete exam follow-up in 6 months

## 2019-09-24 ENCOUNTER — OFFICE VISIT (OUTPATIENT)
Dept: NEUROLOGY | Facility: CLINIC | Age: 59
End: 2019-09-24
Payer: COMMERCIAL

## 2019-09-24 VITALS — SYSTOLIC BLOOD PRESSURE: 110 MMHG | DIASTOLIC BLOOD PRESSURE: 70 MMHG | HEART RATE: 70 BPM

## 2019-09-24 DIAGNOSIS — I95.1 ORTHOSTATIC HYPOTENSION: ICD-10-CM

## 2019-09-24 DIAGNOSIS — G62.9 PERIPHERAL POLYNEUROPATHY: ICD-10-CM

## 2019-09-24 DIAGNOSIS — G25.0 TREMOR, ESSENTIAL: Primary | ICD-10-CM

## 2019-09-24 PROCEDURE — 99214 OFFICE O/P EST MOD 30 MIN: CPT | Performed by: PSYCHIATRY & NEUROLOGY

## 2019-09-24 RX ORDER — ADHESIVE BANDAGE 3/4"
BANDAGE TOPICAL 2 TIMES DAILY
Qty: 1 EACH | Refills: 0 | Status: SHIPPED | OUTPATIENT
Start: 2019-09-24 | End: 2021-03-10

## 2019-09-24 NOTE — PROGRESS NOTES
Lang Aase is a 63 y o  female returns in follow-up today with history of peripheral neuropathy, orthostatic hypotension and essential tremor    Assessment:  1  Tremor, essential    2  Orthostatic hypotension    3  Peripheral polyneuropathy        Plan:  Continue ProAmatine  Continue primidone  Follow-up 3 months    Discussion:  Jennifer Dumont is symptomatic orthostatic hypotension is done much better with her current dose of ProAmatine and she did not demonstrate significant orthostatic hypotension on today's exam   She continues to have issues with falling which I suspect are more related to her underlying peripheral neuropathy secondary to diabetes  Her most recent fall she did injure her wrist and anticipates orthopedic evaluation  When she is cleared will plan to initiate physical therapy for gait and balance and I will see her back in follow-up in 3 months      Subjective:    HPI  Jennifer Dumont returns in follow-up today accompanied by her   She states that she recently had a fall over the weekend  She was rushing to get a paper towel to clean her dog, she lost her balance and fell  Since she has been on her current dose of ProAmatine she has not had any further orthostatic hypotension of significance causing syncope  She usually uses a straight cane when she is out of the house but was not using it at this point  She denies any symptoms of neuropathic pain in her feet  She did injure her wrist and anticipates orthopedic evaluation    She denies any significant issues with her tremor      Past Medical History:   Diagnosis Date    Anemia     Anemia     Cardiac disorder     [facility] failure[facility])     Constipation     [facility] obstructive [facility] disease) ([facility])     Depression     Fibromyalgia     Heart disorder     Malignant neoplasm (HCC)     Migraines     Occasional tremors     Osteoarthritis     Osteoporosis     Problems with swallowing     Restless leg syndrome     Skin cancer     Stomach problems     Tobacco abuse        Family History:  Family History   Problem Relation Age of Onset    Cancer Mother         uterine    Hypertension Mother     Arthritis Mother     Obesity Mother     Heart disease Father     Neuropathy Father     Heart attack Father     Hypertension Father     Mental illness Father     Heart disease Brother     Diabetes Brother     Mental illness Brother     Osteoporosis Family     Scoliosis Family     Osteoarthritis Family     Obesity Sister     Cancer Sister        Past Surgical History:  Past Surgical History:   Procedure Laterality Date    ABDOMINAL SURGERY      Gastrorrhaphy for perforation of ulcer, last assessed 10/30/2014    ADENOIDECTOMY      ANKLE SURGERY Right     ARTHRODESIS      lumbar by posterolateral approach, last assessed 06/13/2017    BACK SURGERY      BLADDER SURGERY      last assessed 10/30/2014    FEMUR SURGERY Right     FOOT SURGERY Right     GALLBLADDER SURGERY      GASTRIC BYPASS      x2    HAND SURGERY Left     HEMORRHOID SURGERY      MULTIPLE TOOTH EXTRACTIONS      OTHER SURGICAL HISTORY Left     arm incision    TOE SURGERY Right     TOENAIL EXCISION      TONSILLECTOMY         Social History:   reports that she has been smoking cigarettes  She has a 21 00 pack-year smoking history  She has never used smokeless tobacco  She reports that she has current or past drug history  Frequency: 7 00 times per week  She reports that she does not drink alcohol      Allergies:  Morphine and related; Quetiapine; and Sulfa antibiotics      Current Outpatient Medications:     albuterol (2 5 mg/3 mL) 0 083 % nebulizer solution, Take 1 vial (2 5 mg total) by nebulization every 6 (six) hours as needed for wheezing or shortness of breath, Disp: 1080 mL, Rfl: 3    albuterol (PROVENTIL HFA,VENTOLIN HFA) 90 mcg/act inhaler, Inhale 2 puffs every 6 (six) hours as needed for wheezing, Disp: 1 Inhaler, Rfl: 5    ARIPiprazole (ABILIFY) 2 mg tablet, Take 2 mg by mouth daily at bedtime, Disp: , Rfl: 0    Azelastine-Fluticasone (DYMISTA) 137-50 MCG/ACT SUSP, into each nostril as needed , Disp: , Rfl:     Blood Glucose Monitoring Suppl (ONE TOUCH ULTRA 2) w/Device KIT, Test daily and as instructed, Disp: 1 each, Rfl: 0    Blood Pressure Monitoring (SPHYGMOMANOMETER) MISC, Check blood pressure daily   Medically necessary, Disp: 1 each, Rfl: 0    buprenorphine-naloxone (SUBOXONE) 2-0 5 mg per SL tablet, Place under the tongue daily, Disp: , Rfl:     butalbital-aspirin-caffeine (FIORINAL) -40 MG per tablet, Take 1 tablet by mouth every 4 (four) hours as needed for headaches, Disp: , Rfl:     calcium carbonate-vitamin D (OSCAL-D) 500 mg-200 units per tablet, Take 2 tablets by mouth daily with breakfast, Disp: 60 tablet, Rfl: 0    Cholecalciferol (VITAMIN D3 PO), Take 1 tablet by mouth daily as needed, Disp: , Rfl:     clonazePAM (KlonoPIN) 0 5 mg tablet, Take 0 5 mg by mouth 3 (three) times a day , Disp: , Rfl: 1    diphenhydrAMINE (BENADRYL) 25 mg tablet, Take 25 mg by mouth every 6 (six) hours as needed for itching, Disp: , Rfl:     doxepin (SINEquan) 150 MG capsule, 150 mg daily at bedtime, Disp: , Rfl: 1    doxycycline hyclate (VIBRAMYCIN) 100 mg capsule, Take 1 capsule (100 mg total) by mouth every 12 (twelve) hours, Disp: 60 capsule, Rfl: 1    famotidine (PEPCID) 20 mg tablet, 1 PO BID, Disp: 60 tablet, Rfl: 1    fluticasone (FLONASE) 50 mcg/act nasal spray, 1 spray 2 (two) times a day as needed for rhinitis , Disp: , Rfl: 2    gabapentin (NEURONTIN) 800 mg tablet, Take 800 mg by mouth 4 (four) times a day, Disp: , Rfl: 3    glucose blood (FREESTYLE LITE) test strip, Daily and as instructed, Disp: 100 each, Rfl: 0    glucose blood (ONE TOUCH ULTRA TEST) test strip, Patient to test two times daily, Disp: 200 each, Rfl: 3    Lancets (FREESTYLE) lancets, Test daily and  as instructed, Disp: 100 each, Rfl: 0    Lancets ([facility]TRAS[facility]FT) lancets, Patient to test two times daily, Disp: 200 each, Rfl: 3    lidocaine (XYLOCAINE) 5 % ointment, apply locally FOUR TIMES DAILY, Disp: , Rfl: 1    midodrine (PROAMATINE) 2 5 mg tablet, TAKE 1 TABLET BY MOUTH 3 TIMES A DAY, Disp: 270 tablet, Rfl: 1    midodrine (PROAMATINE) 5 mg tablet, Take 1 tablet (5 mg total) by mouth 3 (three) times a day, Disp: 90 tablet, Rfl: 5    mupirocin (BACTROBAN) 2 % ointment, Apply topically 3 (three) times a day (Patient taking differently: Apply topically 3 (three) times a day as needed ), Disp: 22 g, Rfl: 1    nicotine (NICODERM CQ) 14 mg/24hr TD 24 hr patch, Place 1 patch on the skin daily, Disp: 28 patch, Rfl: 0    pantoprazole (PROTONIX) 40 mg tablet, Take 1 tablet (40 mg total) by mouth daily, Disp: 60 tablet, Rfl: 3    primidone (MYSOLINE) 50 mg tablet, TAKE 2 TABLETS BY MOUTH AT BEDTIME, Disp: 180 tablet, Rfl: 1    Respiratory Therapy Supplies (NEBULIZER/TUBING/MOUTHPIECE) KIT, Use up to 6 times daily as needed for lung symptoms, Disp: 1 each, Rfl: 3    rOPINIRole (REQUIP) 0 25 mg tablet, TAKE 1 TABLET (0 25 MG TOTAL) BY MOUTH 3 (THREE) TIMES A DAY, Disp: 90 tablet, Rfl: 5    Simethicone (GAS-X PO), Take 1 tablet by mouth as needed, Disp: , Rfl:     sucralfate (CARAFATE) 1 g tablet, Take 1 tablet (1 g total) by mouth 4 (four) times a day as needed (heartburn), Disp: 90 tablet, Rfl: 1    TiZANidine (ZANAFLEX) 2 MG capsule, TAKE 1 CAPSULE (2 MG TOTAL) BY MOUTH 3 (THREE) TIMES A DAY AS NEEDED FOR MUSCLE SPASMS, Disp: 60 capsule, Rfl: 0    venlafaxine 225 MG TB24, Take 225 mg by mouth daily, Disp: , Rfl: 1    WIXELA INHUB 250-50 MCG/DOSE inhaler, INHALE 1 PUFF TWICE A DAY, RINSE MOUTH AFTER USE, Disp: 1 Inhaler, Rfl: 3    zafirlukast (ACCOLATE) 20 MG tablet, Take 1 tablet (20 mg total) by mouth 2 (two) times a day before meals, Disp: 180 tablet, Rfl: 3    I have reviewed the past medical, social and family history, current medications, allergies, vitals, review of systems and updated this information as appropriate today     Objective:    Vitals:  Blood pressure 110/70, pulse 70  Sitting blood pressure was 130/80 and was standing 120/70    Physical Exam    Neurological Exam  GENERAL:  Well-developed well-nourished woman in no acute distress  HEENT/NECK: Head is atraumatic normocephalic, neck is supple  NEUROLOGIC:  Mental Status: Awake and alert without aphasia  Cranial Nerves: Extraocular movements are full  Face is symmetrical  Motor:  No drift is noted on arm extension  Coordination:  A mild 8-10 hertz tremors noted with posture maintenance  Gait was not tested secondary to knee pain              ROS:    Review of Systems   Constitutional: Negative  Negative for appetite change and fever  HENT: Negative  Negative for hearing loss, tinnitus, trouble swallowing and voice change  Eyes: Negative  Negative for photophobia and pain  Respiratory: Negative  Negative for shortness of breath  Cardiovascular: Negative  Negative for palpitations  Gastrointestinal: Negative  Negative for nausea and vomiting  Endocrine: Negative  Negative for cold intolerance and heat intolerance  Genitourinary: Negative  Negative for dysuria, frequency and urgency  Musculoskeletal: Positive for gait problem  Negative for myalgias and neck pain  Patient stated that she has had a fall 3 days ago  Skin: Negative  Negative for rash  Neurological: Positive for tremors  Negative for dizziness, seizures, syncope, facial asymmetry, speech difficulty, weakness, light-headedness, numbness and headaches  Patient states that she feels that her whole body tremors  Hematological: Negative  Does not bruise/bleed easily  Psychiatric/Behavioral: Negative  Negative for confusion, hallucinations and sleep disturbance

## 2019-09-26 ENCOUNTER — APPOINTMENT (OUTPATIENT)
Dept: LAB | Facility: HOSPITAL | Age: 59
End: 2019-09-26
Payer: COMMERCIAL

## 2019-09-26 DIAGNOSIS — R10.13 EPIGASTRIC PAIN: ICD-10-CM

## 2019-09-26 DIAGNOSIS — K21.9 GASTROESOPHAGEAL REFLUX DISEASE, ESOPHAGITIS PRESENCE NOT SPECIFIED: ICD-10-CM

## 2019-09-26 LAB
ANION GAP SERPL CALCULATED.3IONS-SCNC: 8 MMOL/L (ref 4–13)
BUN SERPL-MCNC: 16 MG/DL (ref 5–25)
CALCIUM SERPL-MCNC: 8.8 MG/DL (ref 8.3–10.1)
CHLORIDE SERPL-SCNC: 102 MMOL/L (ref 100–108)
CO2 SERPL-SCNC: 26 MMOL/L (ref 21–32)
CREAT SERPL-MCNC: 0.85 MG/DL (ref 0.6–1.3)
GFR SERPL CREATININE-BSD FRML MDRD: 76 ML/MIN/1.73SQ M
GLUCOSE P FAST SERPL-MCNC: 74 MG/DL (ref 65–99)
POTASSIUM SERPL-SCNC: 4.9 MMOL/L (ref 3.5–5.3)
SODIUM SERPL-SCNC: 136 MMOL/L (ref 136–145)

## 2019-09-26 PROCEDURE — 80048 BASIC METABOLIC PNL TOTAL CA: CPT

## 2019-09-26 PROCEDURE — 36415 COLL VENOUS BLD VENIPUNCTURE: CPT

## 2019-10-03 ENCOUNTER — ANESTHESIA EVENT (OUTPATIENT)
Dept: GASTROENTEROLOGY | Facility: HOSPITAL | Age: 59
End: 2019-10-03

## 2019-10-03 ENCOUNTER — HOSPITAL ENCOUNTER (OUTPATIENT)
Dept: GASTROENTEROLOGY | Facility: HOSPITAL | Age: 59
Setting detail: OUTPATIENT SURGERY
Discharge: HOME | End: 2019-10-03
Attending: INTERNAL MEDICINE | Admitting: INTERNAL MEDICINE
Payer: COMMERCIAL

## 2019-10-03 ENCOUNTER — ANESTHESIA (OUTPATIENT)
Dept: GASTROENTEROLOGY | Facility: HOSPITAL | Age: 59
End: 2019-10-03

## 2019-10-03 VITALS
OXYGEN SATURATION: 94 % | DIASTOLIC BLOOD PRESSURE: 60 MMHG | HEIGHT: 63 IN | HEART RATE: 79 BPM | WEIGHT: 168.65 LBS | RESPIRATION RATE: 20 BRPM | SYSTOLIC BLOOD PRESSURE: 99 MMHG | TEMPERATURE: 98.1 F | BODY MASS INDEX: 29.88 KG/M2

## 2019-10-03 DIAGNOSIS — K59.04 CHRONIC IDIOPATHIC CONSTIPATION: ICD-10-CM

## 2019-10-03 DIAGNOSIS — R11.0 NAUSEA: ICD-10-CM

## 2019-10-03 DIAGNOSIS — K21.9 GASTROESOPHAGEAL REFLUX DISEASE, ESOPHAGITIS PRESENCE NOT SPECIFIED: ICD-10-CM

## 2019-10-03 DIAGNOSIS — R10.13 EPIGASTRIC PAIN: ICD-10-CM

## 2019-10-03 PROCEDURE — 43239 EGD BIOPSY SINGLE/MULTIPLE: CPT | Performed by: INTERNAL MEDICINE

## 2019-10-03 PROCEDURE — 88305 TISSUE EXAM BY PATHOLOGIST: CPT | Performed by: PATHOLOGY

## 2019-10-03 RX ORDER — LIDOCAINE HYDROCHLORIDE 10 MG/ML
INJECTION, SOLUTION INFILTRATION; PERINEURAL AS NEEDED
Status: DISCONTINUED | OUTPATIENT
Start: 2019-10-03 | End: 2019-10-03 | Stop reason: SURG

## 2019-10-03 RX ORDER — SODIUM CHLORIDE, SODIUM LACTATE, POTASSIUM CHLORIDE, CALCIUM CHLORIDE 600; 310; 30; 20 MG/100ML; MG/100ML; MG/100ML; MG/100ML
75 INJECTION, SOLUTION INTRAVENOUS CONTINUOUS
Status: DISCONTINUED | OUTPATIENT
Start: 2019-10-03 | End: 2019-10-07 | Stop reason: HOSPADM

## 2019-10-03 RX ORDER — ALBUTEROL SULFATE 2.5 MG/3ML
2.5 SOLUTION RESPIRATORY (INHALATION) ONCE
Status: COMPLETED | OUTPATIENT
Start: 2019-10-03 | End: 2019-10-03

## 2019-10-03 RX ORDER — PROPOFOL 10 MG/ML
INJECTION, EMULSION INTRAVENOUS AS NEEDED
Status: DISCONTINUED | OUTPATIENT
Start: 2019-10-03 | End: 2019-10-03 | Stop reason: SURG

## 2019-10-03 RX ADMIN — ALBUTEROL SULFATE 2.5 MG: 2.5 SOLUTION RESPIRATORY (INHALATION) at 10:11

## 2019-10-03 RX ADMIN — PROPOFOL 100 MG: 10 INJECTION, EMULSION INTRAVENOUS at 10:23

## 2019-10-03 RX ADMIN — LIDOCAINE HYDROCHLORIDE 100 MG: 10 INJECTION, SOLUTION INFILTRATION; PERINEURAL at 10:23

## 2019-10-03 RX ADMIN — PROPOFOL 50 MG: 10 INJECTION, EMULSION INTRAVENOUS at 10:29

## 2019-10-03 RX ADMIN — SODIUM CHLORIDE, SODIUM LACTATE, POTASSIUM CHLORIDE, AND CALCIUM CHLORIDE: .6; .31; .03; .02 INJECTION, SOLUTION INTRAVENOUS at 10:03

## 2019-10-03 NOTE — ANESTHESIA PREPROCEDURE EVALUATION
Review of Systems/Medical History  Patient summary reviewed  Chart reviewed  No history of anesthetic complications     Cardiovascular  EKG reviewed, CHF compensated CHF,   Comment: Borderline low BP on Midodrine 2 5 mg TID, /58 in preop    EKG 5/2019: Normal sinus rhythm  T wave abnormality, consider anterior ischemia  Abnormal ECG    TTE 11/2018: LEFT VENTRICLE:  Systolic function was normal  Ejection fraction was estimated to be 60 %  There were no regional wall motion abnormalities  Doppler parameters were consistent with abnormal left ventricular relaxation (grade 1 diastolic dysfunction)  RIGHT VENTRICLE:  The size was normal   Systolic function was normal       ,  Pulmonary  Smoker cigarette smoker  , COPD moderate- medication dependent , Shortness of breath,   Comment: PFTs 8/9/19:   Severe diffusion defect, no reversal with bronchodilators, mild exertional hypoxia on 6 min walk      No O2 requirement at home, last used advair last night, uses albuterol daily     GI/Hepatic    GERD well controlled, Bariatric surgery,        Negative  ROS No chronic kidney disease ,        Endo/Other     GYN  Negative gynecology ROS          Hematology  Anemia ,     Musculoskeletal       Neurology    Headaches,   Comment: H/o migraines Psychology   Anxiety, Depression , being treated for depression,   Chronic opioid dependence   Comment: Takes suboxone daily         Physical Exam    Airway    Mallampati score: II  TM Distance: >3 FB  Neck ROM: full     Dental   lower dentures and upper dentures,     Cardiovascular      Pulmonary  Rhonchi,     Other Findings  Scattered rhonchi, most notable at right lower lung base, otherwise clear, no wheezes      Anesthesia Plan  ASA Score- 3     Anesthesia Type- IV sedation with anesthesia with ASA Monitors     Additional Monitors:   Airway Plan:     Comment: Recent labs personally reviewed:  Lab Results       Component                Value               Date WBC                      11 29 (H)           05/27/2019                 HGB                      13 0                05/27/2019                 PLT                      342                 05/27/2019            Lab Results       Component                Value               Date                       K                        4 9                 09/26/2019                 BUN                      16                  09/26/2019                 CREATININE               0 85                09/26/2019            Lab Results       Component                Value               Date                       PTT                      33                  12/11/2017             Lab Results       Component                Value               Date                       INR                      0 94                12/11/2017              Blood type       Will pretreat with albuterol nebulizer prior to procedure  Hetal Truong MD, have personally seen and evaluated the patient prior to anesthetic care  I have reviewed the pre-anesthetic record, medical history, allergies, medications and any other medical records if appropriate to the anesthetic care  If a CRNA is involved in the case, I have reviewed the CRNA assessment, if present, and agree  I discussed the anesthetic plan and risks/benefits/alternatives with the patient including possible PONV, sore throat, and possibility of rare anesthetic and surgical emergencies        Plan Factors- Patient instructed to abstain from smoking on day of procedure  Patient did not smoke on day of surgery  Induction- intravenous  Postoperative Plan-     Informed Consent- Anesthetic plan and risks discussed with patient  I personally reviewed this patient with the CRNA  Discussed and agreed on the Anesthesia Plan with the CRNA  Dickson Roldan

## 2019-10-03 NOTE — DISCHARGE INSTRUCTIONS
Upper Endoscopy   WHAT YOU NEED TO KNOW:   An upper endoscopy is also called an upper gastrointestinal (GI) endoscopy, or an esophagogastroduodenoscopy (EGD)  You may feel bloated, gassy, or have some abdominal discomfort after your procedure  Your throat may be sore for 24 to 36 hours  You may burp or pass gas from air that is still inside your body  DISCHARGE INSTRUCTIONS:   Call 911 if:   · You have sudden chest pain or trouble breathing  Seek care immediately if:   · You feel dizzy or faint  · You have trouble swallowing  · You have severe throat pain  · Your bowel movements are very dark or black  · Your abdomen is hard and firm and you have severe pain  · You vomit blood  Contact your healthcare provider if:   · You feel full or bloated and cannot burp or pass gas  · You have not had a bowel movement for 3 days after your procedure  · You have neck pain  · You have a fever or chills  · You have nausea or are vomiting  · You have a rash or hives  · You have questions or concerns about your endoscopy  Relieve a sore throat:  Suck on throat lozenges or crushed ice  Gargle with a small amount of warm salt water  Mix 1 teaspoon of salt and 1 cup of warm water to make salt water  Relieve gas and discomfort from bloating:  Lie on your right side with a heating pad on your abdomen  Take short walks to help pass gas  Eat small meals until bloating is relieved  Rest after your procedure:  Do not drive or make important decisions until the day after your procedure  Return to your normal activity as directed  You can usually return to work the day after your procedure  Follow up with your healthcare provider as directed:  Write down your questions so you remember to ask them during your visits  © 2017 [employer]  Information is for End User's use only and may not be sold, redistributed or otherwise used for commercial purposes   All illustrations and images included in [employer] are the copyrighted property of [employer] , [employer]  or [employer]  The above information is an  only  It is not intended as medical advice for individual conditions or treatments  Talk to your doctor, nurse or pharmacist before following any medical regimen to see if it is safe and effective for you

## 2019-10-03 NOTE — ANESTHESIA POSTPROCEDURE EVALUATION
Post-Op Assessment Note    CV Status:  Stable  Pain Score: 0    Pain management: adequate     Mental Status:  Alert and awake   Hydration Status:  Euvolemic   PONV Controlled:  Controlled   Airway Patency:  Patent   Post Op Vitals Reviewed: Yes      Staff: Anesthesiologist, CRNA   Comments: VSS          BP      Temp     Pulse     Resp      SpO2

## 2019-10-08 ENCOUNTER — HOSPITAL ENCOUNTER (OUTPATIENT)
Dept: CT IMAGING | Facility: HOSPITAL | Age: 59
Discharge: HOME | End: 2019-10-08
Payer: COMMERCIAL

## 2019-10-08 DIAGNOSIS — R10.13 EPIGASTRIC PAIN: ICD-10-CM

## 2019-10-08 DIAGNOSIS — K21.9 GASTROESOPHAGEAL REFLUX DISEASE, ESOPHAGITIS PRESENCE NOT SPECIFIED: ICD-10-CM

## 2019-10-08 PROCEDURE — 74177 CT ABD & PELVIS W/CONTRAST: CPT

## 2019-10-08 RX ADMIN — IOHEXOL 100 ML: 350 INJECTION, SOLUTION INTRAVENOUS at 14:12

## 2019-10-09 ENCOUNTER — OFFICE VISIT (OUTPATIENT)
Dept: OBGYN CLINIC | Age: 59
End: 2019-10-09
Payer: COMMERCIAL

## 2019-10-09 VITALS
WEIGHT: 170.6 LBS | BODY MASS INDEX: 30.23 KG/M2 | HEIGHT: 63 IN | DIASTOLIC BLOOD PRESSURE: 78 MMHG | SYSTOLIC BLOOD PRESSURE: 138 MMHG

## 2019-10-09 DIAGNOSIS — Z01.419 ENCOUNTER FOR GYNECOLOGICAL EXAMINATION WITHOUT ABNORMAL FINDING: Primary | ICD-10-CM

## 2019-10-09 DIAGNOSIS — N76.0 ACUTE VAGINITIS: ICD-10-CM

## 2019-10-09 DIAGNOSIS — Z12.31 ENCOUNTER FOR SCREENING MAMMOGRAM FOR MALIGNANT NEOPLASM OF BREAST: ICD-10-CM

## 2019-10-09 DIAGNOSIS — Z78.0 POST-MENOPAUSAL: ICD-10-CM

## 2019-10-09 PROCEDURE — G0145 SCR C/V CYTO,THINLAYER,RESCR: HCPCS | Performed by: NURSE PRACTITIONER

## 2019-10-09 PROCEDURE — G0101 CA SCREEN;PELVIC/BREAST EXAM: HCPCS | Performed by: NURSE PRACTITIONER

## 2019-10-09 PROCEDURE — 87624 HPV HI-RISK TYP POOLED RSLT: CPT | Performed by: NURSE PRACTITIONER

## 2019-10-09 RX ORDER — CLOTRIMAZOLE 1 %
CREAM (GRAM) TOPICAL 2 TIMES DAILY
Qty: 30 G | Refills: 0 | Status: SHIPPED | OUTPATIENT
Start: 2019-10-09 | End: 2020-08-07 | Stop reason: SDUPTHER

## 2019-10-09 NOTE — PROGRESS NOTES
Diagnoses and all orders for this visit:    1  Encounter for gynecological examination without abnormal finding  -     Liquid-based pap, screening  Pap collected today, discussed new guidelines  Continue with lubricant use and try coconut oil instead of baby oil, maybe be less irritating  Healthy eating and nutrition, daily exercise discussed and SBE reinforced  Discussed calcium and vitamin D daily intake  2  Post-menopausal/3  Acute vaginitis  -     clotrimazole (LOTRIMIN) 1 % cream; Apply topically 2 (two) times a day for 14 days    Also reviewed if symptoms do not improve, can try vaginal estrogen  Patient instructed to call if she wants to try  Reviewed vaginal hygiene, no douching, excessive feminine products, scented soaps and lotions  Keep vaginal area cool and dry  Drink lots of water and watch sugar intake  Can try a probiotic for vaginal silvia health  Call if symptoms persists or gets worse  4  Encounter for screening mammogram for malignant neoplasm of breast  -     Mammo screening bilateral w 3d & cad; Future    Other orders  -     Cancel: Mammo screening bilateral w 3d & cad; Future      All questions and concerns answered  Call with any questions  Pleasant 63 y o  postmenopausal female here for annual exam  She denies any vaginal bleeding of any kind  She states that she occasionally experiences thick white, vaginal discharge  Denies vaginal itching, discharge and odor today  Denies pelvic pain, breast problems and urinary incontinence  Mild dyspareunia, still sexually active, uses lubrication  She is up-to-date on mammogram, next mammogram due in April, order given today  She is also up-to-date with colonoscopy, due 8/2021  Based on new pap guidelines, patient will need pap today, last pap done pt is unsure           Vitals:    10/09/19 1529   BP: 138/78   BP Location: Right arm   Patient Position: Sitting   Cuff Size: Standard   Weight: 77 4 kg (170 lb 9 6 oz)   Height: 5' 3" (1 6 m)     Body mass index is 30 22 kg/m²      Allergies   Allergen Reactions    Morphine And Related Swelling and Other (See Comments)     Only allergic to IV morphine per patient    Quetiapine      Aka(seroquel)    Sulfa Antibiotics Other (See Comments)     "can't take->gastric bypass"       Past Medical History:   Diagnosis Date    Anemia     Anemia     Cardiac disorder     CHF (congestive heart failure) ([facility]     Constipation     COPD (chronic obstructive pulmonary disease) ([facility]     Depression     Fibromyalgia     Fibromyalgia, primary     Heart disorder     Malignant neoplasm (HCC)     Migraines     Myocardial infarction (HCC)     Occasional tremors     Osteoarthritis     Osteoporosis     Pneumonia     Problems with swallowing     Restless leg syndrome     Skin cancer     Stomach problems     Tobacco abuse      Past Surgical History:   Procedure Laterality Date    ABDOMINAL SURGERY      Gastrorrhaphy for perforation of ulcer, last assessed 10/30/2014    ADENOIDECTOMY      ANKLE SURGERY Right     ARTHRODESIS      lumbar by posterolateral approach, last assessed 06/13/2017    BACK SURGERY      BLADDER SURGERY      last assessed 10/30/2014    CHOLECYSTECTOMY      COLONOSCOPY      FEMUR SURGERY Right     FOOT SURGERY Right     GALLBLADDER SURGERY      GASTRIC BYPASS      x2    HAND SURGERY Left     HEMORRHOID SURGERY      MULTIPLE TOOTH EXTRACTIONS      OTHER SURGICAL HISTORY Left     arm incision    TOE SURGERY Right     TOENAIL EXCISION      TONSILLECTOMY       Family History   Problem Relation Age of Onset    Cancer Mother         uterine    Hypertension Mother    [place] Arthritis Mother     Obesity Mother     Heart disease Father     Neuropathy Father     Heart attack Father     Hypertension Father     Mental illness Father     Heart disease Brother     Diabetes Brother     Mental illness Brother     Osteoporosis Family     Scoliosis Family     Osteoarthritis Family     Obesity Sister     Cancer Sister     Cancer Maternal Aunt     Breast cancer Neg Hx     Colon cancer Neg Hx     Ovarian cancer Neg Hx     Cervical cancer Neg Hx      Social History     Tobacco Use    Smoking status: Current Every Day Smoker     Packs/day: 0 50     Years: 42 00     Pack years: 21 00     Types: Cigarettes     Last attempt to quit: 2019     Years since quittin 3    Smokeless tobacco: Never Used    Tobacco comment: 1/2 pack a day    Substance Use Topics    Alcohol use: Not Currently     Alcohol/week: 0 0 standard drinks     Frequency: Monthly or less    Drug use: Yes     Frequency: 7 0 times per week     Types: Marijuana     Comment: daily       Current Outpatient Medications:     albuterol (2 5 mg/3 mL) 0 083 % nebulizer solution, Take 1 vial (2 5 mg total) by nebulization every 6 (six) hours as needed for wheezing or shortness of breath, Disp: 1080 mL, Rfl: 3    albuterol (PROVENTIL HFA,VENTOLIN HFA) 90 mcg/act inhaler, Inhale 2 puffs every 6 (six) hours as needed for wheezing, Disp: 1 Inhaler, Rfl: 5    ARIPiprazole (ABILIFY) 2 mg tablet, Take 2 mg by mouth daily at bedtime, Disp: , Rfl: 0    Azelastine-Fluticasone (DYMISTA) 137-50 MCG/ACT SUSP, into each nostril as needed , Disp: , Rfl:     Blood Glucose Monitoring Suppl (ONE TOUCH ULTRA 2) w/Device KIT, Test daily and as instructed, Disp: 1 each, Rfl: 0    Blood Pressure Monitoring (BLOOD PRESSURE CUFF) MISC, by Does not apply route 2 (two) times a day, Disp: 1 each, Rfl: 0    Blood Pressure Monitoring (SPHYGMOMANOMETER) MISC, Check blood pressure daily   Medically necessary, Disp: 1 each, Rfl: 0    buprenorphine-naloxone (SUBOXONE) 2-0 5 mg per SL tablet, Place under the tongue daily, Disp: , Rfl:     butalbital-aspirin-caffeine (FIORINAL) -40 MG per tablet, Take 1 tablet by mouth every 4 (four) hours as needed for headaches, Disp: , Rfl:     calcium carbonate-vitamin D (OSCAL-D) 500 mg-200 units per tablet, Take 2 tablets by mouth daily with breakfast, Disp: 60 tablet, Rfl: 0    Cholecalciferol (VITAMIN D3 PO), Take 1 tablet by mouth daily as needed, Disp: , Rfl:     clonazePAM (KlonoPIN) 0 5 mg tablet, Take 0 5 mg by mouth 3 (three) times a day , Disp: , Rfl: 1    diphenhydrAMINE (BENADRYL) 25 mg tablet, Take 25 mg by mouth every 6 (six) hours as needed for itching, Disp: , Rfl:     doxepin (SINEquan) 150 MG capsule, 150 mg daily at bedtime, Disp: , Rfl: 1    famotidine (PEPCID) 20 mg tablet, 1 PO BID, Disp: 60 tablet, Rfl: 1    fluticasone (FLONASE) 50 mcg/act nasal spray, 1 spray 2 (two) times a day as needed for rhinitis , Disp: , Rfl: 2    gabapentin (NEURONTIN) 800 mg tablet, Take 800 mg by mouth 4 (four) times a day, Disp: , Rfl: 3    glucose blood (FREESTYLE LITE) test strip, Daily and as instructed, Disp: 100 each, Rfl: 0    glucose blood (ONE TOUCH ULTRA TEST) test strip, Patient to test two times daily, Disp: 200 each, Rfl: 3    Lancets (FREESTYLE) lancets, Test daily and  as instructed, Disp: 100 each, Rfl: 0    Lancets (ONETOUCH ULTRASOFT) lancets, Patient to test two times daily, Disp: 200 each, Rfl: 3    lidocaine (XYLOCAINE) 5 % ointment, apply locally FOUR TIMES DAILY, Disp: , Rfl: 1    midodrine (PROAMATINE) 5 mg tablet, Take 1 tablet (5 mg total) by mouth 3 (three) times a day, Disp: 90 tablet, Rfl: 5    pantoprazole (PROTONIX) 40 mg tablet, Take 1 tablet (40 mg total) by mouth daily, Disp: 60 tablet, Rfl: 3    primidone (MYSOLINE) 50 mg tablet, TAKE 2 TABLETS BY MOUTH AT BEDTIME, Disp: 180 tablet, Rfl: 1    Respiratory Therapy Supplies (NEBULIZER/TUBING/MOUTHPIECE) KIT, Use up to 6 times daily as needed for lung symptoms, Disp: 1 each, Rfl: 3    rOPINIRole (REQUIP) 0 25 mg tablet, TAKE 1 TABLET (0 25 MG TOTAL) BY MOUTH 3 (THREE) TIMES A DAY, Disp: 90 tablet, Rfl: 5    Simethicone (GAS-X PO), Take 1 tablet by mouth as needed, Disp: , Rfl:     TiZANidine (ZANAFLEX) 2 MG capsule, TAKE 1 CAPSULE (2 MG TOTAL) BY MOUTH 3 (THREE) TIMES A DAY AS NEEDED FOR MUSCLE SPASMS, Disp: 60 capsule, Rfl: 0    venlafaxine 225 MG TB24, Take 225 mg by mouth daily, Disp: , Rfl: 1    WIXELA INHUB 250-50 MCG/DOSE inhaler, INHALE 1 PUFF TWICE A DAY, RINSE MOUTH AFTER USE, Disp: 1 Inhaler, Rfl: 3    zafirlukast (ACCOLATE) 20 MG tablet, Take 1 tablet (20 mg total) by mouth 2 (two) times a day before meals, Disp: 180 tablet, Rfl: 3    clotrimazole (LOTRIMIN) 1 % cream, Apply topically 2 (two) times a day for 14 days, Disp: 30 g, Rfl: 0  No current facility-administered medications for this visit  Facility-Administered Medications Ordered in Other Visits:     barium sulfate 2 1 % suspension 900 mL, 900 mL, Oral, Once in imaging, Nataliia Khan PA-C    OB History    Para Term  AB Living   9 1     8     SAB TAB Ectopic Multiple Live Births   6 2            # Outcome Date GA Lbr Francisco/2nd Weight Sex Delivery Anes PTL Lv   9 SAB            8 SAB            7 SAB            6 SAB            5 SAB            4 SAB            3 TAB            2 TAB            1 Para                      Review of Systems   Constitutional: Negative for chills, fatigue, fever and unexpected weight change  Respiratory: Negative for shortness of breath  Gastrointestinal: Negative for anal bleeding, blood in stool, constipation and diarrhea  Genitourinary: Negative for difficulty urinating, dysuria and hematuria  OBGyn Exam     Physical Exam   Constitutional: She appears well-developed and well-nourished  No distress  Head: Normocephalic  Neck: Normal range of motion  Neck supple  Pulmonary: Effort normal   Breasts: bilateral without masses, skin changes or nipple discharge  Bilaterally soft and warm to touch  No areas of erythema or pain  Abdominal: Soft  Non-tender  Pelvic exam was performed with patient supine  No labial fusion, mild thinning w/o leukoplakia   There is + rash, W/o tenderness, lesion or injury on the right  And left inner and outer labia minora and majora  Uterus is not deviated, not enlarged, not fixed and not tender  Cervix exhibits no motion tenderness, no discharge and no friability, stenotic os: no  Right adnexum displays no mass, no tenderness and no fullness  Left adnexum displays no mass, no tenderness and no fullness  No erythema or tenderness in the vagina, R/T vaginal dryness  Mild signs of atrophy, w/o erosion,  ++shortening w/o tightening of vaginal canal  No foreign body in the vagina  No signs of injury around the vagina  No vaginal discharge found  Prolapse:  None  Lymphadenopathy:          Right: No inguinal adenopathy present  Left: No inguinal adenopathy present

## 2019-10-09 NOTE — PATIENT INSTRUCTIONS
Calcium/Vitamin D Supplement (By mouth)   Calcium (SHERIDAN-see-um), Vitamin D (VYE-ta-min D)  Supplies your body with calcium if you need more than you get in your diet  Calcium helps prevent osteoporosis (weak or brittle bones)  Vitamin D helps your body use the calcium  Calcium and vitamin D are minerals that your body needs to work properly  Brand Name(s): Zachary-Citrate, Zachary-Citrate Plus Vitamin D, Calcet Petites, Calcium 600MG+D, Calcium Citrate +D3 Maximum, Caltrate 600 + D, Citracal + D, Citracal Calcium Citrate Petites with Vitamin D, Citracal Petites, Citracal Ultradense Calcium Citrate, Citracal Ultradense Calcium Citrate Petite w/Vit D, Citrus Calcium with Vitamin D, D-1000, D-2000, D3-400IU   There may be other brand names for this medicine  When This Medicine Should Not Be Used: You should not use this medicine if you have had an allergic reaction to calcium or vitamin D (ergocalciferol)  How to Use This Medicine:   Tablet, Long Acting Tablet, Fizzy Tablet, Liquid Filled Capsule, Chewable Tablet  · Your doctor will tell you how much medicine to use  Do not use more than directed  · Follow the instructions on the medicine label if you are using this medicine without a prescription  Ask your pharmacist or health caregiver if you are not sure how much calcium you should take in one day  · Most calcium supplements should be taken with food, but some kinds of calcium (such as calcium citrate) can be taken with or without food  Ask your health care provider or read the label on the bottle to see if you need to take your specific kind of calcium with food  Drink a full glass of water (8 ounces) with each dose  · If you are using the effervescent (fizzy) tablet, dissolve the tablet in about 6 to 8 ounces of water (3/4 cup to 1 cup)  After the tablet is completely dissolved, drink this mixture right away  Do not save any mixture to take later    · Carefully follow your doctor's instructions about any special diet   · If you need to take more than one dose each a day, take each dose at evenly spaced times, unless your doctor has told you otherwise  If a dose is missed:   · Take a dose as soon as you remember  If it is almost time for your next dose, wait until then and take a regular dose  Do not take extra medicine to make up for a missed dose  How to Store and Dispose of This Medicine:   · Store the medicine in a closed container at room temperature, away from heat, moisture, and direct light  If the effervescent (fizzy) tablet comes packaged in foil, do not open the foil until you are ready to use each tablet  · Ask your pharmacist, doctor, or health caregiver about the best way to dispose of any outdated medicine or medicine no longer needed  · Keep all medicine out of the reach of children  Never share your medicine with anyone  Drugs and Foods to Avoid:   Ask your doctor or pharmacist before using any other medicine, including over-the-counter medicines, vitamins, and herbal products  · Make sure your doctor knows if you are also using other supplements or medicines that contain calcium  Tell your doctor if you are also using gallium nitrate (Ganite®), cellulose sodium phosphate (Calcibind®), or etidronate (Didronel®)  · Calcium can change the way other medicines work if you take them at the same time  If you need to use other medicines, take them at least 2 hours before or 2 hours after you take your calcium supplement  This is particularly important if you are also using phenytoin (Dilantin®) or a tetracycline antibiotic to treat an infection (such as doxycycline, minocycline, Vibramycin®)  · Do not take your calcium supplement with a high-fiber meal (such as bran, whole-grain cereal or bread, fresh fruits)  Do not smoke cigarettes or cigars  Do not drink large amounts of alcohol or caffeine (for example, more than about 8 cups of coffee)    Warnings While Using This Medicine:   · Make sure your doctor knows if you are pregnant or breast feeding, or if you have kidney disease or have ever had kidney stones  Tell your doctor if you have had problems with too much calcium (hypercalcemia) or too little calcium in your blood (hypocalcemia)  Some health problems that can cause hypercalcemia are sarcoidosis, or problems with your parathyroid gland  · You should not use certain brands of this medicine if you have kidney disease or are on dialysis, because they may harm your kidneys  Ask your caregiver what brands are best for you  · Some health problems can affect how much calcium you should take  Tell your doctor if you have stomach or digestion problems, such as on-going diarrhea, not absorbing nutrients properly, or not having enough acid in your stomach  · This medicine might contain phenylalanine (aspartame)  This is only a concern if you have a disorder called phenylketonuria (a problem with amino acids)  If you have this condition, talk to your doctor before using this medicine  · If you are using a large amount of calcium or using it for a long time, your doctor might need to check your blood on a regular basis  Be sure to keep all appointments  Possible Side Effects While Using This Medicine:   Call your doctor right away if you notice any of these side effects:  · Headache that will not go away, dry mouth, loss of appetite, severe constipation  If you notice other side effects that you think are caused by this medicine, tell your doctor  Call your doctor for medical advice about side effects  You may report side effects to FDA at 6-744-FDA-6976  © 2017 [address] [employer] Information is for End User's use only and may not be sold, redistributed or otherwise used for commercial purposes  The above information is an  only  It is not intended as medical advice for individual conditions or treatments   Talk to your doctor, nurse or pharmacist before following any medical regimen to see if it is safe and effective for you

## 2019-10-10 LAB
HPV HR 12 DNA CVX QL NAA+PROBE: NEGATIVE
HPV16 DNA CVX QL NAA+PROBE: NEGATIVE
HPV18 DNA CVX QL NAA+PROBE: NEGATIVE

## 2019-10-15 LAB
LAB AP GYN PRIMARY INTERPRETATION: NORMAL
Lab: NORMAL

## 2019-10-24 ENCOUNTER — OFFICE VISIT (OUTPATIENT)
Dept: INTERNAL MEDICINE CLINIC | Facility: CLINIC | Age: 59
End: 2019-10-24
Payer: COMMERCIAL

## 2019-10-24 VITALS
DIASTOLIC BLOOD PRESSURE: 80 MMHG | SYSTOLIC BLOOD PRESSURE: 130 MMHG | HEIGHT: 63 IN | BODY MASS INDEX: 30.55 KG/M2 | WEIGHT: 172.4 LBS | HEART RATE: 82 BPM | OXYGEN SATURATION: 96 %

## 2019-10-24 DIAGNOSIS — R27.0 ATAXIA: ICD-10-CM

## 2019-10-24 DIAGNOSIS — Z23 NEED FOR INFLUENZA VACCINATION: Primary | ICD-10-CM

## 2019-10-24 DIAGNOSIS — R53.1 GENERALIZED WEAKNESS: ICD-10-CM

## 2019-10-24 DIAGNOSIS — R29.6 FREQUENT FALLS: ICD-10-CM

## 2019-10-24 DIAGNOSIS — J41.0 SIMPLE CHRONIC BRONCHITIS (HCC): ICD-10-CM

## 2019-10-24 DIAGNOSIS — M15.9 PRIMARY OSTEOARTHRITIS INVOLVING MULTIPLE JOINTS: ICD-10-CM

## 2019-10-24 DIAGNOSIS — G62.9 PERIPHERAL POLYNEUROPATHY: ICD-10-CM

## 2019-10-24 DIAGNOSIS — M79.10 MYALGIA: ICD-10-CM

## 2019-10-24 PROCEDURE — G0008 ADMIN INFLUENZA VIRUS VAC: HCPCS

## 2019-10-24 PROCEDURE — 99214 OFFICE O/P EST MOD 30 MIN: CPT

## 2019-10-24 PROCEDURE — 90670 PCV13 VACCINE IM: CPT

## 2019-10-24 PROCEDURE — G0009 ADMIN PNEUMOCOCCAL VACCINE: HCPCS

## 2019-10-24 PROCEDURE — 90682 RIV4 VACC RECOMBINANT DNA IM: CPT

## 2019-10-24 RX ORDER — TIZANIDINE HYDROCHLORIDE 2 MG/1
2 CAPSULE, GELATIN COATED ORAL 3 TIMES DAILY PRN
Qty: 90 CAPSULE | Refills: 11 | Status: SHIPPED | OUTPATIENT
Start: 2019-10-24 | End: 2020-10-10

## 2019-10-24 NOTE — PATIENT INSTRUCTIONS
A patient with severe COPD has increasing tremor and increasing gait ataxia  She needs physical therapy and I recommend a combination of water therapy and land based therapy  Cigarette smoking needs to stop     Influenza vaccine today   Prevnar today    Revisit with me after the new year

## 2019-10-24 NOTE — PROGRESS NOTES
Assessment/Plan:       Diagnoses and all orders for this visit:    Need for influenza vaccination  -     influenza vaccine, 2014-2907, quadrivalent, recombinant, PF, 0 5 mL, for patients 18 yr+ ([facility])    Simple chronic bronchitis ([facility] [address] )  -     PNEUMOCOCCAL CONJUGATE VACCINE 13-VALENT GREATER THAN 6 MONTHS    Peripheral polyneuropathy  -     Ambulatory referral to Physical Therapy; Future    Primary osteoarthritis involving multiple joints    Ataxia  -     Ambulatory referral to Physical Therapy; Future    Frequent falls  -     Ambulatory referral to Physical Therapy; Future    Generalized weakness  -     Ambulatory referral to Physical Therapy; Future    Myalgia  -     TiZANidine (ZANAFLEX) 2 MG capsule; Take 1 capsule (2 mg total) by mouth 3 (three) times a day as needed for muscle spasms                Subjective:      Patient ID: Shital Zapata is a 63 y o  female  A patient with severe chronic bronchitic COPD secondary to cigarette smoking   She continues to smoke  Almost  about 6 months ago weeks ago from acute hypoxemic respiratory failure  Discharged back to home  Still smoking half a pack a day  She is not in distress and not toxic in appearance but remains a bit hoarse  However, this too was a bit improved  The patient apparently globally feels better  She looks better she is not as she gray  She is not tachypneic nor distress and her lungs are clear with no rales rhonchi or wheeze  This is the 1st time she has been this way and quite a while  She reports enough energy to do some basic house work  Still smoking  Nicotine patches of being use but she finds it hard   Unfortunately, she fell Chantix and number of years ago because she had a suicidal ideation on the drug  Having said that, her cigarette consumption is down a lot  The patient reports increase neuropathy    She has paresthesias of the feet, and yesterday, a visiting nurse did a monofilament check in her sensation was reduced  In a clinical correlation with this, she has actually fallen a couple of times  She is ataxic and tremulous  She needs physical therapy  The following portions of the patient's history were reviewed and updated as appropriate:   She has a past medical history of Anemia, Anemia, Cardiac disorder, CHF (congestive heart failure) ([address] [address] ), Constipation, COPD (chronic obstructive pulmonary disease) ([facility] [address] ), Depression, Fibromyalgia, Fibromyalgia, primary, Heart disorder, Malignant neoplasm ([facility] [address] ), Migraines, Myocardial infarction ([facility] [address] ), Occasional tremors, Osteoarthritis, Osteoporosis, Problems with swallowing, Restless leg syndrome, Skin cancer, Stomach problems, and Tobacco abuse ,  does not have any pertinent problems on file  ,   has a past surgical history that includes Gallbladder surgery; Tonsillectomy; Gastric bypass; Back surgery; Other surgical history (Left); Arthrodesis; Bladder surgery; Foot surgery (Right); Abdominal surgery; Hand surgery (Left); Adenoidectomy; Toe Surgery (Right); Ankle surgery (Right); Femur Surgery (Right); Hemorrhoid surgery; Toenail excision; Multiple tooth extractions; Cholecystectomy; and Colonoscopy  ,  family history includes Arthritis in her mother; Cancer in her maternal aunt, mother, and sister; Diabetes in her brother; Heart attack in her father; Heart disease in her brother and father; Hypertension in her father and mother; Mental illness in her brother and father; Neuropathy in her father; Obesity in her mother and sister; Osteoarthritis in her family; Osteoporosis in her family; Scoliosis in her family  ,   reports that she has been smoking cigarettes  She has a 21 00 pack-year smoking history  She has never used smokeless tobacco  She reports that she drank alcohol  She reports that she has current or past drug history  Drug: Marijuana  Frequency: 7 00 times per week  ,  is allergic to morphine and related; quetiapine; and sulfa antibiotics     Current Outpatient Medications   Medication Sig Dispense Refill    albuterol (2 5 mg/3 mL) 0 083 % nebulizer solution Take 1 vial (2 5 mg total) by nebulization every 6 (six) hours as needed for wheezing or shortness of breath 1080 mL 3    albuterol (PROVENTIL HFA,VENTOLIN HFA) 90 mcg/act inhaler Inhale 2 puffs every 6 (six) hours as needed for wheezing 1 Inhaler 5    ARIPiprazole (ABILIFY) 2 mg tablet Take 2 mg by mouth daily at bedtime  0    Azelastine-Fluticasone (DYMISTA) 137-50 MCG/ACT SUSP into each nostril as needed       Blood Glucose Monitoring Suppl (ONE TOUCH ULTRA 2) w/Device KIT Test daily and as instructed 1 each 0    buprenorphine-naloxone (SUBOXONE) 2-0 5 mg per SL tablet Place under the tongue daily      butalbital-aspirin-caffeine (FIORINAL) -40 MG per tablet Take 1 tablet by mouth every 4 (four) hours as needed for headaches      calcium carbonate-vitamin D (OSCAL-D) 500 mg-200 units per tablet Take 2 tablets by mouth daily with breakfast 60 tablet 0    Cholecalciferol (VITAMIN D3 PO) Take 1 tablet by mouth daily as needed      clonazePAM (KlonoPIN) 0 5 mg tablet Take 0 5 mg by mouth 3 (three) times a day   1    diphenhydrAMINE (BENADRYL) 25 mg tablet Take 25 mg by mouth every 6 (six) hours as needed for itching      doxepin (SINEquan) 150 MG capsule 150 mg daily at bedtime  1    famotidine (PEPCID) 20 mg tablet 1 PO BID 60 tablet 1    fluticasone (FLONASE) 50 mcg/act nasal spray 1 spray 2 (two) times a day as needed for rhinitis   2    gabapentin (NEURONTIN) 800 mg tablet Take 800 mg by mouth 4 (four) times a day  3    lidocaine (XYLOCAINE) 5 % ointment apply locally FOUR TIMES DAILY  1    midodrine (PROAMATINE) 5 mg tablet Take 1 tablet (5 mg total) by mouth 3 (three) times a day 90 tablet 5    pantoprazole (PROTONIX) 40 mg tablet Take 1 tablet (40 mg total) by mouth daily 60 tablet 3    primidone (MYSOLINE) 50 mg tablet TAKE 2 TABLETS BY MOUTH AT BEDTIME 180 tablet 1    Respiratory Therapy Supplies (NEBULIZER/TUBING/MOUTHPIECE) KIT Use up to 6 times daily as needed for lung symptoms 1 each 3    rOPINIRole (REQUIP) 0 25 mg tablet TAKE 1 TABLET (0 25 MG TOTAL) BY MOUTH 3 (THREE) TIMES A DAY 90 tablet 5    Simethicone (GAS-X PO) Take 1 tablet by mouth as needed      TiZANidine (ZANAFLEX) 2 MG capsule Take 1 capsule (2 mg total) by mouth 3 (three) times a day as needed for muscle spasms 90 capsule 11    venlafaxine 225 MG TB24 Take 225 mg by mouth daily  1    WIXELA INHUB 250-50 MCG/DOSE inhaler INHALE 1 PUFF TWICE A DAY, RINSE MOUTH AFTER USE 1 Inhaler 3    zafirlukast (ACCOLATE) 20 MG tablet Take 1 tablet (20 mg total) by mouth 2 (two) times a day before meals 180 tablet 3    Blood Pressure Monitoring (BLOOD PRESSURE CUFF) MISC by Does not apply route 2 (two) times a day 1 each 0    Blood Pressure Monitoring (SPHYGMOMANOMETER) MISC Check blood pressure daily  Medically necessary 1 each 0    clotrimazole (LOTRIMIN) 1 % cream Apply topically 2 (two) times a day for 14 days 30 g 0    glucose blood (FREESTYLE LITE) test strip Daily and as instructed 100 each 0    glucose blood (ONE TOUCH ULTRA TEST) test strip Patient to test two times daily 200 each 3    Lancets (FREESTYLE) lancets Test daily and  as instructed 100 each 0    Lancets (ONETOUCH ULTRASOFT) lancets Patient to test two times daily 200 each 3     No current facility-administered medications for this visit  Review of Systems   Constitutional: Positive for fatigue  Negative for fever  HENT: Negative for sinus pressure  Eyes: Negative for visual disturbance  Respiratory: Positive for cough and shortness of breath  Negative for wheezing  Cardiovascular: Negative for leg swelling  Gastrointestinal: Negative for abdominal pain  Genitourinary: Negative for difficulty urinating  Musculoskeletal: Positive for arthralgias and gait problem     Neurological: Positive for tremors, weakness, light-headedness and numbness  Negative for headaches  Psychiatric/Behavioral: Positive for confusion, decreased concentration and dysphoric mood  Objective:  Vitals:    10/24/19 1715   BP: 130/80   Pulse: 82   SpO2: 96%      Physical Exam   Constitutional:   Alert female patient who appears 8years older than stated age but not tachypneic not dyspneic and not using accessory muscles  Able to speak in full sentences  significantly overweight   She looks chronically ill but not acutely ill  Neck: Trachea normal  Carotid bruit is not present  Decreased range of motion present  Cardiovascular: Regular rhythm and normal heart sounds  Pulmonary/Chest: Effort normal  No stridor  No respiratory distress  She has decreased breath sounds  She has no wheezes  She has no rhonchi  She has no rales  Abdominal: Soft  Normal appearance  Neurological: She is alert  Coordination and gait abnormal          Patient Instructions    A patient with severe COPD has increasing tremor and increasing gait ataxia  She needs physical therapy and I recommend a combination of water therapy and land based therapy  Cigarette smoking needs to stop     Influenza vaccine today   Prevnar today    Revisit with me after the new year

## 2019-10-24 NOTE — PROGRESS NOTES
BMI Counseling: Body mass index is 30 54 kg/m²  The BMI is above normal  Nutrition recommendations include reducing portion sizes

## 2019-11-11 ENCOUNTER — HOSPITAL ENCOUNTER (OUTPATIENT)
Dept: CT IMAGING | Facility: HOSPITAL | Age: 59
Discharge: HOME | End: 2019-11-11
Payer: COMMERCIAL

## 2019-11-11 DIAGNOSIS — R93.89 ABNORMAL CHEST CT: ICD-10-CM

## 2019-11-11 PROCEDURE — 71250 CT THORAX DX C-: CPT

## 2019-11-15 DIAGNOSIS — J45.30 MILD PERSISTENT ASTHMA WITHOUT COMPLICATION: ICD-10-CM

## 2019-11-16 DIAGNOSIS — L73.2 HIDRADENITIS: Primary | ICD-10-CM

## 2019-11-16 RX ORDER — DOXYCYCLINE HYCLATE 100 MG/1
CAPSULE ORAL
Qty: 60 CAPSULE | Refills: 2 | Status: SHIPPED | OUTPATIENT
Start: 2019-11-16 | End: 2020-01-14 | Stop reason: SDDI

## 2019-12-12 DIAGNOSIS — G25.81 RESTLESS LEG: ICD-10-CM

## 2019-12-12 RX ORDER — ROPINIROLE 0.25 MG/1
0.25 TABLET, FILM COATED ORAL 3 TIMES DAILY
Qty: 270 TABLET | Refills: 1 | Status: SHIPPED | OUTPATIENT
Start: 2019-12-12 | End: 2020-06-01

## 2019-12-30 DIAGNOSIS — R10.13 EPIGASTRIC PAIN: ICD-10-CM

## 2019-12-30 RX ORDER — FAMOTIDINE 20 MG/1
40 TABLET, FILM COATED ORAL
Qty: 60 TABLET | Refills: 2 | Status: SHIPPED | OUTPATIENT
Start: 2019-12-30 | End: 2020-04-28 | Stop reason: SDUPTHER

## 2019-12-30 RX ORDER — PANTOPRAZOLE SODIUM 40 MG/1
40 TABLET, DELAYED RELEASE ORAL 2 TIMES DAILY
Qty: 60 TABLET | Refills: 3 | Status: SHIPPED | OUTPATIENT
Start: 2019-12-30 | End: 2020-03-03 | Stop reason: SDUPTHER

## 2019-12-30 NOTE — TELEPHONE ENCOUNTER
Pantoprazole has to be ordered as 40 mg twice a day  Famotidine has to be ordered as 40 mg at bedtime    Please send to [facility] on [address]

## 2020-01-02 ENCOUNTER — TELEPHONE (OUTPATIENT)
Dept: PULMONOLOGY | Facility: CLINIC | Age: 60
End: 2020-01-02

## 2020-01-02 DIAGNOSIS — R91.1 PULMONARY NODULE: Primary | ICD-10-CM

## 2020-01-06 DIAGNOSIS — I95.1 ORTHOSTATIC HYPOTENSION: ICD-10-CM

## 2020-01-06 RX ORDER — MIDODRINE HYDROCHLORIDE 5 MG/1
TABLET ORAL
Qty: 270 TABLET | Refills: 0 | Status: SHIPPED | OUTPATIENT
Start: 2020-01-06 | End: 2020-01-14 | Stop reason: SDUPTHER

## 2020-01-14 ENCOUNTER — OFFICE VISIT (OUTPATIENT)
Dept: NEUROLOGY | Facility: CLINIC | Age: 60
End: 2020-01-14
Payer: COMMERCIAL

## 2020-01-14 VITALS
WEIGHT: 171 LBS | HEART RATE: 80 BPM | DIASTOLIC BLOOD PRESSURE: 70 MMHG | BODY MASS INDEX: 30.3 KG/M2 | SYSTOLIC BLOOD PRESSURE: 82 MMHG | HEIGHT: 63 IN

## 2020-01-14 DIAGNOSIS — G25.0 TREMOR, ESSENTIAL: Primary | ICD-10-CM

## 2020-01-14 DIAGNOSIS — G62.9 PERIPHERAL POLYNEUROPATHY: ICD-10-CM

## 2020-01-14 DIAGNOSIS — I95.1 ORTHOSTATIC HYPOTENSION: ICD-10-CM

## 2020-01-14 PROCEDURE — 99214 OFFICE O/P EST MOD 30 MIN: CPT | Performed by: PSYCHIATRY & NEUROLOGY

## 2020-01-14 RX ORDER — VENLAFAXINE HYDROCHLORIDE 150 MG/1
150 TABLET, EXTENDED RELEASE ORAL 2 TIMES DAILY
Refills: 0 | COMMUNITY
Start: 2019-10-07 | End: 2020-07-11 | Stop reason: SDUPTHER

## 2020-01-14 RX ORDER — MIDODRINE HYDROCHLORIDE 5 MG/1
TABLET ORAL
Qty: 270 TABLET | Refills: 0 | Status: ON HOLD | OUTPATIENT
Start: 2020-01-14 | End: 2020-07-13
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
## 2020-01-15 ENCOUNTER — APPOINTMENT (OUTPATIENT)
Dept: RADIOLOGY | Facility: CLINIC | Age: 60
End: 2020-01-15
Payer: COMMERCIAL

## 2020-01-15 ENCOUNTER — OFFICE VISIT (OUTPATIENT)
Dept: OBGYN CLINIC | Facility: CLINIC | Age: 60
End: 2020-01-15
Payer: COMMERCIAL

## 2020-01-15 VITALS
HEART RATE: 94 BPM | HEIGHT: 63 IN | DIASTOLIC BLOOD PRESSURE: 64 MMHG | SYSTOLIC BLOOD PRESSURE: 94 MMHG | BODY MASS INDEX: 30.29 KG/M2

## 2020-01-15 DIAGNOSIS — M54.50 LOW BACK PAIN, UNSPECIFIED BACK PAIN LATERALITY, UNSPECIFIED CHRONICITY, UNSPECIFIED WHETHER SCIATICA PRESENT: ICD-10-CM

## 2020-01-15 DIAGNOSIS — M25.551 BILATERAL HIP PAIN: ICD-10-CM

## 2020-01-15 DIAGNOSIS — M25.551 BILATERAL HIP PAIN: Primary | ICD-10-CM

## 2020-01-15 DIAGNOSIS — M25.552 BILATERAL HIP PAIN: ICD-10-CM

## 2020-01-15 DIAGNOSIS — M54.16 LUMBAR RADICULOPATHY, CHRONIC: ICD-10-CM

## 2020-01-15 DIAGNOSIS — M25.552 BILATERAL HIP PAIN: Primary | ICD-10-CM

## 2020-01-15 DIAGNOSIS — Z98.1 HISTORY OF LUMBAR SPINAL FUSION: ICD-10-CM

## 2020-01-15 PROCEDURE — 73502 X-RAY EXAM HIP UNI 2-3 VIEWS: CPT

## 2020-01-15 PROCEDURE — 99213 OFFICE O/P EST LOW 20 MIN: CPT | Performed by: ORTHOPAEDIC SURGERY

## 2020-01-15 PROCEDURE — 72110 X-RAY EXAM L-2 SPINE 4/>VWS: CPT
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
## 2020-01-16 RX ORDER — GABAPENTIN 300 MG/1
300 CAPSULE ORAL 3 TIMES DAILY
Qty: 90 CAPSULE | Refills: 0 | Status: SHIPPED | OUTPATIENT
Start: 2020-01-16 | End: 2020-02-10

## 2020-01-20 ENCOUNTER — OFFICE VISIT (OUTPATIENT)
Dept: PULMONOLOGY | Facility: CLINIC | Age: 60
End: 2020-01-20
Payer: COMMERCIAL

## 2020-01-20 VITALS
WEIGHT: 159 LBS | BODY MASS INDEX: 28.17 KG/M2 | SYSTOLIC BLOOD PRESSURE: 104 MMHG | DIASTOLIC BLOOD PRESSURE: 70 MMHG | OXYGEN SATURATION: 92 % | HEART RATE: 105 BPM | HEIGHT: 63 IN

## 2020-01-20 DIAGNOSIS — Z72.0 TOBACCO ABUSE: ICD-10-CM

## 2020-01-20 DIAGNOSIS — R91.8 GROUND GLASS OPACITY PRESENT ON IMAGING OF LUNG: ICD-10-CM

## 2020-01-20 DIAGNOSIS — J41.0 SIMPLE CHRONIC BRONCHITIS (HCC): Primary | ICD-10-CM

## 2020-01-20 DIAGNOSIS — R93.89 ABNORMAL CHEST CT: ICD-10-CM

## 2020-01-20 PROCEDURE — 99214 OFFICE O/P EST MOD 30 MIN: CPT | Performed by: PHYSICIAN ASSISTANT

## 2020-02-07 ENCOUNTER — OFFICE VISIT (OUTPATIENT)
Dept: INTERNAL MEDICINE CLINIC | Facility: CLINIC | Age: 60
End: 2020-02-07
Payer: COMMERCIAL

## 2020-02-07 VITALS
BODY MASS INDEX: 29.23 KG/M2 | HEART RATE: 95 BPM | DIASTOLIC BLOOD PRESSURE: 70 MMHG | WEIGHT: 165 LBS | OXYGEN SATURATION: 96 % | SYSTOLIC BLOOD PRESSURE: 110 MMHG | HEIGHT: 63 IN

## 2020-02-07 DIAGNOSIS — R29.898 MUSCULAR DECONDITIONING: ICD-10-CM

## 2020-02-07 DIAGNOSIS — D50.9 IRON DEFICIENCY ANEMIA, UNSPECIFIED IRON DEFICIENCY ANEMIA TYPE: ICD-10-CM

## 2020-02-07 DIAGNOSIS — M15.9 PRIMARY OSTEOARTHRITIS INVOLVING MULTIPLE JOINTS: ICD-10-CM

## 2020-02-07 DIAGNOSIS — J41.0 SIMPLE CHRONIC BRONCHITIS (HCC): Primary | ICD-10-CM

## 2020-02-07 DIAGNOSIS — G62.9 PERIPHERAL POLYNEUROPATHY: ICD-10-CM

## 2020-02-07 DIAGNOSIS — E55.9 VITAMIN D DEFICIENCY: ICD-10-CM

## 2020-02-07 PROCEDURE — 3008F BODY MASS INDEX DOCD: CPT | Performed by: INTERNAL MEDICINE

## 2020-02-07 PROCEDURE — 99213 OFFICE O/P EST LOW 20 MIN: CPT | Performed by: INTERNAL MEDICINE

## 2020-02-07 RX ORDER — LIDOCAINE 50 MG/G
OINTMENT TOPICAL 2 TIMES DAILY PRN
Qty: 35.44 G | Refills: 11 | Status: SHIPPED | OUTPATIENT
Start: 2020-02-07 | End: 2021-02-20

## 2020-02-07 NOTE — PROGRESS NOTES
Assessment/Plan:       Diagnoses and all orders for this visit:    Simple chronic bronchitis (HCC)    Peripheral polyneuropathy  -     lidocaine (XYLOCAINE) 5 % ointment; Apply topically 2 (two) times a day as needed for mild pain    Primary osteoarthritis involving multiple joints  -     Ambulatory referral to Physical Therapy; Future  -     lidocaine (XYLOCAINE) 5 % ointment; Apply topically 2 (two) times a day as needed for mild pain    Muscular deconditioning  -     Ambulatory referral to Physical Therapy; Future    Vitamin D deficiency  -     Lipid Panel with Direct LDL reflex; Future  -     CBC and differential; Future  -     Hemoglobin A1C; Future  -     Comprehensive metabolic panel; Future  -     TSH, 3rd generation with Free T4 reflex; Future  -     UA (URINE) with reflex to Scope  -     Iron; Future  -     Iron Saturation %; Future  -     Folate; Future  -     Vitamin B12; Future    Iron deficiency anemia, unspecified iron deficiency anemia type  -     Lipid Panel with Direct LDL reflex; Future  -     CBC and differential; Future  -     Hemoglobin A1C; Future  -     Comprehensive metabolic panel; Future  -     TSH, 3rd generation with Free T4 reflex; Future  -     UA (URINE) with reflex to Scope  -     Iron; Future  -     Iron Saturation %; Future  -     Folate; Future  -     Vitamin B12; Future                Subjective:      Patient ID: Sandy Rajan is a 63 y o  female  A 16-year-old female  COPD/chronic bronchitis who continues to smoke half a pack a day  This in spite of her complaining poverty  Osteoarthritis   A neuropathy of unknown cause    Globally she feels poorly although no where near as bad as she did last year when she almost  from lung disease  Continues to be weak due to chronic disease and would like to do more physical therapy        The following portions of the patient's history were reviewed and updated as appropriate:   She has a past medical history of Anemia, Anemia, Cardiac disorder, [facility] ), Constipation, COPD (chronic obstructive pulmonary disease) ([facility] ), Depression, Fibromyalgia, Fibromyalgia, primary, Head injury, Heart disorder, Malignant neoplasm ([facility] ), Migraines, Myocardial infarction ([facility] ), Occasional tremors, Osteoarthritis, Osteoporosis, Problems with swallowing, PTSD (post-traumatic stress disorder), Restless leg syndrome, Skin cancer, Stomach problems, and Tobacco abuse ,  does not have any pertinent problems on file  ,   has a past surgical history that includes Gallbladder surgery; Tonsillectomy; Gastric bypass; Back surgery; Other surgical history (Left); Arthrodesis; Bladder surgery; Foot surgery (Right); Abdominal surgery; Hand surgery (Left); Adenoidectomy; Toe Surgery (Right); Ankle surgery (Right); Femur Surgery (Right); Hemorrhoid surgery; Toenail excision; Multiple tooth extractions; Cholecystectomy; and Colonoscopy  ,  family history includes Arthritis in her mother; Cancer in her maternal aunt; Diabetes in her brother; Heart attack in her father; Heart disease in her brother and father; Hypertension in her father and mother; Mental illness in her brother and father; Neuropathy in her father; Obesity in her mother and sister; Osteoarthritis in her family; Osteoporosis in her family; Scoliosis in her family; Uterine cancer in her mother  ,   reports that she has been smoking cigarettes  She has a 21 00 pack-year smoking history  She has never used smokeless tobacco  She reports that she drank alcohol  She reports that she has current or past drug history  Drug: Marijuana  Frequency: 7 00 times per week  ,  is allergic to morphine and related; quetiapine; and sulfa antibiotics     Current Outpatient Medications   Medication Sig Dispense Refill    albuterol (2 5 mg/3 mL) 0 083 % nebulizer solution Take 1 vial (2 5 mg total) by nebulization every 6 (six) hours as needed for wheezing or shortness of breath (Patient taking differently: Take 2 5 mg by nebulization every 6 (six) hours as needed for wheezing or shortness of breath ) 1080 mL 3    albuterol (PROVENTIL HFA,VENTOLIN HFA) 90 mcg/act inhaler Inhale 2 puffs every 6 (six) hours as needed for wheezing 1 Inhaler 5    ARIPiprazole (ABILIFY) 2 mg tablet Take 2 mg by mouth daily at bedtime  0    Blood Pressure Monitoring (SPHYGMOMANOMETER) MISC Medically necessary to monitor blood pressure due to orthostatic hypotension 1 each 0    buprenorphine-naloxone (SUBOXONE) 2-0 5 mg per SL tablet Place under the tongue daily      butalbital-aspirin-caffeine (FIORINAL) -40 MG per tablet Take 1 tablet by mouth every 4 (four) hours as needed for headaches      calcium carbonate-vitamin D (OSCAL-D) 500 mg-200 units per tablet Take 2 tablets by mouth daily with breakfast 60 tablet 0    clonazePAM (KlonoPIN) 0 5 mg tablet Take 0 5 mg by mouth 3 (three) times a day   1    clotrimazole (LOTRIMIN) 1 % cream Apply topically 2 (two) times a day for 14 days 30 g 0    diphenhydrAMINE (BENADRYL) 25 mg tablet Take 25 mg by mouth every 6 (six) hours as needed for itching      doxepin (SINEquan) 150 MG capsule 150 mg daily at bedtime  1    famotidine (PEPCID) 20 mg tablet Take 2 tablets (40 mg total) by mouth daily at bedtime 1 PO BID 60 tablet 2    fluticasone (FLONASE) 50 mcg/act nasal spray 1 spray 2 (two) times a day as needed for rhinitis   2    gabapentin (NEURONTIN) 300 mg capsule Take 1 capsule (300 mg total) by mouth 3 (three) times a day 90 capsule 0    gabapentin (NEURONTIN) 800 mg tablet Take 800 mg by mouth 4 (four) times a day  3    glucose blood (ONE TOUCH ULTRA TEST) test strip Patient to test two times daily 200 each 3    Lancets (ONETOUCH ULTRASOFT) lancets Patient to test two times daily 200 each 3    lidocaine (XYLOCAINE) 5 % ointment Apply topically 2 (two) times a day as needed for mild pain 35 44 g 11    midodrine (PROAMATINE) 5 mg tablet 1 5 p  o  T i d , last dose before 6: 00 p m  270 tablet 0    pantoprazole (PROTONIX) 40 mg tablet Take 1 tablet (40 mg total) by mouth 2 (two) times a day 60 tablet 3    primidone (MYSOLINE) 50 mg tablet TAKE 2 TABLETS BY MOUTH AT BEDTIME 180 tablet 1    Respiratory Therapy Supplies (NEBULIZER/TUBING/MOUTHPIECE) KIT Use up to 6 times daily as needed for lung symptoms 1 each 3    rOPINIRole (REQUIP) 0 25 mg tablet TAKE 1 TABLET (0 25 MG TOTAL) BY MOUTH 3 (THREE) TIMES A  tablet 1    Simethicone (GAS-X PO) Take 1 tablet by mouth as needed      TiZANidine (ZANAFLEX) 2 MG capsule Take 1 capsule (2 mg total) by mouth 3 (three) times a day as needed for muscle spasms 90 capsule 11    venlafaxine 150 MG TB24 Take 150 mg by mouth every morning  0    venlafaxine 225 MG TB24 Take 225 mg by mouth every morning Taken with a 150 mg tablet  1    WIXELA INHUB 250-50 MCG/DOSE inhaler INHALE 1 PUFF TWICE A DAY, RINSE MOUTH AFTER USE 3 Inhaler 4    zafirlukast (ACCOLATE) 20 MG tablet Take 1 tablet (20 mg total) by mouth 2 (two) times a day before meals 180 tablet 3    Blood Glucose Monitoring Suppl (ONE TOUCH ULTRA 2) w/Device KIT Test daily and as instructed (Patient not taking: Reported on 1/15/2020) 1 each 0    Blood Pressure Monitoring (BLOOD PRESSURE CUFF) MISC by Does not apply route 2 (two) times a day (Patient not taking: Reported on 1/15/2020) 1 each 0     No current facility-administered medications for this visit  Review of Systems   Constitutional: Positive for fatigue  HENT: Negative for sinus pressure and sneezing  Respiratory: Positive for cough, shortness of breath and wheezing  Cardiovascular: Negative for chest pain  Gastrointestinal: Negative for abdominal pain  Endocrine: Positive for cold intolerance  Genitourinary: Negative for difficulty urinating and dysuria  Musculoskeletal: Positive for arthralgias and back pain  Skin: Negative for rash  Neurological: Positive for weakness and numbness   Negative for headaches  Psychiatric/Behavioral: Positive for dysphoric mood  The patient is nervous/anxious  Objective:  Vitals:    02/07/20 1605   BP: 110/70   Pulse: 95   SpO2: 96%      Physical Exam   Constitutional:     An overweight chronically ill-appearing female patient nevertheless not in distress  Not tachypneic, not dyspneic, and not using accessory muscles  HENT:   Mouth/Throat: No oropharyngeal exudate  Eyes: No scleral icterus  Neck: Normal range of motion  Cardiovascular: Normal rate  Pulmonary/Chest: Effort normal  She has decreased breath sounds  She has no wheezes  She has no rhonchi  She has no rales  Musculoskeletal: She exhibits deformity  Osteoarthritic deformity   Neurological: She is alert  Patient Instructions    A patient is major problem is continued smoking  I have referred the PT  I would like to see her back here for her birthday

## 2020-02-08 DIAGNOSIS — M54.16 LUMBAR RADICULOPATHY, CHRONIC: ICD-10-CM

## 2020-02-10 RX ORDER — GABAPENTIN 300 MG/1
CAPSULE ORAL
Qty: 90 CAPSULE | Refills: 0 | Status: SHIPPED | OUTPATIENT
Start: 2020-02-10 | End: 2020-07-09 | Stop reason: ALTCHOICE

## 2020-02-18 ENCOUNTER — EVALUATION (OUTPATIENT)
Dept: PHYSICAL THERAPY | Age: 60
End: 2020-02-18
Payer: COMMERCIAL

## 2020-02-18 VITALS — DIASTOLIC BLOOD PRESSURE: 69 MMHG | SYSTOLIC BLOOD PRESSURE: 98 MMHG

## 2020-02-18 DIAGNOSIS — R53.81 PHYSICAL DECONDITIONING: ICD-10-CM

## 2020-02-18 DIAGNOSIS — M15.9 GENERALIZED OA: Primary | ICD-10-CM

## 2020-02-18 PROCEDURE — 97162 PT EVAL MOD COMPLEX 30 MIN: CPT | Performed by: PHYSICAL THERAPIST

## 2020-02-18 PROCEDURE — 97113 AQUATIC THERAPY/EXERCISES: CPT | Performed by: PHYSICAL THERAPIST

## 2020-02-18 NOTE — PROGRESS NOTES
PT Evaluation     Today's date: 2020  Patient name: Heena Agustin  : 1960  MRN: [id number]  Referring provider: Jamie Rowe MD  Dx:   Encounter Diagnosis     ICD-10-CM    1  Generalized OA M15 9 PT plan of care cert/re-cert   2  Physical deconditioning R53 81 PT plan of care cert/re-cert       Start Time: 1500  Stop Time: 1600  Total time in clinic (min): 60 minutes    Assessment  Assessment details: Heena Agustin is a 63 y o  female who presents with pain, decreased strength, decreased ROM, impaired sensation, ambulatory dysfunction and postural  dysfunction  Due to these impairments, Patient has difficulty performing a/iadls  Patient's clinical presentation is consistent with their referring diagnosis of debility/muscle weakness  Patient would benefit from skilled physical therapy to address their aforementioned impairments, improve their level of function and to improve their overall quality of life  Impairments: abnormal coordination, abnormal gait, abnormal muscle firing, abnormal muscle tone, abnormal or restricted ROM, abnormal movement, activity intolerance, impaired balance, impaired physical strength, lacks appropriate home exercise program, pain with function, safety issue, weight-bearing intolerance, poor posture  and poor body mechanics  Understanding of Dx/Px/POC: good   Prognosis: fair    Goals  ST-3 WEEKS  1  Decrease pain by 2 points on VAS at its worst   2   Increase ROM by > 5 deg in all deficients planes  3   Increase CORE/LE by 1/2 MMT grade in all deficient planes  LT-6 WEEKS  1  Patient to be independent with a/iadls and improve SLS balance to 10 seconds and increase TUG score from 28 to  14 seconds  2  Increase functional activities for leisure and home activities to previous LOF    3  Independent with HEP and/or fitness program     Plan  Patient would benefit from: skilled physical therapy  Planned modality interventions: cryotherapy, electrical stimulation/Russian stimulation, thermotherapy: hydrocollator packs and unattended electrical stimulation  Planned therapy interventions: activity modification, behavior modification, body mechanics training, aquatic therapy, flexibility, functional ROM exercises, home exercise program, IADL retraining, joint mobilization, manual therapy, neuromuscular re-education, patient education, postural training, strengthening, stretching, therapeutic activities and therapeutic exercise  Frequency: 2x week (2-3x week)  Duration in weeks: 12  Plan of Care beginning date: 2020  Plan of Care expiration date: 2020  Treatment plan discussed with: patient        Subjective Evaluation    History of Present Illness  Date of onset: 10/5/2007  Mechanism of injury: Gastric bypass surgery   And then  patient had an perforated ulcer so she  had a second bypass , complains of weakness to legs and fatigue as well as back pain          Recurrent probem    Quality of life: good    Pain  Current pain ratin  At best pain ratin  At worst pain ratin  Quality: squeezing, needle-like, dull ache and pressure  Relieving factors: relaxation and medications  Aggravating factors: stair climbing and walking  Progression: worsening    Social Support  Steps to enter house: no  Stairs in house: no   Lives in: [facility]  Lives with: alone    Treatments  Previous treatment: physical therapy and injection treatment  Patient Goals  Patient goals for therapy: decreased pain, increased motion, improved balance, increased strength and independence with ADLs/IADLs          Objective     Static Posture     Head  Forward  Thoracic Spine  Hyperkyphosis  Ankle/Foot   Ankle/Foot (Left): Pes planus  Ankle/Foot (Right): Pes planus  Palpation   Left   Hypertonic in the erector spinae, lumbar paraspinals and quadratus lumborum  Tenderness of the erector spinae and lumbar paraspinals       Right   Hypertonic in the erector spinae, lumbar paraspinals and quadratus lumborum  Tenderness of the erector spinae and lumbar paraspinals  Active Range of Motion     Lumbar   Flexion: 75 degrees   Extension: 15 degrees     Strength/Myotome Testing     Left Hip   Planes of Motion   Flexion: 3+  Extension: 3+  Abduction: 3+  Adduction: 4-    Right Hip   Planes of Motion   Flexion: 3+  Extension: 3+  Abduction: 3+  Adduction: 4-    Left Knee   Flexion: 3+  Extension: 3    Right Knee   Flexion: 3+  Extension: 3    Left Ankle/Foot   Dorsiflexion: 3+  Plantar flexion: 4-    Right Ankle/Foot   Dorsiflexion: 3+  Plantar flexion: 4-    Additional Strength Details  CORE is 3+/5    Tests     Lumbar     Left   Negative passive SLR  Right   Negative passive SLR  Ambulation     Ambulation: Stairs   Ascend stairs: independent  Pattern: non-reciprocal  Railings: one rail  Descend stairs: independent  Pattern: non-reciprocal  Railings: one rail    Observational Gait   Gait: antalgic and crouched   Decreased walking speed and stride length       Additional Observational Gait Details  Uses SPC    Functional Assessment        Single Leg Stance   Left: 1 seconds  Right: 1 seconds  Neuro Exam:     Functional outcomes   TU (seconds)      Flowsheet Rows      Most Recent Value   PT/OT G-Codes   Assessment Type  Evaluation   G code set  Mobility: Walking & Moving Around   Mobility: Walking and Moving Around Current Status ()  CK   Mobility: Walking and Moving Around Goal Status ()  CJ        Precautions: Lumbar fusion, gastric bypass, neuropathy, anemia, FMS, CHF    Daily Treatment Diary     Manual                                                                                   Exercise Diary              Water walking 5            Postural training             Gait training             Home exercise pgm/patient education             Wall: t/h raises 1            Hip abd/add 2            Marching 1            squats 1            Knee flex/ext 1            Step-ups (fwd/bkwd/ss)             SLS (eyes open/closed)             SLS w UE mvmt  AROM/ball toss             Weight shifting             UE Noodle work x 4              UE AROM             Resistive UE work (paddles, bells, TB)             Core work on noodle (sitting/stdg)             Sit on noodle with movement             Seated on pool bench w proper posture 1            Ankle df/pf 1            marching 1            Hip Ab/add 1            Knee flex/ext 1            Deep water mvmt             Deep water tx/stretching 5            Specific self - stretches wall/steps 5                Modalities              whirlpool 5

## 2020-02-25 ENCOUNTER — OFFICE VISIT (OUTPATIENT)
Dept: PHYSICAL THERAPY | Age: 60
End: 2020-02-25
Payer: COMMERCIAL

## 2020-02-25 DIAGNOSIS — R53.81 PHYSICAL DECONDITIONING: ICD-10-CM

## 2020-02-25 DIAGNOSIS — M15.9 GENERALIZED OA: Primary | ICD-10-CM

## 2020-02-25 DIAGNOSIS — G25.0 TREMOR, ESSENTIAL: ICD-10-CM

## 2020-02-25 PROCEDURE — 97113 AQUATIC THERAPY/EXERCISES: CPT | Performed by: PHYSICAL THERAPIST

## 2020-02-25 RX ORDER — PRIMIDONE 50 MG/1
TABLET ORAL
Qty: 180 TABLET | Refills: 1 | Status: SHIPPED | OUTPATIENT
Start: 2020-02-25 | End: 2020-07-10 | Stop reason: SDUPTHER

## 2020-02-25 NOTE — PROGRESS NOTES
Daily Note     Today's date: 2020  Patient name: Fran Mckeon  : 1960  MRN: [id number]  Referring provider: Nathaniel Lawrence MD  Dx:   Encounter Diagnosis     ICD-10-CM    1  Generalized OA M15 9    2  Physical deconditioning R53 81        Start Time: 1402  Stop Time: 1500  Total time in clinic (min): 58 minutes    Subjective: Patient complains of pain level 8/10      Objective: See treatment diary below      Assessment: Tolerated treatment well  Patient demonstrated fatigue post treatment, exhibited good technique with therapeutic exercises and would benefit from continued PT      Plan: Progress treatment as tolerated           Precautions: Lumbar fusion, gastric bypass, neuropathy, anemia, FMS, CHF    Daily Treatment Diary     Manual                                                                                   Exercise Diary             Water walking 5 10           Postural training  2           Gait training  5           Home exercise pgm/patient education             Wall: t/h raises 1 1           Hip abd/add 2 2            1           squats 1 1           Knee flex/ext 1 1           Step-ups (fwd/bkwd/ss)             SLS (eyes open/closed)             SLS w UE mvmt  AROM/ball toss             Weight shifting             UE Noodle work x 4              UE AROM             Resistive UE work (paddles, bells, TB)             Core work on noodle (sitting/stdg)             Sit on noodle with movement             Seated on pool bench w proper posture 1 1           Ankle df/pf 1 1            1           Hip Ab/add 1 1           Knee flex/ext 1 1           Deep water mvmt  5           Deep water tx/stretching 5 10           Specific self - stretches wall/steps 5 5               Modalities              whirlpool 5 10

## 2020-03-03 ENCOUNTER — APPOINTMENT (OUTPATIENT)
Dept: PHYSICAL THERAPY | Age: 60
End: 2020-03-03
Payer: COMMERCIAL

## 2020-03-03 DIAGNOSIS — R10.13 EPIGASTRIC PAIN: ICD-10-CM

## 2020-03-03 RX ORDER — PANTOPRAZOLE SODIUM 40 MG/1
40 TABLET, DELAYED RELEASE ORAL 2 TIMES DAILY
Qty: 60 TABLET | Refills: 3 | Status: SHIPPED | OUTPATIENT
Start: 2020-03-03 | End: 2020-12-08

## 2020-03-05 ENCOUNTER — OFFICE VISIT (OUTPATIENT)
Dept: PHYSICAL THERAPY | Age: 60
End: 2020-03-05
Payer: COMMERCIAL

## 2020-03-05 DIAGNOSIS — M15.9 GENERALIZED OA: Primary | ICD-10-CM

## 2020-03-05 DIAGNOSIS — R53.81 PHYSICAL DECONDITIONING: ICD-10-CM

## 2020-03-05 PROCEDURE — 97113 AQUATIC THERAPY/EXERCISES: CPT

## 2020-03-05 NOTE — PROGRESS NOTES
Daily Note     Today's date: 3/5/2020  Patient name: Gokul Salmon  : 1960  MRN: [id number]  Referring provider: Gilda Hoskins MD  Dx:   Encounter Diagnosis     ICD-10-CM    1  Generalized OA M15 9    2  Physical deconditioning R53 81        Start Time: 1400  Stop Time: 1500  Total time in clinic (min): 60 minutes    Subjective: pt notes LBP today 7/10 and  B LE pain 8/10  She c/o burning pain in B LE's w/ walking about 30 feet  Objective: See treatment diary below      Assessment: Tolerated treatment fairly well  Pt had difficulty w/ steps getting out of the pool  Good relief after session today  Slow gait using SPC  Pt able to complete all ex's as per flowsheet  Added UE noodle ex's and increased pt's bike time today  Patient demonstrated fatigue post treatment and would benefit from continued PT      Plan: Continue per plan of care        Precautions: Lumbar fusion, gastric bypass, neuropathy, anemia, FMS, CHF    Daily Treatment Diary     Manual                                                                                   Exercise Diary   3/5          Water walking 5 10 10          Postural training  2           Gait training  5           Home exercise pgm/patient education             Wall: t/h raises 1 1 1          Hip abd/add 2 2 2           1 1 1          squats 1 1 1          Knee flex/ext 1 1 1          Step-ups (fwd/bkwd/ss)             SLS (eyes open/closed)             SLS w UE mvmt  AROM/ball toss             Weight shifting             UE Noodle work x 4    5          UE AROM             Resistive UE work (paddles, bells, TB)             Core work on noodle (sitting/stdg)             Sit on noodle with movement             Seated on pool bench w proper posture 1 1 1          Ankle df/pf 1 1 1          marching 1 1 1          Hip Ab/add 1 1 1          Knee flex/ext 1 1 2          Deep water mvmt  5 6          Deep water tx/stretching 5 10 10          Specific self - stretches wall/steps 5 5 5              Modalities   2/25 3/5          whirlpool 5 10 10

## 2020-03-11 ENCOUNTER — OFFICE VISIT (OUTPATIENT)
Dept: PHYSICAL THERAPY | Age: 60
End: 2020-03-11
Payer: COMMERCIAL

## 2020-03-11 DIAGNOSIS — R53.81 PHYSICAL DECONDITIONING: ICD-10-CM

## 2020-03-11 DIAGNOSIS — M15.9 GENERALIZED OA: Primary | ICD-10-CM

## 2020-03-11 PROCEDURE — 97113 AQUATIC THERAPY/EXERCISES: CPT

## 2020-03-11 NOTE — PROGRESS NOTES
Daily Note     Today's date: 3/11/2020  Patient name: Daya Brown  : 1960  MRN: [id number]  Referring provider: Aiden Cortés MD  Dx:   Encounter Diagnosis     ICD-10-CM    1  Generalized OA M15 9    2  Physical deconditioning R53 81        Start Time: 1300  Stop Time: 1400  Total time in clinic (min): 60 minutes    Subjective: pt notes she thinks she is ok now w/ her insurance and would like to continue as a pt  Today she notes overall pain 8/10  Objective: See treatment diary below      Assessment: Tolerated treatment fair  Added blue paddles for UE ex's focusing on core  Added forward step ups for LE strengthening  Pt did need a single hand held assistance for step ups due to LOB  VCing throughout session  Patient would benefit from continued PT      Plan: Continue per plan of care        Precautions: Lumbar fusion, gastric bypass, neuropathy, anemia, FMS, CHF    Daily Treatment Diary     Manual                                                                                   Exercise Diary  2/18 2/25 3/5 3/11         Water walking 5 10 10 10         Postural training  2           Gait training  5           Home exercise pgm/patient education             Wall: t/h raises 1 1 1 1         Hip abd/add 2 2 2 2         Marching 1 1 1 1         squats 1 1 1 1         Knee flex/ext 1 1 1 1         Step-ups (fwd/bkwd/ss)    2         SLS (eyes open/closed)             SLS w UE mvmt  AROM/ball toss             Weight shifting             UE Noodle work x 4    5 5         UE AROM             Resistive UE work (paddles, bells, TB)    3         Core work on noodle (sitting/stdg)             Sit on noodle with movement             Seated on pool bench w proper posture 1 1 1          Ankle df/pf 1 1 1 1         marching 1 1 1 1         Hip Ab/add 1 1 1 1         Knee flex/ext 1 1 2 2         Deep water mvmt  5 6 6         Deep water tx/stretching 5 10 10 10         Specific self - stretches wall/steps 5 5 5 2 Modalities   2/25 3/5 3/11         whirlpool 5 10 10 10

## 2020-03-17 ENCOUNTER — APPOINTMENT (OUTPATIENT)
Dept: PHYSICAL THERAPY | Age: 60
End: 2020-03-17
Payer: COMMERCIAL

## 2020-03-20 ENCOUNTER — APPOINTMENT (OUTPATIENT)
Dept: PHYSICAL THERAPY | Age: 60
End: 2020-03-20
Payer: COMMERCIAL

## 2020-04-28 ENCOUNTER — TELEPHONE (OUTPATIENT)
Dept: INTERNAL MEDICINE CLINIC | Facility: CLINIC | Age: 60
End: 2020-04-28

## 2020-04-28 DIAGNOSIS — G25.0 HEREDITARY ESSENTIAL TREMOR: Primary | ICD-10-CM

## 2020-04-28 DIAGNOSIS — R10.13 EPIGASTRIC PAIN: ICD-10-CM

## 2020-04-28 RX ORDER — FAMOTIDINE 20 MG/1
20 TABLET, FILM COATED ORAL 2 TIMES DAILY
Qty: 180 TABLET | Refills: 3 | Status: SHIPPED | OUTPATIENT
Start: 2020-04-28 | End: 2021-07-12

## 2020-04-29 ENCOUNTER — TELEPHONE (OUTPATIENT)
Dept: NEUROLOGY | Facility: CLINIC | Age: 60
End: 2020-04-29

## 2020-05-26 ENCOUNTER — OFFICE VISIT (OUTPATIENT)
Dept: INTERNAL MEDICINE CLINIC | Facility: CLINIC | Age: 60
End: 2020-05-26
Payer: COMMERCIAL

## 2020-05-26 VITALS
WEIGHT: 189 LBS | OXYGEN SATURATION: 98 % | HEIGHT: 63 IN | TEMPERATURE: 96.1 F | HEART RATE: 89 BPM | DIASTOLIC BLOOD PRESSURE: 72 MMHG | BODY MASS INDEX: 33.49 KG/M2 | SYSTOLIC BLOOD PRESSURE: 110 MMHG

## 2020-05-26 DIAGNOSIS — R73.9 HYPERGLYCEMIA: ICD-10-CM

## 2020-05-26 DIAGNOSIS — D89.89 SUPPRESSION OF IMMUNE SYSTEM SUBTHERAPEUTIC (HCC): ICD-10-CM

## 2020-05-26 DIAGNOSIS — D84.9 IMMUNOSUPPRESSION (HCC): ICD-10-CM

## 2020-05-26 DIAGNOSIS — L03.115 CELLULITIS OF RIGHT LOWER EXTREMITY: Primary | ICD-10-CM

## 2020-05-26 PROBLEM — L03.113 CELLULITIS OF RIGHT UPPER EXTREMITY: Status: ACTIVE | Noted: 2020-05-26

## 2020-05-26 PROCEDURE — 3008F BODY MASS INDEX DOCD: CPT | Performed by: INTERNAL MEDICINE

## 2020-05-26 PROCEDURE — 99213 OFFICE O/P EST LOW 20 MIN: CPT | Performed by: INTERNAL MEDICINE

## 2020-05-26 RX ORDER — SULFAMETHOXAZOLE AND TRIMETHOPRIM 800; 160 MG/1; MG/1
1 TABLET ORAL EVERY 12 HOURS SCHEDULED
Qty: 14 TABLET | Refills: 0 | Status: SHIPPED | OUTPATIENT
Start: 2020-05-26 | End: 2020-06-02

## 2020-05-31 DIAGNOSIS — G25.81 RESTLESS LEG: ICD-10-CM

## 2020-06-01 ENCOUNTER — EVALUATION (OUTPATIENT)
Dept: PHYSICAL THERAPY | Age: 60
End: 2020-06-01
Payer: COMMERCIAL

## 2020-06-01 DIAGNOSIS — R29.898 MUSCULAR DECONDITIONING: Primary | ICD-10-CM

## 2020-06-01 DIAGNOSIS — M15.9 PRIMARY OSTEOARTHRITIS INVOLVING MULTIPLE JOINTS: ICD-10-CM

## 2020-06-01 PROCEDURE — 97164 PT RE-EVAL EST PLAN CARE: CPT | Performed by: PHYSICAL THERAPIST

## 2020-06-01 PROCEDURE — 97113 AQUATIC THERAPY/EXERCISES: CPT | Performed by: PHYSICAL THERAPIST

## 2020-06-01 RX ORDER — ROPINIROLE 0.25 MG/1
0.25 TABLET, FILM COATED ORAL 3 TIMES DAILY
Qty: 270 TABLET | Refills: 1 | Status: SHIPPED | OUTPATIENT
Start: 2020-06-01 | End: 2020-11-23

## 2020-06-04 ENCOUNTER — OFFICE VISIT (OUTPATIENT)
Dept: PHYSICAL THERAPY | Age: 60
End: 2020-06-04
Payer: COMMERCIAL

## 2020-06-04 ENCOUNTER — TELEPHONE (OUTPATIENT)
Dept: INTERNAL MEDICINE CLINIC | Facility: CLINIC | Age: 60
End: 2020-06-04

## 2020-06-04 DIAGNOSIS — M15.9 PRIMARY OSTEOARTHRITIS INVOLVING MULTIPLE JOINTS: ICD-10-CM

## 2020-06-04 DIAGNOSIS — R29.898 MUSCULAR DECONDITIONING: Primary | ICD-10-CM

## 2020-06-04 PROCEDURE — 97113 AQUATIC THERAPY/EXERCISES: CPT

## 2020-06-05 ENCOUNTER — OFFICE VISIT (OUTPATIENT)
Dept: INTERNAL MEDICINE CLINIC | Facility: CLINIC | Age: 60
End: 2020-06-05
Payer: COMMERCIAL

## 2020-06-05 VITALS
WEIGHT: 192.8 LBS | SYSTOLIC BLOOD PRESSURE: 124 MMHG | OXYGEN SATURATION: 97 % | HEART RATE: 104 BPM | TEMPERATURE: 98.2 F | BODY MASS INDEX: 34.15 KG/M2 | DIASTOLIC BLOOD PRESSURE: 72 MMHG

## 2020-06-05 DIAGNOSIS — L03.115 CELLULITIS OF RIGHT LOWER EXTREMITY: Primary | ICD-10-CM

## 2020-06-05 PROCEDURE — 99213 OFFICE O/P EST LOW 20 MIN: CPT | Performed by: INTERNAL MEDICINE

## 2020-06-05 RX ORDER — SULFAMETHOXAZOLE AND TRIMETHOPRIM 800; 160 MG/1; MG/1
1 TABLET ORAL EVERY 12 HOURS SCHEDULED
Qty: 14 TABLET | Refills: 0 | Status: SHIPPED | OUTPATIENT
Start: 2020-06-05 | End: 2020-06-12

## 2020-06-11 ENCOUNTER — OFFICE VISIT (OUTPATIENT)
Dept: PHYSICAL THERAPY | Age: 60
End: 2020-06-11
Payer: COMMERCIAL

## 2020-06-11 DIAGNOSIS — R29.898 MUSCULAR DECONDITIONING: Primary | ICD-10-CM

## 2020-06-11 DIAGNOSIS — M15.9 PRIMARY OSTEOARTHRITIS INVOLVING MULTIPLE JOINTS: ICD-10-CM

## 2020-06-11 PROCEDURE — 97113 AQUATIC THERAPY/EXERCISES: CPT

## 2020-06-16 ENCOUNTER — APPOINTMENT (OUTPATIENT)
Dept: PHYSICAL THERAPY | Age: 60
End: 2020-06-16
Payer: COMMERCIAL

## 2020-06-16 NOTE — PROGRESS NOTES
Daily Note     Today's date: 2020  Patient name: Helen Juares  : 1960  MRN: [id number]  Referring provider: Gregoria Ferrell MD  Dx:   Encounter Diagnosis     ICD-10-CM    1  Muscular deconditioning R29 898    2  Primary osteoarthritis involving multiple joints M89 49                   Subjective: ***      Objective: See treatment diary below      Assessment: Tolerated treatment {Tolerated treatment :[id number]}   Patient {assessment:}      Plan: {PLAN:[id number]}     Precautions: Lumbar fusion, gastric bypass, neuropathy, anemia, FMS, CHF    Daily Treatment Diary       Exercise Diary            Water walking 5' 10' 10'          Postural training 4' 2 2          Gait training             Home exercise pgm/patient education             Wall: t/h raises  1 1          Hip abd/add  1 2            1 1          squats  1 1          Knee flex/ext  1 1          Step-ups (fwd/bkwd/ss)             SLS (eyes open/closed)             SLS w UE mvmt  AROM/ball toss             Weight shifting             UE Noodle work x 4   4 4'          UE AROM             Resistive    3'          Core press KB   2' 2'          Sit on noodle with movement             Seated on pool bench w proper posture 1 1 1'          Ankle df/pf 1 1 1           1 1 1          Hip Ab/add 1 1 1          Knee flex/ext 1 1 1          Deep water mvmt 2 5 5          Deep water tx/stretching 5 10 10          Specific self - stretches wall/steps 2 2 2              Modalities            whirlpool 10 10 10

## 2020-06-18 ENCOUNTER — APPOINTMENT (OUTPATIENT)
Dept: PHYSICAL THERAPY | Age: 60
End: 2020-06-18
Payer: COMMERCIAL

## 2020-06-25 ENCOUNTER — HOSPITAL ENCOUNTER (OUTPATIENT)
Dept: RADIOLOGY | Facility: HOSPITAL | Age: 60
Discharge: HOME | End: 2020-06-25
Payer: COMMERCIAL

## 2020-06-25 ENCOUNTER — TRANSCRIBE ORDERS (OUTPATIENT)
Dept: ADMINISTRATIVE | Facility: HOSPITAL | Age: 60
End: 2020-06-25

## 2020-06-25 DIAGNOSIS — M50.30 DDD (DEGENERATIVE DISC DISEASE), CERVICAL: ICD-10-CM

## 2020-06-25 DIAGNOSIS — M19.90 OSTEOARTHRITIS, UNSPECIFIED OSTEOARTHRITIS TYPE, UNSPECIFIED SITE: ICD-10-CM

## 2020-06-25 DIAGNOSIS — M51.36 DDD (DEGENERATIVE DISC DISEASE), LUMBAR: ICD-10-CM

## 2020-06-25 DIAGNOSIS — M19.90 OSTEOARTHRITIS, UNSPECIFIED OSTEOARTHRITIS TYPE, UNSPECIFIED SITE: Primary | ICD-10-CM

## 2020-06-25 PROCEDURE — 73000 X-RAY EXAM OF COLLAR BONE: CPT

## 2020-06-25 PROCEDURE — 73552 X-RAY EXAM OF FEMUR 2/>: CPT

## 2020-06-25 PROCEDURE — 73562 X-RAY EXAM OF KNEE 3: CPT

## 2020-06-25 PROCEDURE — 72110 X-RAY EXAM L-2 SPINE 4/>VWS: CPT

## 2020-06-26 ENCOUNTER — APPOINTMENT (OUTPATIENT)
Dept: PHYSICAL THERAPY | Age: 60
End: 2020-06-26
Payer: COMMERCIAL

## 2020-07-10 ENCOUNTER — OFFICE VISIT (OUTPATIENT)
Dept: NEUROLOGY | Facility: CLINIC | Age: 60
End: 2020-07-10
Payer: COMMERCIAL

## 2020-07-10 ENCOUNTER — TELEPHONE (OUTPATIENT)
Dept: OTHER | Facility: OTHER | Age: 60
End: 2020-07-10

## 2020-07-10 VITALS — DIASTOLIC BLOOD PRESSURE: 80 MMHG | SYSTOLIC BLOOD PRESSURE: 100 MMHG | HEART RATE: 86 BPM

## 2020-07-10 DIAGNOSIS — G62.9 PERIPHERAL POLYNEUROPATHY: ICD-10-CM

## 2020-07-10 DIAGNOSIS — G25.0 TREMOR, ESSENTIAL: Primary | ICD-10-CM

## 2020-07-10 DIAGNOSIS — G57.11 MERALGIA PARESTHETICA, RIGHT: ICD-10-CM

## 2020-07-10 DIAGNOSIS — I95.1 ORTHOSTATIC HYPOTENSION: ICD-10-CM

## 2020-07-10 PROCEDURE — 99214 OFFICE O/P EST MOD 30 MIN: CPT | Performed by: PSYCHIATRY & NEUROLOGY

## 2020-07-10 RX ORDER — PRIMIDONE 50 MG/1
TABLET ORAL
Qty: 270 TABLET | Refills: 1 | Status: SHIPPED | OUTPATIENT
Start: 2020-07-10 | End: 2021-02-10
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
## 2020-07-10 NOTE — TELEPHONE ENCOUNTER
Patient called stating she has chronic bronchitis and she has recently been having a hard time breathing  She would like a call back to make an appointment with Dr Mavis Zimmer tomorrow  Please call patient back ASAP

## 2020-07-11 ENCOUNTER — APPOINTMENT (EMERGENCY)
Dept: CT IMAGING | Facility: HOSPITAL | Age: 60
End: 2020-07-11
Payer: COMMERCIAL

## 2020-07-11 ENCOUNTER — APPOINTMENT (EMERGENCY)
Dept: RADIOLOGY | Facility: HOSPITAL | Age: 60
End: 2020-07-11
Payer: COMMERCIAL

## 2020-07-11 ENCOUNTER — OFFICE VISIT (OUTPATIENT)
Dept: INTERNAL MEDICINE CLINIC | Facility: CLINIC | Age: 60
End: 2020-07-11
Payer: COMMERCIAL

## 2020-07-11 ENCOUNTER — TELEPHONE (OUTPATIENT)
Dept: INTERNAL MEDICINE CLINIC | Facility: CLINIC | Age: 60
End: 2020-07-11

## 2020-07-11 ENCOUNTER — HOSPITAL ENCOUNTER (EMERGENCY)
Facility: HOSPITAL | Age: 60
End: 2020-07-12
Attending: EMERGENCY MEDICINE | Admitting: EMERGENCY MEDICINE
Payer: COMMERCIAL

## 2020-07-11 VITALS
BODY MASS INDEX: 36.14 KG/M2 | WEIGHT: 204 LBS | OXYGEN SATURATION: 90 % | HEART RATE: 96 BPM | TEMPERATURE: 100 F | DIASTOLIC BLOOD PRESSURE: 70 MMHG | HEIGHT: 63 IN | SYSTOLIC BLOOD PRESSURE: 118 MMHG

## 2020-07-11 VITALS
RESPIRATION RATE: 20 BRPM | OXYGEN SATURATION: 97 % | HEIGHT: 63 IN | TEMPERATURE: 99.1 F | SYSTOLIC BLOOD PRESSURE: 121 MMHG | BODY MASS INDEX: 36.14 KG/M2 | DIASTOLIC BLOOD PRESSURE: 80 MMHG | HEART RATE: 80 BPM | WEIGHT: 204 LBS
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
## 2020-07-11 DIAGNOSIS — R53.1 WEAKNESS: ICD-10-CM

## 2020-07-11 DIAGNOSIS — R32 URINARY INCONTINENCE: ICD-10-CM

## 2020-07-11 DIAGNOSIS — F32.2 SEVERE DEPRESSION (HCC): ICD-10-CM

## 2020-07-11 DIAGNOSIS — J44.1 COPD WITH EXACERBATION (HCC): ICD-10-CM

## 2020-07-11 DIAGNOSIS — J18.9 PNEUMONIA: Primary | ICD-10-CM

## 2020-07-11 LAB
ALBUMIN SERPL BCP-MCNC: 3.4 G/DL (ref 3.5–5)
ALP SERPL-CCNC: 104 U/L (ref 46–116)
ALT SERPL W P-5'-P-CCNC: 15 U/L (ref 12–78)
ANION GAP SERPL CALCULATED.3IONS-SCNC: 7 MMOL/L (ref 4–13)
APTT PPP: 39 SECONDS (ref 23–37)
AST SERPL W P-5'-P-CCNC: 25 U/L (ref 5–45)
BACTERIA UR QL AUTO: ABNORMAL /HPF
BASOPHILS # BLD AUTO: 0.02 THOUSANDS/ΜL (ref 0–0.1)
BASOPHILS NFR BLD AUTO: 0 % (ref 0–1)
BILIRUB SERPL-MCNC: 0.7 MG/DL (ref 0.2–1)
BILIRUB UR QL STRIP: NEGATIVE
BUN SERPL-MCNC: 14 MG/DL (ref 5–25)
CALCIUM SERPL-MCNC: 8.6 MG/DL (ref 8.3–10.1)
CHLORIDE SERPL-SCNC: 103 MMOL/L (ref 100–108)
CLARITY UR: CLEAR
CO2 SERPL-SCNC: 26 MMOL/L (ref 21–32)
COLOR UR: YELLOW
CREAT SERPL-MCNC: 0.91 MG/DL (ref 0.6–1.3)
EOSINOPHIL # BLD AUTO: 0.09 THOUSAND/ΜL (ref 0–0.61)
EOSINOPHIL NFR BLD AUTO: 1 % (ref 0–6)
ERYTHROCYTE [DISTWIDTH] IN BLOOD BY AUTOMATED COUNT: 16.7 % (ref 11.6–15.1)
GFR SERPL CREATININE-BSD FRML MDRD: 70 ML/MIN/1.73SQ M
GLUCOSE SERPL-MCNC: 134 MG/DL (ref 65–140)
GLUCOSE UR STRIP-MCNC: NEGATIVE MG/DL
HCT VFR BLD AUTO: 28.3 % (ref 34.8–46.1)
HGB BLD-MCNC: 9.4 G/DL (ref 11.5–15.4)
HGB UR QL STRIP.AUTO: ABNORMAL
IMM GRANULOCYTES # BLD AUTO: 0.08 THOUSAND/UL (ref 0–0.2)
IMM GRANULOCYTES NFR BLD AUTO: 1 % (ref 0–2)
INR PPP: 1.01 (ref 0.84–1.19)
KETONES UR STRIP-MCNC: ABNORMAL MG/DL
LEUKOCYTE ESTERASE UR QL STRIP: NEGATIVE
LIPASE SERPL-CCNC: 66 U/L (ref 73–393)
LYMPHOCYTES # BLD AUTO: 1.76 THOUSANDS/ΜL (ref 0.6–4.47)
LYMPHOCYTES NFR BLD AUTO: 15 % (ref 14–44)
MCH RBC QN AUTO: 29.6 PG (ref 26.8–34.3)
MCHC RBC AUTO-ENTMCNC: 33.2 G/DL (ref 31.4–37.4)
MCV RBC AUTO: 89 FL (ref 82–98)
MONOCYTES # BLD AUTO: 0.65 THOUSAND/ΜL (ref 0.17–1.22)
MONOCYTES NFR BLD AUTO: 6 % (ref 4–12)
NEUTROPHILS # BLD AUTO: 8.91 THOUSANDS/ΜL (ref 1.85–7.62)
NEUTS SEG NFR BLD AUTO: 77 % (ref 43–75)
NITRITE UR QL STRIP: NEGATIVE
NON-SQ EPI CELLS URNS QL MICRO: ABNORMAL /HPF
NRBC BLD AUTO-RTO: 0 /100 WBCS
NT-PROBNP SERPL-MCNC: 1044 PG/ML
PH UR STRIP.AUTO: 6 [PH]
PLATELET # BLD AUTO: 263 THOUSANDS/UL (ref 149–390)
PMV BLD AUTO: 11.1 FL (ref 8.9–12.7)
POTASSIUM SERPL-SCNC: 4.9 MMOL/L (ref 3.5–5.3)
PROT SERPL-MCNC: 7.3 G/DL (ref 6.4–8.2)
PROT UR STRIP-MCNC: ABNORMAL MG/DL
PROTHROMBIN TIME: 13.5 SECONDS (ref 11.6–14.5)
RBC # BLD AUTO: 3.18 MILLION/UL (ref 3.81–5.12)
RBC #/AREA URNS AUTO: ABNORMAL /HPF
SARS-COV-2 RNA RESP QL NAA+PROBE: NEGATIVE
SODIUM SERPL-SCNC: 136 MMOL/L (ref 136–145)
SP GR UR STRIP.AUTO: <=1.005 (ref 1–1.03)
TROPONIN I SERPL-MCNC: <0.02 NG/ML
UROBILINOGEN UR QL STRIP.AUTO: 2 E.U./DL
WBC # BLD AUTO: 11.51 THOUSAND/UL (ref 4.31–10.16)
WBC #/AREA URNS AUTO: ABNORMAL /HPF

## 2020-07-11 PROCEDURE — 80053 COMPREHEN METABOLIC PANEL: CPT | Performed by: EMERGENCY MEDICINE

## 2020-07-11 PROCEDURE — 84484 ASSAY OF TROPONIN QUANT: CPT | Performed by: EMERGENCY MEDICINE

## 2020-07-11 PROCEDURE — 72125 CT NECK SPINE W/O DYE: CPT

## 2020-07-11 PROCEDURE — 81001 URINALYSIS AUTO W/SCOPE: CPT | Performed by: EMERGENCY MEDICINE

## 2020-07-11 PROCEDURE — 73502 X-RAY EXAM HIP UNI 2-3 VIEWS: CPT

## 2020-07-11 PROCEDURE — 94640 AIRWAY INHALATION TREATMENT: CPT

## 2020-07-11 PROCEDURE — 71260 CT THORAX DX C+: CPT

## 2020-07-11 PROCEDURE — 87635 SARS-COV-2 COVID-19 AMP PRB: CPT | Performed by: EMERGENCY MEDICINE

## 2020-07-11 PROCEDURE — 83690 ASSAY OF LIPASE: CPT | Performed by: EMERGENCY MEDICINE

## 2020-07-11 PROCEDURE — 70450 CT HEAD/BRAIN W/O DYE: CPT

## 2020-07-11 PROCEDURE — 73564 X-RAY EXAM KNEE 4 OR MORE: CPT

## 2020-07-11 PROCEDURE — 85610 PROTHROMBIN TIME: CPT | Performed by: EMERGENCY MEDICINE

## 2020-07-11 PROCEDURE — 93005 ELECTROCARDIOGRAM TRACING: CPT

## 2020-07-11 PROCEDURE — 74177 CT ABD & PELVIS W/CONTRAST: CPT

## 2020-07-11 PROCEDURE — 85025 COMPLETE CBC W/AUTO DIFF WBC: CPT | Performed by: EMERGENCY MEDICINE

## 2020-07-11 PROCEDURE — 83880 ASSAY OF NATRIURETIC PEPTIDE: CPT | Performed by: EMERGENCY MEDICINE

## 2020-07-11 PROCEDURE — 87040 BLOOD CULTURE FOR BACTERIA: CPT | Performed by: EMERGENCY MEDICINE

## 2020-07-11 PROCEDURE — 85730 THROMBOPLASTIN TIME PARTIAL: CPT | Performed by: EMERGENCY MEDICINE

## 2020-07-11 PROCEDURE — 96375 TX/PRO/DX INJ NEW DRUG ADDON: CPT

## 2020-07-11 PROCEDURE — 99285 EMERGENCY DEPT VISIT HI MDM: CPT | Performed by: EMERGENCY MEDICINE

## 2020-07-11 PROCEDURE — 36415 COLL VENOUS BLD VENIPUNCTURE: CPT | Performed by: EMERGENCY MEDICINE

## 2020-07-11 PROCEDURE — 99285 EMERGENCY DEPT VISIT HI MDM: CPT

## 2020-07-11 PROCEDURE — 99213 OFFICE O/P EST LOW 20 MIN: CPT | Performed by: INTERNAL MEDICINE

## 2020-07-11 PROCEDURE — 96365 THER/PROPH/DIAG IV INF INIT: CPT

## 2020-07-11 PROCEDURE — 3008F BODY MASS INDEX DOCD: CPT | Performed by: INTERNAL MEDICINE

## 2020-07-11 RX ORDER — ARIPIPRAZOLE 2 MG/1
2 TABLET ORAL DAILY
Status: DISCONTINUED | OUTPATIENT
Start: 2020-07-12 | End: 2020-07-12 | Stop reason: HOSPADM

## 2020-07-11 RX ORDER — ARIPIPRAZOLE 2 MG/1
2 TABLET ORAL
Qty: 90 TABLET | Refills: 0
Start: 2020-07-11 | End: 2021-07-21

## 2020-07-11 RX ORDER — ROPINIROLE 0.25 MG/1
0.25 TABLET, FILM COATED ORAL ONCE
Status: COMPLETED | OUTPATIENT
Start: 2020-07-11 | End: 2020-07-11

## 2020-07-11 RX ORDER — DOXEPIN HYDROCHLORIDE 150 MG/1
150 CAPSULE ORAL
Qty: 90 CAPSULE | Refills: 0
Start: 2020-07-11 | End: 2021-04-02

## 2020-07-11 RX ORDER — ALBUTEROL SULFATE 2.5 MG/3ML
5 SOLUTION RESPIRATORY (INHALATION) ONCE
Status: COMPLETED | OUTPATIENT
Start: 2020-07-11 | End: 2020-07-11

## 2020-07-11 RX ORDER — METHYLPREDNISOLONE SODIUM SUCCINATE 125 MG/2ML
125 INJECTION, POWDER, LYOPHILIZED, FOR SOLUTION INTRAMUSCULAR; INTRAVENOUS ONCE
Status: COMPLETED | OUTPATIENT
Start: 2020-07-11 | End: 2020-07-11

## 2020-07-11 RX ORDER — ROPINIROLE 0.25 MG/1
0.25 TABLET, FILM COATED ORAL 3 TIMES DAILY
Status: DISCONTINUED | OUTPATIENT
Start: 2020-07-11 | End: 2020-07-11

## 2020-07-11 RX ORDER — TIZANIDINE 2 MG/1
2 TABLET ORAL EVERY 8 HOURS PRN
Status: DISCONTINUED | OUTPATIENT
Start: 2020-07-11 | End: 2020-07-12 | Stop reason: HOSPADM

## 2020-07-11 RX ORDER — NICOTINE 21 MG/24HR
21 PATCH, TRANSDERMAL 24 HOURS TRANSDERMAL ONCE
Status: DISCONTINUED | OUTPATIENT
Start: 2020-07-11 | End: 2020-07-12 | Stop reason: HOSPADM

## 2020-07-11 RX ORDER — DIAZEPAM 5 MG/1
5 TABLET ORAL ONCE
Status: COMPLETED | OUTPATIENT
Start: 2020-07-11 | End: 2020-07-11

## 2020-07-11 RX ORDER — VENLAFAXINE HYDROCHLORIDE 225 MG/1
225 TABLET, EXTENDED RELEASE ORAL 2 TIMES DAILY
Qty: 90 TABLET | Refills: 0
Start: 2020-07-11

## 2020-07-11 RX ORDER — VENLAFAXINE HYDROCHLORIDE 150 MG/1
150 TABLET, EXTENDED RELEASE ORAL
Qty: 90 TABLET | Refills: 0
Start: 2020-07-11

## 2020-07-11 RX ORDER — LORAZEPAM 2 MG/ML
1 INJECTION INTRAMUSCULAR ONCE
Status: COMPLETED | OUTPATIENT
Start: 2020-07-11 | End: 2020-07-11

## 2020-07-11 RX ORDER — MIDODRINE HYDROCHLORIDE 5 MG/1
5 TABLET ORAL
Status: DISCONTINUED | OUTPATIENT
Start: 2020-07-12 | End: 2020-07-12 | Stop reason: HOSPADM

## 2020-07-11 RX ORDER — DOXEPIN HYDROCHLORIDE 50 MG/1
50 CAPSULE ORAL
Qty: 90 CAPSULE | Refills: 0
Start: 2020-07-11 | End: 2021-04-02

## 2020-07-11 RX ADMIN — DIAZEPAM 5 MG: 5 TABLET ORAL at 16:43

## 2020-07-11 RX ADMIN — GABAPENTIN 800 MG: 100 CAPSULE ORAL at 22:30

## 2020-07-11 RX ADMIN — IOHEXOL 100 ML: 350 INJECTION, SOLUTION INTRAVENOUS at 17:35

## 2020-07-11 RX ADMIN — MIDODRINE HYDROCHLORIDE 5 MG: 5 TABLET ORAL at 22:30

## 2020-07-11 RX ADMIN — ALBUTEROL SULFATE 5 MG: 2.5 SOLUTION RESPIRATORY (INHALATION) at 19:47

## 2020-07-11 RX ADMIN — METHYLPREDNISOLONE SODIUM SUCCINATE 125 MG: 125 INJECTION, POWDER, FOR SOLUTION INTRAMUSCULAR; INTRAVENOUS at 16:45

## 2020-07-11 RX ADMIN — ROPINIROLE HYDROCHLORIDE 0.25 MG: 0.25 TABLET, FILM COATED ORAL at 22:30

## 2020-07-11 RX ADMIN — LORAZEPAM 1 MG: 2 INJECTION INTRAMUSCULAR; INTRAVENOUS at 22:30

## 2020-07-11 RX ADMIN — CEFTRIAXONE SODIUM 1000 MG: 10 INJECTION, POWDER, FOR SOLUTION INTRAVENOUS at 19:52

## 2020-07-11 RX ADMIN — NICOTINE 21 MG: 21 PATCH TRANSDERMAL at 19:47

## 2020-07-11 RX ADMIN — TIZANIDINE 2 MG: 2 TABLET ORAL at 22:30

## 2020-07-11 RX ADMIN — IPRATROPIUM BROMIDE 0.5 MG: 0.5 SOLUTION RESPIRATORY (INHALATION) at 19:47

## 2020-07-11 RX ADMIN — AZITHROMYCIN MONOHYDRATE 500 MG: 500 INJECTION, POWDER, LYOPHILIZED, FOR SOLUTION INTRAVENOUS at 20:49

## 2020-07-11 NOTE — PATIENT INSTRUCTIONS
Patient with exacerbation of COPD and possible right middle lobe pneumonia is referred to the emergency room for further assessment

## 2020-07-11 NOTE — TELEPHONE ENCOUNTER
COVID Pre-Visit Screening     1  Is this a family member screening? No  2  Have you traveled outside of your state in the past 2 weeks? No  3  Do you presently have a fever or flu-like symptoms? No  4  Do you have symptoms of an upper respiratory infection like runny nose, sore throat, or cough? Yes  5  Are you suffering from new headache that you have not had in the past?  No  6  Do you have/have you experienced any new shortness of breath recently? Yes  7  Do you have any new diarrhea, nausea or vomiting? No  8  Have you been in contact with anyone who has been sick or diagnosed with COVID-19? No  9  Do you have any new loss of taste or smell? No  10  Are you able to wear a mask without a valve for the entire visit?  Yes  Patient has chronic bronchitis

## 2020-07-11 NOTE — PROGRESS NOTES
Assessment/Plan:       Diagnoses and all orders for this visit:    COPD with exacerbation ([facility] )    [facility] ([facility] )  -     venlafaxine 225 MG TB24; Take 1 tablet (225 mg total) by mouth 2 (two) times a day  -     venlafaxine 150 MG TB24; Take 1 tablet (150 mg total) by mouth daily with breakfast  -     doxepin (SINEquan) 50 mg capsule; Take 1 capsule (50 mg total) by mouth daily at bedtime  -     doxepin (SINEquan) 150 MG capsule; Take 1 capsule (150 mg total) by mouth daily at bedtime  -     ARIPiprazole (ABILIFY) 2 mg tablet; Take 1 tablet (2 mg total) by mouth daily at bedtime                Subjective:      Patient ID: Suzi Morfin is a 63 y o  female  Coughing, short of breath, fever x1 week  Patient well known to have severe COPD presents  She continues to smoke  Using nebulizers at home  Not on corticosteroids  Also complaining of left hip pain after a fall  The following portions of the patient's history were reviewed and updated as appropriate:   She has a past medical history of Anemia, Anemia, Cardiac disorder, CHF (congestive heart failure) ([facility] ), Constipation, COPD (chronic obstructive pulmonary disease) ([facility] ), Depression, Fibromyalgia, Fibromyalgia, primary, Head injury, Heart disorder, Malignant neoplasm ([facility] ), Migraines, Myocardial infarction ([facility] ), Occasional tremors, Osteoarthritis, Osteoporosis, Problems with swallowing, PTSD (post-traumatic stress disorder), Restless leg syndrome, Skin cancer, Stomach problems, and Tobacco abuse ,  does not have any pertinent problems on file  ,   has a past surgical history that includes Gallbladder surgery; Tonsillectomy; Gastric bypass; Back surgery; Other surgical history (Left); Arthrodesis; Bladder surgery; Foot surgery (Right); Abdominal surgery; Hand surgery (Left); Adenoidectomy; Toe Surgery (Right); Ankle surgery (Right); Femur Surgery (Right); Hemorrhoid surgery; Toenail excision; Multiple tooth extractions;  Cholecystectomy; and Colonoscopy  ,  family history includes Arthritis in her mother; Cancer in her maternal aunt; Diabetes in her brother; Heart attack in her father; Heart disease in her brother and father; Hypertension in her father and mother; Mental illness in her brother and father; Neuropathy in her father; Obesity in her mother and sister; Osteoarthritis in her family; Osteoporosis in her family; Scoliosis in her family; Uterine cancer in her mother  ,   reports that she has been smoking cigarettes  She has a 21 00 pack-year smoking history  She has never used smokeless tobacco  She reports that she drank alcohol  She reports that she has current or past drug history  Drug: Marijuana  Frequency: 7 00 times per week  ,  is allergic to morphine and related; quetiapine; and sulfa antibiotics     Current Outpatient Medications   Medication Sig Dispense Refill    albuterol (2 5 mg/3 mL) 0 083 % nebulizer solution Take 1 vial (2 5 mg total) by nebulization every 6 (six) hours as needed for wheezing or shortness of breath (Patient taking differently: Take 2 5 mg by nebulization every 6 (six) hours as needed for wheezing or shortness of breath ) 1080 mL 3    albuterol (PROVENTIL HFA,VENTOLIN HFA) 90 mcg/act inhaler Inhale 2 puffs every 6 (six) hours as needed for wheezing 1 Inhaler 5    ARIPiprazole (ABILIFY) 2 mg tablet Take 1 tablet (2 mg total) by mouth daily at bedtime 90 tablet 0    Blood Glucose Monitoring Suppl (ONE TOUCH ULTRA 2) w/Device KIT Test daily and as instructed 1 each 0    Blood Pressure Monitoring (BLOOD PRESSURE CUFF) MISC by Does not apply route 2 (two) times a day 1 each 0    Blood Pressure Monitoring (SPHYGMOMANOMETER) MISC Medically necessary to monitor blood pressure due to orthostatic hypotension 1 each 0    buprenorphine-naloxone (SUBOXONE) 2-0 5 mg per SL tablet Place under the tongue 2 (two) times a day       butalbital-aspirin-caffeine (FIORINAL) -40 MG per tablet Take 1 tablet by mouth every 4 (four) hours as needed for headaches      clonazePAM (KlonoPIN) 0 5 mg tablet Take 0 5 mg by mouth 3 (three) times a day   1    diphenhydrAMINE (BENADRYL) 25 mg tablet Take 25 mg by mouth every 6 (six) hours as needed for itching      doxepin (SINEquan) 150 MG capsule Take 1 capsule (150 mg total) by mouth daily at bedtime 90 capsule 0    doxepin (SINEquan) 50 mg capsule Take 1 capsule (50 mg total) by mouth daily at bedtime 90 capsule 0    famotidine (PEPCID) 20 mg tablet Take 1 tablet (20 mg total) by mouth 2 (two) times a day 1 PO  tablet 3    fluticasone (FLONASE) 50 mcg/act nasal spray 1 spray 2 (two) times a day as needed for rhinitis   2    gabapentin (NEURONTIN) 800 mg tablet Take 800 mg by mouth 4 (four) times a day  3    glucose blood (ONE TOUCH ULTRA TEST) test strip Patient to test two times daily 200 each 3    Lancets (ONETOUCH ULTRASOFT) lancets Patient to test two times daily 200 each 3    lidocaine (XYLOCAINE) 5 % ointment Apply topically 2 (two) times a day as needed for mild pain 35 44 g 11    midodrine (PROAMATINE) 5 mg tablet 1 5 p  o  T i d , last dose before 6:00 p m  (Patient taking differently: Take 5 mg by mouth 2 (two) times a day 1 5 p  o  T i d , last dose before 6:00 p m ) 270 tablet 0    pantoprazole (PROTONIX) 40 mg tablet Take 1 tablet (40 mg total) by mouth 2 (two) times a day 60 tablet 3    primidone (MYSOLINE) 50 mg tablet Three p o  Q h s  270 tablet 1    Respiratory Therapy Supplies (NEBULIZER/TUBING/MOUTHPIECE) KIT Use up to 6 times daily as needed for lung symptoms 1 each 3    rOPINIRole (REQUIP) 0 25 mg tablet TAKE 1 TABLET (0 25 MG TOTAL) BY MOUTH 3 (THREE) TIMES A  tablet 1    Simethicone (GAS-X PO) Take 1 tablet by mouth as needed      TiZANidine (ZANAFLEX) 2 MG capsule Take 1 capsule (2 mg total) by mouth 3 (three) times a day as needed for muscle spasms 90 capsule 11    venlafaxine 150 MG TB24 Take 1 tablet (150 mg total) by mouth daily with breakfast 90 tablet 0    venlafaxine 225 MG TB24 Take 1 tablet (225 mg total) by mouth 2 (two) times a day 90 tablet 0    WIXELA INHUB 250-50 MCG/DOSE inhaler INHALE 1 PUFF TWICE A DAY, RINSE MOUTH AFTER USE 3 Inhaler 4    zafirlukast (ACCOLATE) 20 MG tablet Take 1 tablet (20 mg total) by mouth 2 (two) times a day before meals 180 tablet 3    clotrimazole (LOTRIMIN) 1 % cream Apply topically 2 (two) times a day for 14 days 30 g 0     No current facility-administered medications for this visit  Review of Systems   Constitutional: Positive for chills, fatigue and fever  Respiratory: Positive for cough, shortness of breath and wheezing  Musculoskeletal: Positive for arthralgias, back pain and gait problem  Objective:  Vitals:    07/11/20 1313   BP: 118/70   Pulse: 96   Temp: 100 °F (37 8 °C)   SpO2: 90%      Physical Exam   Constitutional:  Non-toxic appearance  She does not appear ill  Female patient  She is overweight  She appears chronically ill  She is a bit tachypneic but not in respiratory distress  Not using accessory muscles  No retractions  Cardiovascular: Normal rate  Pulmonary/Chest: Effort normal  She has decreased breath sounds  She has rhonchi in the right middle field, the right lower field, the left middle field and the left lower field  She has rales in the right middle field and the right lower field  Abdominal: Soft  Lymphadenopathy:     She has no cervical adenopathy  Skin: Skin is warm and dry  Patient Instructions     Patient with exacerbation of COPD and possible right middle lobe pneumonia is referred to the emergency room for further assessment

## 2020-07-11 NOTE — ED NOTES
Patient transported to [address] [place] [address], [address] [place] [address]  07/11/20 [id number]

## 2020-07-12 ENCOUNTER — HOSPITAL ENCOUNTER (INPATIENT)
Facility: HOSPITAL | Age: 60
LOS: 1 days | Discharge: HOME HEALTH | DRG: 194 | End: 2020-07-13
Attending: FAMILY MEDICINE | Admitting: INTERNAL MEDICINE
Payer: COMMERCIAL

## 2020-07-12 DIAGNOSIS — J44.1 COPD WITH EXACERBATION (HCC): ICD-10-CM

## 2020-07-12 DIAGNOSIS — I95.1 ORTHOSTATIC HYPOTENSION: ICD-10-CM

## 2020-07-12 DIAGNOSIS — J18.9 PNEUMONIA OF BOTH LOWER LOBES DUE TO INFECTIOUS ORGANISM: ICD-10-CM

## 2020-07-12 DIAGNOSIS — M54.50 ACUTE BILATERAL LOW BACK PAIN WITHOUT SCIATICA: Primary | ICD-10-CM

## 2020-07-12 DIAGNOSIS — R29.6 FALLS FREQUENTLY: ICD-10-CM

## 2020-07-12 DIAGNOSIS — Z72.0 TOBACCO ABUSE: ICD-10-CM

## 2020-07-12 LAB
ATRIAL RATE: 91 BPM
BACTERIA UR QL AUTO: NORMAL /HPF
BILIRUB UR QL STRIP: NEGATIVE
CLARITY UR: CLEAR
COLOR UR: YELLOW
EST. AVERAGE GLUCOSE BLD GHB EST-MCNC: 128 MG/DL
GLUCOSE SERPL-MCNC: 237 MG/DL (ref 65–140)
GLUCOSE SERPL-MCNC: 238 MG/DL (ref 65–140)
GLUCOSE SERPL-MCNC: 274 MG/DL (ref 65–140)
GLUCOSE SERPL-MCNC: 305 MG/DL (ref 65–140)
GLUCOSE SERPL-MCNC: 332 MG/DL (ref 65–140)
GLUCOSE UR STRIP-MCNC: ABNORMAL MG/DL
HBA1C MFR BLD: 6.1 %
HGB UR QL STRIP.AUTO: NEGATIVE
HYALINE CASTS #/AREA URNS LPF: NORMAL /LPF
KETONES UR STRIP-MCNC: NEGATIVE MG/DL
LEUKOCYTE ESTERASE UR QL STRIP: ABNORMAL
NITRITE UR QL STRIP: NEGATIVE
NON-SQ EPI CELLS URNS QL MICRO: NORMAL /HPF
P AXIS: 61 DEGREES
PH UR STRIP.AUTO: 6.5 [PH]
PR INTERVAL: 162 MS
PROCALCITONIN SERPL-MCNC: 0.74 NG/ML
PROT UR STRIP-MCNC: ABNORMAL MG/DL
QRS AXIS: 24 DEGREES
QRSD INTERVAL: 84 MS
QT INTERVAL: 352 MS
QTC INTERVAL: 432 MS
RBC #/AREA URNS AUTO: NORMAL /HPF
S PNEUM AG UR QL: NEGATIVE
SP GR UR STRIP.AUTO: 1.02 (ref 1–1.03)
T WAVE AXIS: 36 DEGREES
TSH SERPL DL<=0.05 MIU/L-ACNC: 0.62 UIU/ML (ref 0.36–3.74)
UROBILINOGEN UR QL STRIP.AUTO: 1 E.U./DL
VENTRICULAR RATE: 91 BPM
WBC #/AREA URNS AUTO: NORMAL /HPF

## 2020-07-12 PROCEDURE — 99221 1ST HOSP IP/OBS SF/LOW 40: CPT | Performed by: NEUROLOGICAL SURGERY

## 2020-07-12 PROCEDURE — 99223 1ST HOSP IP/OBS HIGH 75: CPT | Performed by: INTERNAL MEDICINE

## 2020-07-12 PROCEDURE — 94760 N-INVAS EAR/PLS OXIMETRY 1: CPT

## 2020-07-12 PROCEDURE — 94640 AIRWAY INHALATION TREATMENT: CPT

## 2020-07-12 PROCEDURE — 81001 URINALYSIS AUTO W/SCOPE: CPT | Performed by: INTERNAL MEDICINE

## 2020-07-12 PROCEDURE — 87449 NOS EACH ORGANISM AG IA: CPT | Performed by: INTERNAL MEDICINE

## 2020-07-12 PROCEDURE — 84443 ASSAY THYROID STIM HORMONE: CPT | Performed by: INTERNAL MEDICINE

## 2020-07-12 PROCEDURE — 80307 DRUG TEST PRSMV CHEM ANLYZR: CPT | Performed by: PHYSICIAN ASSISTANT

## 2020-07-12 PROCEDURE — 82948 REAGENT STRIP/BLOOD GLUCOSE: CPT

## 2020-07-12 PROCEDURE — 83036 HEMOGLOBIN GLYCOSYLATED A1C: CPT | Performed by: INTERNAL MEDICINE

## 2020-07-12 PROCEDURE — 93010 ELECTROCARDIOGRAM REPORT: CPT | Performed by: INTERNAL MEDICINE

## 2020-07-12 PROCEDURE — 84145 PROCALCITONIN (PCT): CPT | Performed by: INTERNAL MEDICINE

## 2020-07-12 RX ORDER — GUAIFENESIN 600 MG
600 TABLET, EXTENDED RELEASE 12 HR ORAL 2 TIMES DAILY
Status: DISCONTINUED | OUTPATIENT
Start: 2020-07-12 | End: 2020-07-13 | Stop reason: HOSPADM

## 2020-07-12 RX ORDER — LEVALBUTEROL 1.25 MG/.5ML
1.25 SOLUTION, CONCENTRATE RESPIRATORY (INHALATION)
Status: DISCONTINUED | OUTPATIENT
Start: 2020-07-12 | End: 2020-07-13 | Stop reason: HOSPADM

## 2020-07-12 RX ORDER — ALBUTEROL SULFATE 2.5 MG/3ML
SOLUTION RESPIRATORY (INHALATION)
Status: COMPLETED
Start: 2020-07-12 | End: 2020-07-12

## 2020-07-12 RX ORDER — ACETAMINOPHEN 325 MG/1
650 TABLET ORAL EVERY 6 HOURS PRN
Status: DISCONTINUED | OUTPATIENT
Start: 2020-07-12 | End: 2020-07-13 | Stop reason: HOSPADM

## 2020-07-12 RX ORDER — ARIPIPRAZOLE 2 MG/1
2 TABLET ORAL
Status: DISCONTINUED | OUTPATIENT
Start: 2020-07-12 | End: 2020-07-13 | Stop reason: HOSPADM

## 2020-07-12 RX ORDER — METHYLPREDNISOLONE SODIUM SUCCINATE 40 MG/ML
40 INJECTION, POWDER, LYOPHILIZED, FOR SOLUTION INTRAMUSCULAR; INTRAVENOUS EVERY 8 HOURS
Status: DISCONTINUED | OUTPATIENT
Start: 2020-07-12 | End: 2020-07-13

## 2020-07-12 RX ORDER — AZITHROMYCIN 250 MG/1
250 TABLET, FILM COATED ORAL EVERY 24 HOURS
Status: DISCONTINUED | OUTPATIENT
Start: 2020-07-12 | End: 2020-07-13

## 2020-07-12 RX ORDER — VENLAFAXINE HYDROCHLORIDE 150 MG/1
150 CAPSULE, EXTENDED RELEASE ORAL DAILY
Status: DISCONTINUED | OUTPATIENT
Start: 2020-07-12 | End: 2020-07-13 | Stop reason: HOSPADM

## 2020-07-12 RX ORDER — SODIUM CHLORIDE FOR INHALATION 0.9 %
3 VIAL, NEBULIZER (ML) INHALATION
Status: DISCONTINUED | OUTPATIENT
Start: 2020-07-12 | End: 2020-07-13 | Stop reason: HOSPADM

## 2020-07-12 RX ORDER — ROPINIROLE 0.25 MG/1
0.25 TABLET, FILM COATED ORAL 3 TIMES DAILY
Status: DISCONTINUED | OUTPATIENT
Start: 2020-07-12 | End: 2020-07-13 | Stop reason: HOSPADM

## 2020-07-12 RX ORDER — MAGNESIUM HYDROXIDE/ALUMINUM HYDROXICE/SIMETHICONE 120; 1200; 1200 MG/30ML; MG/30ML; MG/30ML
15 SUSPENSION ORAL EVERY 6 HOURS PRN
Status: DISCONTINUED | OUTPATIENT
Start: 2020-07-12 | End: 2020-07-13 | Stop reason: HOSPADM

## 2020-07-12 RX ORDER — CLONAZEPAM 0.5 MG/1
0.5 TABLET ORAL 2 TIMES DAILY PRN
Status: DISCONTINUED | OUTPATIENT
Start: 2020-07-12 | End: 2020-07-13 | Stop reason: HOSPADM

## 2020-07-12 RX ORDER — MIDODRINE HYDROCHLORIDE 5 MG/1
5 TABLET ORAL
Status: DISCONTINUED | OUTPATIENT
Start: 2020-07-12 | End: 2020-07-13 | Stop reason: HOSPADM

## 2020-07-12 RX ORDER — ONDANSETRON 2 MG/ML
4 INJECTION INTRAMUSCULAR; INTRAVENOUS EVERY 6 HOURS PRN
Status: DISCONTINUED | OUTPATIENT
Start: 2020-07-12 | End: 2020-07-13 | Stop reason: HOSPADM

## 2020-07-12 RX ORDER — PANTOPRAZOLE SODIUM 40 MG/1
40 TABLET, DELAYED RELEASE ORAL
Status: DISCONTINUED | OUTPATIENT
Start: 2020-07-12 | End: 2020-07-13 | Stop reason: HOSPADM

## 2020-07-12 RX ORDER — DOCUSATE SODIUM 100 MG/1
100 CAPSULE, LIQUID FILLED ORAL 2 TIMES DAILY PRN
Status: DISCONTINUED | OUTPATIENT
Start: 2020-07-12 | End: 2020-07-13 | Stop reason: HOSPADM

## 2020-07-12 RX ORDER — NICOTINE 21 MG/24HR
1 PATCH, TRANSDERMAL 24 HOURS TRANSDERMAL DAILY
Status: DISCONTINUED | OUTPATIENT
Start: 2020-07-12 | End: 2020-07-13 | Stop reason: HOSPADM

## 2020-07-12 RX ORDER — BUPRENORPHINE AND NALOXONE 2; .5 MG/1; MG/1
1 FILM, SOLUBLE BUCCAL; SUBLINGUAL 2 TIMES DAILY
Status: DISCONTINUED | OUTPATIENT
Start: 2020-07-12 | End: 2020-07-13 | Stop reason: HOSPADM

## 2020-07-12 RX ORDER — FAMOTIDINE 20 MG/1
20 TABLET, FILM COATED ORAL 2 TIMES DAILY
Status: DISCONTINUED | OUTPATIENT
Start: 2020-07-12 | End: 2020-07-13 | Stop reason: HOSPADM

## 2020-07-12 RX ORDER — BUPRENORPHINE AND NALOXONE 2; .5 MG/1; MG/1
1 FILM, SOLUBLE BUCCAL; SUBLINGUAL DAILY
Status: DISCONTINUED | OUTPATIENT
Start: 2020-07-12 | End: 2020-07-12

## 2020-07-12 RX ORDER — DOXEPIN HYDROCHLORIDE 50 MG/1
50 CAPSULE ORAL
Status: DISCONTINUED | OUTPATIENT
Start: 2020-07-12 | End: 2020-07-13 | Stop reason: HOSPADM

## 2020-07-12 RX ORDER — GABAPENTIN 300 MG/1
300 CAPSULE ORAL 4 TIMES DAILY
Status: DISCONTINUED | OUTPATIENT
Start: 2020-07-12 | End: 2020-07-13 | Stop reason: HOSPADM

## 2020-07-12 RX ORDER — NAPROXEN 250 MG/1
500 TABLET ORAL ONCE
Status: COMPLETED | OUTPATIENT
Start: 2020-07-12 | End: 2020-07-12

## 2020-07-12 RX ORDER — PRIMIDONE 50 MG/1
50 TABLET ORAL
Status: DISCONTINUED | OUTPATIENT
Start: 2020-07-12 | End: 2020-07-13 | Stop reason: HOSPADM

## 2020-07-12 RX ORDER — FLUTICASONE PROPIONATE 50 MCG
1 SPRAY, SUSPENSION (ML) NASAL DAILY
Status: DISCONTINUED | OUTPATIENT
Start: 2020-07-12 | End: 2020-07-13 | Stop reason: HOSPADM

## 2020-07-12 RX ORDER — MONTELUKAST SODIUM 10 MG/1
10 TABLET ORAL
Status: DISCONTINUED | OUTPATIENT
Start: 2020-07-12 | End: 2020-07-13 | Stop reason: HOSPADM

## 2020-07-12 RX ADMIN — FAMOTIDINE 20 MG: 20 TABLET, FILM COATED ORAL at 17:06

## 2020-07-12 RX ADMIN — ISODIUM CHLORIDE 3 ML: 0.03 SOLUTION RESPIRATORY (INHALATION) at 20:08

## 2020-07-12 RX ADMIN — ISODIUM CHLORIDE 3 ML: 0.03 SOLUTION RESPIRATORY (INHALATION) at 07:17

## 2020-07-12 RX ADMIN — GUAIFENESIN 600 MG: 600 TABLET, EXTENDED RELEASE ORAL at 17:06

## 2020-07-12 RX ADMIN — VENLAFAXINE HYDROCHLORIDE 150 MG: 150 CAPSULE, EXTENDED RELEASE ORAL at 08:17

## 2020-07-12 RX ADMIN — GABAPENTIN 300 MG: 300 CAPSULE ORAL at 17:06

## 2020-07-12 RX ADMIN — ENOXAPARIN SODIUM 40 MG: 40 INJECTION SUBCUTANEOUS at 08:17

## 2020-07-12 RX ADMIN — CLONAZEPAM 0.5 MG: 0.5 TABLET ORAL at 10:56

## 2020-07-12 RX ADMIN — BUPRENORPHINE AND NALOXONE 1 FILM: 2; .5 FILM, SOLUBLE BUCCAL; SUBLINGUAL at 08:18

## 2020-07-12 RX ADMIN — ROPINIROLE HYDROCHLORIDE 0.25 MG: 0.25 TABLET, FILM COATED ORAL at 08:17

## 2020-07-12 RX ADMIN — AZITHROMYCIN 250 MG: 250 TABLET, FILM COATED ORAL at 08:17

## 2020-07-12 RX ADMIN — GABAPENTIN 300 MG: 300 CAPSULE ORAL at 22:21

## 2020-07-12 RX ADMIN — FAMOTIDINE 20 MG: 20 TABLET, FILM COATED ORAL at 08:17

## 2020-07-12 RX ADMIN — LEVALBUTEROL HYDROCHLORIDE 1.25 MG: 1.25 SOLUTION, CONCENTRATE RESPIRATORY (INHALATION) at 20:08

## 2020-07-12 RX ADMIN — ROPINIROLE HYDROCHLORIDE 0.25 MG: 0.25 TABLET, FILM COATED ORAL at 22:21

## 2020-07-12 RX ADMIN — METHYLPREDNISOLONE SODIUM SUCCINATE 40 MG: 40 INJECTION, POWDER, FOR SOLUTION INTRAMUSCULAR; INTRAVENOUS at 03:36

## 2020-07-12 RX ADMIN — ROPINIROLE HYDROCHLORIDE 0.25 MG: 0.25 TABLET, FILM COATED ORAL at 15:04

## 2020-07-12 RX ADMIN — METHYLPREDNISOLONE SODIUM SUCCINATE 40 MG: 40 INJECTION, POWDER, FOR SOLUTION INTRAMUSCULAR; INTRAVENOUS at 10:56

## 2020-07-12 RX ADMIN — PANTOPRAZOLE SODIUM 40 MG: 40 TABLET, DELAYED RELEASE ORAL at 06:58

## 2020-07-12 RX ADMIN — INSULIN LISPRO 3 UNITS: 100 INJECTION, SOLUTION INTRAVENOUS; SUBCUTANEOUS at 11:01

## 2020-07-12 RX ADMIN — LEVALBUTEROL HYDROCHLORIDE 1.25 MG: 1.25 SOLUTION, CONCENTRATE RESPIRATORY (INHALATION) at 12:17

## 2020-07-12 RX ADMIN — ISODIUM CHLORIDE 3 ML: 0.03 SOLUTION RESPIRATORY (INHALATION) at 12:17

## 2020-07-12 RX ADMIN — PRIMIDONE 50 MG: 50 TABLET ORAL at 22:21

## 2020-07-12 RX ADMIN — ALBUTEROL SULFATE 2.5 MG: 2.5 SOLUTION RESPIRATORY (INHALATION) at 05:22

## 2020-07-12 RX ADMIN — CEFTRIAXONE SODIUM 1000 MG: 10 INJECTION, POWDER, FOR SOLUTION INTRAVENOUS at 20:10

## 2020-07-12 RX ADMIN — ARIPIPRAZOLE 2 MG: 2 TABLET ORAL at 22:21

## 2020-07-12 RX ADMIN — NAPROXEN 500 MG: 250 TABLET ORAL at 00:18

## 2020-07-12 RX ADMIN — INSULIN LISPRO 5 UNITS: 100 INJECTION, SOLUTION INTRAVENOUS; SUBCUTANEOUS at 16:27

## 2020-07-12 RX ADMIN — INSULIN LISPRO 3 UNITS: 100 INJECTION, SOLUTION INTRAVENOUS; SUBCUTANEOUS at 06:59

## 2020-07-12 RX ADMIN — GUAIFENESIN 600 MG: 600 TABLET, EXTENDED RELEASE ORAL at 08:17

## 2020-07-12 RX ADMIN — MIDODRINE HYDROCHLORIDE 5 MG: 5 TABLET ORAL at 06:58

## 2020-07-12 RX ADMIN — DOXEPIN HYDROCHLORIDE 50 MG: 50 CAPSULE ORAL at 22:21

## 2020-07-12 RX ADMIN — BUPRENORPHINE AND NALOXONE 1 FILM: 2; .5 FILM, SOLUBLE BUCCAL; SUBLINGUAL at 17:06

## 2020-07-12 RX ADMIN — MIDODRINE HYDROCHLORIDE 5 MG: 5 TABLET ORAL at 15:07

## 2020-07-12 RX ADMIN — INSULIN LISPRO 3 UNITS: 100 INJECTION, SOLUTION INTRAVENOUS; SUBCUTANEOUS at 22:28

## 2020-07-12 RX ADMIN — LEVALBUTEROL HYDROCHLORIDE 1.25 MG: 1.25 SOLUTION, CONCENTRATE RESPIRATORY (INHALATION) at 07:17

## 2020-07-12 RX ADMIN — NICOTINE 1 PATCH: 21 PATCH, EXTENDED RELEASE TRANSDERMAL at 08:17

## 2020-07-12 RX ADMIN — METHYLPREDNISOLONE SODIUM SUCCINATE 40 MG: 40 INJECTION, POWDER, FOR SOLUTION INTRAMUSCULAR; INTRAVENOUS at 18:19

## 2020-07-12 RX ADMIN — CLONAZEPAM 0.5 MG: 0.5 TABLET ORAL at 22:21

## 2020-07-12 NOTE — ASSESSMENT & PLAN NOTE
· Possibly fall is due to generalized weakness exacerbating previous history of muscular this conditioning  However with history of back pain, will also get lumbosacral MRI  · Thoracolumbar reconstruction film does not reveal any abnormality  · CT cervical spine no acute fracture or traumatic malalignment  · Neurosurgical consult pending acute finding; would consider OP follow up if weakness resolves   · MRI of the lumbosacral spines

## 2020-07-12 NOTE — ASSESSMENT & PLAN NOTE
With bilateral involvement for pneumonia; started on Rocephin with azithromycin  We will continue both antibiotics  Meanwhile will also get sputum cultures and pneumonia studies as per protocol  Will continue with nebulizations and respiratory support  Continue to monitor CBC, blood culture and metabolic profile  Oxygenation as needed
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
## 2020-07-12 NOTE — EMTALA/ACUTE CARE TRANSFER
[address] [place] [address]  [address] [address] [place] [address]  Dept: [phone]      EMTALA TRANSFER CONSENT    NAME Joshua Luque                                         1960                              MRN [id number]    I have been informed of my rights regarding examination, treatment, and transfer   by Dr Liborio Weber, *    Benefits: Specialized equipment and/or services available at the receiving facility (Include comment)________________________(MRI)    Risks: Potential for delay in receiving treatment, Potential deterioration of medical condition, Loss of IV, Increased discomfort during transfer, Possible worsening of condition or death during transfer      Consent for Transfer:  I acknowledge that my medical condition has been evaluated and explained to me by the emergency department physician or other qualified medical person and/or my attending physician, who has recommended that I be transferred to the service of  Accepting Physician: Eddy Harden at [address] Name, [address] : [facility]  The above potential benefits of such transfer, the potential risks associated with such transfer, and the probable risks of not being transferred have been explained to me, and I fully understand them  The doctor has explained that, in my case, the benefits of transfer outweigh the risks  I agree to be transferred  I authorize the performance of emergency medical procedures and treatments upon me in both transit and upon arrival at the receiving facility  Additionally, I authorize the release of any and all medical records to the receiving facility and request they be transported with me, if possible  I understand that the safest mode of transportation during a medical emergency is an ambulance and that the Hospital advocates the use of this mode of transport   Risks of traveling to the receiving facility by car, including absence of medical control, life sustaining equipment, such as oxygen, and medical personnel has been explained to me and I fully understand them  (JENNA CORRECT BOX BELOW)  [  ]  I consent to the stated transfer and to be transported by ambulance/helicopter  [  ]  I consent to the stated transfer, but refuse transportation by ambulance and accept full responsibility for my transportation by car  I understand the risks of non-ambulance transfers and I exonerate the Hospital and its staff from any deterioration in my condition that results from this refusal     X___________________________________________    DATE  20  TIME________  Signature of patient or legally responsible individual signing on patient behalf           RELATIONSHIP TO PATIENT_________________________          Provider Certification    NAME [address] [place] [address] 1960                              MRN [id number]    A medical screening exam was performed on the above named patient  Based on the examination:    Condition Necessitating Transfer The primary encounter diagnosis was Pneumonia  Diagnoses of Fall, initial encounter, Weakness, and Urinary incontinence were also pertinent to this visit      Patient Condition: The patient has been stabilized such that within reasonable medical probability, no material deterioration of the patient condition or the condition of the unborn child(nica) is likely to result from the transfer    Reason for Transfer: Other (Include comment)____________________(needs MRI - unable to obtain MRI at this facility in timely manner)    Transfer Requirements: Facility [facility]   · Space available and qualified personnel available for treatment as acknowledged by Jane Gagnon  · Agreed to accept transfer and to provide appropriate medical treatment as acknowledged by       Pietro Augustine  · Appropriate medical records of the examination and treatment of the patient are provided at the time of transfer   [address] _______  · Transfer will be performed by qualified personnel from    and appropriate transfer equipment as required, including the use of necessary and appropriate life support measures  Provider Certification: I have examined the patient and explained the following risks and benefits of being transferred/refusing transfer to the patient/family:  General risk, such as traffic hazards, adverse weather conditions, rough terrain or turbulence, possible failure of equipment (including vehicle or aircraft), or consequences of actions of persons outside the control of the transport personnel      Based on these reasonable risks and benefits to the patient and/or the unborn child(nica), and based upon the information available at the time of the patients examination, I certify that the medical benefits reasonably to be expected from the provision of appropriate medical treatments at another medical facility outweigh the increasing risks, if any, to the individuals medical condition, and in the case of labor to the unborn child, from effecting the transfer      X____________________________________________ DATE 07/11/20        TIME_______      ORIGINAL - SEND TO MEDICAL RECORDS   COPY - SEND WITH PATIENT DURING TRANSFER

## 2020-07-12 NOTE — PLAN OF CARE
Problem: PAIN - ADULT  Goal: Verbalizes/displays adequate comfort level or baseline comfort level  Description  Interventions:  - Encourage patient to monitor pain and request assistance  - Assess pain using appropriate pain scale  - Administer analgesics based on type and severity of pain and evaluate response  - Implement non-pharmacological measures as appropriate and evaluate response  - Consider cultural and social influences on pain and pain management  - Notify physician/advanced practitioner if interventions unsuccessful or patient reports new pain  Outcome: Progressing     Problem: INFECTION - ADULT  Goal: Absence or prevention of progression during hospitalization  Description  INTERVENTIONS:  - Assess and monitor for signs and symptoms of infection  - Monitor lab/diagnostic results  - Monitor all insertion sites, i e  indwelling lines, tubes, and drains  - Monitor endotracheal if appropriate and nasal secretions for changes in amount and color  - [place] appropriate cooling/warming therapies per order  - Administer medications as ordered  - Instruct and encourage patient and family to use good hand hygiene technique  - Identify and instruct in appropriate isolation precautions for identified infection/condition  Outcome: Progressing  Goal: Absence of fever/infection during neutropenic period  Description  INTERVENTIONS:  - Monitor WBC    Outcome: Progressing     Problem: SAFETY ADULT  Goal: Patient will remain free of falls  Description  INTERVENTIONS:  - Assess patient frequently for physical needs  -  Identify cognitive and physical deficits and behaviors that affect risk of falls    -  [place] fall precautions as indicated by assessment   - Educate patient/family on patient safety including physical limitations  - Instruct patient to call for assistance with activity based on assessment  - Modify environment to reduce risk of injury  - Consider OT/PT consult to assist with strengthening/mobility  Outcome: Progressing  Goal: Maintain or return to baseline ADL function  Description  INTERVENTIONS:  -  Assess patient's ability to carry out ADLs; assess patient's baseline for ADL function and identify physical deficits which impact ability to perform ADLs (bathing, care of mouth/teeth, toileting, grooming, dressing, etc )  - Assess/evaluate cause of self-care deficits   - Assess range of motion  - Assess patient's mobility; develop plan if impaired  - Assess patient's need for assistive devices and provide as appropriate  - Encourage maximum independence but intervene and supervise when necessary  - Involve family in performance of ADLs  - Assess for home care needs following discharge   - Consider OT consult to assist with ADL evaluation and planning for discharge  - Provide patient education as appropriate  Outcome: Progressing  Goal: Maintain or return mobility status to optimal level  Description  INTERVENTIONS:  - Assess patient's baseline mobility status (ambulation, transfers, stairs, etc )    - Identify cognitive and physical deficits and behaviors that affect mobility  - Identify mobility aids required to assist with transfers and/or ambulation (gait belt, sit-to-stand, lift, walker, cane, etc )  - [place] fall precautions as indicated by assessment  - Record patient progress and toleration of activity level on Mobility SBAR; progress patient to next Phase/Stage  - Instruct patient to call for assistance with activity based on assessment  - Consider rehabilitation consult to assist with strengthening/weightbearing, etc   Outcome: Progressing     Problem: DISCHARGE PLANNING  Goal: Discharge to home or other facility with appropriate resources  Description  INTERVENTIONS:  - Identify barriers to discharge w/patient and caregiver  - Arrange for needed discharge resources and transportation as appropriate  - Identify discharge learning needs (meds, wound care, etc )  - Arrange for interpretive services to assist at discharge as needed  - Refer to Case Management Department for coordinating discharge planning if the patient needs post-hospital services based on physician/advanced practitioner order or complex needs related to functional status, cognitive ability, or social support system  Outcome: Progressing     Problem: Knowledge Deficit  Goal: Patient/family/caregiver demonstrates understanding of disease process, treatment plan, medications, and discharge instructions  Description  Complete learning assessment and assess knowledge base    Interventions:  - Provide teaching at level of understanding  - Provide teaching via preferred learning methods  Outcome: Progressing
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
## 2020-07-12 NOTE — ED PROVIDER NOTES
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 needed for wheezing, Starting Thu 8/22/2019, Normal      ARIPiprazole (ABILIFY) 2 mg tablet Take 1 tablet (2 mg total) by mouth daily at bedtime, Starting Sat 7/11/2020, No Print      Blood Glucose Monitoring Suppl (ONE TOUCH ULTRA 2) w/Device KIT Test daily and as instructed, Normal      Blood Pressure Monitoring (BLOOD PRESSURE CUFF) MISC by Does not apply route 2 (two) times a day, Starting Tue 9/24/2019, Print      Blood Pressure Monitoring (SPHYGMOMANOMETER) MISC Medically necessary to monitor blood pressure due to orthostatic hypotension, Print      buprenorphine-naloxone (SUBOXONE) 2-0 5 mg per SL tablet Place under the tongue 2 (two) times a day , Historical Med      butalbital-aspirin-caffeine (FIORINAL) -40 MG per tablet Take 1 tablet by mouth every 4 (four) hours as needed for headaches, Historical Med      clonazePAM (KlonoPIN) 0 5 mg tablet Take 0 5 mg by mouth 3 (three) times a day , Starting Thu 1/18/2018, Historical Med      diphenhydrAMINE (BENADRYL) 25 mg tablet Take 25 mg by mouth every 6 (six) hours as needed for itching, Historical Med      !! doxepin (SINEquan) 150 MG capsule Take 1 capsule (150 mg total) by mouth daily at bedtime, Starting Sat 7/11/2020, No Print      !! doxepin (SINEquan) 50 mg capsule Take 1 capsule (50 mg total) by mouth daily at bedtime, Starting Sat 7/11/2020, No Print      famotidine (PEPCID) 20 mg tablet Take 1 tablet (20 mg total) by mouth 2 (two) times a day 1 PO BID, Starting Tue 4/28/2020, Normal      fluticasone (FLONASE) 50 mcg/act nasal spray 1 spray 2 (two) times a day as needed for rhinitis , Starting Mon 4/23/2018, Historical Med      gabapentin (NEURONTIN) 800 mg tablet Take 800 mg by mouth 4 (four) times a day, Starting Tue 1/16/2018, Historical Med      glucose blood (ONE TOUCH ULTRA TEST) test strip Patient to test two times daily, Normal      Lancets (ONETOUCH ULTRASOFT) lancets Patient to test two times daily, Normal      lidocaine (XYLOCAINE) 5 % ointment Apply topically 2 (two) times a day as needed for mild pain, Starting Fri 2/7/2020, Normal      pantoprazole (PROTONIX) 40 mg tablet Take 1 tablet (40 mg total) by mouth 2 (two) times a day, Starting Tue 3/3/2020, Normal      primidone (MYSOLINE) 50 mg tablet Three p o  Q h s , Normal      Respiratory Therapy Supplies (NEBULIZER/TUBING/MOUTHPIECE) KIT Use up to 6 times daily as needed for lung symptoms, Normal      rOPINIRole (REQUIP) 0 25 mg tablet TAKE 1 TABLET (0 25 MG TOTAL) BY MOUTH 3 (THREE) TIMES A DAY, Starting Mon 6/1/2020, Normal      Simethicone (GAS-X PO) Take 1 tablet by mouth as needed, Historical Med      TiZANidine (ZANAFLEX) 2 MG capsule Take 1 capsule (2 mg total) by mouth 3 (three) times a day as needed for muscle spasms, Starting Thu 10/24/2019, Normal      !! venlafaxine 150 MG TB24 Take 1 tablet (150 mg total) by mouth daily with breakfast, Starting Sat 7/11/2020, No Print      !! venlafaxine 225 MG TB24 Take 1 tablet (225 mg total) by mouth 2 (two) times a day, Starting Sat 7/11/2020, No Print      [employer] INHUB 250-50 MCG/DOSE inhaler INHALE 1 PUFF TWICE A DAY, RINSE MOUTH AFTER USE, Normal      zafirlukast (ACCOLATE) 20 MG tablet Take 1 tablet (20 mg total) by mouth 2 (two) times a day before meals, Starting Mon 9/16/2019, Normal      midodrine (PROAMATINE) 5 mg tablet 1 5 p  o  T i d , last dose before 6:00 p m , Normal      clotrimazole (LOTRIMIN) 1 % cream Apply topically 2 (two) times a day for 14 days, Starting Wed 10/9/2019, Until u 6/4/2020, Normal       !! - Potential duplicate medications found  Please discuss with provider  No discharge procedures on file      PDMP Review     None          ED Provider  Electronically Signed by           Yani Mclian DO  07/14/20 3329

## 2020-07-12 NOTE — ASSESSMENT & PLAN NOTE
· Respiratory support and oxygenation support as needed   · Given Solu-Medrol 125 mg x 1 in the ED ; Solu-Medrol 40 mg Q 8 hours for 24 hours minimum  · Will defer down titration to 7/13 team pending clinical improvement   · Nebulizations, flonase and montelukast to continue  · Respiratory protocol

## 2020-07-12 NOTE — ASSESSMENT & PLAN NOTE
Although both insulin and C-peptide are within normal limits; hemoglobin A1c mildly elevated at 6 3; most likely with tendency to elevated blood sugars when given steroid  Will start patient on insulin sliding scale and place patient on diabetic diet

## 2020-07-12 NOTE — ASSESSMENT & PLAN NOTE
· With bilateral involvement for pneumonia  · Continue Rocephin with azithromycin    · Sputum cultures pending   · Urine antigens pending  · Procalcitonin elevated at 0 74; will continue to trend   · Will continue with nebulizations and respiratory support as appropriate    · Continue to monitor CBC, blood culture and metabolic profile  · On 7/12 patient does endorse improvement in her breathing however does note that it gets extremely worse when she is ambulating or has any type of exertion  · Will order ambulatory oxygen evaluation for 7/13  · Recommend following up OP with her primary pulmonology team Dr Tanvir Baker in [facility]

## 2020-07-12 NOTE — ASSESSMENT & PLAN NOTE
· Although both insulin and C-peptide are within normal limits; hemoglobin A1c mildly elevated at 6 3; most likely with tendency to elevated blood sugars when given steroid      · Suspect persistent elevation in the setting of IV steroids   · Continue insulin sliding scale   · Carb controlled diet

## 2020-07-12 NOTE — PROGRESS NOTES
Post Admission Follow Up Note -   Rafael Diaz 1960, 63 y o  female MRN: [id number]    Unit/Bed#: -01 Encounter: [id number]    Primary Care Provider: Riley Morfin MD   Date and time admitted to hospital: 7/12/2020  2:49 AM      * Pneumonia of both lungs due to infectious organism  Assessment & Plan  · With bilateral involvement for pneumonia  · Continue Rocephin with azithromycin    · Sputum cultures pending   · Urine antigens pending  · Procalcitonin elevated at 0 74; will continue to trend   · Will continue with nebulizations and respiratory support as appropriate    · Continue to monitor CBC, blood culture and metabolic profile  [facility]  Assessment & Plan  · Respiratory support and oxygenation support as needed   · Given Solu-Medrol 125 mg x 1 in the ED ; Solu-Medrol 40 mg Q 8 hours for 24 hours minimum  · Will defer down titration to 7/13 team pending clinical improvement   · Nebulizations, flonase and montelukast to continue  · Respiratory protocol     Severe depression ([facility] )  Assessment & Plan  · Patient on Abilify, Sinequan and Effexor  Pain syndrome, chronic  Assessment & Plan  · Patient on Suboxone  Falls frequently  Assessment & Plan  · Possibly fall is due to generalized weakness exacerbating previous history of muscular this conditioning  However with history of back pain, will also get lumbosacral MRI  · Thoracolumbar reconstruction film does not reveal any abnormality  · CT cervical spine no acute fracture or traumatic malalignment  · Neurosurgery consulted; further recommendations await MRI results  · MRI of the lumbosacral spines  Hyperglycemia  Assessment & Plan  · Although both insulin and C-peptide are within normal limits; hemoglobin A1c mildly elevated at 6 3; most likely with tendency to elevated blood sugars when given steroid      · Suspect persistent elevation in the setting of IV steroids   · Continue insulin sliding scale   · Carb controlled diet     Tremor, anxiety related  Assessment & Plan  · Continue Requip  Tobacco abuse  Assessment & Plan  · Nicotine replacement  Advised to discontinue smoking  VTE Pharmacologic Prophylaxis:   Pharmacologic: Enoxaparin (Lovenox)  Mechanical VTE Prophylaxis in Place: Yes    Patient Centered Rounds: I have performed bedside rounds with nursing staff today  Discussions with Specialists or Other Care Team Provider: nursing staff     Education and Discussions with Family / Patient: education provided at the bedside in regards to plan of care as noted above     Time Spent for Care: 30 minutes  More than 50% of total time spent on counseling and coordination of care as described above  Current Length of Stay: 0 day(s)    Current Patient Status: Inpatient   Certification Statement: The patient will continue to require additional inpatient hospital stay due to IV steroids and antibiotics    Discharge Plan: pending clinical progress  24-48 hours  Code Status: Level 1 - Full Code      Subjective:   Patient reports that overall her breathing is much improved  Objective:     Vitals:   Temp (24hrs), Av 9 °F (37 2 °C), Min:98 2 °F (36 8 °C), Max:100 °F (37 8 °C)    Temp:  [98 2 °F (36 8 °C)-100 °F (37 8 °C)] 98 2 °F (36 8 °C)  HR:  [80-99] 82  Resp:  [19-23] 19  BP: (105-130)/(55-94) 127/82  SpO2:  [87 %-97 %] 94 %  Body mass index is 36 67 kg/m²  Input and Output Summary (last 24 hours): Intake/Output Summary (Last 24 hours) at 2020 1228  Last data filed at 2020 1007  Gross per 24 hour   Intake 600 ml   Output 400 ml   Net 200 ml       Physical Exam:     Physical Exam   Constitutional: She is oriented to person, place, and time  She appears well-nourished  She is cooperative  No distress  Cardiovascular: Normal rate, regular rhythm, normal heart sounds and intact distal pulses  No murmur heard    Pulses:       Radial pulses are 2+ on the right side, and 2+ on the left side  No peripheral edema noted  Pulmonary/Chest: No tachypnea and no bradypnea  No respiratory distress  She has wheezes  Coarse breath sounds appreciated in BLL fields  Abdominal: Soft  Bowel sounds are normal  She exhibits no distension  There is no tenderness  Musculoskeletal: She exhibits no edema  Neurological: She is alert and oriented to person, place, and time  No cranial nerve deficit  Skin: Skin is warm and dry  Capillary refill takes less than 2 seconds  Psychiatric: She has a normal mood and affect  Her speech is normal and behavior is normal  Judgment normal    Vitals reviewed  Additional Data:     Labs:    Results from last 7 days   Lab Units 07/11/20  1640   WBC Thousand/uL 11 51*   HEMOGLOBIN g/dL 9 4*   HEMATOCRIT % 28 3*   PLATELETS Thousands/uL 263   NEUTROS PCT % 77*   LYMPHS PCT % 15   MONOS PCT % 6   EOS PCT % 1     Results from last 7 days   Lab Units 07/11/20  1640   SODIUM mmol/L 136   POTASSIUM mmol/L 4 9   CHLORIDE mmol/L 103   CO2 mmol/L 26   BUN mg/dL 14   CREATININE mg/dL 0 91   ANION GAP mmol/L 7   CALCIUM mg/dL 8 6   ALBUMIN g/dL 3 4*   TOTAL BILIRUBIN mg/dL 0 70   ALK PHOS U/L 104   ALT U/L 15   AST U/L 25   GLUCOSE RANDOM mg/dL 134     Results from last 7 days   Lab Units 07/11/20  1640   INR  1 01     Results from last 7 days   Lab Units 07/12/20  1047 07/12/20  0640 07/12/20  0619   POC GLUCOSE mg/dl 238* 237* 274*     Results from last 7 days   Lab Units 07/12/20  0443   HEMOGLOBIN A1C % 6 1*     Results from last 7 days   Lab Units 07/12/20  0443   PROCALCITONIN ng/ml 0 74*           * I Have Reviewed All Lab Data Listed Above  * Additional Pertinent Lab Tests [facility]ed[facility]    Imaging:    Imaging Reports Reviewed Today Include: CT spine, CT chest abdomen and pelvis, CT head     Imaging Personally Reviewed by Myself Includes:  none    Recent Cultures (last 7 days):     Results from last 7 days   Lab Units 07/11/20 1948   BLOOD CULTURE  Received in Microbiology Lab  Culture in Progress  Received in Microbiology Lab  Culture in Progress  Last 24 Hours Medication List:     Current Facility-Administered Medications:  acetaminophen 650 mg Oral Q6H PRN Praveena Median, MD   aluminum-magnesium hydroxide-simethicone 15 mL Oral Q6H PRN Praveena Median, MD   ARIPiprazole 2 mg Oral HS Praveena Median, MD   azithromycin 250 mg Oral Q24H Praveena Median, MD   buprenorphine-naloxone 1 Film Sublingual BID Praveena Median, MD   cefTRIAXone 1,000 mg Intravenous Q24H Praveena Median, MD   clonazePAM 0 5 mg Oral BID PRN Praveena Median, MD   docusate sodium 100 mg Oral BID PRN Praveena Median, MD   doxepin 50 mg Oral HS Praveena Median, MD   enoxaparin 40 mg Subcutaneous Daily Praveena Median, MD   famotidine 20 mg Oral BID Praveena Median, MD   fluticasone 1 spray Each Nare Daily Praveena Median, MD   guaiFENesin 600 mg Oral BID Praveena Median, MD   insulin lispro 1-5 Units Subcutaneous HS Praveena Median, MD   insulin lispro 1-6 Units Subcutaneous TID AC Praveena Median, MD   levalbuterol 1 25 mg Nebulization TID Praveena Median, MD   And       sodium chloride 3 mL Nebulization TID Praveena Median, MD   methylPREDNISolone sodium succinate 40 mg Intravenous Q8H Praveena Median, MD   midodrine 5 mg Oral BID AC Praveena Median, MD   montelukast 10 mg Oral HS Praveena Median, MD   nicotine 1 patch Transdermal Daily Praveena Median, MD   ondansetron 4 mg Intravenous Q6H PRN Praveena Median, MD   pantoprazole 40 mg Oral Early Morning Praveena Median, MD   primidone 50 mg Oral HS Praveena Median, MD   rOPINIRole 0 25 mg Oral TID Praveena Median, MD   venlafaxine 150 mg Oral Daily Praveena Median, MD        Today, Patient Was Seen By: GARFIELD Carmona    ** Please Note: Dictation voice to text software may have been used in the creation of this document   **

## 2020-07-12 NOTE — ASSESSMENT & PLAN NOTE
Nebulizations to continue  Respiratory support and oxygenation support  Patient started on Solu-Medrol 125 mg x 1 and to continue Solu-Medrol 40 mg Q 8 hours  Nebulizations to continue  Flonase and montelukast to continue

## 2020-07-12 NOTE — ASSESSMENT & PLAN NOTE
Possibly fall is due to generalized weakness exacerbating previous history of muscular this conditioning  However with history of back pain, will also get lumbosacral MRI  So far thoracolumbar reconstruction film does not reveal any abnormality  Neuro surgical consult  MRI of the lumbosacral spines
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
## 2020-07-13 ENCOUNTER — TELEPHONE (OUTPATIENT)
Dept: INTERNAL MEDICINE CLINIC | Facility: CLINIC | Age: 60
End: 2020-07-13

## 2020-07-13 ENCOUNTER — APPOINTMENT (INPATIENT)
Dept: RADIOLOGY | Facility: HOSPITAL | Age: 60
DRG: 194 | End: 2020-07-13
Payer: COMMERCIAL

## 2020-07-13 VITALS
DIASTOLIC BLOOD PRESSURE: 83 MMHG | SYSTOLIC BLOOD PRESSURE: 149 MMHG | HEIGHT: 63 IN | TEMPERATURE: 97.8 F | OXYGEN SATURATION: 98 % | HEART RATE: 90 BPM | WEIGHT: 205.03 LBS | BODY MASS INDEX: 36.33 KG/M2 | RESPIRATION RATE: 16 BRPM

## 2020-07-13 LAB
ANION GAP SERPL CALCULATED.3IONS-SCNC: 4 MMOL/L (ref 4–13)
BASOPHILS # BLD AUTO: 0.02 THOUSANDS/ΜL (ref 0–0.1)
BASOPHILS NFR BLD AUTO: 0 % (ref 0–1)
BUN SERPL-MCNC: 17 MG/DL (ref 5–25)
CALCIUM SERPL-MCNC: 8.8 MG/DL (ref 8.3–10.1)
CHLORIDE SERPL-SCNC: 106 MMOL/L (ref 100–108)
CO2 SERPL-SCNC: 27 MMOL/L (ref 21–32)
CREAT SERPL-MCNC: 0.78 MG/DL (ref 0.6–1.3)
EOSINOPHIL # BLD AUTO: 0.01 THOUSAND/ΜL (ref 0–0.61)
EOSINOPHIL NFR BLD AUTO: 0 % (ref 0–6)
ERYTHROCYTE [DISTWIDTH] IN BLOOD BY AUTOMATED COUNT: 17 % (ref 11.6–15.1)
GFR SERPL CREATININE-BSD FRML MDRD: 84 ML/MIN/1.73SQ M
GLUCOSE SERPL-MCNC: 141 MG/DL (ref 65–140)
GLUCOSE SERPL-MCNC: 177 MG/DL (ref 65–140)
GLUCOSE SERPL-MCNC: 204 MG/DL (ref 65–140)
HCT VFR BLD AUTO: 28.6 % (ref 34.8–46.1)
HGB BLD-MCNC: 9.4 G/DL (ref 11.5–15.4)
IMM GRANULOCYTES # BLD AUTO: 0.07 THOUSAND/UL (ref 0–0.2)
IMM GRANULOCYTES NFR BLD AUTO: 1 % (ref 0–2)
L PNEUMO1 AG UR QL IA.RAPID: NEGATIVE
LYMPHOCYTES # BLD AUTO: 3.16 THOUSANDS/ΜL (ref 0.6–4.47)
LYMPHOCYTES NFR BLD AUTO: 25 % (ref 14–44)
MCH RBC QN AUTO: 29.4 PG (ref 26.8–34.3)
MCHC RBC AUTO-ENTMCNC: 32.9 G/DL (ref 31.4–37.4)
MCV RBC AUTO: 89 FL (ref 82–98)
MONOCYTES # BLD AUTO: 0.95 THOUSAND/ΜL (ref 0.17–1.22)
MONOCYTES NFR BLD AUTO: 8 % (ref 4–12)
NEUTROPHILS # BLD AUTO: 8.34 THOUSANDS/ΜL (ref 1.85–7.62)
NEUTS SEG NFR BLD AUTO: 66 % (ref 43–75)
NRBC BLD AUTO-RTO: 0 /100 WBCS
PLATELET # BLD AUTO: 290 THOUSANDS/UL (ref 149–390)
PMV BLD AUTO: 10.6 FL (ref 8.9–12.7)
POTASSIUM SERPL-SCNC: 4.1 MMOL/L (ref 3.5–5.3)
PROCALCITONIN SERPL-MCNC: 0.36 NG/ML
RBC # BLD AUTO: 3.2 MILLION/UL (ref 3.81–5.12)
SODIUM SERPL-SCNC: 137 MMOL/L (ref 136–145)
WBC # BLD AUTO: 12.55 THOUSAND/UL (ref 4.31–10.16)

## 2020-07-13 PROCEDURE — 85025 COMPLETE CBC W/AUTO DIFF WBC: CPT | Performed by: NURSE PRACTITIONER

## 2020-07-13 PROCEDURE — 94760 N-INVAS EAR/PLS OXIMETRY 1: CPT

## 2020-07-13 PROCEDURE — 99239 HOSP IP/OBS DSCHRG MGMT >30: CPT | Performed by: NURSE PRACTITIONER

## 2020-07-13 PROCEDURE — 82948 REAGENT STRIP/BLOOD GLUCOSE: CPT

## 2020-07-13 PROCEDURE — 84145 PROCALCITONIN (PCT): CPT | Performed by: INTERNAL MEDICINE

## 2020-07-13 PROCEDURE — 99232 SBSQ HOSP IP/OBS MODERATE 35: CPT | Performed by: NURSE PRACTITIONER

## 2020-07-13 PROCEDURE — 97163 PT EVAL HIGH COMPLEX 45 MIN: CPT

## 2020-07-13 PROCEDURE — A9585 GADOBUTROL INJECTION: HCPCS | Performed by: EMERGENCY MEDICINE

## 2020-07-13 PROCEDURE — 72158 MRI LUMBAR SPINE W/O & W/DYE: CPT

## 2020-07-13 PROCEDURE — 80048 BASIC METABOLIC PNL TOTAL CA: CPT | Performed by: NURSE PRACTITIONER

## 2020-07-13 PROCEDURE — 94640 AIRWAY INHALATION TREATMENT: CPT

## 2020-07-13 RX ORDER — PREDNISONE 20 MG/1
40 TABLET ORAL DAILY
Qty: 15 TABLET | Refills: 0 | Status: SHIPPED | OUTPATIENT
Start: 2020-07-13 | End: 2020-07-16

## 2020-07-13 RX ORDER — AMOXICILLIN AND CLAVULANATE POTASSIUM 875; 125 MG/1; MG/1
1 TABLET, FILM COATED ORAL EVERY 12 HOURS SCHEDULED
Status: DISCONTINUED | OUTPATIENT
Start: 2020-07-13 | End: 2020-07-13 | Stop reason: HOSPADM

## 2020-07-13 RX ORDER — PREDNISONE 20 MG/1
20 TABLET ORAL DAILY
Status: DISCONTINUED | OUTPATIENT
Start: 2020-07-19 | End: 2020-07-13 | Stop reason: HOSPADM

## 2020-07-13 RX ORDER — AZITHROMYCIN 500 MG/1
500 TABLET, FILM COATED ORAL EVERY 24 HOURS
Status: DISCONTINUED | OUTPATIENT
Start: 2020-07-14 | End: 2020-07-13 | Stop reason: HOSPADM

## 2020-07-13 RX ORDER — AZITHROMYCIN 500 MG/1
500 TABLET, FILM COATED ORAL EVERY 24 HOURS
Qty: 5 TABLET | Refills: 0 | Status: SHIPPED | OUTPATIENT
Start: 2020-07-14 | End: 2020-07-19

## 2020-07-13 RX ORDER — PREDNISONE 20 MG/1
40 TABLET ORAL DAILY
Status: DISCONTINUED | OUTPATIENT
Start: 2020-07-13 | End: 2020-07-13 | Stop reason: HOSPADM

## 2020-07-13 RX ORDER — NICOTINE 21 MG/24HR
1 PATCH, TRANSDERMAL 24 HOURS TRANSDERMAL DAILY
Qty: 28 PATCH | Refills: 0 | Status: SHIPPED | OUTPATIENT
Start: 2020-07-14

## 2020-07-13 RX ORDER — PREDNISONE 10 MG/1
10 TABLET ORAL DAILY
Status: DISCONTINUED | OUTPATIENT
Start: 2020-07-22 | End: 2020-07-13 | Stop reason: HOSPADM

## 2020-07-13 RX ORDER — AMOXICILLIN AND CLAVULANATE POTASSIUM 875; 125 MG/1; MG/1
1 TABLET, FILM COATED ORAL EVERY 12 HOURS SCHEDULED
Qty: 10 TABLET | Refills: 0 | Status: SHIPPED | OUTPATIENT
Start: 2020-07-13 | End: 2020-07-18

## 2020-07-13 RX ORDER — MIDODRINE HYDROCHLORIDE 5 MG/1
TABLET ORAL
Qty: 270 TABLET | Refills: 0 | Status: SHIPPED | OUTPATIENT
Start: 2020-07-13 | End: 2020-10-05

## 2020-07-13 RX ADMIN — GABAPENTIN 300 MG: 300 CAPSULE ORAL at 08:40

## 2020-07-13 RX ADMIN — FLUTICASONE PROPIONATE 1 SPRAY: 50 SPRAY, METERED NASAL at 08:41

## 2020-07-13 RX ADMIN — LEVALBUTEROL HYDROCHLORIDE 1.25 MG: 1.25 SOLUTION, CONCENTRATE RESPIRATORY (INHALATION) at 07:30

## 2020-07-13 RX ADMIN — NICOTINE 1 PATCH: 21 PATCH, EXTENDED RELEASE TRANSDERMAL at 08:40

## 2020-07-13 RX ADMIN — METHYLPREDNISOLONE SODIUM SUCCINATE 40 MG: 40 INJECTION, POWDER, FOR SOLUTION INTRAMUSCULAR; INTRAVENOUS at 11:07

## 2020-07-13 RX ADMIN — PANTOPRAZOLE SODIUM 40 MG: 40 TABLET, DELAYED RELEASE ORAL at 05:04

## 2020-07-13 RX ADMIN — AMOXICILLIN AND CLAVULANATE POTASSIUM 1 TABLET: 875; 125 TABLET, FILM COATED ORAL at 13:39

## 2020-07-13 RX ADMIN — GUAIFENESIN 600 MG: 600 TABLET, EXTENDED RELEASE ORAL at 08:40

## 2020-07-13 RX ADMIN — ISODIUM CHLORIDE 3 ML: 0.03 SOLUTION RESPIRATORY (INHALATION) at 13:10

## 2020-07-13 RX ADMIN — INSULIN LISPRO 2 UNITS: 100 INJECTION, SOLUTION INTRAVENOUS; SUBCUTANEOUS at 11:09

## 2020-07-13 RX ADMIN — LEVALBUTEROL HYDROCHLORIDE 1.25 MG: 1.25 SOLUTION, CONCENTRATE RESPIRATORY (INHALATION) at 13:09

## 2020-07-13 RX ADMIN — FAMOTIDINE 20 MG: 20 TABLET, FILM COATED ORAL at 08:40

## 2020-07-13 RX ADMIN — ISODIUM CHLORIDE 3 ML: 0.03 SOLUTION RESPIRATORY (INHALATION) at 07:30

## 2020-07-13 RX ADMIN — BUPRENORPHINE AND NALOXONE 1 FILM: 2; .5 FILM, SOLUBLE BUCCAL; SUBLINGUAL at 08:41

## 2020-07-13 RX ADMIN — GADOBUTROL 10 ML: 604.72 INJECTION INTRAVENOUS at 01:22

## 2020-07-13 RX ADMIN — VENLAFAXINE HYDROCHLORIDE 150 MG: 150 CAPSULE, EXTENDED RELEASE ORAL at 08:40

## 2020-07-13 RX ADMIN — MIDODRINE HYDROCHLORIDE 5 MG: 5 TABLET ORAL at 05:04

## 2020-07-13 RX ADMIN — ROPINIROLE HYDROCHLORIDE 0.25 MG: 0.25 TABLET, FILM COATED ORAL at 08:40

## 2020-07-13 RX ADMIN — GABAPENTIN 300 MG: 300 CAPSULE ORAL at 11:07

## 2020-07-13 RX ADMIN — METHYLPREDNISOLONE SODIUM SUCCINATE 40 MG: 40 INJECTION, POWDER, FOR SOLUTION INTRAMUSCULAR; INTRAVENOUS at 05:03

## 2020-07-13 RX ADMIN — AZITHROMYCIN 250 MG: 250 TABLET, FILM COATED ORAL at 08:41

## 2020-07-13 NOTE — PROGRESS NOTES
Progress Note - Stevo Fowler 1960, 63 y o  female MRN: [id number]    Unit/Bed#: -[facility] Encounter: [id number]    Primary Care Provider: Cheryl Schulz MD   Date and time admitted to hospital: 7/12/2020  2:49 AM        Falls frequently  Assessment & Plan  Low back and bilateral leg pain with subjective lower extremity weakness  Patient presented with 2 weeks of increasing B/L LE pain  Patient has had prior lumbar fusion at [facility] in 2015  Imaging:    CT head 07/11/2020: No acute intracranial abnormality  CT cervical spine 07/11/2020: No cervical spine fracture or traumatic malalignment  CT thoracolumbar 07/11/2020: No acute posttraumatic abnormality detected  MRI lumbar 07/13/2020: Postsurgical changes of dorsal spinal decompression and fixation from L3 to L5 with L3-L4 and L4-5 interbody fusion  Right central and proximal foraminal protrusion at L5-S1 encroaching on the right subarticular recess, correlate for right S1 radiculopathy  Interval improvement in the previously noted right-sided foraminal stenosis at L3-L4  Plan:  · Continue frequent neurological checks  · Reviewed imaging with patient  · Medical and pain management per primary team  · Patient follows with outpatient pain management and is currently on Suboxone  · Recommend PT/OT recommending home with outpatient therapy  · No neurosurgical intervention warranted at this time  · DVT PPX: SCDs and SQ lovenox       Neurosurgery will sign off, patient can follow on as-needed basis, call with any questions or concerns          Subjective/Objective      Chief Complaint: "I feel great today"    Subjective:  Patient continues to report constant lower back pain 6/10 which radiates into bilateral legs  She also reports increase in urinary frequency  Patient also continues to report subjective lower extremity weakness  She reports current pain regimen is helping with pain    Patient also complaining of a burning sensation in bilateral lower extremities from the mid calf to toes on lateral aspect  She denies any headaches, dizziness, blurry vision, abdominal pain, nausea, vomiting, diarrhea, no problems with bowel or bladder, no new weakness or numbness/tingling  Patient reports improvement in chest pain and shortness of breath  She reports she has not had a BM but is passing gas  She also denies any urinary incontinence  Objective:  Patient comfortably sitting in bed with legs crossed, coloring, NAD  I/O       07/11 0701 - 07/12 0700 07/12 0701 - 07/13 0700 07/13 0701 - 07/14 0700    P  O   1500     Total Intake(mL/kg)  1500 (16 1)     Urine (mL/kg/hr)  400 (0 2)     Total Output  400     Net  +1100            Unmeasured Urine Occurrence  2 x           Invasive Devices     None                 Physical Exam:  Vitals: Blood pressure 149/83, pulse 90, temperature 97 8 °F (36 6 °C), resp  rate 16, height 5' 3" (1 6 m), weight 93 kg (205 lb 0 4 oz), SpO2 98 %  ,Body mass index is 36 32 kg/m²      General appearance: alert, appears stated age, cooperative and no distress  Head: Normocephalic, without obvious abnormality, atraumatic  Eyes: EOMI, PERRL, conjugate gaze  Back: no kyphosis present, mild TTP to thoracic/lumbar spine  Lungs: non labored breathing  Heart: regular heart rate  Neurologic:   Mental status: Alert, oriented x3, thought content appropriate, speech is clear, following commands  Cranial nerves: grossly intact (Cranial nerves II-XII)  Sensory: normal to LT in all extremities, decreased sensation in bilateral lateral lower extremities, JPS and DST intact  Motor: moving all extremities without focal weakness strength 5/5 throughout except for B/L HF 4/5  Reflexes: 1+ and symmetric, no Murphy's or clonus appreciated  Coordination: finger to nose normal bilaterally, no drift bilaterally      Lab Results:  Results from last 7 days   Lab Units 07/13/20  0501 07/11/20  1640   WBC Thousand/uL 12 55* 11 51*   HEMOGLOBIN g/dL 9 4* 9 4* HEMATOCRIT % 28 6* 28 3*   PLATELETS Thousands/uL 290 263   NEUTROS PCT % 66 77*   MONOS PCT % 8 6     Results from last 7 days   Lab Units 07/13/20  0501 07/11/20  1640   POTASSIUM mmol/L 4 1 4 9   CHLORIDE mmol/L 106 103   CO2 mmol/L 27 26   BUN mg/dL 17 14   CREATININE mg/dL 0 78 0 91   CALCIUM mg/dL 8 8 8 6   ALK PHOS U/L  --  104   ALT U/L  --  15   AST U/L  --  25             Results from last 7 days   Lab Units 07/11/20  1640   INR  1 01   PTT seconds 39*     No results found for: TROPONINT  ABG:No results found for: PHART, AEW7VLI, PO2ART, HSA7TVK, M3BFYVOB, BEART, SOURCE    Imaging Studies: I have personally reviewed pertinent reports  and I have personally reviewed pertinent films in PACS    Mri Lumbar Spine W Wo Contrast    Result Date: 7/13/2020  Impression: Postsurgical changes of dorsal spinal decompression and fixation from L3 to L5 with L3-L4 and L4-5 interbody fusion  Right central and proximal foraminal protrusion at L5-S1 encroaching on the right subarticular recess, correlate for right S1 radiculopathy  Interval improvement in the previously noted right-sided foraminal stenosis at L3-L4  Workstation performed: [id number]       EKG, Pathology, and Other Studies: I have personally reviewed pertinent reports        VTE Pharmacologic Prophylaxis: Enoxaparin (Lovenox)    VTE Mechanical Prophylaxis: sequential compression device

## 2020-07-13 NOTE — SOCIAL WORK
CM was informed that pt was inquiring about bed rails and a raised toilet seat  CM met with pt and pt's significant other at bedside to discuss  CM informed that [facility] does not supply bed rails  CM offered to order pt a bedside commode that she could remove the bucket and place over her toilet to raise it, however pt reported that there is not enough room in the bathroom to do so  Pt reported that she plans to get both bed rails and raised toilet seats outside of the hospital     Pt confirmed that her sig other is to provide transportation at time of discharge  [facility] able to accept pt, pt aware of same

## 2020-07-13 NOTE — ASSESSMENT & PLAN NOTE
Patient presented to [facility] with increase SOB  Imaging:    CT chest abdomen pelvis 07/11/2020:No acute posttraumatic abnormality identified in the chest, abdomen or pelvis  Background emphysema with superimposed groundglass opacities bilaterally have significantly worsened during the interval and is consistent with either diffuse infection or pulmonary edema  Stable probably reactive mediastinal and right hilar adenopathy      Plan:  · Patient remains afebrile  · Procalcitonin improved from 0 74 to 0 34 (7/13)  · WBCs increased to 12 55  · Blood cultures x2 drawn on 7/11 show no growth for 24/hrs  · Sputum culture and Gram stain pending  · Continue Augmentin and azithromycin  · Management per primary team, continue to trend and follow cultures
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
## 2020-07-13 NOTE — DISCHARGE SUMMARY
Discharge- Jorge Castanon 1960, 63 y o  female MRN: [id number]    Unit/Bed#: -01 Encounter: [id number]    Primary Care Provider: Quita Lopez MD   Date and time admitted to hospital: 7/12/2020  2:49 AM    * Pneumonia of both lungs due to infectious organism  Assessment & Plan  · With bilateral involvement for pneumonia  · Continue Rocephin with azithromycin    · Transition to PO Augmentin for additional 5 days and azithromycin for additional 5 days   · Sputum cultures not collected as patient not having productive cough  · Urine antigens negative   · Procalcitonin elevated at 0 74; downtrend appreciated 0 36  · Will continue with nebulizations and respiratory support as appropriate    · Continue to monitor CBC, blood culture and metabolic profile  · On 7/12 patient does endorse improvement in her breathing however does note that it gets extremely worse when she is ambulating or has any type of exertion  · Home oxygen discussed with patient and she reports that she does not need an evaluation as her exertional dyspnea is much improved; will defer to primary Pulmonary team   · Recommend following up OP with her primary pulmonology team Dr Alan Alonso in [place]; recommended setting up appointment following prednisone taper     COPD with exacerbation [facility])  Assessment & Plan  · Respiratory support and oxygenation support as needed   · Given Solu-Medrol 125 mg x 1 in the ED   · Status post Solu-Medrol 40 mg Q 8 hours for 24 hours; transitioned to PO prednisone on long taper at time of discharge  · Nebulizations, flonase and montelukast to continue  · Respiratory protocol     Severe depression ([address] )  Assessment & Plan  · Patient on Abilify, Sinequan and Effexor  Pain syndrome, chronic  Assessment & Plan  · Patient on Suboxone  Falls frequently  Assessment & Plan  · Possibly fall is due to generalized weakness exacerbating previous history of muscular this conditioning   However with history of back pain, will also get lumbosacral MRI  · Thoracolumbar reconstruction film does not reveal any abnormality  · CT cervical spine no acute fracture or traumatic malalignment  · Neurosurgical consult completed; input as noted below   · No obvious pathology on CT   · Recommend PT/OT evaluations completed recommending HHPT  · Case Management informed in regards to [facility] PT as well as DME requests  · MRI of the lumbar spine w/wo constrast revealed " Postsurgical changes of dorsal spinal decompression and fixation from L3 to L5 with L3-L4 and L4-5 interbody fusion  Right central and proximal foraminal protrusion at L5-S1 encroaching on the right subarticular recess, correlate for right S1 radiculopathy  Interval improvement in the previously noted right-sided foraminal stenosis at L3-L4 "     Hyperglycemia  Assessment & Plan  · Although both insulin and C-peptide are within normal limits; hemoglobin A1c mildly elevated at 6 3; most likely with tendency to elevated blood sugars when given steroid  · Suspect persistent elevation in the setting of IV steroids   · Continue insulin sliding scale   · Carb controlled diet     Tremor, anxiety related  Assessment & Plan  · Continue Requip  Tobacco abuse  Assessment & Plan  · Nicotine replacement  Advised to discontinue smoking      Discharging Physician / Practitioner: GARFIELD Haddad  PCP: [facility], MD  Admission Date:   Admission Orders (From admission, onward)     Ordered        07/12/20 0256  Inpatient Admission  Once         07/12/20 0251  Inpatient Admission  Once                   Discharge Date: 07/13/20    Resolved Problems  Date Reviewed: 7/13/2020    None          Consultations During Hospital Stay:  · Neurosurgery  · PT  · OT     Procedures Performed:   · MRI lumbar   · CT recon thoracolumbar  · CT chest abdomen pelvis w contrast   · CT spine cervical wo contrast  · CT head   · XR knee   · XR hip/pelvis    Significant Findings / Test Results:   · as noted above     Incidental Findings:   · none     Test Results Pending at Discharge (will require follow up):   · none     Outpatient Tests Requested:  · none    Complications:   none    Reason for Admission: shortness of breath     Hospital Course:     Joshua Luque is a 63 y o  female patient with past medical history of COPD, tobacco abuse currently, PTSD, MI, fibromyalgia, CHF, and anemia who originally presented to the hospital on 7/12/2020 due to shortness of breath  On review of admission documentation patient reported that she was generally weak and that she had had an increased number of falls recently  After imaging it appeared she was without fracture however did have pneumonia complicated by possible COPD exacerbation  IV steroids were started and subsequently transitioned to PO steroids on long taper at discharge  She was also started on IV abx and transitioned to PO for additional 5 days at discharge  Initially she was seen in [facility] however required transfer due to MRI lumbar and need for neurosurgery evaluation  PT and OT were recommended HH PT  Patient also requesting DME  Case management on board to assist with needs for home  Patient stable and ready for discharge  See rest of plan/hospital course as noted above  Please see above list of diagnoses and related plan for additional information  Condition at Discharge: stable     Discharge Day Visit / Exam:     Subjective:  Patient reports that overall she feels much better and that her exertional dyspnea has resolved   Vitals: Blood Pressure: 149/83 (07/13/20 0736)  Pulse: 90 (07/13/20 0736)  Temperature: 97 8 °F (36 6 °C) (07/13/20 0448)  Temp Source: Oral (07/12/20 1700)  Respirations: 16 (07/13/20 0448)  Height: 5' 3" (160 cm) (07/12/20 0322)  Weight - Scale: 93 kg (205 lb 0 4 oz) (07/13/20 0518)  SpO2: 98 % (07/13/20 1310)  Exam:   Physical Exam   Constitutional: She is oriented to person, place, and time   She appears well-nourished  She is cooperative  No distress  Cardiovascular: Normal rate, regular rhythm, normal heart sounds and intact distal pulses  No murmur heard  Pulses:       Radial pulses are 2+ on the right side, and 2+ on the left side  No peripheral edema noted  Pulmonary/Chest: Effort normal and breath sounds normal  No tachypnea and no bradypnea  No respiratory distress  She has no wheezes  Coarse breath sounds appreciated in BLL fields  Abdominal: Soft  Bowel sounds are normal  She exhibits no distension  There is no tenderness  Musculoskeletal: She exhibits no edema  Neurological: She is alert and oriented to person, place, and time  No cranial nerve deficit  Skin: Skin is warm and dry  Capillary refill takes less than 2 seconds  Psychiatric: She has a normal mood and affect  Her speech is normal and behavior is normal  Judgment normal    Vitals reviewed  Discharge instructions/Information to patient and family:   See after visit summary for information provided to patient and family  Provisions for Follow-Up Care:  See after visit summary for information related to follow-up care and any pertinent home health orders  Disposition:     Home with VNA Services (Reminder: Complete face to face encounter)       Discharge Statement:  I spent 45 minutes discharging the patient  This time was spent on the day of discharge  I had direct contact with the patient on the day of discharge  Greater than 50% of the total time was spent examining patient, answering all patient questions, arranging and discussing plan of care with patient as well as directly providing post-discharge instructions  Additional time then spent on discharge activities  Discharge Medications:  See after visit summary for reconciled discharge medications provided to patient and family        ** Please Note: This note has been constructed using a voice recognition system **

## 2020-07-13 NOTE — DISCHARGE INSTR - AVS FIRST PAGE
Dear Suzi Morfin,     It was our pleasure to care for you here at [facility]  It is our hope that we were always able to exceed the expected standards for your care during your stay  You were hospitalized due to shortness of breath  You were cared for on the 4th floor by Cristina Bentley under the service of Danielle Batista MD with the [facility] who covers for your primary care physician (PCP), Paul Metcalf MD, while you were hospitalized  If you have any questions or concerns related to this hospitalization, you may contact us at [phone]  For follow up as well as any medication refills, we recommend that you follow up with your primary care physician  A registered nurse will reach out to you by phone within a few days after your discharge to answer any additional questions that you may have after going home    However, at this time we provide for you here, the most important instructions / recommendations at discharge:     · Notable Medication Adjustments -   · Initiation of prednisone PO taper as outlined below   · 40 mg (2 tablets) for 3 days to be completed on 7/16  · 30 mg (1 5 tablets) for 3 days to be completed on 7/19  · 20 mg (1 tablets) for 3 days to be completed on 7/22  · 10 mg (0 5 tablets) for 3 days to be completed on 7/25  · Initiation of azithromycin 500mg once daily for 5 days   · Initiation of Augmentin 875-125mg every 12 hours for 5 days   · Testing Required after Discharge -   · If at some point in the future you feel like you may require oxygen with ambulation or exertion please see primary pulmonologist Dr Guera Mckeon for oxygen evaluation and orders   · Important follow up information -   · Continue to recommend full tobacco/smoking cessation   · Please follow up with Dr Guera Mckeon following completion of steroid taper  · Other Instructions -   · ***  · Please review this entire after visit summary as additional general instructions including medication list, appointments, activity, diet, any pertinent wound care, and other additional recommendations from your care team that may be provided for you        Sincerely,     GARFIELD Rosa

## 2020-07-13 NOTE — ASSESSMENT & PLAN NOTE
· With bilateral involvement for pneumonia  · Continue Rocephin with azithromycin    · Transition to PO Augmentin for additional 5 days and azithromycin for additional 5 days   · Sputum cultures not collected as patient not having productive cough  · Urine antigens negative   · Procalcitonin elevated at 0 74; downtrend appreciated 0 36  · Will continue with nebulizations and respiratory support as appropriate    · Continue to monitor CBC, blood culture and metabolic profile      · On 7/12 patient does endorse improvement in her breathing however does note that it gets extremely worse when she is ambulating or has any type of exertion  · Home oxygen discussed with patient and she reports that she does not need an evaluation as her exertional dyspnea is much improved; will defer to primary Pulmonary team   · Recommend following up OP with her primary pulmonology team Dr Ave Campbell in [place]; recommended setting up appointment following prednisone taper

## 2020-07-13 NOTE — ASSESSMENT & PLAN NOTE
· Possibly fall is due to generalized weakness exacerbating previous history of muscular this conditioning  However with history of back pain, will also get lumbosacral MRI  · Thoracolumbar reconstruction film does not reveal any abnormality  · CT cervical spine no acute fracture or traumatic malalignment  · Neurosurgical consult completed; input as noted below   · No obvious pathology on CT   · Recommend PT/OT evaluations completed recommending HHPT  · Case Management informed in regards to [facility] PT as well as DME requests  · MRI of the lumbar spine w/wo constrast revealed " Postsurgical changes of dorsal spinal decompression and fixation from L3 to L5 with L3-L4 and L4-5 interbody fusion  Right central and proximal foraminal protrusion at L5-S1 encroaching on the right subarticular recess, correlate for right S1 radiculopathy   Interval improvement in the previously noted right-sided foraminal stenosis at L3-L4 "

## 2020-07-13 NOTE — ASSESSMENT & PLAN NOTE
Low back and bilateral leg pain with subjective lower extremity weakness  Patient presented with 2 weeks of increasing B/L LE pain  [facility] [address] [facility]  Imaging:    CT head 07/11/2020: No acute intracranial abnormality  CT cervical spine 07/11/2020: No cervical spine fracture or traumatic malalignment  CT thoracolumbar 07/11/2020: No acute posttraumatic abnormality detected  MRI lumbar 07/13/2020: Postsurgical changes of dorsal spinal decompression and fixation from L3 to L5 with L3-L4 and L4-5 interbody fusion  Right central and proximal foraminal protrusion at L5-S1 encroaching on the right subarticular recess, correlate for right S1 radiculopathy  Interval improvement in the previously noted right-sided foraminal stenosis at L3-L4  Plan:  · Continue frequent neurological checks     · Reviewed imaging with patient  · Medical and pain management per primary team  · Patient follows with outpatient pain management and is currently on Suboxone  · Recommend PT/OT recommending home with outpatient therapy  · No neurosurgical intervention warranted at this time  · DVT PPX: SCDs and SQ lovenox       Neurosurgery will sign off, patient can follow on as-needed basis, call with any questions or concerns

## 2020-07-13 NOTE — ASSESSMENT & PLAN NOTE
· Respiratory support and oxygenation support as needed   · Given Solu-Medrol 125 mg x 1 in the ED   · Status post Solu-Medrol 40 mg Q 8 hours for 24 hours; transitioned to PO prednisone on long taper at time of discharge  · Nebulizations, flonase and montelukast to continue  · Respiratory protocol

## 2020-07-13 NOTE — SOCIAL WORK
CM met with pt at bedside to introduce CM role and begin discharge planning  Pt resides alone in a 1st floor apartment that has 0 HUMBLE  Pt reports that her boyfriend stays with her the majority of the time  Pt reports being able to dress and bathe on her own but requires some assistance at baseline  Pt has a rollator, rolling walker, wheelchair, and shower chair, pt declines need for bedside commode  Pt drives and receives SSD benefits  Pt indicates a history of VNA and a history of [facility] many years ago  Pt reports that she would like [facility] at discharge for SN/PT/OT, referral placed via [address]  Pt also indicated that she would like private pay caregivers, list provided to pt  Pt's emergency contact is her daughter, Manuel Krishnamurthy ([phone])  Pt reports that her boyfriend or daughter will be able to provide transportation at time of discharge  CM reviewed d/c planning process including the following: identifying help at home, patient preference for d/c planning needs, Discharge Lounge, [facility] Meds to Bed program, availability of treatment team to discuss questions or concerns patient and/or family may have regarding understanding medications and recognizing signs and symptoms once discharged  CM also encouraged patient to follow up with all recommended appointments after discharge  Patient advised of importance for patient and family to participate in managing patients medical well being

## 2020-07-14 ENCOUNTER — TELEPHONE (OUTPATIENT)
Dept: INTERNAL MEDICINE CLINIC | Facility: CLINIC | Age: 60
End: 2020-07-14

## 2020-07-14 ENCOUNTER — TRANSITIONAL CARE MANAGEMENT (OUTPATIENT)
Dept: INTERNAL MEDICINE CLINIC | Facility: CLINIC | Age: 60
End: 2020-07-14

## 2020-07-14 NOTE — TELEPHONE ENCOUNTER
COVID Pre-Visit Screening     1  Is this a family member screening? No  2  Have you traveled outside of your state in the past 2 weeks? No  3  Do you presently have a fever or flu-like symptoms? No  4  Do you have symptoms of an upper respiratory infection like runny nose, sore throat, or cough? Yes   She has a runny nose & cough due to her pneumonia   5  Are you suffering from new headache that you have not had in the past?  No  6  Do you have/have you experienced any new shortness of breath recently? No  7  Do you have any new diarrhea, nausea or vomiting? No  8  Have you been in contact with anyone who has been sick or diagnosed with COVID-19? No  9  Do you have any new loss of taste or smell? Yes  Lost of taste due to dentures  10   Are you able to wear a mask without a valve for the entire visit? Yes      Patient tested negative for COVID at the ER  Appt tomorrow @ 5:30 w/Dr Brumfield

## 2020-07-15 ENCOUNTER — OFFICE VISIT (OUTPATIENT)
Dept: INTERNAL MEDICINE CLINIC | Facility: CLINIC | Age: 60
End: 2020-07-15
Payer: COMMERCIAL

## 2020-07-15 VITALS
TEMPERATURE: 98.5 F | OXYGEN SATURATION: 95 % | DIASTOLIC BLOOD PRESSURE: 72 MMHG | SYSTOLIC BLOOD PRESSURE: 110 MMHG | HEART RATE: 90 BPM

## 2020-07-15 DIAGNOSIS — J18.9 PNEUMONIA OF BOTH LOWER LOBES DUE TO INFECTIOUS ORGANISM: Primary | ICD-10-CM

## 2020-07-15 DIAGNOSIS — Z72.0 TOBACCO ABUSE: ICD-10-CM

## 2020-07-15 DIAGNOSIS — J44.1 COPD WITH EXACERBATION (HCC): ICD-10-CM

## 2020-07-15 PROCEDURE — 99496 TRANSJ CARE MGMT HIGH F2F 7D: CPT | Performed by: INTERNAL MEDICINE

## 2020-07-15 NOTE — PROGRESS NOTES
2nd attempt-LVM asking pt to return call for TCM documentation  TCM appt   has been scheduled  For 7/15/20      By Sesar Concepcion

## 2020-07-15 NOTE — PATIENT INSTRUCTIONS
Patient whose principal diagnosis is still smoking  She needs to stop  Recommend again Nicotine patch 21 milligram place a m  Removed at HS  Use nicotine gum 2 milligram for breakthrough cravings  Revisit in 3 weeks

## 2020-07-15 NOTE — PROGRESS NOTES
Assessment/Plan:     No problem-specific Assessment & Plan notes found for this encounter  Diagnoses and all orders for this visit:    Pneumonia of both lower lobes due to infectious organism    COPD with exacerbation ([address] )    Tobacco abuse  -     nicotine polacrilex (NICORETTE) 2 mg gum; Chew 1 each (2 mg total) as needed for smoking cessation         Subjective:     Patient ID: Joan Langston is a 63 y o  female  Follow-up after hospitalization for bronchopneumonia and COPD exacerbation  Discharged on Augmentin and steroids  Better than she was a week ago when she was dyspneic tachypneic and respiratory distress and ashy gray  Continues to smoke a pack a day  Been almost impossible to get her to stop  Discussed this at some length  She needs to stop smoking and her significant other needs to stop buying her cigarettes because she is immobile in the house  Recommendation is nicotine patch 21 milligram with Nicorette gum for breakthrough      Review of Systems   Constitutional: Positive for fatigue  Respiratory: Positive for shortness of breath  Musculoskeletal: Positive for arthralgias and gait problem  Psychiatric/Behavioral: Positive for dysphoric mood  The patient is nervous/anxious  Objective:     Physical Exam   Constitutional:    Obese chronically ill female patient who appears older than stated age   Pulmonary/Chest: Effort normal  She has decreased breath sounds  She has no wheezes  She has rhonchi in the right lower field and the left lower field  She has no rales  Vitals:    07/15/20 1738   BP: 110/72   BP Location: Left arm   Patient Position: Sitting   Cuff Size: Standard   Pulse: 90   Temp: 98 5 °F (36 9 °C)   TempSrc: Temporal   SpO2: 95%       Transitional Care Management Review:  Joan Langston is a 63 y o  female here for TCM follow up       During the TCM phone call patient stated:    TCM Call (since 6/14/2020)     Date and time call was made  7/15/2020 10:48 AM    Hospital care reviewed  Records reviewed    Patient was hospitialized at  [facility]    Date of Admission  07/12/20    Date of discharge  07/13/20    Diagnosis  Pneumonia of both lungs due to infectious organism    Were the patients medications reviewed and updated  -- <img src='C:FILES (X86)      TCM Call (since 6/14/2020)     Patients specialists  Pulmonlolgist    Pulmonologist name  dr Val Edwards contact #  [phone]    Endocrinologist name  [id number]    Referrals needed  Arian Garcia MD

## 2020-07-16 ENCOUNTER — TELEPHONE (OUTPATIENT)
Dept: INTERNAL MEDICINE CLINIC | Facility: CLINIC | Age: 60
End: 2020-07-16

## 2020-07-16 NOTE — TELEPHONE ENCOUNTER
She will be putting an order in for Social Work, to help her get certain things set up      She already has PT & OT  If  has no problem with that she doesn't have a call back

## 2020-07-17 LAB
BACTERIA BLD CULT: NORMAL
BACTERIA BLD CULT: NORMAL

## 2020-07-18 LAB
AMPHETAMINES UR QL SCN: NEGATIVE NG/ML
BARBITURATES UR QL SCN: POSITIVE
BENZODIAZ UR QL: NEGATIVE
BZE UR QL: NEGATIVE NG/ML
CANNABINOIDS UR QL SCN: POSITIVE
METHADONE UR QL SCN: NEGATIVE NG/ML
OPIATES UR QL: NEGATIVE NG/ML
PCP UR QL: NEGATIVE NG/ML
PROPOXYPH UR QL SCN: NEGATIVE NG/ML

## 2020-07-20 ENCOUNTER — TELEPHONE (OUTPATIENT)
Dept: PULMONOLOGY | Facility: CLINIC | Age: 60
End: 2020-07-20

## 2020-07-20 NOTE — TELEPHONE ENCOUNTER

## 2020-07-21 ENCOUNTER — OFFICE VISIT (OUTPATIENT)
Dept: PULMONOLOGY | Facility: CLINIC | Age: 60
End: 2020-07-21
Payer: COMMERCIAL

## 2020-07-21 VITALS
BODY MASS INDEX: 34.02 KG/M2 | TEMPERATURE: 98 F | OXYGEN SATURATION: 97 % | SYSTOLIC BLOOD PRESSURE: 122 MMHG | HEIGHT: 63 IN | WEIGHT: 192 LBS | HEART RATE: 92 BPM | DIASTOLIC BLOOD PRESSURE: 70 MMHG

## 2020-07-21 DIAGNOSIS — J84.9 ILD (INTERSTITIAL LUNG DISEASE) (HCC): ICD-10-CM

## 2020-07-21 DIAGNOSIS — J44.9 CHRONIC OBSTRUCTIVE PULMONARY DISEASE, UNSPECIFIED COPD TYPE (HCC): Primary | ICD-10-CM

## 2020-07-21 DIAGNOSIS — Z72.0 TOBACCO ABUSE: ICD-10-CM

## 2020-07-21 DIAGNOSIS — J41.0 SIMPLE CHRONIC BRONCHITIS (HCC): Primary | ICD-10-CM

## 2020-07-21 DIAGNOSIS — R91.8 GROUND GLASS OPACITY PRESENT ON IMAGING OF LUNG: ICD-10-CM

## 2020-07-21 DIAGNOSIS — J43.9 PULMONARY EMPHYSEMA, UNSPECIFIED EMPHYSEMA TYPE (HCC): ICD-10-CM

## 2020-07-21 DIAGNOSIS — R06.02 SHORTNESS OF BREATH: ICD-10-CM

## 2020-07-21 DIAGNOSIS — R93.89 ABNORMAL CHEST CT: ICD-10-CM

## 2020-07-21 PROCEDURE — 1111F DSCHRG MED/CURRENT MED MERGE: CPT | Performed by: INTERNAL MEDICINE

## 2020-07-21 PROCEDURE — 99214 OFFICE O/P EST MOD 30 MIN: CPT | Performed by: INTERNAL MEDICINE

## 2020-07-21 RX ORDER — ALBUTEROL SULFATE 90 UG/1
2 AEROSOL, METERED RESPIRATORY (INHALATION) EVERY 6 HOURS PRN
Qty: 1 INHALER | Refills: 3 | Status: SHIPPED | OUTPATIENT
Start: 2020-07-21 | End: 2020-12-07

## 2020-07-23 NOTE — PROGRESS NOTES
Assessment/Plan:   Diagnoses and all orders for this visit:    Simple chronic bronchitis ([facility])  -     albuterol (Proventil HFA) 90 mcg/act inhaler; Inhale 2 puffs every 6 (six) hours as needed for wheezing  -     Nebulizer Supplies    Shortness of breath    ILD (interstitial lung disease) ([facility])  -     CT chest high resolution; Future    Abnormal chest CT    Ground glass opacity present on imaging of lung    Pulmonary nodule    Tobacco abuse    Pulmonary emphysema, unspecified emphysema type ([facility])      patient with significant smoking history still actively smoking about half a pack a day wants to quit on her own, long discussion with the patient with regards to smoking cessation various treatment options discussed she states she is using the nicotine patch currently and trying to cut down  Also discussed if there is still with significant urge to smoke on a nicotine patch I have discussed with her to use Nicorette gum 2 mg tablets 2 or 3 g in a day  Chronic simple bronchitis with extensive smoking history currently on albuterol with ipratropium with nebulizer 4 times daily as needed  She will continue with Advair 250/51 puff twice daily  Rinse mouth after use  Her PFTs demonstrate FEV1 of 87% predicted and her DLCO has significantly decreased  CT of the chest recently done with the ground-glass opacities which have worsened from before  I have discussed with her she has responded to the antibiotics clinically feels better but we have to follow-up with a repeat CT scan in 6-8 weeks, worsening ground-glass opacities with likely interstitial lung disease smoking related interstitial lung disease likely again discussed with the patient the treatment would be to quit smoking  Understands and verbalizes  Also the CT of the chest did not demonstrate any lung nodules, but but right hilar lymph node 1 2 cm precarinal 1 2 cm and a subcarinal 1 2 cm which have been stable from before    I have ordered for a high-resolution CT of the chest to be done as a repeat and I will see her back after that  If there is any worsening symptoms in the meantime she will call or go into the ER  Return in about 6 weeks (around 9/1/2020)  All questions are answered to the patient's satisfaction and understanding  She verbalizes understanding  She is encouraged to call with any further questions or concerns  Portions of the record may have been created with voice recognition software  Occasional wrong word or "sound a like" substitutions may have occurred due to the inherent limitations of voice recognition software  Read the chart carefully and recognize, using context, where substitutions have occurred  Electronically Signed by Jerman Monzon MD    ______________________________________________________________________    Chief Complaint:   Chief Complaint   Patient presents with    Follow-up    COPD       Patient ID: David Love is a 63 y o  y o  female has a past medical history of Anemia, Anemia, Cardiac disorder, CHF (congestive heart failure) ([address] ), Constipation, COPD (chronic obstructive pulmonary disease) ([address] ), Depression, Fibromyalgia, Fibromyalgia, primary, Head injury, Heart disorder, Malignant neoplasm ([address] ), Migraines, Myocardial infarction ([address] ), Occasional tremors, Osteoarthritis, Osteoporosis, Problems with swallowing, PTSD (post-traumatic stress disorder), Restless leg syndrome, Skin cancer, Stomach problems, and Tobacco abuse     7/21/2020  Patient presents today for follow-up visit  David Love is a 59-year-old female current everyday smoker with past medical history including but not limited to COPD, asthma, GERD, bariatric surgery, anemia, depression, anxiety, thrombo angiitis obliterans, and frequent falls  She presents today for follow-up    After a recent hospitalization which she was admitted for COPD exacerbation with likely pneumonia bilateral, CT of the chest demonstrating bilateral emphysema with the ground-glass opacities likely pneumonia  She was treated with antibiotics for a total duration of 10 days  She states since the hospital discharge her breathing has slightly improved  Although she still short of breath on exertion, also still has some cough with white mucoid sputum no hemoptysis  Review of Systems   Constitutional: Positive for fatigue  Negative for appetite change, chills, diaphoresis, fever and unexpected weight change  HENT: Negative for congestion, ear discharge, ear pain, nosebleeds, postnasal drip, rhinorrhea, sinus pain, sore throat and voice change  Eyes: Negative for pain, discharge and visual disturbance  Respiratory: Positive for cough and shortness of breath  Negative for apnea, choking, chest tightness, wheezing and stridor  Cardiovascular: Negative for chest pain, palpitations and leg swelling  Gastrointestinal: Negative for abdominal pain, blood in stool, constipation, diarrhea and vomiting  Endocrine: Negative for cold intolerance, heat intolerance, polydipsia, polyphagia and polyuria  Genitourinary: Negative for difficulty urinating and dysuria  Musculoskeletal: Negative for arthralgias and neck pain  Skin: Negative for pallor and rash  Allergic/Immunologic: Negative for environmental allergies and food allergies  Neurological: Negative for dizziness, speech difficulty, weakness and light-headedness  Hematological: Negative for adenopathy  Does not bruise/bleed easily  Psychiatric/Behavioral: Positive for sleep disturbance  Negative for agitation and confusion  The patient is nervous/anxious  Smoking history: She reports that she has been smoking cigarettes  She has a 42 00 pack-year smoking history   She has never used smokeless tobacco     The following portions of the patient's history were reviewed and updated as appropriate: allergies, current medications, past family history, past medical history, past social history, past surgical history and problem list     Immunization History   Administered Date(s) Administered    INFLUENZA 10/03/2014, 09/23/2017    Influenza Quadrivalent, 6-35 Months IM 09/23/2017    Influenza, recombinant, quadrivalent,injectable, preservative free 10/08/2018, 10/24/2019    Pneumococcal Conjugate 13-Valent 10/24/2019     Current Outpatient Medications   Medication Sig Dispense Refill    albuterol (2 5 mg/3 mL) 0 083 % nebulizer solution Take 1 vial (2 5 mg total) by nebulization every 6 (six) hours as needed for wheezing or shortness of breath (Patient taking differently: Take 2 5 mg by nebulization every 6 (six) hours as needed for wheezing or shortness of breath ) 1080 mL 3    albuterol (PROVENTIL HFA,VENTOLIN HFA) 90 mcg/act inhaler Inhale 2 puffs every 6 (six) hours as needed for wheezing 1 Inhaler 5    ARIPiprazole (ABILIFY) 2 mg tablet Take 1 tablet (2 mg total) by mouth daily at bedtime 90 tablet 0    Blood Glucose Monitoring Suppl (ONE TOUCH ULTRA 2) w/Device KIT Test daily and as instructed 1 each 0    Blood Pressure Monitoring (BLOOD PRESSURE CUFF) MISC by Does not apply route 2 (two) times a day 1 each 0    Blood Pressure Monitoring (SPHYGMOMANOMETER) MISC Medically necessary to monitor blood pressure due to orthostatic hypotension 1 each 0    buprenorphine-naloxone (SUBOXONE) 2-0 5 mg per SL tablet Place under the tongue 2 (two) times a day       butalbital-aspirin-caffeine (FIORINAL) -40 MG per tablet Take 1 tablet by mouth every 4 (four) hours as needed for headaches      clonazePAM (KlonoPIN) 0 5 mg tablet Take 0 5 mg by mouth 3 (three) times a day   1    clotrimazole (LOTRIMIN) 1 % cream Apply topically 2 (two) times a day for 14 days 30 g 0    diphenhydrAMINE (BENADRYL) 25 mg tablet Take 25 mg by mouth every 6 (six) hours as needed for itching      doxepin (SINEquan) 150 MG capsule Take 1 capsule (150 mg total) by mouth daily at bedtime 90 capsule 0    doxepin (SINEquan) 50 mg capsule Take 1 capsule (50 mg total) by mouth daily at bedtime 90 capsule 0    famotidine (PEPCID) 20 mg tablet Take 1 tablet (20 mg total) by mouth 2 (two) times a day 1 PO  tablet 3    fluticasone (FLONASE) 50 mcg/act nasal spray 1 spray 2 (two) times a day as needed for rhinitis   2    gabapentin (NEURONTIN) 800 mg tablet Take 800 mg by mouth 4 (four) times a day  3    glucose blood (ONE TOUCH ULTRA TEST) test strip Patient to test two times daily 200 each 3    Lancets (ONETOUCH ULTRASOFT) lancets Patient to test two times daily 200 each 3    lidocaine (XYLOCAINE) 5 % ointment Apply topically 2 (two) times a day as needed for mild pain 35 44 g 11    midodrine (PROAMATINE) 5 mg tablet TAKE 1 AND 1/2 TABLETS BY MOUTH 3 TIMES A DAY LAST DOSE BEFORE 6:00 P M  270 tablet 0    nicotine (NICODERM CQ) 21 mg/24 hr TD 24 hr patch Place 1 patch on the skin daily 28 patch 0    pantoprazole (PROTONIX) 40 mg tablet Take 1 tablet (40 mg total) by mouth 2 (two) times a day 60 tablet 3    primidone (MYSOLINE) 50 mg tablet Three p o  Q h s  270 tablet 1    Respiratory Therapy Supplies (NEBULIZER/TUBING/MOUTHPIECE) KIT Use up to 6 times daily as needed for lung symptoms 1 each 3    rOPINIRole (REQUIP) 0 25 mg tablet TAKE 1 TABLET (0 25 MG TOTAL) BY MOUTH 3 (THREE) TIMES A  tablet 1    Simethicone (GAS-X PO) Take 1 tablet by mouth as needed      TiZANidine (ZANAFLEX) 2 MG capsule Take 1 capsule (2 mg total) by mouth 3 (three) times a day as needed for muscle spasms 90 capsule 11    venlafaxine 150 MG TB24 Take 1 tablet (150 mg total) by mouth daily with breakfast 90 tablet 0    venlafaxine 225 MG TB24 Take 1 tablet (225 mg total) by mouth 2 (two) times a day 90 tablet 0    WIXELA INHUB 250-50 MCG/DOSE inhaler INHALE 1 PUFF TWICE A DAY, RINSE MOUTH AFTER USE 3 Inhaler 4    zafirlukast (ACCOLATE) 20 MG tablet Take 1 tablet (20 mg total) by mouth 2 (two) times a day before meals 180 tablet 3    albuterol (Proventil HFA) 90 mcg/act inhaler Inhale 2 puffs every 6 (six) hours as needed for wheezing 1 Inhaler 3    nicotine polacrilex (NICORETTE) 2 mg gum Chew 1 each (2 mg total) as needed for smoking cessation (Patient not taking: Reported on 7/21/2020) 100 each 0     No current facility-administered medications for this visit  Allergies: Morphine and related; Quetiapine; and Sulfa antibiotics    Objective:  Vitals:    07/21/20 1045   BP: 122/70   Pulse: 92   Temp: 98 °F (36 7 °C)   SpO2: 97%   Weight: 87 1 kg (192 lb)   Height: 5' 3" (1 6 m)   Oxygen Therapy  SpO2: 97 %    Wt Readings from Last 3 Encounters:   07/21/20 87 1 kg (192 lb)   07/13/20 93 kg (205 lb 0 4 oz)   07/11/20 92 5 kg (204 lb)     Body mass index is 34 01 kg/m²  Physical Exam   Constitutional: She is oriented to person, place, and time  She appears well-developed and well-nourished  HENT:   Head: Normocephalic and atraumatic  Eyes: Pupils are equal, round, and reactive to light  Conjunctivae are normal    Neck: Normal range of motion  Neck supple  No JVD present  No thyromegaly present  Cardiovascular: Normal rate, regular rhythm and normal heart sounds  Exam reveals no gallop and no friction rub  No murmur heard  Pulmonary/Chest: No respiratory distress  She has no wheezes  She has no rales  She exhibits no tenderness  Diminished breath sounds bilaterally no rhonchi   Musculoskeletal: Normal range of motion  She exhibits no edema, tenderness or deformity  Lymphadenopathy:     She has no cervical adenopathy  Neurological: She is alert and oriented to person, place, and time  Skin: Skin is warm and dry  Psychiatric: She has a normal mood and affect  Nursing note and vitals reviewed  Diagnostics:  I have personally reviewed pertinent films in PACS    Ct Chest Abdomen Pelvis W Contrast    Result Date: 7/11/2020  Narrative: CT CHEST, ABDOMEN AND PELVIS WITH IV CONTRAST INDICATION:   trauma  Fall   COMPARISON:  CT chest 11/11/2019 and CT abdomen/pelvis 10/8/2019 TECHNIQUE: CT examination of the chest, abdomen and pelvis was performed  Axial, sagittal, and coronal 2D reformatted images were created from the source data and submitted for interpretation  Radiation dose length product (DLP) for this visit:  1217 mGy-cm   This examination, like all CT scans performed in the [facility], was performed utilizing techniques to minimize radiation dose exposure, including the use of iterative reconstruction and automated exposure control  IV Contrast:  100 mL of iohexol (OMNIPAQUE) Enteric Contrast: Enteric contrast was not administered  FINDINGS: CHEST LUNGS:  Emphysema with superimposed groundglass opacities bilaterally has worsened significantly since the prior study  No endotracheal or endobronchial lesion  PLEURA:  Unremarkable  HEART/GREAT VESSELS:  Unremarkable for patient's age  MEDIASTINUM AND LUCIO:  Stable prominent lymph nodes including a 1 2 cm right hilar node, 1 2 cm precarinal node and 1 2 cm subcarinal node amongst others  CHEST WALL AND LOWER NECK:   Unremarkable  ABDOMEN LIVER/BILIARY TREE:  Unremarkable  GALLBLADDER:  Removed  SPLEEN:  Unremarkable  PANCREAS:  Unremarkable  ADRENAL GLANDS:  Unremarkable  KIDNEYS/URETERS:  Stable left lower pole subcentimeter angiomyelolipoma  No hydronephrosis  STOMACH AND BOWEL:  Stable gastric bypass changes noted  No bowel obstruction  APPENDIX:  No findings to suggest appendicitis  ABDOMINOPELVIC CAVITY:  No ascites  No pneumoperitoneum  No lymphadenopathy  VESSELS:  Unremarkable for patient's age  PELVIS REPRODUCTIVE ORGANS:  Unremarkable for patient's age  URINARY BLADDER:  Unremarkable  ABDOMINAL WALL/INGUINAL REGIONS:  Multiple small ventral hernias containing fat  OSSEOUS STRUCTURES:  No acute fracture or destructive osseous lesion  Stable degenerative changes of the lower thoracic spine with mild dextroscoliosis  Postsurgical changes of the lumbar spine  Impression: No acute posttraumatic abnormality identified in the chest, abdomen or pelvis  Background emphysema with superimposed groundglass opacities bilaterally have significantly worsened during the interval and is consistent with either diffuse infection or pulmonary edema  Stable probably reactive mediastinal and right hilar adenopathy   Workstation performed: [id number]

## 2020-07-25 ENCOUNTER — TELEPHONE (OUTPATIENT)
Dept: INTERNAL MEDICINE CLINIC | Facility: CLINIC | Age: 60
End: 2020-07-25

## 2020-07-25 NOTE — TELEPHONE ENCOUNTER
Patient is not able to find filter for her nebulizer would like to know what she should do or where she can find them   Please call patient at [phone]

## 2020-07-27 DIAGNOSIS — J44.1 COPD WITH EXACERBATION (HCC): Primary | ICD-10-CM

## 2020-08-07 ENCOUNTER — OFFICE VISIT (OUTPATIENT)
Dept: INTERNAL MEDICINE CLINIC | Facility: CLINIC | Age: 60
End: 2020-08-07
Payer: COMMERCIAL

## 2020-08-07 VITALS
OXYGEN SATURATION: 94 % | WEIGHT: 204 LBS | DIASTOLIC BLOOD PRESSURE: 72 MMHG | HEIGHT: 63 IN | SYSTOLIC BLOOD PRESSURE: 104 MMHG | BODY MASS INDEX: 36.14 KG/M2 | HEART RATE: 97 BPM

## 2020-08-07 DIAGNOSIS — B37.3 CANDIDIASIS OF THE GENITALS, FEMALE: Primary | ICD-10-CM

## 2020-08-07 DIAGNOSIS — N76.0 ACUTE VAGINITIS: ICD-10-CM

## 2020-08-07 PROBLEM — J18.9 PNEUMONIA OF BOTH LUNGS DUE TO INFECTIOUS ORGANISM: Status: RESOLVED | Noted: 2020-07-12 | Resolved: 2020-08-07

## 2020-08-07 PROCEDURE — 99213 OFFICE O/P EST LOW 20 MIN: CPT | Performed by: INTERNAL MEDICINE

## 2020-08-07 PROCEDURE — 3008F BODY MASS INDEX DOCD: CPT | Performed by: INTERNAL MEDICINE

## 2020-08-07 PROCEDURE — G0439 PPPS, SUBSEQ VISIT: HCPCS | Performed by: INTERNAL MEDICINE

## 2020-08-07 PROCEDURE — 1111F DSCHRG MED/CURRENT MED MERGE: CPT | Performed by: INTERNAL MEDICINE

## 2020-08-07 RX ORDER — FLUCONAZOLE 150 MG/1
TABLET ORAL
Qty: 4 TABLET | Refills: 0 | Status: SHIPPED | OUTPATIENT
Start: 2020-08-07 | End: 2021-02-20

## 2020-08-07 RX ORDER — CLOTRIMAZOLE 1 %
CREAM (GRAM) TOPICAL 2 TIMES DAILY
Qty: 30 G | Refills: 5 | Status: SHIPPED | OUTPATIENT
Start: 2020-08-07 | End: 2021-03-17
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
## 2020-08-07 NOTE — PROGRESS NOTES
Assessment/Plan:       Diagnoses and all orders for this visit:    Candidiasis of the genitals, female  -     fluconazole (DIFLUCAN) 150 mg tablet; Once weekly x4 weeks    Acute vaginitis  -     clotrimazole (LOTRIMIN) 1 % cream; Apply topically 2 (two) times a day for 14 days                Subjective:      Patient ID: Helen Juares is a 62 y o  female  Patient with chronic bronchitic COPD victim of numerous hospitalizations and episodes of pneumonia and bronchitis who continues to smoke a pack a day  Failed nicotine patch  Failed Chantix in the past   Until she stop smoking, "routine "office visits are a waste of time     wellness parameters are updated      The following portions of the patient's history were reviewed and updated as appropriate:   She has a past medical history of Anemia, Anemia, Cardiac disorder, CHF (congestive heart failure) ([address] ), Constipation, COPD (chronic obstructive pulmonary disease) ([address] ), Depression, Fibromyalgia, Fibromyalgia, primary, Head injury, Heart disorder, Malignant neoplasm ([address] ), Migraines, Myocardial infarction ([address] ), Occasional tremors, Osteoarthritis, Osteoporosis, Problems with swallowing, PTSD (post-traumatic stress disorder), Restless leg syndrome, Skin cancer, Stomach problems, and Tobacco abuse ,  does not have any pertinent problems on file  ,   has a past surgical history that includes Gallbladder surgery; Tonsillectomy; Gastric bypass; Back surgery; Other surgical history (Left); Arthrodesis; Bladder surgery; Foot surgery (Right); Abdominal surgery; Hand surgery (Left); Adenoidectomy; Toe Surgery (Right); Ankle surgery (Right); Femur Surgery (Right); Hemorrhoid surgery; Toenail excision; Multiple tooth extractions; Cholecystectomy; and Colonoscopy  ,  family history includes Arthritis in her mother; Cancer in her maternal aunt; Diabetes in her brother; Heart attack in her father; Heart disease in her brother and father; Hypertension in her father and mother; Mental illness in her brother and father; Neuropathy in her father; Obesity in her mother and sister; Osteoarthritis in her family; Osteoporosis in her family; Scoliosis in her family; Uterine cancer in her mother  ,   reports that she has been smoking cigarettes  She has a 42 00 pack-year smoking history  She has never used smokeless tobacco  She reports previous alcohol use  She reports current drug use  Frequency: 7 00 times per week  Drug: Marijuana  ,  is allergic to morphine and related; quetiapine; and sulfa antibiotics     Current Outpatient Medications   Medication Sig Dispense Refill    albuterol (2 5 mg/3 mL) 0 083 % nebulizer solution Take 1 vial (2 5 mg total) by nebulization every 6 (six) hours as needed for wheezing or shortness of breath (Patient taking differently: Take 2 5 mg by nebulization every 6 (six) hours as needed for wheezing or shortness of breath ) 1080 mL 3    albuterol (Proventil HFA) 90 mcg/act inhaler Inhale 2 puffs every 6 (six) hours as needed for wheezing 1 Inhaler 3    albuterol (PROVENTIL HFA,VENTOLIN HFA) 90 mcg/act inhaler Inhale 2 puffs every 6 (six) hours as needed for wheezing 1 Inhaler 5    ARIPiprazole (ABILIFY) 2 mg tablet Take 1 tablet (2 mg total) by mouth daily at bedtime 90 tablet 0    Blood Glucose Monitoring Suppl (ONE TOUCH ULTRA 2) w/Device KIT Test daily and as instructed 1 each 0    Blood Pressure Monitoring (BLOOD PRESSURE CUFF) MISC by Does not apply route 2 (two) times a day 1 each 0    Blood Pressure Monitoring (SPHYGMOMANOMETER) MISC Medically necessary to monitor blood pressure due to orthostatic hypotension 1 each 0    buprenorphine-naloxone (SUBOXONE) 2-0 5 mg per SL tablet Place under the tongue 2 (two) times a day       butalbital-aspirin-caffeine (FIORINAL) -40 MG per tablet Take 1 tablet by mouth every 4 (four) hours as needed for headaches      clonazePAM (KlonoPIN) 0 5 mg tablet Take 0 5 mg by mouth 3 (three) times a day   1    clotrimazole (LOTRIMIN) 1 % cream Apply topically 2 (two) times a day for 14 days 30 g 5    diphenhydrAMINE (BENADRYL) 25 mg tablet Take 25 mg by mouth every 6 (six) hours as needed for itching      doxepin (SINEquan) 150 MG capsule Take 1 capsule (150 mg total) by mouth daily at bedtime 90 capsule 0    doxepin (SINEquan) 50 mg capsule Take 1 capsule (50 mg total) by mouth daily at bedtime 90 capsule 0    famotidine (PEPCID) 20 mg tablet Take 1 tablet (20 mg total) by mouth 2 (two) times a day 1 PO  tablet 3    fluticasone (FLONASE) 50 mcg/act nasal spray 1 spray 2 (two) times a day as needed for rhinitis   2    gabapentin (NEURONTIN) 800 mg tablet Take 800 mg by mouth 4 (four) times a day  3    glucose blood (ONE TOUCH ULTRA TEST) test strip Patient to test two times daily 200 each 3    Lancets (ONETOUCH ULTRASOFT) lancets Patient to test two times daily 200 each 3    lidocaine (XYLOCAINE) 5 % ointment Apply topically 2 (two) times a day as needed for mild pain 35 44 g 11    midodrine (PROAMATINE) 5 mg tablet TAKE 1 AND 1/2 TABLETS BY MOUTH 3 TIMES A DAY LAST DOSE BEFORE 6:00 P M  270 tablet 0    nicotine (NICODERM CQ) 21 mg/24 hr TD 24 hr patch Place 1 patch on the skin daily 28 patch 0    pantoprazole (PROTONIX) 40 mg tablet Take 1 tablet (40 mg total) by mouth 2 (two) times a day 60 tablet 3    primidone (MYSOLINE) 50 mg tablet Three p o  Q h s  270 tablet 1    Respiratory Therapy Supplies (NEBULIZER/TUBING/MOUTHPIECE) KIT Use up to 6 times daily as needed for lung symptoms 1 each 3    rOPINIRole (REQUIP) 0 25 mg tablet TAKE 1 TABLET (0 25 MG TOTAL) BY MOUTH 3 (THREE) TIMES A  tablet 1    Simethicone (GAS-X PO) Take 1 tablet by mouth as needed      TiZANidine (ZANAFLEX) 2 MG capsule Take 1 capsule (2 mg total) by mouth 3 (three) times a day as needed for muscle spasms 90 capsule 11    venlafaxine 150 MG TB24 Take 1 tablet (150 mg total) by mouth daily with breakfast 90 tablet 0  venlafaxine 225 MG TB24 Take 1 tablet (225 mg total) by mouth 2 (two) times a day 90 tablet 0    WIXELA INHUB 250-50 MCG/DOSE inhaler INHALE 1 PUFF TWICE A DAY, RINSE MOUTH AFTER USE 3 Inhaler 4    zafirlukast (ACCOLATE) 20 MG tablet Take 1 tablet (20 mg total) by mouth 2 (two) times a day before meals 180 tablet 3    fluconazole (DIFLUCAN) 150 mg tablet Once weekly x4 weeks 4 tablet 0    nicotine polacrilex (NICORETTE) 2 mg gum Chew 1 each (2 mg total) as needed for smoking cessation (Patient not taking: Reported on 7/21/2020) 100 each 0     No current facility-administered medications for this visit  Review of Systems   Constitutional: Positive for fatigue  Respiratory: Positive for cough and shortness of breath  Musculoskeletal: Positive for arthralgias  Psychiatric/Behavioral: Positive for dysphoric mood  The patient is nervous/anxious  All other systems reviewed and are negative  Objective:  Vitals:    08/07/20 1636   BP: 104/72   Pulse: 97   SpO2: 94%      Physical Exam  Constitutional:       Comments:  Overweight female patient in a wheelchair who looks older than stated age   Neck:      Musculoskeletal: Normal range of motion  Cardiovascular:      Rate and Rhythm: Normal rate  Pulmonary:      Effort: Pulmonary effort is normal  No accessory muscle usage  Breath sounds: Decreased air movement present  Examination of the right-middle field reveals rhonchi  Examination of the left-middle field reveals rhonchi  Examination of the right-lower field reveals rhonchi  Examination of the left-lower field reveals rhonchi  Decreased breath sounds and rhonchi present  No wheezing or rales  Comments:  Faint expiratory rhonchi  are diffuse  Skin:     General: Skin is warm  Patient Instructions    Until smoking cessation happens, nothing will do any good

## 2020-08-31 ENCOUNTER — TELEPHONE (OUTPATIENT)
Dept: INTERNAL MEDICINE CLINIC | Facility: CLINIC | Age: 60
End: 2020-08-31

## 2020-08-31 RX ORDER — DIHYDROERGOTAMINE MESYLATE 4 MG/ML
1 SPRAY NASAL AS NEEDED
COMMUNITY

## 2020-08-31 NOTE — TELEPHONE ENCOUNTER
[facility] paper work was faxed here 2 wks ago    she is ck'ing status    has it been completed    she said it's urgent that this be done asap    before they take her out of the system    this is for her to get home health care  Sh asked for a call back to let her know if it has been done

## 2020-09-01 NOTE — TELEPHONE ENCOUNTER
CALLED PT  [facility] FORM FOUND IN SCANNING FOR [facility] AND FAXED [phone]

## 2020-09-01 NOTE — TELEPHONE ENCOUNTER
Patient is asking if the form was faxed to [address] [facility]  ?? It's not in 's folder out front

## 2020-09-03 ENCOUNTER — OFFICE VISIT (OUTPATIENT)
Dept: PULMONOLOGY | Facility: CLINIC | Age: 60
End: 2020-09-03
Payer: COMMERCIAL

## 2020-09-03 VITALS
OXYGEN SATURATION: 92 % | HEIGHT: 63 IN | BODY MASS INDEX: 35.79 KG/M2 | TEMPERATURE: 97.4 F | WEIGHT: 202 LBS | HEART RATE: 96 BPM | DIASTOLIC BLOOD PRESSURE: 84 MMHG | SYSTOLIC BLOOD PRESSURE: 124 MMHG

## 2020-09-03 DIAGNOSIS — R93.89 ABNORMAL CHEST CT: ICD-10-CM

## 2020-09-03 DIAGNOSIS — J84.9 ILD (INTERSTITIAL LUNG DISEASE) (HCC): ICD-10-CM

## 2020-09-03 DIAGNOSIS — R06.02 SHORTNESS OF BREATH: ICD-10-CM

## 2020-09-03 DIAGNOSIS — Z72.0 TOBACCO ABUSE: ICD-10-CM

## 2020-09-03 DIAGNOSIS — B37.0 ORAL THRUSH: ICD-10-CM

## 2020-09-03 DIAGNOSIS — Z00.00 ROUTINE HEALTH MAINTENANCE: Primary | ICD-10-CM

## 2020-09-03 DIAGNOSIS — J41.0 SIMPLE CHRONIC BRONCHITIS (HCC): ICD-10-CM

## 2020-09-03 DIAGNOSIS — R91.8 GROUND GLASS OPACITY PRESENT ON IMAGING OF LUNG: ICD-10-CM

## 2020-09-03 PROCEDURE — 99214 OFFICE O/P EST MOD 30 MIN: CPT | Performed by: INTERNAL MEDICINE

## 2020-09-03 NOTE — PROGRESS NOTES
Assessment/Plan:   Diagnoses and all orders for this visit:    Routine health maintenance  -     Ambulatory referral to Gastroenterology; Future    Simple chronic bronchitis (HCC)    Shortness of breath    ILD (interstitial lung disease) (HCC)    Abnormal chest CT    Ground glass opacity present on imaging of lung    Tobacco abuse    Oral thrush  -     nystatin (MYCOSTATIN) 500,000 units/5 mL suspension; Apply 5 mL (500,000 Units total) to the mouth or throat 4 (four) times a day      patient with significant smoking history still actively smoking a few cigarettes a day has significantly cut down from before, wants to quit on her own again long discussion with the patient with regards to smoking cessation and also even congratulated the patient on significantly cutting it down from before  She is currently using the nicotine patches and trying to cut it down  Chronic simple bronchitis with extensive smoking history currently on albuterol with ipratropium via nebulizer 4 times daily as needed using it currently only once a day she states  Continue with Advair 250/51 puff twice daily  Rinse mouth after use  Given the mild thrush will add nystatin swish and swallow  Her PFTs demonstrate FEV1 of 87% predicted and DLCO has significantly decreased  CT of the chest recently done with ground-glass opacities which have worsened from before discussed with the patient regarding interstitial lung disease, smoking related likely  She had a right hilar lymph node 1 2 cm and pre can IL and subcarinal lymph node have all been stable from before  I had ordered for a high-resolution CT of the chest which she missed the appointment she will reschedule and I will give her a call with the results  Flu shot fall of 2020 discussed  Call if any worsening symptoms otherwise will see her back in 3 months or p r n  earlier as needed  Return in about 3 months (around 12/3/2020)    All questions are answered to the patient's satisfaction and understanding  She verbalizes understanding  She is encouraged to call with any further questions or concerns  Portions of the record may have been created with voice recognition software  Occasional wrong word or "sound a like" substitutions may have occurred due to the inherent limitations of voice recognition software  Read the chart carefully and recognize, using context, where substitutions have occurred  Electronically Signed by Pancho Medellin MD    ______________________________________________________________________    Chief Complaint:   Chief Complaint   Patient presents with    Follow-up       Patient ID: Nicolás Alonzo is a 62 y o  y o  female has a past medical history of Anemia, Anemia, Cardiac disorder, CHF (congestive heart failure) ([address] ), Constipation, COPD (chronic obstructive pulmonary disease) ([address] ), Depression, Fibromyalgia, Fibromyalgia, primary, Head injury, Heart disorder, Malignant neoplasm ([address] ), Migraines, Myocardial infarction ([address] ), Occasional tremors, Osteoarthritis, Osteoporosis, Problems with swallowing, PTSD (post-traumatic stress disorder), Restless leg syndrome, Skin cancer, Stomach problems, and Tobacco abuse  9/3/2020  Patient presents today for follow-up visit  Nicolás Alonzo is a 60-year-old female current everyday smoker with past medical history including but not limited to COPD, asthma, GERD, bariatric surgery, anemia, depression, anxiety, thrombo angiitis obliterans, and frequent falls  She presents today for follow-up  After a recent hospitalization which she was admitted for COPD exacerbation with likely pneumonia bilateral, CT of the chest demonstrating bilateral emphysema with the ground-glass opacities likely pneumonia  She was treated with antibiotics for a total duration of 10 days  She states since the hospital discharge her breathing has slightly improved    Although she still short of breath on exertion, also still has some cough with white mucoid sputum no hemoptysis          Review of Systems   Constitutional: Positive for fatigue  HENT: Positive for trouble swallowing  Hoarseness of voice   Eyes: Negative  Respiratory: Positive for cough ( still has some cough which has decreased since last visit ) and shortness of breath (States the shortness of breath has decreased since last visit )  Cardiovascular: Negative  Gastrointestinal: Negative  Endocrine: Negative  Genitourinary: Negative  Musculoskeletal: Negative  Allergic/Immunologic: Negative  Neurological: Negative  Hematological: Negative  Psychiatric/Behavioral: Negative  Smoking history: She reports that she has been smoking cigarettes  She has a 42 00 pack-year smoking history   She has never used smokeless tobacco     The following portions of the patient's history were reviewed and updated as appropriate: allergies, current medications, past family history, past medical history, past social history, past surgical history and problem list     Immunization History   Administered Date(s) Administered    INFLUENZA 10/03/2014, 09/23/2017    Influenza Quadrivalent, 6-35 Months IM 09/23/2017    Influenza, recombinant, quadrivalent,injectable, preservative free 10/08/2018, 10/24/2019    Pneumococcal Conjugate 13-Valent 10/24/2019     Current Outpatient Medications   Medication Sig Dispense Refill    albuterol (2 5 mg/3 mL) 0 083 % nebulizer solution Take 1 vial (2 5 mg total) by nebulization every 6 (six) hours as needed for wheezing or shortness of breath (Patient taking differently: Take 2 5 mg by nebulization every 6 (six) hours as needed for wheezing or shortness of breath ) 1080 mL 3    albuterol (Proventil HFA) 90 mcg/act inhaler Inhale 2 puffs every 6 (six) hours as needed for wheezing 1 Inhaler 3    albuterol (PROVENTIL HFA,VENTOLIN HFA) 90 mcg/act inhaler Inhale 2 puffs every 6 (six) hours as needed for wheezing 1 Inhaler 5    ARIPiprazole (ABILIFY) 2 mg tablet Take 1 tablet (2 mg total) by mouth daily at bedtime 90 tablet 0    Blood Glucose Monitoring Suppl (ONE TOUCH ULTRA 2) w/Device KIT Test daily and as instructed 1 each 0    Blood Pressure Monitoring (BLOOD PRESSURE CUFF) MISC by Does not apply route 2 (two) times a day 1 each 0    Blood Pressure Monitoring (SPHYGMOMANOMETER) MISC Medically necessary to monitor blood pressure due to orthostatic hypotension 1 each 0    buprenorphine-naloxone (SUBOXONE) 2-0 5 mg per SL tablet Place under the tongue 2 (two) times a day       butalbital-aspirin-caffeine (FIORINAL) -40 MG per tablet Take 1 tablet by mouth every 4 (four) hours as needed for headaches      clonazePAM (KlonoPIN) 0 5 mg tablet Take 0 5 mg by mouth 3 (three) times a day   1    dihydroergotamine (MIGRANAL) 4 MG/ML nasal spray 1 spray into each nostril as needed Use in one nostril as directed    No more than 4 sprays in one hour      diphenhydrAMINE (BENADRYL) 25 mg tablet Take 25 mg by mouth every 6 (six) hours as needed for itching      doxepin (SINEquan) 150 MG capsule Take 1 capsule (150 mg total) by mouth daily at bedtime 90 capsule 0    doxepin (SINEquan) 50 mg capsule Take 1 capsule (50 mg total) by mouth daily at bedtime 90 capsule 0    famotidine (PEPCID) 20 mg tablet Take 1 tablet (20 mg total) by mouth 2 (two) times a day 1 PO  tablet 3    fluconazole (DIFLUCAN) 150 mg tablet Once weekly x4 weeks 4 tablet 0    fluticasone (FLONASE) 50 mcg/act nasal spray 1 spray 2 (two) times a day as needed for rhinitis   2    gabapentin (NEURONTIN) 800 mg tablet Take 800 mg by mouth 4 (four) times a day  3    glucose blood (ONE TOUCH ULTRA TEST) test strip Patient to test two times daily 200 each 3    Lancets (ONETOUCH ULTRASOFT) lancets Patient to test two times daily 200 each 3    lidocaine (XYLOCAINE) 5 % ointment Apply topically 2 (two) times a day as needed for mild pain 35 44 g 11    midodrine (PROAMATINE) 5 mg tablet TAKE 1 AND 1/2 TABLETS BY MOUTH 3 TIMES A DAY LAST DOSE BEFORE 6:00 P M  270 tablet 0    nicotine (NICODERM CQ) 21 mg/24 hr TD 24 hr patch Place 1 patch on the skin daily 28 patch 0    pantoprazole (PROTONIX) 40 mg tablet Take 1 tablet (40 mg total) by mouth 2 (two) times a day 60 tablet 3    primidone (MYSOLINE) 50 mg tablet Three p o  Q h s  270 tablet 1    Respiratory Therapy Supplies (NEBULIZER/TUBING/MOUTHPIECE) KIT Use up to 6 times daily as needed for lung symptoms 1 each 3    rOPINIRole (REQUIP) 0 25 mg tablet TAKE 1 TABLET (0 25 MG TOTAL) BY MOUTH 3 (THREE) TIMES A  tablet 1    Simethicone (GAS-X PO) Take 1 tablet by mouth as needed      TiZANidine (ZANAFLEX) 2 MG capsule Take 1 capsule (2 mg total) by mouth 3 (three) times a day as needed for muscle spasms 90 capsule 11    venlafaxine 150 MG TB24 Take 1 tablet (150 mg total) by mouth daily with breakfast 90 tablet 0    venlafaxine 225 MG TB24 Take 1 tablet (225 mg total) by mouth 2 (two) times a day 90 tablet 0    WIXELA INHUB 250-50 MCG/DOSE inhaler INHALE 1 PUFF TWICE A DAY, RINSE MOUTH AFTER USE 3 Inhaler 4    clotrimazole (LOTRIMIN) 1 % cream Apply topically 2 (two) times a day for 14 days 30 g 5    nicotine polacrilex (NICORETTE) 2 mg gum Chew 1 each (2 mg total) as needed for smoking cessation (Patient not taking: Reported on 7/21/2020) 100 each 0    nystatin (MYCOSTATIN) 500,000 units/5 mL suspension Apply 5 mL (500,000 Units total) to the mouth or throat 4 (four) times a day 473 mL 1    zafirlukast (ACCOLATE) 20 MG tablet Take 1 tablet (20 mg total) by mouth 2 (two) times a day before meals 180 tablet 3     No current facility-administered medications for this visit  Allergies: Morphine and related; Quetiapine; and Sulfa antibiotics    Objective:  Vitals:    09/03/20 1418   BP: 124/84   Pulse: 96   Temp: (!) 97 4 °F (36 3 °C)   SpO2: 92%   Weight: 91 6 kg (202 lb)   Height: 5' 3" (1 6 m)   Oxygen Therapy  SpO2: 92 %      Wt Readings from Last 3 Encounters:   09/03/20 91 6 kg (202 lb)   08/07/20 92 5 kg (204 lb)   07/21/20 87 1 kg (192 lb)     Body mass index is 35 78 kg/m²  Physical Exam  Vitals signs and nursing note reviewed  Constitutional:       Appearance: She is well-developed  HENT:      Head: Normocephalic and atraumatic  Mouth/Throat:      Comments: Mild oral thrush  Eyes:      Conjunctiva/sclera: Conjunctivae normal       Pupils: Pupils are equal, round, and reactive to light  Neck:      Musculoskeletal: Normal range of motion and neck supple  Thyroid: No thyromegaly  Vascular: No JVD  Cardiovascular:      Rate and Rhythm: Normal rate and regular rhythm  Heart sounds: Normal heart sounds  No murmur  No friction rub  No gallop  Pulmonary:      Effort: Pulmonary effort is normal  No respiratory distress  Breath sounds: No wheezing or rales  Comments: Diminished breath sounds bilaterally no rhonchi  Chest:      Chest wall: No tenderness  Musculoskeletal: Normal range of motion  General: No tenderness or deformity  Lymphadenopathy:      Cervical: No cervical adenopathy  Skin:     General: Skin is warm and dry  Neurological:      Mental Status: She is alert and oriented to person, place, and time             Diagnostics:  I have personally reviewed pertinent films in PACS

## 2020-09-10 ENCOUNTER — TELEPHONE (OUTPATIENT)
Dept: INTERNAL MEDICINE CLINIC | Facility: CLINIC | Age: 60
End: 2020-09-10

## 2020-09-10 NOTE — TELEPHONE ENCOUNTER
They received form on 9/1 but "length of care" needs to checked at "[facility]" and re faxed

## 2020-10-04 DIAGNOSIS — I95.1 ORTHOSTATIC HYPOTENSION: ICD-10-CM

## 2020-10-05 RX ORDER — MIDODRINE HYDROCHLORIDE 5 MG/1
TABLET ORAL
Qty: 270 TABLET | Refills: 0 | Status: SHIPPED | OUTPATIENT
Start: 2020-10-05 | End: 2020-11-06

## 2020-10-09 DIAGNOSIS — M79.10 MYALGIA: ICD-10-CM

## 2020-10-10 RX ORDER — TIZANIDINE HYDROCHLORIDE 2 MG/1
CAPSULE, GELATIN COATED ORAL
Qty: 270 CAPSULE | Refills: 3 | Status: SHIPPED | OUTPATIENT
Start: 2020-10-10 | End: 2022-01-04 | Stop reason: DRUGHIGH

## 2020-10-19 ENCOUNTER — TELEPHONE (OUTPATIENT)
Dept: NEUROLOGY | Facility: CLINIC | Age: 60
End: 2020-10-19

## 2020-10-27 ENCOUNTER — OFFICE VISIT (OUTPATIENT)
Dept: GASTROENTEROLOGY | Facility: CLINIC | Age: 60
End: 2020-10-27
Payer: COMMERCIAL

## 2020-10-27 VITALS
TEMPERATURE: 97.5 F | BODY MASS INDEX: 36.14 KG/M2 | HEIGHT: 63 IN | HEART RATE: 88 BPM | SYSTOLIC BLOOD PRESSURE: 128 MMHG | DIASTOLIC BLOOD PRESSURE: 82 MMHG | WEIGHT: 204 LBS

## 2020-10-27 DIAGNOSIS — R13.10 ODYNOPHAGIA: ICD-10-CM

## 2020-10-27 DIAGNOSIS — R13.14 PHARYNGOESOPHAGEAL DYSPHAGIA: Primary | ICD-10-CM

## 2020-10-27 PROCEDURE — 99214 OFFICE O/P EST MOD 30 MIN: CPT | Performed by: INTERNAL MEDICINE

## 2020-10-27 PROCEDURE — 3008F BODY MASS INDEX DOCD: CPT | Performed by: INTERNAL MEDICINE

## 2020-10-27 RX ORDER — METHOCARBAMOL 750 MG/1
TABLET, FILM COATED ORAL
COMMUNITY
Start: 2020-09-10 | End: 2020-10-27 | Stop reason: DRUGHIGH

## 2020-11-06 DIAGNOSIS — I95.1 ORTHOSTATIC HYPOTENSION: ICD-10-CM

## 2020-11-06 RX ORDER — MIDODRINE HYDROCHLORIDE 5 MG/1
TABLET ORAL
Qty: 135 TABLET | Refills: 1 | Status: SHIPPED | OUTPATIENT
Start: 2020-11-06 | End: 2020-12-10

## 2020-11-10 ENCOUNTER — HOSPITAL ENCOUNTER (OUTPATIENT)
Dept: GASTROENTEROLOGY | Facility: HOSPITAL | Age: 60
Setting detail: OUTPATIENT SURGERY
Discharge: HOME | End: 2020-11-10
Attending: INTERNAL MEDICINE

## 2020-11-11 ENCOUNTER — TELEPHONE (OUTPATIENT)
Dept: GASTROENTEROLOGY | Facility: CLINIC | Age: 60
End: 2020-11-11

## 2020-11-13 ENCOUNTER — TELEPHONE (OUTPATIENT)
Dept: INTERNAL MEDICINE CLINIC | Facility: CLINIC | Age: 60
End: 2020-11-13

## 2020-11-22 DIAGNOSIS — G25.81 RESTLESS LEG: ICD-10-CM

## 2020-11-23 RX ORDER — ROPINIROLE 0.25 MG/1
TABLET, FILM COATED ORAL
Qty: 270 TABLET | Refills: 1 | Status: SHIPPED | OUTPATIENT
Start: 2020-11-23 | End: 2020-11-27

## 2020-11-25 DIAGNOSIS — G25.81 RESTLESS LEG: ICD-10-CM

## 2020-11-27 RX ORDER — ROPINIROLE 0.25 MG/1
TABLET, FILM COATED ORAL
Qty: 270 TABLET | Refills: 1 | Status: SHIPPED | OUTPATIENT
Start: 2020-11-27 | End: 2021-03-12 | Stop reason: SDUPTHER

## 2020-12-02 DIAGNOSIS — J45.20 MILD INTERMITTENT ASTHMA WITHOUT COMPLICATION: ICD-10-CM

## 2020-12-02 RX ORDER — ZAFIRLUKAST 20 MG/1
20 TABLET, FILM COATED ORAL
Qty: 180 TABLET | Refills: 3 | Status: SHIPPED | OUTPATIENT
Start: 2020-12-02 | End: 2020-12-08 | Stop reason: SDUPTHER

## 2020-12-04 ENCOUNTER — TELEPHONE (OUTPATIENT)
Dept: NEUROLOGY | Facility: CLINIC | Age: 60
End: 2020-12-04

## 2020-12-07 ENCOUNTER — OFFICE VISIT (OUTPATIENT)
Dept: INTERNAL MEDICINE CLINIC | Facility: CLINIC | Age: 60
End: 2020-12-07
Payer: COMMERCIAL

## 2020-12-07 DIAGNOSIS — J41.0 SIMPLE CHRONIC BRONCHITIS (HCC): ICD-10-CM

## 2020-12-07 DIAGNOSIS — R60.0 EDEMA, LOWER EXTREMITY: Primary | ICD-10-CM

## 2020-12-07 PROCEDURE — 99212 OFFICE O/P EST SF 10 MIN: CPT | Performed by: INTERNAL MEDICINE

## 2020-12-07 RX ORDER — ALBUTEROL SULFATE 90 UG/1
AEROSOL, METERED RESPIRATORY (INHALATION)
Qty: 6.7 INHALER | Refills: 3 | Status: SHIPPED | OUTPATIENT
Start: 2020-12-07 | End: 2021-07-19

## 2020-12-08 ENCOUNTER — OFFICE VISIT (OUTPATIENT)
Dept: INTERNAL MEDICINE CLINIC | Facility: CLINIC | Age: 60
End: 2020-12-08
Payer: COMMERCIAL

## 2020-12-08 ENCOUNTER — APPOINTMENT (OUTPATIENT)
Dept: LAB | Facility: CLINIC | Age: 60
End: 2020-12-08
Payer: COMMERCIAL

## 2020-12-08 VITALS
HEART RATE: 91 BPM | BODY MASS INDEX: 40.74 KG/M2 | TEMPERATURE: 97 F | WEIGHT: 230 LBS | OXYGEN SATURATION: 97 % | DIASTOLIC BLOOD PRESSURE: 70 MMHG | SYSTOLIC BLOOD PRESSURE: 100 MMHG

## 2020-12-08 DIAGNOSIS — J41.0 SIMPLE CHRONIC BRONCHITIS (HCC): Primary | ICD-10-CM

## 2020-12-08 DIAGNOSIS — J45.20 MILD INTERMITTENT ASTHMA WITHOUT COMPLICATION: ICD-10-CM

## 2020-12-08 DIAGNOSIS — M79.10 MYALGIA: ICD-10-CM

## 2020-12-08 DIAGNOSIS — Z23 ENCOUNTER FOR IMMUNIZATION: ICD-10-CM

## 2020-12-08 DIAGNOSIS — E66.01 OBESITY, MORBID (HCC): ICD-10-CM

## 2020-12-08 PROBLEM — M79.651 PAIN IN BOTH THIGHS: Status: ACTIVE | Noted: 2020-12-08

## 2020-12-08 PROBLEM — M79.652 PAIN IN BOTH THIGHS: Status: ACTIVE | Noted: 2020-12-08

## 2020-12-08 PROCEDURE — 99214 OFFICE O/P EST MOD 30 MIN: CPT | Performed by: INTERNAL MEDICINE

## 2020-12-08 PROCEDURE — 80048 BASIC METABOLIC PNL TOTAL CA: CPT

## 2020-12-08 PROCEDURE — 3725F SCREEN DEPRESSION PERFORMED: CPT | Performed by: INTERNAL MEDICINE

## 2020-12-08 PROCEDURE — 85652 RBC SED RATE AUTOMATED: CPT

## 2020-12-08 PROCEDURE — 82550 ASSAY OF CK (CPK): CPT

## 2020-12-08 PROCEDURE — G0008 ADMIN INFLUENZA VIRUS VAC: HCPCS | Performed by: INTERNAL MEDICINE

## 2020-12-08 PROCEDURE — 90682 RIV4 VACC RECOMBINANT DNA IM: CPT | Performed by: INTERNAL MEDICINE

## 2020-12-08 PROCEDURE — 82085 ASSAY OF ALDOLASE: CPT

## 2020-12-08 PROCEDURE — 36415 COLL VENOUS BLD VENIPUNCTURE: CPT

## 2020-12-08 PROCEDURE — 82553 CREATINE MB FRACTION: CPT

## 2020-12-08 RX ORDER — ZAFIRLUKAST 20 MG/1
20 TABLET, FILM COATED ORAL
Qty: 180 TABLET | Refills: 3 | Status: SHIPPED | OUTPATIENT
Start: 2020-12-08 | End: 2021-03-10 | Stop reason: SDUPTHER

## 2020-12-09 LAB
ANION GAP SERPL CALCULATED.3IONS-SCNC: 3 MMOL/L (ref 4–13)
BUN SERPL-MCNC: 12 MG/DL (ref 5–25)
CALCIUM SERPL-MCNC: 9 MG/DL (ref 8.3–10.1)
CHLORIDE SERPL-SCNC: 105 MMOL/L (ref 100–108)
CK MB SERPL-MCNC: 3.1 % (ref 0–2.5)
CK MB SERPL-MCNC: 4.1 NG/ML (ref 0–5)
CK SERPL-CCNC: 134 U/L (ref 26–192)
CO2 SERPL-SCNC: 28 MMOL/L (ref 21–32)
CREAT SERPL-MCNC: 0.85 MG/DL (ref 0.6–1.3)
ERYTHROCYTE [SEDIMENTATION RATE] IN BLOOD: 27 MM/HOUR (ref 0–29)
GFR SERPL CREATININE-BSD FRML MDRD: 75 ML/MIN/1.73SQ M
GLUCOSE P FAST SERPL-MCNC: 129 MG/DL (ref 65–99)
POTASSIUM SERPL-SCNC: 5 MMOL/L (ref 3.5–5.3)
SODIUM SERPL-SCNC: 136 MMOL/L (ref 136–145)

## 2020-12-10 DIAGNOSIS — I95.1 ORTHOSTATIC HYPOTENSION: ICD-10-CM

## 2020-12-10 LAB — ALDOLASE SERPL-CCNC: 5.3 U/L (ref 3.3–10.3)

## 2020-12-10 RX ORDER — MIDODRINE HYDROCHLORIDE 5 MG/1
TABLET ORAL
Qty: 135 TABLET | Refills: 1 | Status: SHIPPED | OUTPATIENT
Start: 2020-12-10 | End: 2021-01-05

## 2020-12-11 ENCOUNTER — OFFICE VISIT (OUTPATIENT)
Dept: PULMONOLOGY | Facility: CLINIC | Age: 60
End: 2020-12-11
Payer: COMMERCIAL

## 2020-12-11 VITALS
OXYGEN SATURATION: 95 % | HEART RATE: 86 BPM | SYSTOLIC BLOOD PRESSURE: 124 MMHG | BODY MASS INDEX: 40.74 KG/M2 | TEMPERATURE: 97.7 F | DIASTOLIC BLOOD PRESSURE: 80 MMHG | HEIGHT: 63 IN

## 2020-12-11 DIAGNOSIS — Z72.0 TOBACCO ABUSE: ICD-10-CM

## 2020-12-11 DIAGNOSIS — R91.8 GROUND GLASS OPACITY PRESENT ON IMAGING OF LUNG: ICD-10-CM

## 2020-12-11 DIAGNOSIS — J41.0 SIMPLE CHRONIC BRONCHITIS (HCC): Primary | ICD-10-CM

## 2020-12-11 PROCEDURE — 99214 OFFICE O/P EST MOD 30 MIN: CPT | Performed by: PHYSICIAN ASSISTANT

## 2020-12-11 RX ORDER — ALBUTEROL SULFATE 90 UG/1
POWDER, METERED RESPIRATORY (INHALATION)
Qty: 3 EACH | Refills: 3 | Status: SHIPPED | OUTPATIENT
Start: 2020-12-11 | End: 2021-12-29 | Stop reason: SDUPTHER

## 2021-01-05 DIAGNOSIS — I95.1 ORTHOSTATIC HYPOTENSION: ICD-10-CM

## 2021-01-05 RX ORDER — MIDODRINE HYDROCHLORIDE 5 MG/1
TABLET ORAL
Qty: 135 TABLET | Refills: 1 | Status: SHIPPED | OUTPATIENT
Start: 2021-01-05 | End: 2021-07-12

## 2021-01-06 ENCOUNTER — TELEPHONE (OUTPATIENT)
Dept: NEUROLOGY | Facility: CLINIC | Age: 61
End: 2021-01-06

## 2021-01-13 ENCOUNTER — TELEPHONE (OUTPATIENT)
Dept: INTERNAL MEDICINE CLINIC | Facility: CLINIC | Age: 61
End: 2021-01-13

## 2021-01-13 NOTE — TELEPHONE ENCOUNTER
Had gastric bypass 2 times  Only goes to the bathroom every 7-10 days  So when she does go to the bathroom it is extremely large and dense  She clogs the pipes, so she calls maintenance to come and get it unclogged  It took all day yesterday to get it flushed through the pipes  [facility] authority of [facility] is requesting a letter from her doctor so the pt can get a "power flusher" toilet  Please fax letter to Nikky Chester at the [facility]   Office fx # [phone]

## 2021-02-05 RX ORDER — ARIPIPRAZOLE 5 MG/1
TABLET ORAL
COMMUNITY
Start: 2020-12-31 | End: 2021-07-21 | Stop reason: SDUPTHER

## 2021-02-08 ENCOUNTER — OFFICE VISIT (OUTPATIENT)
Dept: INTERNAL MEDICINE CLINIC | Facility: CLINIC | Age: 61
End: 2021-02-08
Payer: COMMERCIAL

## 2021-02-08 VITALS
SYSTOLIC BLOOD PRESSURE: 120 MMHG | HEART RATE: 108 BPM | OXYGEN SATURATION: 98 % | HEIGHT: 63 IN | TEMPERATURE: 97.7 F | DIASTOLIC BLOOD PRESSURE: 66 MMHG | BODY MASS INDEX: 40.74 KG/M2

## 2021-02-08 DIAGNOSIS — J45.30 MILD PERSISTENT ASTHMA WITHOUT COMPLICATION: ICD-10-CM

## 2021-02-08 DIAGNOSIS — R10.12 LEFT UPPER QUADRANT ABDOMINAL PAIN: ICD-10-CM

## 2021-02-08 DIAGNOSIS — F32.2 SEVERE DEPRESSION (HCC): ICD-10-CM

## 2021-02-08 DIAGNOSIS — D84.9 IMMUNOSUPPRESSION (HCC): ICD-10-CM

## 2021-02-08 DIAGNOSIS — I73.1 THROMBOANGIITIS OBLITERANS (HCC): ICD-10-CM

## 2021-02-08 DIAGNOSIS — J41.0 SIMPLE CHRONIC BRONCHITIS (HCC): ICD-10-CM

## 2021-02-08 DIAGNOSIS — Z12.31 BREAST CANCER SCREENING BY MAMMOGRAM: Primary | ICD-10-CM

## 2021-02-08 DIAGNOSIS — E27.40 LOW SERUM CORTISOL LEVEL (HCC): ICD-10-CM

## 2021-02-08 DIAGNOSIS — E66.01 OBESITY, MORBID (HCC): ICD-10-CM

## 2021-02-08 DIAGNOSIS — Z72.0 TOBACCO ABUSE: ICD-10-CM

## 2021-02-08 PROCEDURE — 99214 OFFICE O/P EST MOD 30 MIN: CPT | Performed by: PHYSICIAN ASSISTANT

## 2021-02-08 NOTE — PROGRESS NOTES
Assessment/Plan: I wrote her prescription friend up walker that she requested  Also a little more short of breath over the past few months recommend a pulmonary follow-up  She is having some vague left upper quadrant epigastric discomfort she is concerned about pancreatitis and abdominal aneurysm which is in her family  Workup as below       Diagnoses and all orders for this visit:    Breast cancer screening by mammogram  -     Mammo screening bilateral w 3d & cad; Future    [facility]uppression ([facility] )  -     Comprehensive metabolic panel; Future  -     CBC and differential; Future  -     Lipase; Future  -     UA w Reflex to Microscopic w Reflex to Culture  -     US abdominal aorta; Future  -     XR chest pa & lateral; Future    Simple chronic bronchitis (HCC)  -     Comprehensive metabolic panel; Future  -     CBC and differential; Future  -     Lipase; Future  -     UA w Reflex to Microscopic w Reflex to Culture  -     US abdominal aorta; Future  -     XR chest pa & lateral; Future    Severe depression (HCC)  -     Comprehensive metabolic panel; Future  -     CBC and differential; Future  -     Lipase; Future  -     UA w Reflex to Microscopic w Reflex to Culture  -     US abdominal aorta; Future  -     XR chest pa & lateral; Future    Thromboangiitis obliterans (HCC)    Obesity, morbid ([facility] )  -     Comprehensive metabolic panel; Future  -     CBC and differential; Future  -     Lipase; Future  -     UA w Reflex to Microscopic w Reflex to Culture  -     US abdominal aorta; Future  -     XR chest pa & lateral; Future    Low serum cortisol level (HCC)    Tobacco abuse    Mild persistent asthma without complication  -     Comprehensive metabolic panel; Future  -     CBC and differential; Future  -     Lipase; Future  -     UA w Reflex to Microscopic w Reflex to Culture  -     US abdominal aorta;  Future  -     XR chest pa & lateral; Future    Left upper quadrant abdominal pain  -     Comprehensive metabolic panel; Future  -     CBC and differential; Future  -     Lipase; Future  -     UA w Reflex to Microscopic w Reflex to Culture  -     US abdominal aorta; Future  -     XR chest pa & lateral; Future    Other orders  -     ARIPiprazole (ABILIFY) 5 mg tablet; TAKE 1 TABLET BY MOUTH EVERY DAY AT NIGHT        No problem-specific Assessment & Plan notes found for this encounter  Subjective:      Patient ID: Ugo Dave is a 62 y o  female  She needs a prescription for special type of walker that helps her stand up straighter  Also complaining of slightly more short of breath with exertion and discomfort in her left upper quadrant that comes and goes over the past 2 months  No chest pain palpitations dizziness a chronic productive cough unchanged  A cigarette smoker  Severe COPD her appetite and weight is stable no orthopnea PND or worsening ankle swelling  History of anxiety depression on psych meds followed by psych      The following portions of the patient's history were reviewed and updated as appropriate:   She has a past medical history of Anemia, Anemia, Cardiac disorder, CHF (congestive heart failure) ([address] ), Constipation, COPD (chronic obstructive pulmonary disease) ([address] ), Depression, Fibromyalgia, Fibromyalgia, primary, Head injury, Heart disorder, Malignant neoplasm ([address] ), Migraines, Myocardial infarction ([address] ), Occasional tremors, Osteoarthritis, Osteoporosis, Problems with swallowing, PTSD (post-traumatic stress disorder), Restless leg syndrome, Skin cancer, Stomach problems, and Tobacco abuse ,  does not have any pertinent problems on file  ,   has a past surgical history that includes Gallbladder surgery; Tonsillectomy; Gastric bypass; Back surgery; Other surgical history (Left); Arthrodesis; Bladder surgery; Foot surgery (Right); Abdominal surgery; Hand surgery (Left); Adenoidectomy; Toe Surgery (Right); Ankle surgery (Right); Femur Surgery (Right); Hemorrhoid surgery;  Toenail excision; Multiple tooth extractions; Cholecystectomy; and Colonoscopy  ,  family history includes Arthritis in her mother; Cancer in her maternal aunt; Diabetes in her brother; Heart attack in her father; Heart disease in her brother and father; Hypertension in her father and mother; Mental illness in her brother and father; Neuropathy in her father; Obesity in her mother and sister; Osteoarthritis in her family; Osteoporosis in her family; Scoliosis in her family; Uterine cancer in her mother  ,   reports that she has been smoking cigarettes  She has a 42 00 pack-year smoking history  She has never used smokeless tobacco  She reports previous alcohol use  She reports current drug use  Frequency: 7 00 times per week  Drug: Marijuana  ,  is allergic to morphine and related; quetiapine; and sulfa antibiotics     Current Outpatient Medications   Medication Sig Dispense Refill    albuterol (2 5 mg/3 mL) 0 083 % nebulizer solution Take 1 vial (2 5 mg total) by nebulization every 6 (six) hours as needed for wheezing or shortness of breath (Patient taking differently: Take 2 5 mg by nebulization every 6 (six) hours as needed for wheezing or shortness of breath ) 1080 mL 3    albuterol (PROVENTIL HFA,VENTOLIN HFA) 90 mcg/act inhaler Inhale 2 puffs every 6 (six) hours as needed for wheezing 1 Inhaler 5    albuterol (PROVENTIL HFA,VENTOLIN HFA) 90 mcg/act inhaler TAKE 2 PUFFS BY MOUTH EVERY 6 HOURS AS NEEDED FOR WHEEZE 6 7 Inhaler 3    Albuterol Sulfate (ProAir RespiClick) 276 (90 Base) MCG/ACT AEPB Inhale 2 puffs Q 4-6 H PRN SOB or wheezing 3 each 3    ARIPiprazole (ABILIFY) 2 mg tablet Take 1 tablet (2 mg total) by mouth daily at bedtime 90 tablet 0    ARIPiprazole (ABILIFY) 5 mg tablet TAKE 1 TABLET BY MOUTH EVERY DAY AT NIGHT      Blood Glucose Monitoring Suppl (ONE TOUCH ULTRA 2) w/Device KIT Test daily and as instructed 1 each 0    Blood Pressure Monitoring (BLOOD PRESSURE CUFF) MISC by Does not apply route 2 (two) times a day 1 each 0    Blood Pressure Monitoring (SPHYGMOMANOMETER) MISC Medically necessary to monitor blood pressure due to orthostatic hypotension 1 each 0    buprenorphine-naloxone (SUBOXONE) 2-0 5 mg per SL tablet Place under the tongue 2 (two) times a day       butalbital-aspirin-caffeine (FIORINAL) -40 MG per tablet Take 1 tablet by mouth every 4 (four) hours as needed for headaches      clonazePAM (KlonoPIN) 0 5 mg tablet Take 0 5 mg by mouth 3 (three) times a day   1    clotrimazole (LOTRIMIN) 1 % cream Apply topically 2 (two) times a day for 14 days 30 g 5    dihydroergotamine (MIGRANAL) 4 MG/ML nasal spray 1 spray into each nostril as needed Use in one nostril as directed    No more than 4 sprays in one hour      diphenhydrAMINE (BENADRYL) 25 mg tablet Take 25 mg by mouth every 6 (six) hours as needed for itching      doxepin (SINEquan) 150 MG capsule Take 1 capsule (150 mg total) by mouth daily at bedtime 90 capsule 0    doxepin (SINEquan) 50 mg capsule Take 1 capsule (50 mg total) by mouth daily at bedtime 90 capsule 0    famotidine (PEPCID) 20 mg tablet Take 1 tablet (20 mg total) by mouth 2 (two) times a day 1 PO  tablet 3    fluticasone (FLONASE) 50 mcg/act nasal spray 1 spray 2 (two) times a day as needed for rhinitis   2    gabapentin (NEURONTIN) 800 mg tablet Take 800 mg by mouth 4 (four) times a day  3    glucose blood (ONE TOUCH ULTRA TEST) test strip Patient to test two times daily 200 each 3    Lancets (ONETOUCH ULTRASOFT) lancets Patient to test two times daily 200 each 3    lidocaine (XYLOCAINE) 5 % ointment Apply topically 2 (two) times a day as needed for mild pain 35 44 g 11    midodrine (PROAMATINE) 5 mg tablet TAKE 1 AND 1/2 TABLETS BY MOUTH 3 TIMES A DAY LAST DOSE BEFORE 6:00 P M  135 tablet 1    nicotine (NICODERM CQ) 21 mg/24 hr TD 24 hr patch Place 1 patch on the skin daily 28 patch 0    primidone (MYSOLINE) 50 mg tablet Three p o  Q h s  270 tablet 1    Respiratory Therapy Supplies (NEBULIZER/TUBING/MOUTHPIECE) KIT Use up to 6 times daily as needed for lung symptoms 1 each 3    rOPINIRole (REQUIP) 0 25 mg tablet TAKE 1 TABLET BY MOUTH THREE TIMES A  tablet 1    Simethicone (GAS-X PO) Take 1 tablet by mouth as needed      TiZANidine (ZANAFLEX) 2 MG capsule TAKE 1 CAPSULE BY MOUTH THREE TIMES A DAY AS NEEDED FOR MUSLCE SPASMS 270 capsule 3    venlafaxine 150 MG TB24 Take 1 tablet (150 mg total) by mouth daily with breakfast 90 tablet 0    venlafaxine 225 MG TB24 Take 1 tablet (225 mg total) by mouth 2 (two) times a day 90 tablet 0    WIXELA INHUB 250-50 MCG/DOSE inhaler INHALE 1 PUFF TWICE A DAY, RINSE MOUTH AFTER USE 3 Inhaler 4    zafirlukast (ACCOLATE) 20 MG tablet Take 1 tablet (20 mg total) by mouth 2 (two) times a day before meals 180 tablet 3    nicotine polacrilex (NICORETTE) 2 mg gum Chew 1 each (2 mg total) as needed for smoking cessation (Patient not taking: Reported on 12/11/2020) 100 each 0     No current facility-administered medications for this visit  Review of Systems   Constitutional: Negative for chills and fever  HENT: Negative for ear pain and sore throat  Eyes: Negative for pain and visual disturbance  Respiratory: Positive for cough and shortness of breath  Cardiovascular: Negative for chest pain and palpitations  Gastrointestinal: Positive for abdominal pain  Negative for vomiting  Genitourinary: Negative for dysuria and hematuria  Musculoskeletal: Positive for arthralgias, back pain, gait problem and myalgias  Skin: Negative for color change and rash  Neurological: Negative for seizures and syncope  Psychiatric/Behavioral: Positive for dysphoric mood  The patient is nervous/anxious  All other systems reviewed and are negative          Objective:  Vitals:    02/08/21 1614   BP: 120/66   BP Location: Left arm   Patient Position: Sitting   Cuff Size: Standard   Pulse: (!) 108   Temp: 97 7 °F (36 5 °C)   TempSrc: Temporal   SpO2: 98%   Height: 5' 3" (1 6 m)     Body mass index is 40 74 kg/m²  Physical Exam  Vitals signs reviewed  Constitutional:       Appearance: She is well-developed  She is obese  HENT:      Head: Normocephalic  Right Ear: Tympanic membrane and external ear normal       Left Ear: Tympanic membrane and external ear normal       Nose: Nose normal       Mouth/Throat:      Mouth: Mucous membranes are moist    Eyes:      Extraocular Movements: Extraocular movements intact  Conjunctiva/sclera: Conjunctivae normal       Pupils: Pupils are equal, round, and reactive to light  Neck:      Musculoskeletal: Normal range of motion and neck supple  Thyroid: No thyromegaly  Cardiovascular:      Rate and Rhythm: Normal rate and regular rhythm  Pulses: Normal pulses  Heart sounds: Normal heart sounds  No murmur  Pulmonary:      Effort: Pulmonary effort is normal  No respiratory distress  Breath sounds: Normal breath sounds  No stridor  No wheezing, rhonchi or rales  Comments:  Decreased breath sounds overall  Abdominal:      General: Bowel sounds are normal  There is no distension  Palpations: Abdomen is soft  There is no mass  Tenderness: There is no abdominal tenderness  There is no right CVA tenderness, left CVA tenderness, guarding or rebound  Hernia: No hernia is present  Musculoskeletal: Normal range of motion  General: No swelling  Skin:     General: Skin is warm and dry  Neurological:      General: No focal deficit present  Mental Status: She is alert and oriented to person, place, and time  Gait: Gait abnormal ( in a wheelchair)  Deep Tendon Reflexes: Reflexes are normal and symmetric  Psychiatric:         Mood and Affect: Mood normal          Thought Content:  Thought content normal          Judgment: Judgment normal

## 2021-02-10 ENCOUNTER — HOSPITAL ENCOUNTER (OUTPATIENT)
Dept: CT IMAGING | Facility: HOSPITAL | Age: 61
Discharge: HOME | End: 2021-02-10
Attending: INTERNAL MEDICINE
Payer: COMMERCIAL

## 2021-02-10 ENCOUNTER — LAB (OUTPATIENT)
Dept: LAB | Facility: HOSPITAL | Age: 61
End: 2021-02-10
Payer: COMMERCIAL

## 2021-02-10 ENCOUNTER — HOSPITAL ENCOUNTER (OUTPATIENT)
Dept: RADIOLOGY | Facility: HOSPITAL | Age: 61
Discharge: HOME | End: 2021-02-10
Payer: COMMERCIAL

## 2021-02-10 DIAGNOSIS — J45.30 MILD PERSISTENT ASTHMA WITHOUT COMPLICATION: ICD-10-CM

## 2021-02-10 DIAGNOSIS — D84.9 IMMUNOSUPPRESSION (HCC): ICD-10-CM

## 2021-02-10 DIAGNOSIS — F32.2 SEVERE DEPRESSION (HCC): ICD-10-CM

## 2021-02-10 DIAGNOSIS — J84.9 ILD (INTERSTITIAL LUNG DISEASE) (HCC): ICD-10-CM

## 2021-02-10 DIAGNOSIS — E66.01 OBESITY, MORBID (HCC): ICD-10-CM

## 2021-02-10 DIAGNOSIS — R10.12 LEFT UPPER QUADRANT ABDOMINAL PAIN: ICD-10-CM

## 2021-02-10 DIAGNOSIS — J41.0 SIMPLE CHRONIC BRONCHITIS (HCC): ICD-10-CM

## 2021-02-10 DIAGNOSIS — G25.0 TREMOR, ESSENTIAL: ICD-10-CM

## 2021-02-10 LAB
ALBUMIN SERPL BCP-MCNC: 3.5 G/DL (ref 3.5–5)
ALP SERPL-CCNC: 131 U/L (ref 46–116)
ALT SERPL W P-5'-P-CCNC: 15 U/L (ref 12–78)
ANION GAP SERPL CALCULATED.3IONS-SCNC: 7 MMOL/L (ref 4–13)
AST SERPL W P-5'-P-CCNC: 10 U/L (ref 5–45)
BASOPHILS # BLD AUTO: 0.05 THOUSANDS/ΜL (ref 0–0.1)
BASOPHILS NFR BLD AUTO: 1 % (ref 0–1)
BILIRUB SERPL-MCNC: 0.2 MG/DL (ref 0.2–1)
BUN SERPL-MCNC: 11 MG/DL (ref 5–25)
CALCIUM SERPL-MCNC: 8.9 MG/DL (ref 8.3–10.1)
CHLORIDE SERPL-SCNC: 102 MMOL/L (ref 100–108)
CO2 SERPL-SCNC: 27 MMOL/L (ref 21–32)
CREAT SERPL-MCNC: 0.95 MG/DL (ref 0.6–1.3)
EOSINOPHIL # BLD AUTO: 0.11 THOUSAND/ΜL (ref 0–0.61)
EOSINOPHIL NFR BLD AUTO: 1 % (ref 0–6)
ERYTHROCYTE [DISTWIDTH] IN BLOOD BY AUTOMATED COUNT: 17.9 % (ref 11.6–15.1)
GFR SERPL CREATININE-BSD FRML MDRD: 66 ML/MIN/1.73SQ M
GLUCOSE SERPL-MCNC: 166 MG/DL (ref 65–140)
HCT VFR BLD AUTO: 33.1 % (ref 34.8–46.1)
HGB BLD-MCNC: 10.6 G/DL (ref 11.5–15.4)
IMM GRANULOCYTES # BLD AUTO: 0.04 THOUSAND/UL (ref 0–0.2)
IMM GRANULOCYTES NFR BLD AUTO: 0 % (ref 0–2)
LIPASE SERPL-CCNC: 161 U/L (ref 73–393)
LYMPHOCYTES # BLD AUTO: 3.1 THOUSANDS/ΜL (ref 0.6–4.47)
LYMPHOCYTES NFR BLD AUTO: 35 % (ref 14–44)
MCH RBC QN AUTO: 28.5 PG (ref 26.8–34.3)
MCHC RBC AUTO-ENTMCNC: 32 G/DL (ref 31.4–37.4)
MCV RBC AUTO: 89 FL (ref 82–98)
MONOCYTES # BLD AUTO: 0.61 THOUSAND/ΜL (ref 0.17–1.22)
MONOCYTES NFR BLD AUTO: 7 % (ref 4–12)
NEUTROPHILS # BLD AUTO: 5.04 THOUSANDS/ΜL (ref 1.85–7.62)
NEUTS SEG NFR BLD AUTO: 56 % (ref 43–75)
NRBC BLD AUTO-RTO: 0 /100 WBCS
PLATELET # BLD AUTO: 336 THOUSANDS/UL (ref 149–390)
PMV BLD AUTO: 10.5 FL (ref 8.9–12.7)
POTASSIUM SERPL-SCNC: 4.9 MMOL/L (ref 3.5–5.3)
PROT SERPL-MCNC: 7.4 G/DL (ref 6.4–8.2)
RBC # BLD AUTO: 3.72 MILLION/UL (ref 3.81–5.12)
SODIUM SERPL-SCNC: 136 MMOL/L (ref 136–145)
WBC # BLD AUTO: 8.95 THOUSAND/UL (ref 4.31–10.16)

## 2021-02-10 PROCEDURE — 83690 ASSAY OF LIPASE: CPT

## 2021-02-10 PROCEDURE — 85025 COMPLETE CBC W/AUTO DIFF WBC: CPT

## 2021-02-10 PROCEDURE — 80053 COMPREHEN METABOLIC PANEL: CPT

## 2021-02-10 PROCEDURE — 71250 CT THORAX DX C-: CPT

## 2021-02-10 PROCEDURE — 71046 X-RAY EXAM CHEST 2 VIEWS: CPT

## 2021-02-10 PROCEDURE — 36415 COLL VENOUS BLD VENIPUNCTURE: CPT

## 2021-02-10 RX ORDER — PRIMIDONE 50 MG/1
TABLET ORAL
Qty: 270 TABLET | Refills: 1 | Status: SHIPPED | OUTPATIENT
Start: 2021-02-10 | End: 2021-08-09

## 2021-02-11 ENCOUNTER — TELEPHONE (OUTPATIENT)
Dept: INTERNAL MEDICINE CLINIC | Facility: CLINIC | Age: 61
End: 2021-02-11

## 2021-02-11 DIAGNOSIS — R74.8 ELEVATED ALKALINE PHOSPHATASE LEVEL: Primary | ICD-10-CM

## 2021-02-11 NOTE — TELEPHONE ENCOUNTER
----- Message from Danish Multani PA-C sent at 2/11/2021 11:57 AM EST -----  Alkaline phosphatase slightly elevated all her other labs are fine  I ordered isoenzymes to clarify the  alkaline phosphatase    Please call her

## 2021-02-12 DIAGNOSIS — J45.30 MILD PERSISTENT ASTHMA WITHOUT COMPLICATION: ICD-10-CM

## 2021-02-17 ENCOUNTER — TELEPHONE (OUTPATIENT)
Dept: PULMONOLOGY | Facility: CLINIC | Age: 61
End: 2021-02-17

## 2021-02-17 ENCOUNTER — HOSPITAL ENCOUNTER (OUTPATIENT)
Dept: GASTROENTEROLOGY | Facility: HOSPITAL | Age: 61
Setting detail: OUTPATIENT SURGERY
Discharge: HOME | End: 2021-02-17
Attending: INTERNAL MEDICINE

## 2021-02-17 DIAGNOSIS — R13.10 ODYNOPHAGIA: ICD-10-CM

## 2021-02-17 DIAGNOSIS — R10.13 EPIGASTRIC PAIN: ICD-10-CM

## 2021-02-17 DIAGNOSIS — K59.04 CHRONIC IDIOPATHIC CONSTIPATION: ICD-10-CM

## 2021-02-17 DIAGNOSIS — R11.0 NAUSEA: ICD-10-CM

## 2021-02-17 DIAGNOSIS — R13.14 PHARYNGOESOPHAGEAL DYSPHAGIA: ICD-10-CM

## 2021-02-17 RX ORDER — SODIUM CHLORIDE, SODIUM LACTATE, POTASSIUM CHLORIDE, CALCIUM CHLORIDE 600; 310; 30; 20 MG/100ML; MG/100ML; MG/100ML; MG/100ML
125 INJECTION, SOLUTION INTRAVENOUS CONTINUOUS
OUTPATIENT
Start: 2021-02-17

## 2021-02-17 NOTE — TELEPHONE ENCOUNTER
Called and left a message that the opacities in the recent CT of the chest have decreased from before but not completely resolved, I do not see a follow-up appointment for her please make sure that she has a follow-up appointment to see 1 of hers at least in the next 2-3 weeks so that we can go over the results of the CT chest and then order the next CT chest   For follow-up

## 2021-02-17 NOTE — TELEPHONE ENCOUNTER
Radiology called with significant findings  Can you please call with results when you have a moment? Thank you

## 2021-02-20 DIAGNOSIS — M15.9 PRIMARY OSTEOARTHRITIS INVOLVING MULTIPLE JOINTS: ICD-10-CM

## 2021-02-20 DIAGNOSIS — G62.9 PERIPHERAL POLYNEUROPATHY: ICD-10-CM

## 2021-02-20 DIAGNOSIS — B37.3 CANDIDIASIS OF THE GENITALS, FEMALE: ICD-10-CM

## 2021-02-20 RX ORDER — FLUCONAZOLE 150 MG/1
TABLET ORAL
Qty: 4 TABLET | Refills: 0 | Status: SHIPPED | OUTPATIENT
Start: 2021-02-20 | End: 2021-03-10

## 2021-02-20 RX ORDER — LIDOCAINE 50 MG/G
OINTMENT TOPICAL
Qty: 35.44 G | Refills: 11 | Status: SHIPPED | OUTPATIENT
Start: 2021-02-20

## 2021-02-23 ENCOUNTER — TELEPHONE (OUTPATIENT)
Dept: INTERNAL MEDICINE CLINIC | Facility: CLINIC | Age: 61
End: 2021-02-23

## 2021-02-23 NOTE — TELEPHONE ENCOUNTER
Danika Gomes from [employer] called in regards to a on order for a walker they received  They will be faxing us a form to fill out   Please send pt's demographics, insurance info and last office note with completed form to [phone]

## 2021-02-25 NOTE — TELEPHONE ENCOUNTER
yimi called from [facility] regarding this patient  She needs to know the patient weight to send her a walker        [phone]

## 2021-03-02 ENCOUNTER — OFFICE VISIT (OUTPATIENT)
Dept: NEUROLOGY | Facility: CLINIC | Age: 61
End: 2021-03-02
Payer: COMMERCIAL

## 2021-03-02 VITALS — HEART RATE: 82 BPM | DIASTOLIC BLOOD PRESSURE: 78 MMHG | SYSTOLIC BLOOD PRESSURE: 118 MMHG

## 2021-03-02 DIAGNOSIS — G25.0 TREMOR, ESSENTIAL: Primary | ICD-10-CM

## 2021-03-02 DIAGNOSIS — I95.1 ORTHOSTATIC HYPOTENSION: ICD-10-CM

## 2021-03-02 DIAGNOSIS — G62.9 PERIPHERAL POLYNEUROPATHY: ICD-10-CM

## 2021-03-02 DIAGNOSIS — G57.11 MERALGIA PARESTHETICA, RIGHT: ICD-10-CM

## 2021-03-02 PROCEDURE — 99213 OFFICE O/P EST LOW 20 MIN: CPT | Performed by: PSYCHIATRY & NEUROLOGY
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
## 2021-03-04 ENCOUNTER — TELEPHONE (OUTPATIENT)
Dept: PULMONOLOGY | Facility: CLINIC | Age: 61
End: 2021-03-04

## 2021-03-05 ENCOUNTER — OFFICE VISIT (OUTPATIENT)
Dept: PULMONOLOGY | Facility: CLINIC | Age: 61
End: 2021-03-05
Payer: COMMERCIAL

## 2021-03-05 ENCOUNTER — TELEPHONE (OUTPATIENT)
Dept: PULMONOLOGY | Facility: CLINIC | Age: 61
End: 2021-03-05

## 2021-03-05 VITALS
SYSTOLIC BLOOD PRESSURE: 124 MMHG | HEIGHT: 63 IN | HEART RATE: 94 BPM | OXYGEN SATURATION: 96 % | DIASTOLIC BLOOD PRESSURE: 86 MMHG | TEMPERATURE: 97.7 F | BODY MASS INDEX: 40.74 KG/M2

## 2021-03-05 DIAGNOSIS — R91.8 GROUND GLASS OPACITY PRESENT ON IMAGING OF LUNG: ICD-10-CM

## 2021-03-05 DIAGNOSIS — R91.8 LUNG NODULES: ICD-10-CM

## 2021-03-05 DIAGNOSIS — R93.89 ABNORMAL CHEST CT: ICD-10-CM

## 2021-03-05 DIAGNOSIS — Z72.0 TOBACCO ABUSE: ICD-10-CM

## 2021-03-05 DIAGNOSIS — J41.0 SIMPLE CHRONIC BRONCHITIS (HCC): Primary | ICD-10-CM

## 2021-03-05 PROCEDURE — 99214 OFFICE O/P EST MOD 30 MIN: CPT | Performed by: INTERNAL MEDICINE

## 2021-03-05 NOTE — PROGRESS NOTES
Assessment/Plan:   Diagnoses and all orders for this visit:    Simple chronic bronchitis (HCC)    Ground glass opacity present on imaging of lung    Tobacco abuse    Lung nodules    Abnormal chest CT      Chronic simple bronchitis with extensive smoking history currently on albuterol with ipratropium via nebulizer 4 times daily as needed using it currently only once a day she states  Continue with Advair 250/51 puff twice daily  Rinse mouth after use  Given the mild thrush will add nystatin swish and swallow  Her PFTs demonstrate FEV1 of 87% predicted and DLCO has significantly decreased  CT of the chest 7/11/20,  done with ground-glass opacities which have worsened from before discussed with the patient regarding interstitial lung disease, smoking related likely  She had a right hilar lymph node 1 2 cm and pre can IL and subcarinal lymph node have all been stable from before  she had a high-resolution CT of the chest done on 02/10/2021 with no evidence of any home being or traction bronchiectasis, the ground-glass opacities have significantly decreased from before  The lung a small lung nodules all subcentimeter lung nodules have been stable  she does need an annual CT of the chest for follow-up of these subcentimeter lung nodules in view of extensive smoking history still actively smoking  Long discussion with the patient with regards to smoking cessation, she states she is using her neck or at patch and is trying to cut down  Recommend weight loss     COVID-19 vaccine information given to the patient  Follow-up in 3 months or p r n  earlier as needed  Return in about 3 months (around 6/5/2021)  All questions are answered to the patient's satisfaction and understanding  She verbalizes understanding  She is encouraged to call with any further questions or concerns  Portions of the record may have been created with voice recognition software    Occasional wrong word or "sound a like" substitutions may have occurred due to the inherent limitations of voice recognition software  Read the chart carefully and recognize, using context, where substitutions have occurred  Electronically Signed by Evelina Raines MD    ______________________________________________________________________    Chief Complaint:   Chief Complaint   Patient presents with    Cough    Shortness of Breath    Wheezing       Patient ID: Ericka Guzman is a 62 y o  y o  female has a past medical history of Anemia, Anemia, Cardiac disorder, CHF (congestive heart failure) ([address] ), Constipation, COPD (chronic obstructive pulmonary disease) ([address] ), Depression, Fibromyalgia, Fibromyalgia, primary, Head injury, Heart disorder, Malignant neoplasm ([address] ), Migraines, Myocardial infarction ([address] ), Occasional tremors, Osteoarthritis, Osteoporosis, Problems with swallowing, PTSD (post-traumatic stress disorder), Restless leg syndrome, Skin cancer, Stomach problems, and Tobacco abuse  3/5/2021  Patient presents today for follow-up visit  Ericka Guzman is a 55-year-old female current smoker with past medical history including but not limited to COPD, asthma, GERD, bariatric surgery, anemia, depression, anxiety, thrombo angiitis obliterans, frequent falls presenting for follow-up  Patient reports her breathing is about the same  She has chronic chest exertion and cough which is also likely worsened by her ongoing tobacco abuse  She has cut back to 3/4 pack per day and is trying to quit entirely with nicotine patches  Review of Systems   Constitutional: Positive for fatigue  HENT: Negative  Eyes: Negative  Respiratory: Positive for cough, shortness of breath and wheezing  Cardiovascular: Negative  Gastrointestinal: Negative  Endocrine: Negative  Genitourinary: Negative  Musculoskeletal: Negative  Allergic/Immunologic: Negative  Neurological: Negative  Hematological: Negative      Psychiatric/Behavioral: Positive for sleep disturbance  The patient is nervous/anxious  Smoking history: She reports that she has been smoking cigarettes  She has a 42 00 pack-year smoking history  She has never used smokeless tobacco     The following portions of the patient's history were reviewed and updated as appropriate: allergies, current medications, past family history, past medical history, past social history, past surgical history and problem list     Immunization History   Administered Date(s) Administered    INFLUENZA 10/03/2014, 09/23/2017    Influenza Quadrivalent, 6-35 Months IM 09/23/2017    Influenza, recombinant, quadrivalent,injectable, preservative free 10/08/2018, 10/24/2019, 12/08/2020    Pneumococcal Conjugate 13-Valent 10/24/2019     Current Outpatient Medications   Medication Sig Dispense Refill    albuterol (PROVENTIL HFA,VENTOLIN HFA) 90 mcg/act inhaler TAKE 2 PUFFS BY MOUTH EVERY 6 HOURS AS NEEDED FOR WHEEZE 6 7 Inhaler 3    ARIPiprazole (ABILIFY) 2 mg tablet Take 1 tablet (2 mg total) by mouth daily at bedtime 90 tablet 0    ARIPiprazole (ABILIFY) 5 mg tablet TAKE 1 TABLET BY MOUTH EVERY DAY AT NIGHT      Blood Glucose Monitoring Suppl (ONE TOUCH ULTRA 2) w/Device KIT Test daily and as instructed 1 each 0    Blood Pressure Monitoring (SPHYGMOMANOMETER) MISC Medically necessary to monitor blood pressure due to orthostatic hypotension 1 each 0    buprenorphine-naloxone (SUBOXONE) 2-0 5 mg per SL tablet Place under the tongue 2 (two) times a day       butalbital-aspirin-caffeine (FIORINAL) -40 MG per tablet Take 1 tablet by mouth every 4 (four) hours as needed for headaches      clonazePAM (KlonoPIN) 0 5 mg tablet Take 0 5 mg by mouth 3 (three) times a day   1    dihydroergotamine (MIGRANAL) 4 MG/ML nasal spray 1 spray into each nostril as needed Use in one nostril as directed    No more than 4 sprays in one hour      diphenhydrAMINE (BENADRYL) 25 mg tablet Take 25 mg by mouth every 6 (six) hours as needed for itching      doxepin (SINEquan) 150 MG capsule Take 1 capsule (150 mg total) by mouth daily at bedtime (Patient taking differently: Take 300 mg by mouth daily at bedtime ) 90 capsule 0    doxepin (SINEquan) 50 mg capsule Take 1 capsule (50 mg total) by mouth daily at bedtime 90 capsule 0    famotidine (PEPCID) 20 mg tablet Take 1 tablet (20 mg total) by mouth 2 (two) times a day 1 PO  tablet 3    fluticasone (FLONASE) 50 mcg/act nasal spray 1 spray 2 (two) times a day as needed for rhinitis   2    gabapentin (NEURONTIN) 800 mg tablet Take 800 mg by mouth 4 (four) times a day  3    glucose blood (ONE TOUCH ULTRA TEST) test strip Patient to test two times daily 200 each 3    Lancets (ONETOUCH ULTRASOFT) lancets Patient to test two times daily 200 each 3    lidocaine (XYLOCAINE) 5 % ointment APPLY TOPICALLY 2 TIMES A DAY AS NEEDED FOR MILD PAIN 35 44 g 11    midodrine (PROAMATINE) 5 mg tablet TAKE 1 AND 1/2 TABLETS BY MOUTH 3 TIMES A DAY LAST DOSE BEFORE 6:00 P M  (Patient taking differently: Patient takes 2 tabs Qam and 1 tab with dinner) 135 tablet 1    primidone (MYSOLINE) 50 mg tablet TAKE 3 TABLETS BY MOUTH AT BEDTIME (Patient taking differently: Take 100 mg by mouth daily at bedtime TAKE 3 TABLETS BY MOUTH AT BEDTIME) 270 tablet 1    rOPINIRole (REQUIP) 0 25 mg tablet TAKE 1 TABLET BY MOUTH THREE TIMES A  tablet 1    Simethicone (GAS-X PO) Take 1 tablet by mouth as needed      TiZANidine (ZANAFLEX) 2 MG capsule TAKE 1 CAPSULE BY MOUTH THREE TIMES A DAY AS NEEDED FOR MUSLCE SPASMS 270 capsule 3    venlafaxine 150 MG TB24 Take 1 tablet (150 mg total) by mouth daily with breakfast 90 tablet 0    venlafaxine 225 MG TB24 Take 1 tablet (225 mg total) by mouth 2 (two) times a day 90 tablet 0    Wixela Inhub 250-50 MCG/DOSE inhaler INHALE 1 PUFF TWICE A DAY, RINSE MOUTH AFTER USE 3 Inhaler 4    zafirlukast (ACCOLATE) 20 MG tablet Take 1 tablet (20 mg total) by mouth 2 (two) times a day before meals 180 tablet 3    albuterol (2 5 mg/3 mL) 0 083 % nebulizer solution Take 1 vial (2 5 mg total) by nebulization every 6 (six) hours as needed for wheezing or shortness of breath (Patient not taking: Reported on 3/2/2021) 1080 mL 3    albuterol (PROVENTIL HFA,VENTOLIN HFA) 90 mcg/act inhaler Inhale 2 puffs every 6 (six) hours as needed for wheezing (Patient not taking: Reported on 3/5/2021) 1 Inhaler 5    Albuterol Sulfate (ProAir RespiClick) 142 (90 Base) MCG/ACT AEPB Inhale 2 puffs Q 4-6 H PRN SOB or wheezing (Patient not taking: Reported on 3/5/2021) 3 each 3    Blood Pressure Monitoring (BLOOD PRESSURE CUFF) MISC by Does not apply route 2 (two) times a day (Patient not taking: Reported on 3/5/2021) 1 each 0    clotrimazole (LOTRIMIN) 1 % cream Apply topically 2 (two) times a day for 14 days 30 g 5    fluconazole (DIFLUCAN) 150 mg tablet TAKE 1 TABLET BY MOUTH ONCE WEEKLY FOR 4 WEEKS (Patient not taking: Reported on 3/2/2021) 4 tablet 0    nicotine (NICODERM CQ) 21 mg/24 hr TD 24 hr patch Place 1 patch on the skin daily (Patient not taking: Reported on 3/2/2021) 28 patch 0    nicotine polacrilex (NICORETTE) 2 mg gum Chew 1 each (2 mg total) as needed for smoking cessation (Patient not taking: Reported on 12/11/2020) 100 each 0    Respiratory Therapy Supplies (NEBULIZER/TUBING/MOUTHPIECE) KIT Use up to 6 times daily as needed for lung symptoms (Patient not taking: Reported on 3/5/2021) 1 each 3     No current facility-administered medications for this visit  Allergies: Morphine and related, Quetiapine, and Sulfa antibiotics    Objective:  Vitals:    03/05/21 1127   BP: 124/86   Pulse: 94   Temp: 97 7 °F (36 5 °C)   SpO2: 96%   Height: 5' 3" (1 6 m)   Oxygen Therapy  SpO2: 96 %    Wt Readings from Last 3 Encounters:   12/08/20 104 kg (230 lb)   10/27/20 92 5 kg (204 lb)   09/03/20 91 6 kg (202 lb)     Body mass index is 40 74 kg/m²      Physical Exam  Constitutional:       Appearance: She is well-developed  HENT:      Head: Normocephalic and atraumatic  Eyes:      Pupils: Pupils are equal, round, and reactive to light  Neck:      Musculoskeletal: Normal range of motion and neck supple  Cardiovascular:      Rate and Rhythm: Normal rate and regular rhythm  Pulmonary:      Effort: Pulmonary effort is normal       Breath sounds: Wheezing (occ expiratory rhonchi) present  No rales  Chest:      Chest wall: No tenderness  Musculoskeletal: Normal range of motion  Skin:     General: Skin is warm and dry  Neurological:      Mental Status: She is alert and oriented to person, place, and time  Psychiatric:         Behavior: Behavior normal            Diagnostics:  I have personally reviewed pertinent films in PACS  Xr Chest Pa & Lateral    Result Date: 2/14/2021  Narrative: CHEST INDICATION:   D84 9: Immunodeficiency, unspecified J41 0: Simple chronic bronchitis F32 2: Major depressive disorder, single episode, severe without psychotic features E66 01: Morbid (severe) obesity due to excess calories J45 30: Mild persistent asthma, uncomplicated Y18 44: Left upper quadrant pain  COMPARISON:  Chest radiograph from 5/25/2019 and chest CT from 2/10/2021  EXAM PERFORMED/VIEWS:  XR CHEST PA & LATERAL  DUAL ENERGY SUBTRACTION  FINDINGS: Cardiomediastinal silhouette normal  Lungs clear  No effusion or pneumothorax  Multiple clips in the upper abdomen  Moderate curvature of the spine  Lumbar fusion  Impression: No acute cardiopulmonary disease  Workstation performed: [id number]     Ct Chest High Resolution    Result Date: 2/16/2021  Narrative: CT CHEST INCLUDING HIGH RESOLUTION SLICES - WITHOUT CONTRAST INDICATION:   J84 9: Interstitial pulmonary disease, unspecified  COMPARISON:  July 11, 2020 TECHNIQUE: CT examination of the chest was performed without intravenous contrast   Contiguous 1 25 mm transverse images were obtained along with 1 x 10 mm transverse images with the patient prone  Additional thin section images were performed at 10 mm intervals in supine and prone positioning in inspiration as well as supine in expiration  Reformatted images were created in sagittal, and coronal planes  MIP reconstructions were created in the transverse and coronal planes along with minIP reconstructions in the coronal plane  Sagittal reformatted images were also created  Radiation dose length product (DLP) for this visit:  817 mGy-cm   This examination, like all CT scans performed in the [facility], was performed utilizing techniques to minimize radiation dose exposure, including the use of iterative reconstruction and automated exposure control  FINDINGS: LUNGS: On the previous study of July 11, 2020 lobular areas of groundglass density were noted in the both lungs  The dilation noted groundglass areas are markedly decreased  There is no honeycombing, traction bronchiectatic changes and mild interlobular septal thickening seen Groundglass nodular density seen right upper lobe, in image 61 series 3 Linear density seen right lung base suggest atelectasis Linear density seen at the left lung base 3 mm nodular density seen posterior aspect of the left upper lobe in image 96 series 3, additional nodular density seen in image 77 series 603 The left upper lobe nodular density seen in image 59 series 3 measuring about 5 mm No significant air trapping seen PLEURA:  Unremarkable  VESSELS:  Unremarkable for patient's age  HEART:  Coronary artery calcification seen MEDIASTINUM AND LUCIO:  Lower right paratracheal lymph nodes are seen measuring about 1 cm  Lower left paratracheal lymph nodes are seen measuring about 1 cm Subcarinal lymph nodes are seen measuring about 6 mm CHEST WALL AND LOWER NECK:   Unremarkable   VISUALIZED STRUCTURES IN THE UPPER ABDOMEN:  Spleen, pancreas, adrenal glands appear unremarkable Surgical changes are seen in the abdomen The liver remains unchanged and mild prominence of the intrahepatic ducts seen OSSEOUS STRUCTURES:  No acute compression collapse  No gross lytic lesion     Impression: No traction bronchiectatic changes or honeycombing to suggest any fibrosing interstitial lung disease Multifocal areas of groundglass density with the associated mild interlobular septal thickening, markedly decreased from the previous study likely result of resolving underlying infectious or infiltrative process noted on the previous study of July 11, 2020 Mosaic attenuation of the lung parenchyma without significant air trapping 5 mm nodular density left upper lung in image 59 series 3, not seen on the previous  Based on current Fleischner Society 2017 Guidelines on incidental pulmonary nodule, because the patient is considered high risk for lung cancer, 12 month follow-up non-contrast chest CT is recommended  Right upper lobe groundglass nodular density in image 61 series 3 with no significant solid component The study was marked in EPIC for significant notification   Workstation performed: [id number]

## 2021-03-08 ENCOUNTER — TELEPHONE (OUTPATIENT)
Dept: INTERNAL MEDICINE CLINIC | Facility: CLINIC | Age: 61
End: 2021-03-08

## 2021-03-08 NOTE — TELEPHONE ENCOUNTER
Patient is coming in to see you on 3/10--    [facility] is trying to get a hold of patient for 2wks, to try and set up time to deliver rolleder    [facility] she will be coming in 3/10         [facility] [phone]

## 2021-03-10 ENCOUNTER — OFFICE VISIT (OUTPATIENT)
Dept: INTERNAL MEDICINE CLINIC | Facility: CLINIC | Age: 61
End: 2021-03-10
Payer: COMMERCIAL

## 2021-03-10 ENCOUNTER — ANNUAL EXAM (OUTPATIENT)
Dept: OBGYN CLINIC | Age: 61
End: 2021-03-10
Payer: COMMERCIAL

## 2021-03-10 VITALS
HEIGHT: 63 IN | SYSTOLIC BLOOD PRESSURE: 86 MMHG | WEIGHT: 226.2 LBS | DIASTOLIC BLOOD PRESSURE: 66 MMHG | BODY MASS INDEX: 40.08 KG/M2

## 2021-03-10 VITALS
DIASTOLIC BLOOD PRESSURE: 50 MMHG | HEIGHT: 63 IN | TEMPERATURE: 97 F | BODY MASS INDEX: 40.74 KG/M2 | SYSTOLIC BLOOD PRESSURE: 80 MMHG

## 2021-03-10 DIAGNOSIS — R21 SKIN RASH: ICD-10-CM

## 2021-03-10 DIAGNOSIS — I73.1 THROMBOANGIITIS OBLITERANS (HCC): ICD-10-CM

## 2021-03-10 DIAGNOSIS — Z01.419 ENCOUNTER FOR GYNECOLOGICAL EXAMINATION WITHOUT ABNORMAL FINDING: Primary | ICD-10-CM

## 2021-03-10 DIAGNOSIS — R29.898 MUSCULAR DECONDITIONING: ICD-10-CM

## 2021-03-10 DIAGNOSIS — Z78.0 POST-MENOPAUSAL: ICD-10-CM

## 2021-03-10 DIAGNOSIS — M54.14 THORACIC RADICULOPATHY: ICD-10-CM

## 2021-03-10 DIAGNOSIS — N63.20 LEFT BREAST LUMP: ICD-10-CM

## 2021-03-10 DIAGNOSIS — I95.0 IDIOPATHIC HYPOTENSION: ICD-10-CM

## 2021-03-10 DIAGNOSIS — J45.20 MILD INTERMITTENT ASTHMA WITHOUT COMPLICATION: ICD-10-CM

## 2021-03-10 DIAGNOSIS — I51.89 DIASTOLIC DYSFUNCTION: ICD-10-CM

## 2021-03-10 DIAGNOSIS — J41.0 SIMPLE CHRONIC BRONCHITIS (HCC): Primary | ICD-10-CM

## 2021-03-10 DIAGNOSIS — Z72.0 TOBACCO ABUSE: ICD-10-CM

## 2021-03-10 DIAGNOSIS — R73.9 HYPERGLYCEMIA: ICD-10-CM

## 2021-03-10 DIAGNOSIS — E27.40 LOW SERUM CORTISOL LEVEL (HCC): ICD-10-CM

## 2021-03-10 DIAGNOSIS — M80.00XA OSTEOPOROSIS WITH CURRENT PATHOLOGICAL FRACTURE, UNSPECIFIED OSTEOPOROSIS TYPE, INITIAL ENCOUNTER: ICD-10-CM

## 2021-03-10 PROCEDURE — G0145 SCR C/V CYTO,THINLAYER,RESCR: HCPCS | Performed by: NURSE PRACTITIONER

## 2021-03-10 PROCEDURE — 99214 OFFICE O/P EST MOD 30 MIN: CPT | Performed by: INTERNAL MEDICINE

## 2021-03-10 PROCEDURE — 3008F BODY MASS INDEX DOCD: CPT | Performed by: INTERNAL MEDICINE

## 2021-03-10 PROCEDURE — 87624 HPV HI-RISK TYP POOLED RSLT: CPT | Performed by: NURSE PRACTITIONER

## 2021-03-10 PROCEDURE — G0101 CA SCREEN;PELVIC/BREAST EXAM: HCPCS | Performed by: NURSE PRACTITIONER

## 2021-03-10 RX ORDER — ZAFIRLUKAST 20 MG/1
20 TABLET, FILM COATED ORAL
Qty: 180 TABLET | Refills: 3
Start: 2021-03-10 | End: 2021-12-17

## 2021-03-10 RX ORDER — DOXEPIN HYDROCHLORIDE 100 MG/1
CAPSULE ORAL
COMMUNITY
Start: 2021-02-10

## 2021-03-10 RX ORDER — FLUCONAZOLE 150 MG/1
TABLET ORAL
Qty: 3 TABLET | Refills: 1 | Status: SHIPPED | OUTPATIENT
Start: 2021-03-10 | End: 2021-03-17

## 2021-03-10 NOTE — PATIENT INSTRUCTIONS
Chronic lung disease: Ongoing, will not get better if she does not stop smoking  The prognosis is poor because I do not think she well   Thoracic radiculopathic pain  Low blood pressure:  I wonder if there is some kind of subclavian artery stenosis and I would like to see ultrasound     Also, echo regarding diastolic dysfunction  Will see her back here again in 2 week     she is already taking gabapentin 800 p o  t i d  for neuropathy

## 2021-03-10 NOTE — PROGRESS NOTES
Assessment/Plan:       Diagnoses and all orders for this visit:    Simple chronic bronchitis (HCC)  -     zafirlukast (ACCOLATE) 20 MG tablet; Take 1 tablet (20 mg total) by mouth 2 (two) times a day before meals    Thromboangiitis obliterans ([address] )    Muscular deconditioning    Tobacco abuse    Thoracic radiculopathy    Mild intermittent asthma without complication    Diastolic dysfunction  -     Echo complete with contrast if indicated; Future    Low serum cortisol level (HCC)  -     Basic metabolic panel; Future  -     Hemoglobin A1C; Future  -     Cortisol Level, AM Specimen; Future  -     T3, free; Future  -     T4, free; Future  -     TSH, 3rd generation; Future    Hyperglycemia  -     Basic metabolic panel; Future  -     Hemoglobin A1C; Future  -     Cortisol Level, AM Specimen; Future  -     T3, free; Future  -     T4, free; Future  -     TSH, 3rd generation; Future    Idiopathic hypotension  -     MRA upper extremity left; Future  -     MRA upper extremity right; Future    Other orders  -     doxepin (SINEquan) 100 mg capsule; TAKE ORALLY 3 ORAL CAPSULE AT NIGHT FOR 30 DAYS  Subjective:      Patient ID: Jose Ramon Longoria is a 62 y o  female  79-year-old who is physically almost done for   Major problem is cigarette smoking with its attendant diseases of chronic obstructive pulmonary disease with chronic bronchitis and also thrombo angiitis obliterans   Continues to smoke close to a pack a day  I do not think she has made a legitimate effort to stop smoking and I do not she is interested  She self medicating anxiety and depression with tobacco      the patient came in today to talk about her abdominal aorta ultrasound but she did not do it! She apparently is concerned that she has an abdominal aneurysm  She had a CT scan of the abdomen done July 2020 and there was no aneurysm noted        Complaint of pain felt in the left mid back radiating around under the breast; the pain is burning and constantI , and sounds radiculopathic      she is on ProAmatine but I can barely get her blood pressure at all  The following portions of the patient's history were reviewed and updated as appropriate:   She has a past medical history of Anemia, Anemia, Cardiac disorder, CHF (congestive heart failure) ([facility] [address] ), Constipation, COPD (chronic obstructive pulmonary disease) ([facility] [address] ), Depression, Fibromyalgia, Fibromyalgia, primary, Head injury, Heart disorder, Malignant neoplasm ([facility] [address] ), Migraines, Myocardial infarction ([facility] [address] ), Occasional tremors, Osteoarthritis, Osteoporosis, Problems with swallowing, PTSD (post-traumatic stress disorder), Restless leg syndrome, Skin cancer, Stomach problems, and Tobacco abuse ,  does not have any pertinent problems on file  ,   has a past surgical history that includes Gallbladder surgery; Tonsillectomy; Gastric bypass; Back surgery; Other surgical history (Left); Arthrodesis; Bladder surgery; Foot surgery (Right); Abdominal surgery; Hand surgery (Left); Adenoidectomy; Toe Surgery (Right); Ankle surgery (Right); Femur Surgery (Right); Hemorrhoid surgery; Toenail excision; Multiple tooth extractions; Cholecystectomy; and Colonoscopy  ,  family history includes Arthritis in her mother; Cancer in her maternal aunt; Diabetes in her brother; Heart attack in her father; Heart disease in her brother and father; Hypertension in her father and mother; Mental illness in her brother and father; Neuropathy in her father; Obesity in her mother and sister; Osteoarthritis in her family; Osteoporosis in her family; Scoliosis in her family; Uterine cancer in her mother  ,   reports that she has been smoking cigarettes  She has a 42 00 pack-year smoking history  She has never used smokeless tobacco  She reports previous alcohol use  She reports current drug use  Frequency: 7 00 times per week  Drug: Marijuana  ,  is allergic to morphine and related; quetiapine; and sulfa antibiotics     Current Outpatient Medications   Medication Sig Dispense Refill    albuterol (PROVENTIL HFA,VENTOLIN HFA) 90 mcg/act inhaler TAKE 2 PUFFS BY MOUTH EVERY 6 HOURS AS NEEDED FOR WHEEZE 6 7 Inhaler 3    ARIPiprazole (ABILIFY) 2 mg tablet Take 1 tablet (2 mg total) by mouth daily at bedtime 90 tablet 0    ARIPiprazole (ABILIFY) 5 mg tablet TAKE 1 TABLET BY MOUTH EVERY DAY AT NIGHT      Blood Glucose Monitoring Suppl (ONE TOUCH ULTRA 2) w/Device KIT Test daily and as instructed 1 each 0    Blood Pressure Monitoring (SPHYGMOMANOMETER) MISC Medically necessary to monitor blood pressure due to orthostatic hypotension 1 each 0    buprenorphine-naloxone (SUBOXONE) 2-0 5 mg per SL tablet Place under the tongue 2 (two) times a day       butalbital-aspirin-caffeine (FIORINAL) -40 MG per tablet Take 1 tablet by mouth every 4 (four) hours as needed for headaches      clonazePAM (KlonoPIN) 0 5 mg tablet Take 0 5 mg by mouth 3 (three) times a day   1    clotrimazole (LOTRIMIN) 1 % cream Apply topically 2 (two) times a day for 14 days 30 g 5    dihydroergotamine (MIGRANAL) 4 MG/ML nasal spray 1 spray into each nostril as needed Use in one nostril as directed    No more than 4 sprays in one hour      diphenhydrAMINE (BENADRYL) 25 mg tablet Take 25 mg by mouth every 6 (six) hours as needed for itching      doxepin (SINEquan) 150 MG capsule Take 1 capsule (150 mg total) by mouth daily at bedtime 90 capsule 0    doxepin (SINEquan) 50 mg capsule Take 1 capsule (50 mg total) by mouth daily at bedtime 90 capsule 0    famotidine (PEPCID) 20 mg tablet Take 1 tablet (20 mg total) by mouth 2 (two) times a day 1 PO  tablet 3    fluticasone (FLONASE) 50 mcg/act nasal spray 1 spray 2 (two) times a day as needed for rhinitis   2    gabapentin (NEURONTIN) 800 mg tablet Take 800 mg by mouth 4 (four) times a day  3    glucose blood (ONE TOUCH ULTRA TEST) test strip Patient to test two times daily 200 each 3    Lancets (ONETOUCH ULTRASOFT) lancets Patient to test two times daily 200 each 3    lidocaine (XYLOCAINE) 5 % ointment APPLY TOPICALLY 2 TIMES A DAY AS NEEDED FOR MILD PAIN 35 44 g 11    midodrine (PROAMATINE) 5 mg tablet TAKE 1 AND 1/2 TABLETS BY MOUTH 3 TIMES A DAY LAST DOSE BEFORE 6:00 P M  (Patient taking differently: Patient takes 2 tabs Qam and 1 tab with dinner) 135 tablet 1    primidone (MYSOLINE) 50 mg tablet TAKE 3 TABLETS BY MOUTH AT BEDTIME (Patient taking differently: Take 100 mg by mouth daily at bedtime TAKE 3 TABLETS BY MOUTH AT BEDTIME) 270 tablet 1    rOPINIRole (REQUIP) 0 25 mg tablet TAKE 1 TABLET BY MOUTH THREE TIMES A  tablet 1    Simethicone (GAS-X PO) Take 1 tablet by mouth as needed      TiZANidine (ZANAFLEX) 2 MG capsule TAKE 1 CAPSULE BY MOUTH THREE TIMES A DAY AS NEEDED FOR MUSLCE SPASMS 270 capsule 3    venlafaxine 150 MG TB24 Take 1 tablet (150 mg total) by mouth daily with breakfast 90 tablet 0    venlafaxine 225 MG TB24 Take 1 tablet (225 mg total) by mouth 2 (two) times a day 90 tablet 0    Wixela Inhub 250-50 MCG/DOSE inhaler INHALE 1 PUFF TWICE A DAY, RINSE MOUTH AFTER USE 3 Inhaler 4    zafirlukast (ACCOLATE) 20 MG tablet Take 1 tablet (20 mg total) by mouth 2 (two) times a day before meals 180 tablet 3    albuterol (2 5 mg/3 mL) 0 083 % nebulizer solution Take 1 vial (2 5 mg total) by nebulization every 6 (six) hours as needed for wheezing or shortness of breath (Patient not taking: Reported on 3/2/2021) 1080 mL 3    albuterol (PROVENTIL HFA,VENTOLIN HFA) 90 mcg/act inhaler Inhale 2 puffs every 6 (six) hours as needed for wheezing (Patient not taking: Reported on 3/5/2021) 1 Inhaler 5    Albuterol Sulfate (ProAir RespiClick) 683 (90 Base) MCG/ACT AEPB Inhale 2 puffs Q 4-6 H PRN SOB or wheezing (Patient not taking: Reported on 3/5/2021) 3 each 3    doxepin (SINEquan) 100 mg capsule TAKE ORALLY 3 ORAL CAPSULE AT NIGHT FOR 30 DAYS        nicotine (NICODERM CQ) 21 mg/24 hr TD 24 hr patch Place 1 patch on the skin daily (Patient not taking: Reported on 3/2/2021) 28 patch 0    nicotine polacrilex (NICORETTE) 2 mg gum Chew 1 each (2 mg total) as needed for smoking cessation (Patient not taking: Reported on 12/11/2020) 100 each 0    Respiratory Therapy Supplies (NEBULIZER/TUBING/MOUTHPIECE) KIT Use up to 6 times daily as needed for lung symptoms (Patient not taking: Reported on 3/5/2021) 1 each 3     No current facility-administered medications for this visit  Review of Systems   Constitutional: Positive for fatigue  Respiratory: Positive for shortness of breath  Neurological: Positive for weakness  Left midthoracic pain as described which to me seems radiculopathic   All other systems reviewed and are negative  Objective:  Vitals:    03/10/21 1337   BP: (!) 80/50   Temp:       Physical Exam      Patient Instructions    Chronic lung disease: Ongoing, will not get better if she does not stop smoking  The prognosis is poor because I do not think she well   Thoracic radiculopathic pain  Low blood pressure:  I wonder if there is some kind of subclavian artery stenosis and I would like to see ultrasound     Also, echo regarding diastolic dysfunction  Will see her back here again in 2 week     she is already taking gabapentin 800 p o  t i d  for neuropathy
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
## 2021-03-10 NOTE — PATIENT INSTRUCTIONS
Menopause   WHAT YOU NEED TO KNOW:   What is menopause? Menopause is a normal stage in a woman's life when her monthly periods stop  A woman who has not had a period for a full year after the age of [address] is considered to be in menopause  Menopause usually occurs between ages 52 to 48  Perimenopause is a stage before menopause that may cause signs and symptoms similar to menopause  Perimenopause may start about 4 years before menopause  What causes menopause? Menopause starts when the ovaries stop making the female hormones estrogen and progesterone  After menopause, a woman is no longer able to become pregnant  Any of the following may trigger menopause or early menopause:  · Older age    · Surgery, including a hysterectomy or oophorectomy    · Family history of early menopause    · Smoking    · Chemotherapy or pelvic radiation    · Chromosome abnormalities, including Rivera syndrome and Fragile X syndrome    What are the signs and symptoms of menopause? The signs and symptoms of menopause can be different from woman to woman:  · Irregular menstrual cycles with heavy vaginal bleeding followed by decreased bleeding until it stops    · Hot flashes (feeling warm, flushed, and sweaty)     · Vaginal changes such as increased dryness     · Mood changes such as anxiety, depression, or decreased desire to have sex    · Trouble sleeping, joint pain, headaches    · Brittle nails, hair on chin or chest where it is normally absent    · Decrease in breast size and change in skin texture    What do I need to know about menopause? · You can still get pregnant while you have periods  Continue to use birth control if you do not want to get pregnant  You may need to use birth control until it has been 1 year since your periods stopped  · Hormone replacement therapy (HRT) can be used to treat symptoms of menopause  HRT is medicine that replaces your low hormone levels  HRT contains estrogen and sometimes progestin   HRT has benefits and risks  HRT decreases your risk for bone fractures by helping to prevent osteoporosis  HRT also protects you from colon cancer  HRT may increase your risk for breast cancer, blood clots, heart disease, and stroke  Ask your healthcare provider if HRT is right for you  How can I care for myself? · Manage hot flashes  Hot flashes are brief periods of feeling very warm, flushed, and sweaty  Hot flashes can last from a few seconds to several minutes  They may happen many times during the day, and are common at night  Layer your clothing so that you can easily remove some clothing and cool yourself during a hot flash  Cold drinks may also be helpful  · Reduce vaginal dryness  by using over-the-counter vaginal creams  Vaginal dryness may cause you to have pain or discomfort during sex  Only use creams that are made for vaginal use  Do  not  use petroleum jelly  You may need an estrogen cream to put in and around your vagina  Estrogen cream may help decrease vaginal dryness and lower your risk of vaginal infections  · Continue to use birth control  during perimenopause if you do not want to get pregnant  You may need to use birth control until it has been 1 year since your periods stopped  Ask your healthcare provider when you can stop using birth control to prevent pregnancy  How can I live a healthy lifestyle during and after menopause? After menopause, your risk for heart disease and bone loss increases  Ask about these and other ways to stay healthy:  · Exercise regularly  Exercise helps you maintain a healthy weight  Exercise can also help to control your blood pressure and cholesterol levels  Include weight-bearing exercise for strong bones  Weight bearing exercise is recommended for at least 30 minutes, 3 times a week  Ask your healthcare provider about the best exercise plan for you  · Eat a variety of healthy foods    Include fruits, vegetables, whole grains (whole-wheat bread, pasta, and cereals), low-fat dairy, and lean protein foods (beans, poultry, and fish)  Limit foods high in sodium (salt)  Ask your healthcare provider for more information about a meal plan that is right for you  · Do not smoke  If you smoke, it is never too late to quit  You are more likely to have a heart attack, lung disease, blood clots, and cancer if you smoke  Ask your healthcare provider for information if you need help quitting  · Take supplements as directed  You may need extra calcium and vitamin D to help prevent osteoporosis  · Limit alcohol and caffeine  Alcohol and caffeine may worsen your symptoms  When should I contact my healthcare provider? · You have vaginal bleeding after menopause  · You have questions or concerns about your condition or care  CARE AGREEMENT:   You have the right to help plan your care  Learn about your health condition and how it may be treated  Discuss treatment options with your healthcare providers to decide what care you want to receive  You always have the right to refuse treatment  The above information is an  only  It is not intended as medical advice for individual conditions or treatments  Talk to your doctor, nurse or pharmacist before following any medical regimen to see if it is safe and effective for you  © Copyright [address] Information is for End User's use only and may not be sold, redistributed or otherwise used for commercial purposes   All illustrations and images included in CareNotes® are the copyrighted property of [employer]  or [address] [place] [address] [facility]

## 2021-03-12 DIAGNOSIS — G25.81 RESTLESS LEG: ICD-10-CM

## 2021-03-12 RX ORDER — ROPINIROLE 0.25 MG/1
0.25 TABLET, FILM COATED ORAL 3 TIMES DAILY
Qty: 270 TABLET | Refills: 1 | Status: SHIPPED | OUTPATIENT
Start: 2021-03-12 | End: 2021-10-18 | Stop reason: SDUPTHER

## 2021-03-14 DIAGNOSIS — N76.0 ACUTE VAGINITIS: ICD-10-CM

## 2021-03-16 LAB
LAB AP GYN PRIMARY INTERPRETATION: NORMAL
Lab: NORMAL

## 2021-03-17 ENCOUNTER — ANESTHESIA (OUTPATIENT)
Dept: GASTROENTEROLOGY | Facility: HOSPITAL | Age: 61
End: 2021-03-17

## 2021-03-17 ENCOUNTER — ANESTHESIA EVENT (OUTPATIENT)
Dept: GASTROENTEROLOGY | Facility: HOSPITAL | Age: 61
End: 2021-03-17

## 2021-03-17 ENCOUNTER — HOSPITAL ENCOUNTER (OUTPATIENT)
Dept: GASTROENTEROLOGY | Facility: HOSPITAL | Age: 61
Setting detail: OUTPATIENT SURGERY
Discharge: HOME | End: 2021-03-17
Attending: INTERNAL MEDICINE | Admitting: INTERNAL MEDICINE
Payer: COMMERCIAL

## 2021-03-17 VITALS
TEMPERATURE: 98.3 F | DIASTOLIC BLOOD PRESSURE: 72 MMHG | BODY MASS INDEX: 39.92 KG/M2 | SYSTOLIC BLOOD PRESSURE: 101 MMHG | OXYGEN SATURATION: 94 % | HEIGHT: 63 IN | HEART RATE: 90 BPM | WEIGHT: 225.31 LBS | RESPIRATION RATE: 20 BRPM

## 2021-03-17 DIAGNOSIS — R13.14 PHARYNGOESOPHAGEAL DYSPHAGIA: ICD-10-CM

## 2021-03-17 DIAGNOSIS — R10.13 EPIGASTRIC PAIN: ICD-10-CM

## 2021-03-17 DIAGNOSIS — R11.0 NAUSEA: ICD-10-CM

## 2021-03-17 DIAGNOSIS — R13.10 ODYNOPHAGIA: ICD-10-CM

## 2021-03-17 LAB — GLUCOSE SERPL-MCNC: 146 MG/DL (ref 65–140)

## 2021-03-17 PROCEDURE — 43239 EGD BIOPSY SINGLE/MULTIPLE: CPT | Performed by: INTERNAL MEDICINE

## 2021-03-17 PROCEDURE — 43248 EGD GUIDE WIRE INSERTION: CPT | Performed by: INTERNAL MEDICINE

## 2021-03-17 PROCEDURE — 82948 REAGENT STRIP/BLOOD GLUCOSE: CPT

## 2021-03-17 PROCEDURE — 88305 TISSUE EXAM BY PATHOLOGIST: CPT | Performed by: PATHOLOGY

## 2021-03-17 RX ORDER — LIDOCAINE HYDROCHLORIDE 10 MG/ML
INJECTION, SOLUTION EPIDURAL; INFILTRATION; INTRACAUDAL; PERINEURAL AS NEEDED
Status: DISCONTINUED | OUTPATIENT
Start: 2021-03-17 | End: 2021-03-17

## 2021-03-17 RX ORDER — CLOTRIMAZOLE 1 %
CREAM (GRAM) TOPICAL
Qty: 30 G | Refills: 5 | Status: SHIPPED | OUTPATIENT
Start: 2021-03-17 | End: 2021-07-21

## 2021-03-17 RX ORDER — SODIUM CHLORIDE, SODIUM LACTATE, POTASSIUM CHLORIDE, CALCIUM CHLORIDE 600; 310; 30; 20 MG/100ML; MG/100ML; MG/100ML; MG/100ML
INJECTION, SOLUTION INTRAVENOUS CONTINUOUS PRN
Status: DISCONTINUED | OUTPATIENT
Start: 2021-03-17 | End: 2021-03-17

## 2021-03-17 RX ORDER — PROPOFOL 10 MG/ML
INJECTION, EMULSION INTRAVENOUS AS NEEDED
Status: DISCONTINUED | OUTPATIENT
Start: 2021-03-17 | End: 2021-03-17

## 2021-03-17 RX ADMIN — PROPOFOL 50 MG: 10 INJECTION, EMULSION INTRAVENOUS at 09:19

## 2021-03-17 RX ADMIN — LIDOCAINE HYDROCHLORIDE 100 MG: 10 INJECTION, SOLUTION EPIDURAL; INFILTRATION; INTRACAUDAL; PERINEURAL at 09:16

## 2021-03-17 RX ADMIN — SODIUM CHLORIDE, SODIUM LACTATE, POTASSIUM CHLORIDE, AND CALCIUM CHLORIDE: .6; .31; .03; .02 INJECTION, SOLUTION INTRAVENOUS at 09:12

## 2021-03-17 RX ADMIN — PROPOFOL 50 MG: 10 INJECTION, EMULSION INTRAVENOUS at 09:16
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
## 2021-03-17 NOTE — DISCHARGE INSTRUCTIONS
Upper Endoscopy   WHAT YOU NEED TO KNOW:   An upper endoscopy is also called an upper gastrointestinal (GI) endoscopy, or an esophagogastroduodenoscopy (EGD)  You may feel bloated, gassy, or have some abdominal discomfort after your procedure  Your throat may be sore for 24 to 36 hours  You may burp or pass gas from air that is still inside your body  DISCHARGE INSTRUCTIONS:   Call 911 if:   · You have sudden chest pain or trouble breathing  Seek care immediately if:   · You feel dizzy or faint  · You have trouble swallowing  · You have severe throat pain  · Your bowel movements are very dark or black  · Your abdomen is hard and firm and you have severe pain  · You vomit blood  Contact your healthcare provider if:   · You feel full or bloated and cannot burp or pass gas  · You have not had a bowel movement for 3 days after your procedure  · You have neck pain  · You have a fever or chills  · You have nausea or are vomiting  · You have a rash or hives  · You have questions or concerns about your endoscopy  Relieve a sore throat:  Suck on throat lozenges or crushed ice  Gargle with a small amount of warm salt water  Mix 1 teaspoon of salt and 1 cup of warm water to make salt water  Relieve gas and discomfort from bloating:  Lie on your right side with a heating pad on your abdomen  Take short walks to help pass gas  Eat small meals until bloating is relieved  Rest after your procedure:  Do not drive or make important decisions until the day after your procedure  Return to your normal activity as directed  You can usually return to work the day after your procedure  Follow up with your healthcare provider as directed:  Write down your questions so you remember to ask them during your visits  © Copyright [address] Information is for End User's use only and may not be sold, redistributed or otherwise used for commercial purposes   All illustrations and images included in CareNotes® are the copyrighted property of [employer] Inc  or Iggy Davis   The above information is an  only  It is not intended as medical advice for individual conditions or treatments  Talk to your doctor, nurse or pharmacist before following any medical regimen to see if it is safe and effective for you

## 2021-03-17 NOTE — ANESTHESIA PREPROCEDURE EVALUATION
Procedure:  EGD    Relevant Problems   No relevant active problems        Physical Exam    Airway    Mallampati score: III         Dental   upper dentures and lower dentures,     Cardiovascular  Rhythm: regular, Rate: normal,     Pulmonary  Pulmonary exam normal     Other Findings        Anesthesia Plan  ASA Score- 3     Anesthesia Type- IV sedation with anesthesia with ASA Monitors  Additional Monitors:   Airway Plan:           Plan Factors-Exercise tolerance (METS): >4 METS  Chart reviewed  Patient is a current smoker  Patient instructed to abstain from smoking on day of procedure  Patient smoked on day of surgery  Induction- intravenous  Postoperative Plan-     Informed Consent- Anesthetic plan and risks discussed with patient  I personally reviewed this patient with the CRNA  Discussed and agreed on the Anesthesia Plan with the CRNA  Naveed Peterson

## 2021-03-17 NOTE — INTERVAL H&P NOTE
H&P reviewed  After examining the patient I find no changes in the patients condition since the H&P had been written      Vitals:    03/17/21 0828   BP: 148/74   Pulse: 90   Resp: 15   Temp: (!) 97 3 °F (36 3 °C)   SpO2: 96%

## 2021-03-17 NOTE — ANESTHESIA POSTPROCEDURE EVALUATION
Post-Op Assessment Note    CV Status:  Stable  Pain Score: 0    Pain management: adequate     Mental Status:  Arousable   Hydration Status:  Stable   PONV Controlled:  None   Airway Patency:  Patent      Post Op Vitals Reviewed: Yes      Staff: Anesthesiologist, CRNA         No complications documented      /82 (03/17/21 0927)    Temp 98 3 °F (36 8 °C) (03/17/21 0927)    Pulse 79 (03/17/21 0927)   Resp 15 (03/17/21 0927)    SpO2 91 % (03/17/21 0927)

## 2021-03-22 ENCOUNTER — TRANSCRIBE ORDERS (OUTPATIENT)
Dept: ADMINISTRATIVE | Facility: HOSPITAL | Age: 61
End: 2021-03-22

## 2021-03-22 DIAGNOSIS — E04.1 THYROID NODULE: Primary | ICD-10-CM

## 2021-03-22 DIAGNOSIS — N63.20 LUMP OF LEFT BREAST: Primary | ICD-10-CM

## 2021-03-25 ENCOUNTER — HOSPITAL ENCOUNTER (OUTPATIENT)
Dept: MAMMOGRAPHY | Facility: CLINIC | Age: 61
Discharge: HOME | End: 2021-03-25
Payer: COMMERCIAL

## 2021-03-25 ENCOUNTER — HOSPITAL ENCOUNTER (OUTPATIENT)
Dept: ULTRASOUND IMAGING | Facility: CLINIC | Age: 61
Discharge: HOME | End: 2021-03-25
Payer: COMMERCIAL

## 2021-03-25 VITALS — WEIGHT: 225 LBS | HEIGHT: 63 IN | BODY MASS INDEX: 39.87 KG/M2

## 2021-03-25 DIAGNOSIS — N63.20 LUMP OF LEFT BREAST: ICD-10-CM

## 2021-03-25 PROCEDURE — 77066 DX MAMMO INCL CAD BI: CPT

## 2021-03-25 PROCEDURE — G0279 TOMOSYNTHESIS, MAMMO: HCPCS

## 2021-03-25 PROCEDURE — 76642 ULTRASOUND BREAST LIMITED: CPT

## 2021-03-26 ENCOUNTER — TELEPHONE (OUTPATIENT)
Dept: INTERNAL MEDICINE CLINIC | Facility: CLINIC | Age: 61
End: 2021-03-26

## 2021-03-26 NOTE — TELEPHONE ENCOUNTER
Received Fax for MRA upper extremity left and right    Lana Arm is looking for add clinical, what type of symptoms is the pt having? Any peripheral Doppler performed? Is pt a surgical candidate? IF, you don't have any add clinical; you can forward it for dr review- which might require a peer to peer    The review dr will call here to speak with the dr; when he has time    NO need for the peer to peer to be scheduled      Please let me know if you want to forward it for dr review or not

## 2021-03-29 ENCOUNTER — TELEPHONE (OUTPATIENT)
Dept: INTERNAL MEDICINE CLINIC | Facility: CLINIC | Age: 61
End: 2021-03-29

## 2021-03-29 ENCOUNTER — TELEPHONE (OUTPATIENT)
Dept: GASTROENTEROLOGY | Facility: CLINIC | Age: 61
End: 2021-03-29

## 2021-03-29 NOTE — TELEPHONE ENCOUNTER
[facility] record request    Sent to [facility]

## 2021-03-29 NOTE — TELEPHONE ENCOUNTER
I forwarded both of the MRA's for dr review today  IF; needed the review dr will call here to talk to the ordering dr    No need to schedule a peer to peer  The ordering dr can call back; when he has time

## 2021-03-30 NOTE — TELEPHONE ENCOUNTER
The review dr berta Ortiz called to speak with Dr Page Mejia or the covering dr     His name is: Dr Mukesh Pascal  P#: [phone]  The case will close and  tomorrow, Wed 3/31    Dr Mukesh Pascal said DON'T let the case      The case is for MRI arm; left and right   T# for left is: [id number]  T# for right is: [id number]

## 2021-03-31 DIAGNOSIS — I73.1 THROMBOANGIITIS OBLITERANS (HCC): Primary | ICD-10-CM

## 2021-03-31 DIAGNOSIS — I73.9 PERIPHERAL ARTERIAL DISEASE (HCC): ICD-10-CM

## 2021-04-01 NOTE — TELEPHONE ENCOUNTER
I called Dr Mayorga Done back and told him that Dr Page Mejia isn't doing the peer to peer  So Dr Mayorga Done with [facility] is with drawing both tests    I canceled the tests in the pt's chart  I'll call Alyssa Marie to inform her that she can't have the MRA's done

## 2021-04-02 ENCOUNTER — OFFICE VISIT (OUTPATIENT)
Dept: INTERNAL MEDICINE CLINIC | Facility: CLINIC | Age: 61
End: 2021-04-02
Payer: COMMERCIAL

## 2021-04-02 VITALS
BODY MASS INDEX: 39.87 KG/M2 | OXYGEN SATURATION: 93 % | HEIGHT: 63 IN | DIASTOLIC BLOOD PRESSURE: 68 MMHG | SYSTOLIC BLOOD PRESSURE: 104 MMHG | TEMPERATURE: 99 F | HEART RATE: 94 BPM | WEIGHT: 225 LBS

## 2021-04-02 DIAGNOSIS — J44.1 COPD WITH EXACERBATION (HCC): Primary | ICD-10-CM

## 2021-04-02 DIAGNOSIS — I95.0 IDIOPATHIC HYPOTENSION: ICD-10-CM

## 2021-04-02 DIAGNOSIS — I73.1 THROMBOANGIITIS OBLITERANS (HCC): ICD-10-CM

## 2021-04-02 DIAGNOSIS — R29.898 MUSCULAR DECONDITIONING: ICD-10-CM

## 2021-04-02 PROCEDURE — 99213 OFFICE O/P EST LOW 20 MIN: CPT | Performed by: INTERNAL MEDICINE

## 2021-04-06 ENCOUNTER — TELEPHONE (OUTPATIENT)
Dept: INTERNAL MEDICINE CLINIC | Facility: CLINIC | Age: 61
End: 2021-04-06

## 2021-04-06 DIAGNOSIS — I73.1 THROMBOANGIITIS OBLITERANS (HCC): Primary | ICD-10-CM

## 2021-04-06 DIAGNOSIS — I95.0 IDIOPATHIC HYPOTENSION: ICD-10-CM

## 2021-04-06 NOTE — PATIENT INSTRUCTIONS
Multiple chronic problems  Once again the above requested testing has not been done  Patient needs to get the upper extremity imaging done and come back afterwards  Likewise the echo

## 2021-04-06 NOTE — TELEPHONE ENCOUNTER
MRI Department needs clarification of the MRA orders that were put in for patient on 3/10/21  What are they to look for?

## 2021-04-06 NOTE — TELEPHONE ENCOUNTER
----- Message from Rene Adams MD sent at 4/6/2021  4:30 PM EDT -----   I spoke with Radiology  The radiologist recommended an arterial study of the upper limbs rather than the MRA  I placed the order  Please help the patient get this scheduled    Thank you

## 2021-04-06 NOTE — PROGRESS NOTES
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[facility] [address] )    Idiopathic hypotension    Muscular deconditioning                Subjective:      Patient ID: Misbah Morales is a 62 y o  female  56-year-old who is physically almost done for   Major problem is cigarette smoking with its attendant diseases of chronic obstructive pulmonary disease with chronic bronchitis and also thrombo angiitis obliterans   Continues to smoke close to a pack a day  I do not think she has made a legitimate effort to stop smoking and I do not she is interested  She self medicating anxiety and depression with tobacco       She apparently is concerned that she has an abdominal aneurysm  She had a CT scan of the abdomen done July 2020 and there was no aneurysm noted  Complaint of pain felt in the left mid back radiating around under the breast; the pain is burning and constantI , and sounds radiculopathic      she is on ProAmatine but I can barely get her blood pressure at all  I had seen her March 10 with these complaints  Concern for peripheral vascular disease and specifically subclavian artery stenosis leading to falsely low peripheral arterial blood pressure readings when in fact her central blood pressure was fei high  I wanted her to do MRA of the extremities but these have not been done yet  Likewise echocardiogram has not been done  She continues to feels poorly        The following portions of the patient's history were reviewed and updated as appropriate:   She has a past medical history of Anemia, Anemia, Cardiac disorder, CHF (congestive heart failure) ([facility] [address] ), Constipation, COPD (chronic obstructive pulmonary disease) ([facility] [address] ), Depression, Fibromyalgia, Fibromyalgia, primary, GERD (gastroesophageal reflux disease), Head injury, Heart disorder, Malignant neoplasm ([address] ), Migraines, Myocardial infarction ([address] ), Occasional tremors, Osteoarthritis, Osteoporosis, Problems with swallowing, PTSD (post-traumatic stress disorder), Restless leg syndrome, Skin cancer, Stomach problems, and Tobacco abuse ,  does not have any pertinent problems on file  ,   has a past surgical history that includes Gallbladder surgery; Tonsillectomy; Gastric bypass; Back surgery; Other surgical history (Left); Arthrodesis; Bladder surgery; Foot surgery (Right); Abdominal surgery; Hand surgery (Left); Adenoidectomy; Toe Surgery (Right); Ankle surgery (Right); Femur Surgery (Right); Hemorrhoid surgery; Toenail excision; Multiple tooth extractions; Cholecystectomy; Colonoscopy; and EGD ,  family history includes Arthritis in her mother; Cancer in her maternal aunt; Diabetes in her brother; Heart attack in her father; Heart disease in her brother and father; Hypertension in her father and mother; Mental illness in her brother and father; Neuropathy in her father; No Known Problems in her daughter, maternal aunt, and paternal aunt; Obesity in her mother and sister; Osteoarthritis in her family; Osteoporosis in her family; Scoliosis in her family; Uterine cancer in her mother  ,   reports that she has been smoking cigarettes  She has a 42 00 pack-year smoking history  She has never used smokeless tobacco  She reports previous alcohol use  She reports current drug use  Frequency: 7 00 times per week  Drug: Marijuana  ,  is allergic to morphine and related; quetiapine; and sulfa antibiotics     Current Outpatient Medications   Medication Sig Dispense Refill    albuterol (2 5 mg/3 mL) 0 083 % nebulizer solution Take 1 vial (2 5 mg total) by nebulization every 6 (six) hours as needed for wheezing or shortness of breath (Patient taking differently: Take 2 5 mg by nebulization as needed for wheezing or shortness of breath ) 1080 mL 3    albuterol (PROVENTIL HFA,VENTOLIN HFA) 90 mcg/act inhaler Inhale 2 puffs every 6 (six) hours as needed for wheezing 1 Inhaler 5    albuterol (PROVENTIL HFA,VENTOLIN HFA) 90 mcg/act inhaler TAKE 2 PUFFS BY MOUTH EVERY 6 HOURS AS NEEDED FOR WHEEZE 6 7 Inhaler 3    Albuterol Sulfate (ProAir RespiClick) 974 (90 Base) MCG/ACT AEPB Inhale 2 puffs Q 4-6 H PRN SOB or wheezing 3 each 3    ARIPiprazole (ABILIFY) 2 mg tablet Take 1 tablet (2 mg total) by mouth daily at bedtime 90 tablet 0    Blood Glucose Monitoring Suppl (ONE TOUCH ULTRA 2) w/Device KIT Test daily and as instructed 1 each 0    Blood Pressure Monitoring (SPHYGMOMANOMETER) MISC Medically necessary to monitor blood pressure due to orthostatic hypotension 1 each 0    butalbital-aspirin-caffeine (FIORINAL) -40 MG per tablet Take 1 tablet by mouth as needed       clonazePAM (KlonoPIN) 0 5 mg tablet Take 0 5 mg by mouth 3 (three) times a day   1    clotrimazole (LOTRIMIN) 1 % cream APPLY TO AFFECTED AREA TWICE A DAY FOR 14 DAYS 30 g 5    dihydroergotamine (MIGRANAL) 4 MG/ML nasal spray 1 spray into each nostril as needed Use in one nostril as directed  No more than 4 sprays in one hour      diphenhydrAMINE (BENADRYL) 25 mg tablet Take 25 mg by mouth as needed for itching       doxepin (SINEquan) 100 mg capsule TAKE ORALLY 3 ORAL CAPSULE AT NIGHT FOR 30 DAYS        famotidine (PEPCID) 20 mg tablet Take 1 tablet (20 mg total) by mouth 2 (two) times a day 1 PO  tablet 3    fluticasone (FLONASE) 50 mcg/act nasal spray 1 spray 2 (two) times a day as needed for rhinitis   2    gabapentin (NEURONTIN) 800 mg tablet Take 800 mg by mouth 4 (four) times a day  3    glucose blood (ONE TOUCH ULTRA TEST) test strip Patient to test two times daily 200 each 3    Lancets (ONETOUCH ULTRASOFT) lancets Patient to test two times daily 200 each 3    lidocaine (XYLOCAINE) 5 % ointment APPLY TOPICALLY 2 TIMES A DAY AS NEEDED FOR MILD PAIN 35 44 g 11    midodrine (PROAMATINE) 5 mg tablet TAKE 1 AND 1/2 TABLETS BY MOUTH 3 TIMES A DAY LAST DOSE BEFORE 6:00 P M  (Patient taking differently: Patient takes 2 tabs Qam and 1 tab with dinner) 135 tablet 1    primidone (MYSOLINE) 50 mg tablet TAKE 3 TABLETS BY MOUTH AT BEDTIME (Patient taking differently: Take 100 mg by mouth daily at bedtime TAKE 3 TABLETS BY MOUTH AT BEDTIME) 270 tablet 1    Respiratory Therapy Supplies (NEBULIZER/TUBING/MOUTHPIECE) KIT Use up to 6 times daily as needed for lung symptoms 1 each 3    rOPINIRole (REQUIP) 0 25 mg tablet Take 1 tablet (0 25 mg total) by mouth 3 (three) times a day 270 tablet 1    Simethicone (GAS-X PO) Take by mouth as needed       TiZANidine (ZANAFLEX) 2 MG capsule TAKE 1 CAPSULE BY MOUTH THREE TIMES A DAY AS NEEDED FOR MUSLCE SPASMS 270 capsule 3    venlafaxine 150 MG TB24 Take 1 tablet (150 mg total) by mouth daily with breakfast 90 tablet 0    venlafaxine 225 MG TB24 Take 1 tablet (225 mg total) by mouth 2 (two) times a day 90 tablet 0    Wixela Inhub 250-50 MCG/DOSE inhaler INHALE 1 PUFF TWICE A DAY, RINSE MOUTH AFTER USE 3 Inhaler 4    zafirlukast (ACCOLATE) 20 MG tablet Take 1 tablet (20 mg total) by mouth 2 (two) times a day before meals 180 tablet 3    ARIPiprazole (ABILIFY) 5 mg tablet TAKE 1 TABLET BY MOUTH EVERY DAY AT NIGHT      buprenorphine-naloxone (SUBOXONE) 2-0 5 mg per SL tablet Place under the tongue 2 (two) times a day       nicotine (NICODERM CQ) 21 mg/24 hr TD 24 hr patch Place 1 patch on the skin daily (Patient not taking: Reported on 4/2/2021) 28 patch 0    nicotine polacrilex (NICORETTE) 2 mg gum Chew 1 each (2 mg total) as needed for smoking cessation (Patient not taking: Reported on 12/11/2020) 100 each 0     No current facility-administered medications for this visit  Review of Systems   Constitutional: Positive for fatigue  Respiratory: Positive for shortness of breath  Neurological: Positive for weakness  Left midthoracic pain as described which to me seems radiculopathic   All other systems reviewed and are negative          Objective:  Vitals: 04/02/21 1625   BP: 104/68   Pulse: 94   Temp: 99 °F (37 2 °C)   SpO2: 93%      Physical Exam  Constitutional:       Appearance: She is obese  She is ill-appearing  Cardiovascular:      Rate and Rhythm: Normal rate  Pulses:           Radial pulses are 0 on the right side and 0 on the left side  Dorsalis pedis pulses are 0 on the right side and 0 on the left side  Posterior tibial pulses are 0 on the right side and 0 on the left side  Patient Instructions     Multiple chronic problems  Once again the above requested testing has not been done  Patient needs to get the upper extremity imaging done and come back afterwards  Likewise the echo

## 2021-04-14 ENCOUNTER — OFFICE VISIT (OUTPATIENT)
Dept: CARDIOLOGY CLINIC | Facility: CLINIC | Age: 61
End: 2021-04-14
Payer: COMMERCIAL

## 2021-04-14 VITALS
BODY MASS INDEX: 36.66 KG/M2 | SYSTOLIC BLOOD PRESSURE: 102 MMHG | OXYGEN SATURATION: 96 % | WEIGHT: 214.7 LBS | DIASTOLIC BLOOD PRESSURE: 78 MMHG | HEART RATE: 88 BPM | HEIGHT: 64 IN

## 2021-04-14 DIAGNOSIS — I95.0 IDIOPATHIC HYPOTENSION: ICD-10-CM

## 2021-04-14 DIAGNOSIS — R06.00 DYSPNEA ON EXERTION: Primary | ICD-10-CM

## 2021-04-14 DIAGNOSIS — J41.0 SIMPLE CHRONIC BRONCHITIS (HCC): ICD-10-CM

## 2021-04-14 DIAGNOSIS — Z82.49 FAMILY HISTORY OF PREMATURE CORONARY ARTERY DISEASE: ICD-10-CM

## 2021-04-14 PROBLEM — I95.9 HYPOTENSION: Status: ACTIVE | Noted: 2021-03-10

## 2021-04-14 PROCEDURE — 3008F BODY MASS INDEX DOCD: CPT | Performed by: INTERNAL MEDICINE

## 2021-04-14 PROCEDURE — 99214 OFFICE O/P EST MOD 30 MIN: CPT | Performed by: INTERNAL MEDICINE

## 2021-04-14 PROCEDURE — 93000 ELECTROCARDIOGRAM COMPLETE: CPT | Performed by: INTERNAL MEDICINE

## 2021-04-14 NOTE — PROGRESS NOTES
Assessment/Plan:    Shortness of breath  Patient reports of exertional shortness of breath, exercise tolerance is very poor, walking a small distance makes patient out of breath  No reported associated chest pain, nausea vomiting  Last stress test in 2018-fixed defect of inferior and apical cap  From the patient's history shortness of breath likely appears due to COPD, unfortunately patient continues to smoke  But patient also has significant risk factors for coronary artery disease including history of smoking and significant family history  Will order nuclear stress test   Echocardiogram has been ordered by primary care physician, will look for any structural heart disease  Will follow-up patient    COPD with exacerbation [facility])  Patient is following up with Pulmonary Medicine for the same  Patient was counseled on smoking cessation    Hypotension  History of idiopathic hypotension  Patient is on midodrine for the same  Could be secondary to medication induced  Blood pressures this visit-102/78       Diagnoses and all orders for this visit:    Dyspnea, unspecified type    Dyspnea on exertion  -     POCT ECG  -     NM myocardial perfusion spect (rx stress and/or rest); Future    Idiopathic hypotension          Subjective:      Patient ID: Jorge Castanon is a 62 y o  female  HPI  This is a 62years old female with past medical history of significant anxiety, chronic smoker, COPD, idiopathic hypertension, history of gastric bypass , grade 1 diastolic dysfunction who presents to the clinic for routine cardiology follow-up  Patient was last seen in Cardiology in 2018  Patient reports that she gets short of breath on very mild exertion, but patient does not report of any chest pain  Patient reports even mild walking makes her out of breath but patient does not report of any chest pain  No associated nausea, vomiting or passing out    Patient reports she has history of vertigo and reports history of fall many years back     Patient reports she has been diagnosed with hypertension and she is taking midodrine for the same, reports she still runs low blood pressures but no reported history of dizziness  In regards to her COPD patient reports she still smokes, is trying to cut down on her smoking, patient is smoking since 14years of age currently smoking half pack a day  Patient does have significant family history, patient's brother had MI at age of 52, patient's father had some aortic abnormality  Last echo in 2018-estimated EF-60%, no regional wall motion abnormalities, grade 1 diastolic dysfunction  Last stress test in 2018-no reversible defect seen but moderate to large moderately severe predominantly fixed myocardial perfusion defect of the inferior and the apical cap  The following portions of the patient's history were reviewed and updated as appropriate: allergies, current medications, past family history, past medical history, past social history, past surgical history and problem list     Review of Systems   Constitutional: Negative for activity change and appetite change  HENT: Negative for congestion  Eyes: Negative for discharge and itching  Respiratory: Positive for shortness of breath  Negative for wheezing and stridor  Cardiovascular: Negative for chest pain, palpitations and leg swelling  Gastrointestinal: Negative for abdominal distention and abdominal pain  Endocrine: Negative for cold intolerance and heat intolerance  Genitourinary: Negative for difficulty urinating and dysuria  Musculoskeletal: Positive for back pain  Negative for arthralgias  Neurological: Positive for dizziness  Negative for facial asymmetry  Hematological: Negative for adenopathy  Psychiatric/Behavioral: Negative for agitation           Objective:      /78 (BP Location: Left arm, Patient Position: Sitting, Cuff Size: Standard)   Pulse 88   Ht 5' 3 6" (1 615 m)   Wt 97 4 kg (214 lb 11 2 oz) SpO2 96%   BMI 37 32 kg/m²          Physical Exam  Constitutional:       Appearance: Normal appearance  HENT:      Head: Normocephalic  Eyes:      Pupils: Pupils are equal, round, and reactive to light  Cardiovascular:      Rate and Rhythm: Normal rate and regular rhythm  Pulses: Normal pulses  Heart sounds: Normal heart sounds  No murmur  Pulmonary:      Effort: Pulmonary effort is normal  No respiratory distress  Breath sounds: Normal breath sounds  No wheezing  Abdominal:      General: There is no distension  Palpations: Abdomen is soft  Tenderness: There is no abdominal tenderness  Musculoskeletal: Normal range of motion  Right lower leg: No edema  Left lower leg: No edema  Skin:     General: Skin is warm  Neurological:      General: No focal deficit present  Mental Status: She is alert and oriented to person, place, and time     Psychiatric:         Mood and Affect: Mood normal          Behavior: Behavior normal

## 2021-04-14 NOTE — ASSESSMENT & PLAN NOTE
Patient is following up with Pulmonary Medicine for the same    Patient was counseled on smoking cessation

## 2021-04-14 NOTE — ASSESSMENT & PLAN NOTE
History of idiopathic hypotension  Patient is on midodrine for the same    Could be secondary to medication induced  Blood pressures this visit-102/78

## 2021-04-14 NOTE — ASSESSMENT & PLAN NOTE
Patient reports of exertional shortness of breath, exercise tolerance is very poor, walking a small distance makes patient out of breath  No reported associated chest pain, nausea vomiting  Last stress test in 2018-fixed defect of inferior and apical cap  From the patient's history shortness of breath likely appears due to COPD, unfortunately patient continues to smoke  But patient also has significant risk factors for coronary artery disease including history of smoking and significant family history  Will order nuclear stress test   Echocardiogram has been ordered by primary care physician, will look for any structural heart disease    Will follow-up patient

## 2021-04-15 ENCOUNTER — HOSPITAL ENCOUNTER (OUTPATIENT)
Dept: VASCULAR ULTRASOUND | Facility: HOSPITAL | Age: 61
Discharge: HOME | End: 2021-04-15
Payer: COMMERCIAL

## 2021-04-15 ENCOUNTER — HOSPITAL ENCOUNTER (OUTPATIENT)
Dept: MAMMOGRAPHY | Facility: CLINIC | Age: 61
Discharge: HOME | End: 2021-04-15
Payer: COMMERCIAL

## 2021-04-15 ENCOUNTER — HOSPITAL ENCOUNTER (OUTPATIENT)
Dept: NON INVASIVE DIAGNOSTICS | Facility: CLINIC | Age: 61
Discharge: HOME | End: 2021-04-15
Payer: COMMERCIAL

## 2021-04-15 DIAGNOSIS — I51.89 DIASTOLIC DYSFUNCTION: ICD-10-CM

## 2021-04-15 DIAGNOSIS — Z78.0 POST-MENOPAUSAL: ICD-10-CM

## 2021-04-15 DIAGNOSIS — I73.1 THROMBOANGIITIS OBLITERANS (HCC): ICD-10-CM

## 2021-04-15 DIAGNOSIS — I95.0 IDIOPATHIC HYPOTENSION: ICD-10-CM

## 2021-04-15 DIAGNOSIS — M80.00XA OSTEOPOROSIS WITH CURRENT PATHOLOGICAL FRACTURE, UNSPECIFIED OSTEOPOROSIS TYPE, INITIAL ENCOUNTER: ICD-10-CM

## 2021-04-15 PROCEDURE — 93930 UPPER EXTREMITY STUDY: CPT | Performed by: SURGERY

## 2021-04-15 PROCEDURE — 77080 DXA BONE DENSITY AXIAL: CPT

## 2021-04-15 PROCEDURE — 93930 UPPER EXTREMITY STUDY: CPT

## 2021-04-15 PROCEDURE — 93306 TTE W/DOPPLER COMPLETE: CPT

## 2021-04-15 PROCEDURE — 93306 TTE W/DOPPLER COMPLETE: CPT | Performed by: INTERNAL MEDICINE

## 2021-04-28 ENCOUNTER — TELEPHONE (OUTPATIENT)
Dept: INTERNAL MEDICINE CLINIC | Facility: CLINIC | Age: 61
End: 2021-04-28

## 2021-04-28 NOTE — TELEPHONE ENCOUNTER
With the pt's compromised immune system   Should she get the J+J vaccine or one of the others    Give her a call

## 2021-05-20 ENCOUNTER — OFFICE VISIT (OUTPATIENT)
Dept: INTERNAL MEDICINE CLINIC | Facility: CLINIC | Age: 61
End: 2021-05-20
Payer: COMMERCIAL

## 2021-05-20 VITALS
BODY MASS INDEX: 37.99 KG/M2 | HEART RATE: 88 BPM | DIASTOLIC BLOOD PRESSURE: 66 MMHG | OXYGEN SATURATION: 93 % | HEIGHT: 63 IN | TEMPERATURE: 97.7 F | SYSTOLIC BLOOD PRESSURE: 110 MMHG | WEIGHT: 214.4 LBS

## 2021-05-20 DIAGNOSIS — R10.32 LEFT LOWER QUADRANT ABDOMINAL PAIN: Primary | ICD-10-CM

## 2021-05-20 PROCEDURE — 3008F BODY MASS INDEX DOCD: CPT | Performed by: NURSE PRACTITIONER

## 2021-05-20 PROCEDURE — 99214 OFFICE O/P EST MOD 30 MIN: CPT | Performed by: NURSE PRACTITIONER

## 2021-05-20 PROCEDURE — 4004F PT TOBACCO SCREEN RCVD TLK: CPT | Performed by: NURSE PRACTITIONER

## 2021-05-20 NOTE — ASSESSMENT & PLAN NOTE
The patient with a 1 month history of left lower quadrant pain which radiates to her left flank and upper back  There are no aggravating or alleviating factors  The patient denies any trauma  The patient has a history of constipation  She did have a previous gastric bypass surgery  She states that she does not have any burning with urination or blood in her urine or blood in her stool  She does reveal that her urine 1st thing in the morning is dark in color  Her pain currently is a 7/10  She describes this pain is a stabbing pain  The patient states on several occasions she considered going to the emergency room for evaluation  The patient's exam is within normal limits  I did recommend to the patient that we order a CT scan of the abdomen and pelvis along with some blood work and urine test   I also mentioned to the patient that this CT scan would not be done immediately that it may take several days or up to 2 weeks to schedule this appointment  I did recommend that if the patient is in that much discomfort and is not improving over 1 month period of time that she will be able to get a quicker evaluation in the emergency room  Patient states that they will go to the emergency room tomorrow  If her pain worsens overnight she will be seen tonight

## 2021-05-20 NOTE — PROGRESS NOTES
[place]    NAME: Enrique Officer  AGE: 62 y o  SEX: female  : 1960     DATE: 2021     Assessment and Plan:     Problem List Items Addressed This Visit        Other    Left lower quadrant abdominal pain - Primary      The patient with a 1 month history of left lower quadrant pain which radiates to her left flank and upper back  There are no aggravating or alleviating factors  The patient denies any trauma  The patient has a history of constipation  She did have a previous gastric bypass surgery  She states that she does not have any burning with urination or blood in her urine or blood in her stool  She does reveal that her urine 1st thing in the morning is dark in color  Her pain currently is a 7/10  She describes this pain is a stabbing pain  The patient states on several occasions she considered going to the emergency room for evaluation  The patient's exam is within normal limits  I did recommend to the patient that we order a CT scan of the abdomen and pelvis along with some blood work and urine test   I also mentioned to the patient that this CT scan would not be done immediately that it may take several days or up to 2 weeks to schedule this appointment  I did recommend that if the patient is in that much discomfort and is not improving over 1 month period of time that she will be able to get a quicker evaluation in the emergency room  Patient states that they will go to the emergency room tomorrow  If her pain worsens overnight she will be seen tonight  Tobacco Cessation Counseling: Tobacco cessation counseling was not provided  The patient is sincerely urged to quit consumption of tobacco  She is not ready to quit tobacco  Tobacco use screening or tobacco cessation counseling was not performed due to other medical reasons  No follow-ups on file       Chief Complaint:     Chief Complaint   Patient presents with    Pain     on the left side from front of the abdomen to behind on the back  Been going on for a month        History of Present Illness:     Patient with some one-month history of left lower quadrant pain which radiates to the left flank and upper back  She rates this today as a 7/10 and describes it as a stabbing pain  She has considered going in to the emergency room for a quick evaluation but decided to make an appointment in the office today  I did have discussion with the patient and her  regarding her symptoms and the need to order imaging studies as well as blood work and urine test   They have been made aware that these will not be done in an expeditious manner as an outpatient and if her pain is truly unbearable she should be evaluating the emergency room  The patient will be evaluated either tonight or tomorrow in the emergency room  Review of Systems:     Review of Systems   Constitutional: Negative for activity change, fatigue and fever  HENT: Negative for congestion, hearing loss, rhinorrhea, trouble swallowing and voice change  Eyes: Negative for photophobia, pain, discharge and visual disturbance  Respiratory: Positive for shortness of breath (COPD)  Negative for cough and chest tightness  Cardiovascular: Negative for chest pain, palpitations and leg swelling  Gastrointestinal: Positive for abdominal pain and constipation  Negative for abdominal distention, blood in stool, nausea and vomiting  Endocrine: Negative for cold intolerance and heat intolerance  Genitourinary: Negative for difficulty urinating, frequency, hematuria, urgency, vaginal bleeding and vaginal discharge  Musculoskeletal: Negative for arthralgias and myalgias  Skin: Negative  Neurological: Negative for dizziness, weakness, numbness and headaches  Psychiatric/Behavioral: Negative for decreased concentration  The patient is not nervous/anxious           Problem List:     Patient Active Problem List   Diagnosis    Tremor, anxiety related    Thromboangiitis obliterans ([facility] [address] )    Severe depression ([facility] [address] )    Pain syndrome, chronic    Combined hyperlipidemia    Falls frequently    Hoarseness    Pain in both feet    Osteoporosis    Peripheral polyneuropathy    Tobacco abuse    Dental caries    Gingivitis    Lung nodule    Right hip pain    Osteoarthritis    Abnormal myocardial perfusion study    COPD with exacerbation (HCC)    Left flank pain    Diastolic dysfunction    Thyroid nodule    Laryngeal nodule    Pleuritic chest pain    Chronic left-sided thoracic back pain    Acute pain of right shoulder    Cough    Abnormal chest CT    Hyperglycemia    COPD (chronic obstructive pulmonary disease) (HCC)    Shortness of breath    Encounter for gynecological examination without abnormal finding    Post-menopausal    Encounter for screening mammogram for malignant neoplasm of breast    Acute vaginitis    Muscular deconditioning    Vitamin D deficiency    Cellulitis of right lower extremity    Nicotine abuse    Obesity, morbid (HCC)    Pain in both thighs    Immunosuppression (HCC)    Low serum cortisol level (HCC)    Hypotension    Thoracic radiculopathy        Objective:     /66 (BP Location: Left arm, Patient Position: Sitting, Cuff Size: Standard)   Pulse 88   Temp 97 7 °F (36 5 °C) (Temporal) Comment: no nsaids  Ht 5' 3" (1 6 m)   Wt 97 3 kg (214 lb 6 4 oz)   SpO2 93%   BMI 37 98 kg/m²     Physical Exam  Vitals signs reviewed  Constitutional:       Appearance: Normal appearance  She is obese  HENT:      Head: Normocephalic  Nose: Nose normal       Mouth/Throat:      Mouth: Mucous membranes are moist       Pharynx: Oropharynx is clear  Eyes:      Extraocular Movements: Extraocular movements intact  Pupils: Pupils are equal, round, and reactive to light  Cardiovascular:      Rate and Rhythm: Normal rate and regular rhythm     Pulmonary: Effort: Pulmonary effort is normal       Breath sounds: Examination of the right-lower field reveals decreased breath sounds  Examination of the left-lower field reveals decreased breath sounds  Decreased breath sounds present  Musculoskeletal: Normal range of motion  Skin:     General: Skin is warm and dry  Neurological:      General: No focal deficit present  Mental Status: She is alert and oriented to person, place, and time  Psychiatric:         Mood and Affect: Mood normal          Behavior: Behavior normal          Thought Content:  Thought content normal          Judgment: Judgment normal          [facility], [address] [place] [address]

## 2021-06-15 ENCOUNTER — TELEPHONE (OUTPATIENT)
Dept: INTERNAL MEDICINE CLINIC | Facility: CLINIC | Age: 61
End: 2021-06-15

## 2021-06-15 DIAGNOSIS — R10.32 CHRONIC LEFT LOWER QUADRANT PAIN: Primary | ICD-10-CM

## 2021-06-15 DIAGNOSIS — R73.9 HYPERGLYCEMIA: ICD-10-CM

## 2021-06-15 DIAGNOSIS — G89.29 CHRONIC LEFT LOWER QUADRANT PAIN: Primary | ICD-10-CM

## 2021-06-15 NOTE — TELEPHONE ENCOUNTER
Needs to laboratory testing today  Needs a CT of the abdomen and pelvis  I put the orders in    The CT is not stat but try to move it along

## 2021-06-15 NOTE — TELEPHONE ENCOUNTER
Pt saw  5/20 for upper (not lower)  left quadrant aching then burning    She still has this problem & is asking what  recommends    should she see GI ???

## 2021-06-17 ENCOUNTER — APPOINTMENT (OUTPATIENT)
Dept: LAB | Facility: CLINIC | Age: 61
End: 2021-06-17
Payer: COMMERCIAL

## 2021-06-17 DIAGNOSIS — R10.32 CHRONIC LEFT LOWER QUADRANT PAIN: ICD-10-CM

## 2021-06-17 DIAGNOSIS — R73.9 HYPERGLYCEMIA: ICD-10-CM

## 2021-06-17 DIAGNOSIS — G89.29 CHRONIC LEFT LOWER QUADRANT PAIN: ICD-10-CM

## 2021-06-17 LAB
ALBUMIN SERPL BCP-MCNC: 3.3 G/DL (ref 3.5–5)
ALP SERPL-CCNC: 146 U/L (ref 46–116)
ALT SERPL W P-5'-P-CCNC: 12 U/L (ref 12–78)
ANION GAP SERPL CALCULATED.3IONS-SCNC: 7 MMOL/L (ref 4–13)
AST SERPL W P-5'-P-CCNC: 10 U/L (ref 5–45)
BASOPHILS # BLD AUTO: 0.05 THOUSANDS/ΜL (ref 0–0.1)
BASOPHILS NFR BLD AUTO: 1 % (ref 0–1)
BILIRUB SERPL-MCNC: 0.33 MG/DL (ref 0.2–1)
BILIRUB UR QL STRIP: NEGATIVE
BUN SERPL-MCNC: 10 MG/DL (ref 5–25)
CALCIUM ALBUM COR SERPL-MCNC: 9.6 MG/DL (ref 8.3–10.1)
CALCIUM SERPL-MCNC: 9 MG/DL (ref 8.3–10.1)
CHLORIDE SERPL-SCNC: 110 MMOL/L (ref 100–108)
CLARITY UR: CLEAR
CO2 SERPL-SCNC: 23 MMOL/L (ref 21–32)
COLOR UR: YELLOW
CREAT SERPL-MCNC: 0.89 MG/DL (ref 0.6–1.3)
EOSINOPHIL # BLD AUTO: 0.1 THOUSAND/ΜL (ref 0–0.61)
EOSINOPHIL NFR BLD AUTO: 1 % (ref 0–6)
ERYTHROCYTE [DISTWIDTH] IN BLOOD BY AUTOMATED COUNT: 21.1 % (ref 11.6–15.1)
EST. AVERAGE GLUCOSE BLD GHB EST-MCNC: 151 MG/DL
GFR SERPL CREATININE-BSD FRML MDRD: 71 ML/MIN/1.73SQ M
GLUCOSE P FAST SERPL-MCNC: 121 MG/DL (ref 65–99)
GLUCOSE UR STRIP-MCNC: NEGATIVE MG/DL
HBA1C MFR BLD: 6.9 %
HCT VFR BLD AUTO: 36.4 % (ref 34.8–46.1)
HGB BLD-MCNC: 10.9 G/DL (ref 11.5–15.4)
HGB UR QL STRIP.AUTO: NEGATIVE
IMM GRANULOCYTES # BLD AUTO: 0.04 THOUSAND/UL (ref 0–0.2)
IMM GRANULOCYTES NFR BLD AUTO: 0 % (ref 0–2)
KETONES UR STRIP-MCNC: NEGATIVE MG/DL
LEUKOCYTE ESTERASE UR QL STRIP: NEGATIVE
LYMPHOCYTES # BLD AUTO: 3.38 THOUSANDS/ΜL (ref 0.6–4.47)
LYMPHOCYTES NFR BLD AUTO: 36 % (ref 14–44)
MCH RBC QN AUTO: 24.7 PG (ref 26.8–34.3)
MCHC RBC AUTO-ENTMCNC: 29.9 G/DL (ref 31.4–37.4)
MCV RBC AUTO: 83 FL (ref 82–98)
MONOCYTES # BLD AUTO: 0.52 THOUSAND/ΜL (ref 0.17–1.22)
MONOCYTES NFR BLD AUTO: 6 % (ref 4–12)
NEUTROPHILS # BLD AUTO: 5.34 THOUSANDS/ΜL (ref 1.85–7.62)
NEUTS SEG NFR BLD AUTO: 56 % (ref 43–75)
NITRITE UR QL STRIP: NEGATIVE
NRBC BLD AUTO-RTO: 0 /100 WBCS
PH UR STRIP.AUTO: 6 [PH]
PLATELET # BLD AUTO: 354 THOUSANDS/UL (ref 149–390)
PMV BLD AUTO: 11 FL (ref 8.9–12.7)
POTASSIUM SERPL-SCNC: 4.7 MMOL/L (ref 3.5–5.3)
PROT SERPL-MCNC: 7.4 G/DL (ref 6.4–8.2)
PROT UR STRIP-MCNC: NEGATIVE MG/DL
RBC # BLD AUTO: 4.41 MILLION/UL (ref 3.81–5.12)
SODIUM SERPL-SCNC: 140 MMOL/L (ref 136–145)
SP GR UR STRIP.AUTO: 1.01 (ref 1–1.03)
TSH SERPL DL<=0.05 MIU/L-ACNC: 2.82 UIU/ML (ref 0.36–3.74)
UROBILINOGEN UR QL STRIP.AUTO: 0.2 E.U./DL
WBC # BLD AUTO: 9.43 THOUSAND/UL (ref 4.31–10.16)

## 2021-06-17 PROCEDURE — 36415 COLL VENOUS BLD VENIPUNCTURE: CPT

## 2021-06-17 PROCEDURE — 83036 HEMOGLOBIN GLYCOSYLATED A1C: CPT

## 2021-06-17 PROCEDURE — 85025 COMPLETE CBC W/AUTO DIFF WBC: CPT

## 2021-06-17 PROCEDURE — 80053 COMPREHEN METABOLIC PANEL: CPT

## 2021-06-17 PROCEDURE — 81003 URINALYSIS AUTO W/O SCOPE: CPT | Performed by: PHYSICIAN ASSISTANT

## 2021-06-17 PROCEDURE — 84443 ASSAY THYROID STIM HORMONE: CPT

## 2021-06-24 ENCOUNTER — APPOINTMENT (EMERGENCY)
Dept: RADIOLOGY | Facility: HOSPITAL | Age: 61
End: 2021-06-24
Payer: COMMERCIAL

## 2021-06-24 ENCOUNTER — HOSPITAL ENCOUNTER (EMERGENCY)
Facility: HOSPITAL | Age: 61
Discharge: HOME | End: 2021-06-24
Attending: EMERGENCY MEDICINE
Payer: COMMERCIAL

## 2021-06-24 VITALS
RESPIRATION RATE: 16 BRPM | HEART RATE: 98 BPM | DIASTOLIC BLOOD PRESSURE: 90 MMHG | OXYGEN SATURATION: 96 % | SYSTOLIC BLOOD PRESSURE: 128 MMHG | TEMPERATURE: 97.6 F

## 2021-06-24 DIAGNOSIS — S69.91XA INJURY OF RIGHT WRIST, INITIAL ENCOUNTER: Primary | ICD-10-CM

## 2021-06-24 PROCEDURE — 73110 X-RAY EXAM OF WRIST: CPT

## 2021-06-24 PROCEDURE — 29125 APPL SHORT ARM SPLINT STATIC: CPT | Performed by: EMERGENCY MEDICINE

## 2021-06-24 PROCEDURE — 99283 EMERGENCY DEPT VISIT LOW MDM: CPT

## 2021-06-24 PROCEDURE — 99284 EMERGENCY DEPT VISIT MOD MDM: CPT | Performed by: EMERGENCY MEDICINE
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
## 2021-06-25 ENCOUNTER — HOSPITAL ENCOUNTER (OUTPATIENT)
Dept: CT IMAGING | Facility: CLINIC | Age: 61
Discharge: HOME | End: 2021-06-25
Payer: COMMERCIAL

## 2021-06-25 DIAGNOSIS — R10.32 CHRONIC LEFT LOWER QUADRANT PAIN: ICD-10-CM

## 2021-06-25 DIAGNOSIS — G89.29 CHRONIC LEFT LOWER QUADRANT PAIN: ICD-10-CM

## 2021-06-25 PROCEDURE — 74177 CT ABD & PELVIS W/CONTRAST: CPT

## 2021-06-25 PROCEDURE — G1004 CDSM NDSC: HCPCS

## 2021-06-25 RX ADMIN — IOHEXOL 100 ML: 350 INJECTION, SOLUTION INTRAVENOUS at 15:06

## 2021-06-29 ENCOUNTER — OFFICE VISIT (OUTPATIENT)
Dept: INTERNAL MEDICINE CLINIC | Facility: CLINIC | Age: 61
End: 2021-06-29
Payer: COMMERCIAL

## 2021-06-29 VITALS
BODY MASS INDEX: 36.57 KG/M2 | OXYGEN SATURATION: 96 % | HEIGHT: 63 IN | WEIGHT: 206.4 LBS | DIASTOLIC BLOOD PRESSURE: 68 MMHG | HEART RATE: 98 BPM | TEMPERATURE: 97.9 F | SYSTOLIC BLOOD PRESSURE: 112 MMHG

## 2021-06-29 DIAGNOSIS — E61.1 IRON DEFICIENCY: Primary | ICD-10-CM

## 2021-06-29 PROCEDURE — 99214 OFFICE O/P EST MOD 30 MIN: CPT | Performed by: INTERNAL MEDICINE

## 2021-06-29 NOTE — PATIENT INSTRUCTIONS
Major issue of cigarette smoking  She will not feel any improvement if she does not  Continued pain "left upper quadrant  I believe this is radiculopathic and or musculoskeletal   She should address this with pain management  CT scan will be reviewed  Hypochromic microcytic anemia:  Check Hemoccult and iron levels  Revisit here in about a month

## 2021-06-29 NOTE — PROGRESS NOTES
Assessment/Plan:       Diagnoses and all orders for this visit:    Iron deficiency  -     Occult Blood, Fecal Immunochemical; Future  -     Iron; Future  -     Iron Saturation %; Future  -     Transferrin; Future                Subjective:      Patient ID: Vivi Maki is a 62 y o  female  43-year-old who is physically almost done for   Major problem is cigarette smoking with its attendant diseases of chronic obstructive pulmonary disease with chronic bronchitis and also thrombo angiitis obliterans   Continues to smoke close to a pack a day  I do not think she has made a legitimate effort to stop smoking and I do not she is interested  She self medicating anxiety and depression with tobacco      complaint today of pain felt in the left upper quadrant ongoing for several months  The pain is exacerbated by range of motion and sometimes felt as a burning  Clinically this is either musculoskeletal or radiculopathic   CT scan was done several days ago but has not been read  This is the same pain that in the past she is described as left midback pain radiating around on the left breast with burning and again that time it sounded radiculopathic  She apparently is concerned that she has an abdominal aneurysm  She had a CT scan of the abdomen done July 2020 and there was no aneurysm noted  she is on ProAmatine but I can barely get her blood pressure at all  Hypotension noted  The patient had peripheral arterial studies of the upper extremities which did not document any significant occlusive disease  Echocardiogram done:  Normal systolic function, trace  Mitral regurgitation and mild mitral stenosis  Normal aortic valve  Grade 11 diastolic dysfunction       Hypochromic microcytic anemia continues to be noted        The following portions of the patient's history were reviewed and updated as appropriate:   She has a past medical history of Anemia, Anemia, Cardiac disorder, CHF (congestive heart failure) ([facility] ), Constipation, COPD (chronic obstructive pulmonary disease) ([facility] ), Depression, Fibromyalgia, Fibromyalgia, primary, GERD (gastroesophageal reflux disease), Head injury, Heart disorder, Malignant neoplasm ([facility] ), Migraines, Myocardial infarction ([facility] ), Occasional tremors, Osteoarthritis, Osteoporosis, Problems with swallowing, PTSD (post-traumatic stress disorder), Restless leg syndrome, Skin cancer, Stomach problems, and Tobacco abuse ,  does not have any pertinent problems on file  ,   has a past surgical history that includes Gallbladder surgery; Tonsillectomy; Gastric bypass; Back surgery; Other surgical history (Left); Arthrodesis; Bladder surgery; Foot surgery (Right); Abdominal surgery; Hand surgery (Left); Adenoidectomy; Toe Surgery (Right); Ankle surgery (Right); Femur Surgery (Right); Hemorrhoid surgery; Toenail excision; Multiple tooth extractions; Cholecystectomy; Colonoscopy; and EGD ,  family history includes Arthritis in her mother; Cancer in her maternal aunt; Diabetes in her brother; Heart attack in her father; Heart disease in her brother and father; Hypertension in her father and mother; Mental illness in her brother and father; Neuropathy in her father; No Known Problems in her daughter, maternal aunt, and paternal aunt; Obesity in her mother and sister; Osteoarthritis in her family; Osteoporosis in her family; Scoliosis in her family; Uterine cancer in her mother  ,   reports that she has been smoking cigarettes  She has a 21 00 pack-year smoking history  She has never used smokeless tobacco  She reports previous alcohol use  She reports current drug use  Frequency: 7 00 times per week  Drug: Marijuana  ,  is allergic to morphine and related, quetiapine, and sulfa antibiotics     Current Outpatient Medications   Medication Sig Dispense Refill    albuterol (2 5 mg/3 mL) 0 083 % nebulizer solution Take 1 vial (2 5 mg total) by nebulization every 6 (six) hours as needed for wheezing or shortness of breath (Patient taking differently: Take 2 5 mg by nebulization as needed for wheezing or shortness of breath ) 1080 mL 3    albuterol (PROVENTIL HFA,VENTOLIN HFA) 90 mcg/act inhaler Inhale 2 puffs every 6 (six) hours as needed for wheezing 1 Inhaler 5    albuterol (PROVENTIL HFA,VENTOLIN HFA) 90 mcg/act inhaler TAKE 2 PUFFS BY MOUTH EVERY 6 HOURS AS NEEDED FOR WHEEZE 6 7 Inhaler 3    Albuterol Sulfate (ProAir RespiClick) 382 (90 Base) MCG/ACT AEPB Inhale 2 puffs Q 4-6 H PRN SOB or wheezing 3 each 3    ARIPiprazole (ABILIFY) 2 mg tablet Take 1 tablet (2 mg total) by mouth daily at bedtime 90 tablet 0    ARIPiprazole (ABILIFY) 5 mg tablet TAKE 1 TABLET BY MOUTH EVERY DAY AT NIGHT      Blood Glucose Monitoring Suppl (ONE TOUCH ULTRA 2) w/Device KIT Test daily and as instructed 1 each 0    Blood Pressure Monitoring (SPHYGMOMANOMETER) MISC Medically necessary to monitor blood pressure due to orthostatic hypotension 1 each 0    buprenorphine-naloxone (SUBOXONE) 2-0 5 mg per SL tablet Place under the tongue 2 (two) times a day       butalbital-aspirin-caffeine (FIORINAL) -40 MG per tablet Take 1 tablet by mouth as needed       clonazePAM (KlonoPIN) 0 5 mg tablet Take 0 5 mg by mouth 3 (three) times a day   1    clotrimazole (LOTRIMIN) 1 % cream APPLY TO AFFECTED AREA TWICE A DAY FOR 14 DAYS 30 g 5    dihydroergotamine (MIGRANAL) 4 MG/ML nasal spray 1 spray into each nostril as needed Use in one nostril as directed  No more than 4 sprays in one hour      diphenhydrAMINE (BENADRYL) 25 mg tablet Take 25 mg by mouth as needed for itching       doxepin (SINEquan) 100 mg capsule TAKE ORALLY 3 ORAL CAPSULE AT NIGHT FOR 30 DAYS        famotidine (PEPCID) 20 mg tablet Take 1 tablet (20 mg total) by mouth 2 (two) times a day 1 PO  tablet 3    fluticasone (FLONASE) 50 mcg/act nasal spray 1 spray 2 (two) times a day as needed for rhinitis   2    gabapentin (NEURONTIN) 800 mg tablet Take 800 mg by mouth 4 (four) times a day  3    glucose blood (ONE TOUCH ULTRA TEST) test strip Patient to test two times daily 200 each 3    Lancets (ONETOUCH ULTRASOFT) lancets Patient to test two times daily 200 each 3    lidocaine (XYLOCAINE) 5 % ointment APPLY TOPICALLY 2 TIMES A DAY AS NEEDED FOR MILD PAIN 35 44 g 11    midodrine (PROAMATINE) 5 mg tablet TAKE 1 AND 1/2 TABLETS BY MOUTH 3 TIMES A DAY LAST DOSE BEFORE 6:00 P M  (Patient taking differently: Patient takes 2 tabs Qam and 1 tab with dinner) 135 tablet 1    primidone (MYSOLINE) 50 mg tablet TAKE 3 TABLETS BY MOUTH AT BEDTIME (Patient taking differently: Take 100 mg by mouth daily at bedtime TAKE 3 TABLETS BY MOUTH AT BEDTIME) 270 tablet 1    Respiratory Therapy Supplies (NEBULIZER/TUBING/MOUTHPIECE) KIT Use up to 6 times daily as needed for lung symptoms 1 each 3    rOPINIRole (REQUIP) 0 25 mg tablet Take 1 tablet (0 25 mg total) by mouth 3 (three) times a day 270 tablet 1    Simethicone (GAS-X PO) Take by mouth as needed       TiZANidine (ZANAFLEX) 2 MG capsule TAKE 1 CAPSULE BY MOUTH THREE TIMES A DAY AS NEEDED FOR MUSLCE SPASMS 270 capsule 3    venlafaxine 150 MG TB24 Take 1 tablet (150 mg total) by mouth daily with breakfast 90 tablet 0    venlafaxine 225 MG TB24 Take 1 tablet (225 mg total) by mouth 2 (two) times a day 90 tablet 0    Wixela Inhub 250-50 MCG/DOSE inhaler INHALE 1 PUFF TWICE A DAY, RINSE MOUTH AFTER USE 3 Inhaler 4    zafirlukast (ACCOLATE) 20 MG tablet Take 1 tablet (20 mg total) by mouth 2 (two) times a day before meals 180 tablet 3    nicotine (NICODERM CQ) 21 mg/24 hr TD 24 hr patch Place 1 patch on the skin daily (Patient not taking: Reported on 4/14/2021) 28 patch 0    nicotine polacrilex (NICORETTE) 2 mg gum Chew 1 each (2 mg total) as needed for smoking cessation (Patient not taking: Reported on 4/14/2021) 100 each 0     No current facility-administered medications for this visit         Review of Systems Constitutional: Positive for fatigue  Respiratory: Positive for cough and shortness of breath  Gastrointestinal: Positive for abdominal pain  Musculoskeletal: Positive for arthralgias, back pain and gait problem  Neurological: Positive for weakness  Psychiatric/Behavioral: Positive for dysphoric mood  The patient is nervous/anxious  All other systems reviewed and are negative  Objective:  Vitals:    06/29/21 1431   BP: 112/68   Pulse: 98   Temp: 97 9 °F (36 6 °C)   SpO2: 96%      Physical Exam      Patient Instructions     Major issue of cigarette smoking  She will not feel any improvement if she does not  Continued pain "left upper quadrant  I believe this is radiculopathic and or musculoskeletal   She should address this with pain management  CT scan will be reviewed  Hypochromic microcytic anemia:  Check Hemoccult and iron levels  Revisit here in about a month

## 2021-06-30 ENCOUNTER — TRANSCRIBE ORDERS (OUTPATIENT)
Dept: VASCULAR SURGERY | Facility: CLINIC | Age: 61
End: 2021-06-30

## 2021-06-30 DIAGNOSIS — I73.9 PAD (PERIPHERAL ARTERY DISEASE) (HCC): Primary | ICD-10-CM

## 2021-07-01 ENCOUNTER — OFFICE VISIT (OUTPATIENT)
Dept: OBGYN CLINIC | Facility: CLINIC | Age: 61
End: 2021-07-01
Payer: COMMERCIAL

## 2021-07-01 VITALS
HEART RATE: 81 BPM | DIASTOLIC BLOOD PRESSURE: 64 MMHG | BODY MASS INDEX: 36.5 KG/M2 | WEIGHT: 206 LBS | HEIGHT: 63 IN | SYSTOLIC BLOOD PRESSURE: 90 MMHG

## 2021-07-01 DIAGNOSIS — G56.31 ACUTE RADIAL NERVE PALSY, RIGHT: Primary | ICD-10-CM

## 2021-07-01 PROCEDURE — 4004F PT TOBACCO SCREEN RCVD TLK: CPT | Performed by: ORTHOPAEDIC SURGERY

## 2021-07-01 PROCEDURE — 99213 OFFICE O/P EST LOW 20 MIN: CPT | Performed by: ORTHOPAEDIC SURGERY

## 2021-07-01 PROCEDURE — 3008F BODY MASS INDEX DOCD: CPT | Performed by: ORTHOPAEDIC SURGERY
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
## 2021-07-07 ENCOUNTER — TELEPHONE (OUTPATIENT)
Dept: GASTROENTEROLOGY | Facility: CLINIC | Age: 61
End: 2021-07-07

## 2021-07-07 NOTE — TELEPHONE ENCOUNTER
----- Message from Rosenda Quiles PA-C sent at 7/7/2021  9:31 AM EDT -----  Please inform patient that there was no evidence of acute GI problem seen on CT scan    Thank you

## 2021-07-10 DIAGNOSIS — R10.13 EPIGASTRIC PAIN: ICD-10-CM

## 2021-07-10 DIAGNOSIS — I95.1 ORTHOSTATIC HYPOTENSION: ICD-10-CM

## 2021-07-12 RX ORDER — FAMOTIDINE 20 MG/1
TABLET, FILM COATED ORAL
Qty: 180 TABLET | Refills: 3 | Status: SHIPPED | OUTPATIENT
Start: 2021-07-12 | End: 2021-12-17

## 2021-07-12 RX ORDER — MIDODRINE HYDROCHLORIDE 5 MG/1
TABLET ORAL
Qty: 135 TABLET | Refills: 1 | Status: SHIPPED | OUTPATIENT
Start: 2021-07-12 | End: 2021-09-08 | Stop reason: SDUPTHER

## 2021-07-16 ENCOUNTER — TELEPHONE (OUTPATIENT)
Dept: GASTROENTEROLOGY | Facility: CLINIC | Age: 61
End: 2021-07-16

## 2021-07-19 DIAGNOSIS — J41.0 SIMPLE CHRONIC BRONCHITIS (HCC): ICD-10-CM

## 2021-07-19 RX ORDER — ALBUTEROL SULFATE 90 UG/1
AEROSOL, METERED RESPIRATORY (INHALATION)
Qty: 6.7 G | Refills: 3 | Status: SHIPPED | OUTPATIENT
Start: 2021-07-19

## 2021-07-20 ENCOUNTER — TELEPHONE (OUTPATIENT)
Dept: INTERNAL MEDICINE CLINIC | Facility: CLINIC | Age: 61
End: 2021-07-20

## 2021-07-20 NOTE — TELEPHONE ENCOUNTER
Pt is requesting an order for  Rollator specify  with a seat attachment and basket  Fax to [employer] along with the last office note and insurance info    Fax # [phone]

## 2021-07-21 ENCOUNTER — APPOINTMENT (OUTPATIENT)
Dept: LAB | Facility: CLINIC | Age: 61
End: 2021-07-21
Payer: COMMERCIAL

## 2021-07-21 ENCOUNTER — OFFICE VISIT (OUTPATIENT)
Dept: INTERNAL MEDICINE CLINIC | Facility: CLINIC | Age: 61
End: 2021-07-21
Payer: COMMERCIAL

## 2021-07-21 VITALS
OXYGEN SATURATION: 96 % | HEART RATE: 94 BPM | SYSTOLIC BLOOD PRESSURE: 98 MMHG | HEIGHT: 63 IN | DIASTOLIC BLOOD PRESSURE: 66 MMHG | TEMPERATURE: 97.9 F | WEIGHT: 203.8 LBS | BODY MASS INDEX: 36.11 KG/M2

## 2021-07-21 DIAGNOSIS — Z12.12 SCREENING FOR COLORECTAL CANCER: Primary | ICD-10-CM

## 2021-07-21 DIAGNOSIS — E78.2 COMBINED HYPERLIPIDEMIA: ICD-10-CM

## 2021-07-21 DIAGNOSIS — R73.9 HYPERGLYCEMIA: ICD-10-CM

## 2021-07-21 DIAGNOSIS — F31.9 BIPOLAR 1 DISORDER (HCC): ICD-10-CM

## 2021-07-21 DIAGNOSIS — E61.1 IRON DEFICIENCY: ICD-10-CM

## 2021-07-21 DIAGNOSIS — J41.0 SIMPLE CHRONIC BRONCHITIS (HCC): ICD-10-CM

## 2021-07-21 DIAGNOSIS — G89.29 CHRONIC LEFT-SIDED THORACIC BACK PAIN: ICD-10-CM

## 2021-07-21 DIAGNOSIS — Z12.11 SCREENING FOR COLORECTAL CANCER: Primary | ICD-10-CM

## 2021-07-21 DIAGNOSIS — N76.0 ACUTE VAGINITIS: ICD-10-CM

## 2021-07-21 DIAGNOSIS — I95.0 IDIOPATHIC HYPOTENSION: ICD-10-CM

## 2021-07-21 DIAGNOSIS — M54.6 CHRONIC LEFT-SIDED THORACIC BACK PAIN: ICD-10-CM

## 2021-07-21 LAB
BASOPHILS # BLD AUTO: 0.05 THOUSANDS/ΜL (ref 0–0.1)
BASOPHILS NFR BLD AUTO: 0 % (ref 0–1)
EOSINOPHIL # BLD AUTO: 0.08 THOUSAND/ΜL (ref 0–0.61)
EOSINOPHIL NFR BLD AUTO: 1 % (ref 0–6)
ERYTHROCYTE [DISTWIDTH] IN BLOOD BY AUTOMATED COUNT: 21.1 % (ref 11.6–15.1)
HCT VFR BLD AUTO: 35.2 % (ref 34.8–46.1)
HGB BLD-MCNC: 10.6 G/DL (ref 11.5–15.4)
IMM GRANULOCYTES # BLD AUTO: 0.08 THOUSAND/UL (ref 0–0.2)
IMM GRANULOCYTES NFR BLD AUTO: 1 % (ref 0–2)
IRON SATN MFR SERPL: 7 %
IRON SERPL-MCNC: 28 UG/DL (ref 50–170)
LYMPHOCYTES # BLD AUTO: 2.46 THOUSANDS/ΜL (ref 0.6–4.47)
LYMPHOCYTES NFR BLD AUTO: 20 % (ref 14–44)
MCH RBC QN AUTO: 25 PG (ref 26.8–34.3)
MCHC RBC AUTO-ENTMCNC: 30.1 G/DL (ref 31.4–37.4)
MCV RBC AUTO: 83 FL (ref 82–98)
MONOCYTES # BLD AUTO: 0.61 THOUSAND/ΜL (ref 0.17–1.22)
MONOCYTES NFR BLD AUTO: 5 % (ref 4–12)
NEUTROPHILS # BLD AUTO: 8.97 THOUSANDS/ΜL (ref 1.85–7.62)
NEUTS SEG NFR BLD AUTO: 73 % (ref 43–75)
NRBC BLD AUTO-RTO: 0 /100 WBCS
PLATELET # BLD AUTO: 349 THOUSANDS/UL (ref 149–390)
PMV BLD AUTO: 11.2 FL (ref 8.9–12.7)
RBC # BLD AUTO: 4.24 MILLION/UL (ref 3.81–5.12)
TIBC SERPL-MCNC: 402 UG/DL (ref 250–450)
TRANSFERRIN SERPL-MCNC: 310 MG/DL (ref 200–400)
WBC # BLD AUTO: 12.25 THOUSAND/UL (ref 4.31–10.16)

## 2021-07-21 PROCEDURE — 36415 COLL VENOUS BLD VENIPUNCTURE: CPT

## 2021-07-21 PROCEDURE — 83550 IRON BINDING TEST: CPT

## 2021-07-21 PROCEDURE — 99214 OFFICE O/P EST MOD 30 MIN: CPT | Performed by: INTERNAL MEDICINE

## 2021-07-21 PROCEDURE — 85025 COMPLETE CBC W/AUTO DIFF WBC: CPT

## 2021-07-21 PROCEDURE — 83540 ASSAY OF IRON: CPT

## 2021-07-21 PROCEDURE — 84466 ASSAY OF TRANSFERRIN: CPT

## 2021-07-21 RX ORDER — ARIPIPRAZOLE 5 MG/1
5 TABLET ORAL DAILY
Qty: 100 TABLET | Refills: 2
Start: 2021-07-21

## 2021-07-21 RX ORDER — NYSTATIN 100000 [USP'U]/G
POWDER TOPICAL 2 TIMES DAILY
Qty: 45 G | Refills: 11 | Status: SHIPPED | OUTPATIENT
Start: 2021-07-21 | End: 2021-10-18 | Stop reason: SDUPTHER

## 2021-07-21 NOTE — PROGRESS NOTES
Assessment/Plan:       Diagnoses and all orders for this visit:    Screening for colorectal cancer  -     nystatin (MYCOSTATIN) powder; Apply topically 2 (two) times a day    Acute vaginitis    Simple chronic bronchitis (HCC)    Idiopathic hypotension    Chronic left-sided thoracic back pain    Combined hyperlipidemia    Hyperglycemia    Bipolar 1 disorder (HCC)  -     ARIPiprazole (ABILIFY) 5 mg tablet; Take 1 tablet (5 mg total) by mouth daily    Iron deficiency  -     CBC and differential; Future    Other orders  -     Cancel: Ambulatory referral to Gastroenterology; Future                Subjective:      Patient ID: Gautam Fofana is a 62 y o  female  61-year-old who is physically almost done for   Major problem is cigarette smoking with its attendant diseases of chronic obstructive pulmonary disease with chronic bronchitis and also thrombo angiitis obliterans   Continues to smoke close to a pack a day  I do not think she has made a legitimate effort to stop smoking and I do not she is interested  She self medicating anxiety and depression with tobacco     Left upper quadrant pain reported intermittently for several months  History is typical of radiculopathic pain or musculoskeletal pain, not of GI pain  Abdominal CT showed no pathology in the area  This is the same pain that in the past she is described as left midback pain radiating around on the left breast with burning and again that time it sounded radiculopathic       she is on ProAmatine but I can barely get her blood pressure at all  Hypotension noted  The patient had peripheral arterial studies of the upper extremities which did not document any significant occlusive disease  Echocardiogram done:  Normal systolic function, trace  Mitral regurgitation and mild mitral stenosis  Normal aortic valve  Grade 1 diastolic dysfunction       Hypochromic microcytic anemia continues to be noted        The following portions of the patient's history were reviewed and updated as appropriate:   She has a past medical history of Anemia, Anemia, Cardiac disorder, CHF (congestive heart failure) ([facility] [address] ), Constipation, COPD (chronic obstructive pulmonary disease) ([facility] [address] ), Depression, Fibromyalgia, Fibromyalgia, primary, GERD (gastroesophageal reflux disease), Head injury, Heart disorder, Malignant neoplasm ([facility] [address] ), Migraines, [facility] Occasional tremors, Osteoarthritis, Osteoporosis, Problems with swallowing, PTSD (post-traumatic stress disorder), Restless leg syndrome, Skin cancer, Stomach problems, and Tobacco abuse ,  does not have any pertinent problems on file  ,   has a past surgical history that includes Gallbladder surgery; Tonsillectomy; Gastric bypass; Back surgery; Other surgical history (Left); Arthrodesis; Bladder surgery; Foot surgery (Right); Abdominal surgery; Hand surgery (Left); Adenoidectomy; Toe Surgery (Right); Ankle surgery (Right); Femur Surgery (Right); Hemorrhoid surgery; Toenail excision; Multiple tooth extractions; Cholecystectomy; Colonoscopy; and EGD ,  family history includes Arthritis in her mother; Cancer in her maternal aunt; Diabetes in her brother; Heart attack in her father; Heart disease in her brother and father; Hypertension in her father and mother; Mental illness in her brother and father; Neuropathy in her father; No Known Problems in her daughter, maternal aunt, and paternal aunt; Obesity in her mother and sister; Osteoarthritis in her family; Osteoporosis in her family; Scoliosis in her family; Uterine cancer in her mother  ,   reports that she has been smoking cigarettes  She has a 21 00 pack-year smoking history  She has never used smokeless tobacco  She reports previous alcohol use  She reports current drug use  Frequency: 7 00 times per week  Drug: Marijuana  ,  is allergic to morphine and related, quetiapine, and sulfa antibiotics     Current Outpatient Medications   Medication Sig Dispense Refill    albuterol (2 5 mg/3 mL) 0 083 % nebulizer solution Take 1 vial (2 5 mg total) by nebulization every 6 (six) hours as needed for wheezing or shortness of breath (Patient taking differently: Take 2 5 mg by nebulization as needed for wheezing or shortness of breath ) 1080 mL 3    albuterol (PROVENTIL HFA,VENTOLIN HFA) 90 mcg/act inhaler Inhale 2 puffs every 6 (six) hours as needed for wheezing 1 Inhaler 5    albuterol (PROVENTIL HFA,VENTOLIN HFA) 90 mcg/act inhaler INHALE 2 PUFFS BY MOUTH EVERY 6 HOURS AS NEEDED FOR WHEEZE 6 7 g 3    Albuterol Sulfate (ProAir RespiClick) 199 (90 Base) MCG/ACT AEPB Inhale 2 puffs Q 4-6 H PRN SOB or wheezing 3 each 3    ARIPiprazole (ABILIFY) 5 mg tablet Take 1 tablet (5 mg total) by mouth daily 100 tablet 2    Blood Glucose Monitoring Suppl (ONE TOUCH ULTRA 2) w/Device KIT Test daily and as instructed 1 each 0    Blood Pressure Monitoring (SPHYGMOMANOMETER) MISC Medically necessary to monitor blood pressure due to orthostatic hypotension 1 each 0    buprenorphine-naloxone (SUBOXONE) 2-0 5 mg per SL tablet Place under the tongue 2 (two) times a day       butalbital-aspirin-caffeine (FIORINAL) -40 MG per tablet Take 1 tablet by mouth as needed       clonazePAM (KlonoPIN) 0 5 mg tablet Take 0 5 mg by mouth 3 (three) times a day   1    dihydroergotamine (MIGRANAL) 4 MG/ML nasal spray 1 spray into each nostril as needed Use in one nostril as directed  No more than 4 sprays in one hour      diphenhydrAMINE (BENADRYL) 25 mg tablet Take 25 mg by mouth as needed for itching       doxepin (SINEquan) 100 mg capsule TAKE ORALLY 3 ORAL CAPSULE AT NIGHT FOR 30 DAYS        famotidine (PEPCID) 20 mg tablet TAKE 1 TABLET (20 MG TOTAL) BY MOUTH 2 (TWO) TIMES A  tablet 3    gabapentin (NEURONTIN) 800 mg tablet Take 800 mg by mouth 4 (four) times a day  3    glucose blood (ONE TOUCH ULTRA TEST) test strip Patient to test two times daily 200 each 3    Lancets (ONETOUCH ULTRASOFT) lancets Patient to test two times daily 200 each 3    lidocaine (XYLOCAINE) 5 % ointment APPLY TOPICALLY 2 TIMES A DAY AS NEEDED FOR MILD PAIN 35 44 g 11    midodrine (PROAMATINE) 5 mg tablet TAKE 1 AND 1/2 TABLETS BY MOUTH 3 TIMES A DAY LAST DOSE BEFORE 6:00 P M  135 tablet 1    Misc  Devices (Rollator Ultra-Light) MISC Rollator with seat and basket    Indication is ambulatory dysfunction and global weakness due to chronic illness 1 each 0    nicotine (NICODERM CQ) 21 mg/24 hr TD 24 hr patch Place 1 patch on the skin daily 28 patch 0    nicotine polacrilex (NICORETTE) 2 mg gum Chew 1 each (2 mg total) as needed for smoking cessation 100 each 0    primidone (MYSOLINE) 50 mg tablet TAKE 3 TABLETS BY MOUTH AT BEDTIME (Patient taking differently: Take 100 mg by mouth daily at bedtime TAKE 3 TABLETS BY MOUTH AT BEDTIME) 270 tablet 1    Respiratory Therapy Supplies (NEBULIZER/TUBING/MOUTHPIECE) KIT Use up to 6 times daily as needed for lung symptoms 1 each 3    rOPINIRole (REQUIP) 0 25 mg tablet Take 1 tablet (0 25 mg total) by mouth 3 (three) times a day 270 tablet 1    Simethicone (GAS-X PO) Take by mouth as needed       TiZANidine (ZANAFLEX) 2 MG capsule TAKE 1 CAPSULE BY MOUTH THREE TIMES A DAY AS NEEDED FOR MUSLCE SPASMS 270 capsule 3    venlafaxine 150 MG TB24 Take 1 tablet (150 mg total) by mouth daily with breakfast 90 tablet 0    venlafaxine 225 MG TB24 Take 1 tablet (225 mg total) by mouth 2 (two) times a day 90 tablet 0    Wixela Inhub 250-50 MCG/DOSE inhaler INHALE 1 PUFF TWICE A DAY, RINSE MOUTH AFTER USE 3 Inhaler 4    zafirlukast (ACCOLATE) 20 MG tablet Take 1 tablet (20 mg total) by mouth 2 (two) times a day before meals 180 tablet 3    fluticasone (FLONASE) 50 mcg/act nasal spray 1 spray 2 (two) times a day as needed for rhinitis  (Patient not taking: Reported on 7/21/2021)  2    nystatin (MYCOSTATIN) powder Apply topically 2 (two) times a day 45 g 11     No current facility-administered medications for this visit  Review of Systems   Constitutional: Positive for fatigue  Respiratory: Positive for cough and shortness of breath  Gastrointestinal: Positive for abdominal pain  Musculoskeletal: Positive for arthralgias, back pain and gait problem  Neurological: Positive for weakness  Psychiatric/Behavioral: Positive for dysphoric mood  The patient is nervous/anxious  All other systems reviewed and are negative  Objective:  Vitals:    07/21/21 1609   BP: 98/66   Pulse: 94   Temp: 97 9 °F (36 6 °C)   SpO2: 96%      Physical Exam  Constitutional:       Appearance: She is well-developed  She is obese  Comments: An obese female patient who appears older than stated age and who appears chronically ill but who nevertheless looks a tiny bit better than she did a month ago   HENT:      Head: Normocephalic and atraumatic  Eyes:      Pupils: Pupils are equal, round, and reactive to light  Neck:      Thyroid: No thyromegaly  Trachea: No tracheal deviation  Cardiovascular:      Rate and Rhythm: Normal rate and regular rhythm  Heart sounds: Normal heart sounds  No murmur heard  No gallop  Pulmonary:      Effort: No respiratory distress  Breath sounds: Decreased breath sounds present  No wheezing, rhonchi or rales  Abdominal:      General: Bowel sounds are normal       Palpations: Abdomen is soft  Tenderness: There is no abdominal tenderness  Musculoskeletal:         General: Deformity present  No tenderness  Normal range of motion  Cervical back: Normal range of motion and neck supple  Comments:  osteoarthritis   Skin:     General: Skin is warm  Neurological:      Mental Status: She is alert and oriented to person, place, and time        Coordination: Coordination normal    Psychiatric:         Judgment: Judgment normal            Patient Instructions    Multiple chronic diagnoses   Cigarette smoking persist   She needs to check the iron levels which she did not do   Follow-up with me in about a month

## 2021-07-21 NOTE — TELEPHONE ENCOUNTER
Rec'ed the order for Rollator, it needs to be signed by Dr  Also include pt's height, only has the weight        Fax

## 2021-07-21 NOTE — PATIENT INSTRUCTIONS
Multiple chronic diagnoses   Cigarette smoking persist   She needs to check the iron levels which she did not do   Follow-up with me in about a month

## 2021-07-22 ENCOUNTER — OFFICE VISIT (OUTPATIENT)
Dept: OBGYN CLINIC | Facility: CLINIC | Age: 61
End: 2021-07-22
Payer: COMMERCIAL

## 2021-07-22 VITALS
SYSTOLIC BLOOD PRESSURE: 95 MMHG | WEIGHT: 203.8 LBS | HEART RATE: 102 BPM | BODY MASS INDEX: 36.11 KG/M2 | HEIGHT: 63 IN | DIASTOLIC BLOOD PRESSURE: 69 MMHG

## 2021-07-22 DIAGNOSIS — G56.31 ACUTE RADIAL NERVE PALSY, RIGHT: Primary | ICD-10-CM

## 2021-07-22 PROCEDURE — 99213 OFFICE O/P EST LOW 20 MIN: CPT | Performed by: ORTHOPAEDIC SURGERY
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
## 2021-08-03 ENCOUNTER — OFFICE VISIT (OUTPATIENT)
Dept: PULMONOLOGY | Facility: CLINIC | Age: 61
End: 2021-08-03
Payer: COMMERCIAL

## 2021-08-03 VITALS
OXYGEN SATURATION: 93 % | BODY MASS INDEX: 35.61 KG/M2 | DIASTOLIC BLOOD PRESSURE: 72 MMHG | WEIGHT: 201 LBS | HEIGHT: 63 IN | TEMPERATURE: 97.9 F | SYSTOLIC BLOOD PRESSURE: 112 MMHG | HEART RATE: 82 BPM

## 2021-08-03 DIAGNOSIS — R06.02 SHORTNESS OF BREATH: ICD-10-CM

## 2021-08-03 DIAGNOSIS — J41.0 SIMPLE CHRONIC BRONCHITIS (HCC): Primary | ICD-10-CM

## 2021-08-03 DIAGNOSIS — R91.8 LUNG NODULES: ICD-10-CM

## 2021-08-03 DIAGNOSIS — Z72.0 TOBACCO ABUSE: ICD-10-CM

## 2021-08-03 DIAGNOSIS — R93.89 ABNORMAL CHEST CT: ICD-10-CM

## 2021-08-03 DIAGNOSIS — R91.8 GROUND GLASS OPACITY PRESENT ON IMAGING OF LUNG: ICD-10-CM

## 2021-08-03 PROCEDURE — 99214 OFFICE O/P EST MOD 30 MIN: CPT | Performed by: PHYSICIAN ASSISTANT

## 2021-08-03 PROCEDURE — 4004F PT TOBACCO SCREEN RCVD TLK: CPT | Performed by: PHYSICIAN ASSISTANT

## 2021-08-03 RX ORDER — ALBUTEROL SULFATE 90 UG/1
2 AEROSOL, METERED RESPIRATORY (INHALATION) EVERY 6 HOURS PRN
Qty: 8.5 G | Refills: 3 | Status: SHIPPED | OUTPATIENT
Start: 2021-08-03 | End: 2021-12-27 | Stop reason: SDUPTHER

## 2021-08-03 NOTE — PROGRESS NOTES
Assessment:    1  Simple chronic bronchitis (HCC)  albuterol (ProAir HFA) 90 mcg/act inhaler   2  Shortness of breath  CT chest without contrast   3  Ground glass opacity present on imaging of lung  CT chest without contrast   4  Lung nodules  CT chest without contrast   5  Tobacco abuse  CT chest without contrast   6  Abnormal chest CT  CT chest without contrast         Plan:   Patient presenting for follow-up, chronic shortness of breath and dyspnea on exertion likely secondary to chronic bronchitis S still actively smoking 3/4 pack per day   Continue Advair 1 puff twice daily, ipratropium and albuterol as needed  Reviewed CT of the chest with ground-glass opacities and lung nodules likely secondary to smoking  Follow-up chest CT ordered  Discussed with patient that she absolutely needs to quit smoking  She will restart the nicotine patch  Vaccinated against COVID-19    Subjective:     Patient ID: Lang Aase is a 62 y o  female  Chief Complaint:  Jennifer Dumont is a 81-year-old female current smoker with past medical history including but not limited to COPD, asthma, GERD, bariatric surgery, anemia, depression, anxiety, thrombo angiitis obliterans, frequent falls presenting for follow-up  She continues to complain of shortness of breath with exertion and cough  Her cough is always productive for clear sputum, never purulence or with blood  She continues to smoke 3/4 pack per day and stopped using nicotine patches on her own  The following portions of the patient's history were reviewed in this encounter and updated as appropriate: Past medical, social, surgical, family, allergies    Review of Systems   Constitutional: Negative for chills and fever  Respiratory: Positive for cough, chest tightness and shortness of breath  Musculoskeletal: Positive for back pain  All other systems reviewed and are negative          Objective:  Vitals:    08/03/21 1528   BP: 112/72   Pulse: 82   Temp: 97 9 °F (36 6 °C) SpO2: 93%   Weight: 91 2 kg (201 lb)   Height: 5' 3" (1 6 m)       Physical Exam  Vitals and nursing note reviewed  Constitutional:       General: She is not in acute distress  Appearance: She is well-developed  She is obese  She is not diaphoretic  HENT:      Head: Normocephalic and atraumatic  Right Ear: External ear normal       Left Ear: External ear normal       Nose: Nose normal    Eyes:      General: No scleral icterus  Right eye: No discharge  Left eye: No discharge  Conjunctiva/sclera: Conjunctivae normal    Neck:      Trachea: No tracheal deviation  Cardiovascular:      Rate and Rhythm: Normal rate and regular rhythm  Heart sounds: Normal heart sounds  No murmur heard  No friction rub  No gallop  Pulmonary:      Effort: Pulmonary effort is normal  No respiratory distress  Breath sounds: No stridor  No wheezing or rales  Comments: Decreased breath sounds  Abdominal:      General: There is no distension  Tenderness: There is no guarding  Musculoskeletal:         General: No tenderness or deformity  Normal range of motion  Cervical back: Normal range of motion and neck supple  Skin:     General: Skin is warm and dry  Coloration: Skin is not pale  Findings: No erythema or rash  Neurological:      Mental Status: She is alert and oriented to person, place, and time  Cranial Nerves: No cranial nerve deficit  Psychiatric:         Behavior: Behavior normal          Thought Content:  Thought content normal          Judgment: Judgment normal          Lab Review:   Appointment on 07/21/2021   Component Date Value    Iron Saturation 07/21/2021 7     TIBC 07/21/2021 402     Iron 07/21/2021 28*    Transferrin 07/21/2021 310    Appointment on 07/21/2021   Component Date Value    WBC 07/21/2021 12 25*    RBC 07/21/2021 4 24     Hemoglobin 07/21/2021 10 6*    Hematocrit 07/21/2021 35 2     MCV 07/21/2021 83     [facility] 07/21/2021 25 0*    MCHC 07/21/2021 30 1*    RDW 07/21/2021 21 1*    MPV 07/21/2021 11 2     Platelets 28/00/6351 349     nRBC 07/21/2021 0     Neutrophils Relative 07/21/2021 73     Immat GRANS % 07/21/2021 1     Lymphocytes Relative 07/21/2021 20     Monocytes Relative 07/21/2021 5     Eosinophils Relative 07/21/2021 1     Basophils Relative 07/21/2021 0     Neutrophils Absolute 07/21/2021 8 97*    Immature Grans Absolute 07/21/2021 0 08     Lymphocytes Absolute 07/21/2021 2 46     Monocytes Absolute 07/21/2021 0 61     Eosinophils Absolute 07/21/2021 0 08     Basophils Absolute 07/21/2021 0 05    Appointment on 06/17/2021   Component Date Value    WBC 06/17/2021 9 43     RBC 06/17/2021 4 41     Hemoglobin 06/17/2021 10 9*    Hematocrit 06/17/2021 36 4     MCV 06/17/2021 83     MCH 06/17/2021 24 7*    MCHC 06/17/2021 29 9*    RDW 06/17/2021 21 1*    MPV 06/17/2021 11 0     Platelets 48/88/8925 354     nRBC 06/17/2021 0     Neutrophils Relative 06/17/2021 56     Immat GRANS % 06/17/2021 0     Lymphocytes Relative 06/17/2021 36     Monocytes Relative 06/17/2021 6     Eosinophils Relative 06/17/2021 1     Basophils Relative 06/17/2021 1     Neutrophils Absolute 06/17/2021 5 34     Immature Grans Absolute 06/17/2021 0 04     Lymphocytes Absolute 06/17/2021 3 38     Monocytes Absolute 06/17/2021 0 52     Eosinophils Absolute 06/17/2021 0 10     Basophils Absolute 06/17/2021 0 05     Hemoglobin A1C 06/17/2021 6 9*    EAG 06/17/2021 151     TSH 3RD GENERATON 06/17/2021 2 820     Sodium 06/17/2021 140     Potassium 06/17/2021 4 7     Chloride 06/17/2021 110*    CO2 06/17/2021 23     ANION GAP 06/17/2021 7     BUN 06/17/2021 10     Creatinine 06/17/2021 0 89     Glucose, Fasting 06/17/2021 121*    Calcium 06/17/2021 9 0     Corrected Calcium 06/17/2021 9 6     AST 06/17/2021 10     ALT 06/17/2021 12     Alkaline Phosphatase 06/17/2021 146*    Total Protein 06/17/2021 7 4     Albumin 06/17/2021 3 3*    Total Bilirubin 06/17/2021 0 33     eGFR 06/17/2021 71

## 2021-08-07 DIAGNOSIS — G25.0 TREMOR, ESSENTIAL: ICD-10-CM

## 2021-08-09 RX ORDER — PRIMIDONE 50 MG/1
TABLET ORAL
Qty: 270 TABLET | Refills: 1 | Status: SHIPPED | OUTPATIENT
Start: 2021-08-09 | End: 2021-08-10 | Stop reason: SDUPTHER

## 2021-08-10 ENCOUNTER — TELEPHONE (OUTPATIENT)
Dept: NEUROLOGY | Facility: CLINIC | Age: 61
End: 2021-08-10

## 2021-08-10 ENCOUNTER — TELEPHONE (OUTPATIENT)
Dept: GASTROENTEROLOGY | Facility: HOSPITAL | Age: 61
End: 2021-08-10

## 2021-08-10 ENCOUNTER — CONSULT (OUTPATIENT)
Dept: VASCULAR SURGERY | Facility: CLINIC | Age: 61
End: 2021-08-10
Payer: COMMERCIAL

## 2021-08-10 VITALS
WEIGHT: 191 LBS | SYSTOLIC BLOOD PRESSURE: 118 MMHG | TEMPERATURE: 98.5 F | HEART RATE: 106 BPM | HEIGHT: 63 IN | BODY MASS INDEX: 33.84 KG/M2 | DIASTOLIC BLOOD PRESSURE: 74 MMHG

## 2021-08-10 DIAGNOSIS — M79.671 PAIN IN BOTH FEET: ICD-10-CM

## 2021-08-10 DIAGNOSIS — I73.1 THROMBOANGIITIS OBLITERANS (HCC): ICD-10-CM

## 2021-08-10 DIAGNOSIS — M79.672 PAIN IN BOTH FEET: ICD-10-CM

## 2021-08-10 DIAGNOSIS — I73.9 PAD (PERIPHERAL ARTERY DISEASE) (HCC): ICD-10-CM

## 2021-08-10 DIAGNOSIS — R29.898 MUSCULAR DECONDITIONING: ICD-10-CM

## 2021-08-10 DIAGNOSIS — E66.01 OBESITY, MORBID (HCC): ICD-10-CM

## 2021-08-10 DIAGNOSIS — Z72.0 TOBACCO ABUSE: ICD-10-CM

## 2021-08-10 DIAGNOSIS — Z72.0 NICOTINE ABUSE: ICD-10-CM

## 2021-08-10 DIAGNOSIS — G62.9 PERIPHERAL POLYNEUROPATHY: Primary | ICD-10-CM

## 2021-08-10 PROCEDURE — 99204 OFFICE O/P NEW MOD 45 MIN: CPT | Performed by: PHYSICIAN ASSISTANT

## 2021-08-10 PROCEDURE — 4004F PT TOBACCO SCREEN RCVD TLK: CPT | Performed by: PHYSICIAN ASSISTANT

## 2021-08-10 PROCEDURE — 3008F BODY MASS INDEX DOCD: CPT | Performed by: PHYSICIAN ASSISTANT

## 2021-08-10 NOTE — PATIENT INSTRUCTIONS
Leg weakness, ambulatory dysfunction and falls  Decreased pulses in feet which are warm and adequately perfused  Tobacco  Peripheral neuropathy related to diabetes  Tremors  Chronic pain        -falls and leg weakness unlikely related to vascular disease  -smoking cessation  -check lower extremity arterial duplex study  -agree with physical therapy evaluation
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
## 2021-08-10 NOTE — TELEPHONE ENCOUNTER
I called patient to offer a sooner appointment  She asked if we could refill her  Primidone 50 MG tablet  Pharmacy- [address]

## 2021-08-17 ENCOUNTER — HOSPITAL ENCOUNTER (OUTPATIENT)
Dept: ULTRASOUND IMAGING | Facility: HOSPITAL | Age: 61
Discharge: HOME | End: 2021-08-17
Payer: COMMERCIAL

## 2021-08-17 DIAGNOSIS — E04.1 THYROID NODULE: ICD-10-CM

## 2021-08-17 PROCEDURE — 76536 US EXAM OF HEAD AND NECK: CPT

## 2021-08-23 ENCOUNTER — HOSPITAL ENCOUNTER (OUTPATIENT)
Dept: NON INVASIVE DIAGNOSTICS | Facility: CLINIC | Age: 61
Discharge: HOME | End: 2021-08-23

## 2021-08-23 DIAGNOSIS — R06.00 DYSPNEA ON EXERTION: ICD-10-CM

## 2021-08-24 ENCOUNTER — HOSPITAL ENCOUNTER (OUTPATIENT)
Dept: RADIOLOGY | Facility: HOSPITAL | Age: 61
Discharge: HOME | End: 2021-08-24
Payer: COMMERCIAL

## 2021-08-24 ENCOUNTER — HOSPITAL ENCOUNTER (OUTPATIENT)
Dept: ULTRASOUND IMAGING | Facility: HOSPITAL | Age: 61
Discharge: HOME | End: 2021-08-24
Payer: COMMERCIAL

## 2021-08-24 DIAGNOSIS — D84.9 IMMUNOSUPPRESSION (HCC): ICD-10-CM

## 2021-08-24 DIAGNOSIS — J45.30 MILD PERSISTENT ASTHMA WITHOUT COMPLICATION: ICD-10-CM

## 2021-08-24 DIAGNOSIS — T84.018A PROSTHETIC HIP IMPLANT FAILURE, INITIAL ENCOUNTER (HCC): ICD-10-CM

## 2021-08-24 DIAGNOSIS — E66.01 OBESITY, MORBID (HCC): ICD-10-CM

## 2021-08-24 DIAGNOSIS — R10.12 LEFT UPPER QUADRANT ABDOMINAL PAIN: ICD-10-CM

## 2021-08-24 DIAGNOSIS — Z96.649 PROSTHETIC HIP IMPLANT FAILURE, INITIAL ENCOUNTER (HCC): ICD-10-CM

## 2021-08-24 DIAGNOSIS — J41.0 SIMPLE CHRONIC BRONCHITIS (HCC): ICD-10-CM

## 2021-08-24 DIAGNOSIS — F32.2 SEVERE DEPRESSION (HCC): ICD-10-CM

## 2021-08-24 PROCEDURE — 73521 X-RAY EXAM HIPS BI 2 VIEWS: CPT

## 2021-08-24 PROCEDURE — 76775 US EXAM ABDO BACK WALL LIM: CPT

## 2021-08-27 ENCOUNTER — EVALUATION (OUTPATIENT)
Dept: PHYSICAL THERAPY | Age: 61
End: 2021-08-27
Payer: COMMERCIAL

## 2021-08-27 DIAGNOSIS — Z98.1 HISTORY OF LUMBAR FUSION: Primary | ICD-10-CM

## 2021-08-27 PROCEDURE — 97162 PT EVAL MOD COMPLEX 30 MIN: CPT | Performed by: PHYSICAL THERAPIST

## 2021-08-27 PROCEDURE — 97113 AQUATIC THERAPY/EXERCISES: CPT | Performed by: PHYSICAL THERAPIST

## 2021-08-27 NOTE — LETTER
2021    Reny Lawson MD  [phone] [facility]    Patient: Raoul Palma   YOB: 1960   Date of Visit: 2021     Encounter Diagnosis     ICD-10-CM    1  History of lumbar fusion  Z98 1        Dear Dr Carolina Herrera:    Thank you for your recent referral of Raoul Palma  Please review the attached evaluation summary from Sonia's recent visit  Please verify that you agree with the plan of care by signing the attached order  If you have any questions or concerns, please do not hesitate to call  I sincerely appreciate the opportunity to share in the care of one of your patients and hope to have another opportunity to work with you in the near future  Sincerely,    Meka Lyons, PT      Referring Provider:      I certify that I have read the below Plan of Care and certify the need for these services furnished under this plan of treatment while under my care  Reny Lawson MD  [phone] [facility] [id number]  Via Fax: [phone]          PT Evaluation     Today's date: 2021  Patient name: Raoul Palma  : 1960  MRN: [id number]  Referring provider: Bernice Polk MD  Dx:   Encounter Diagnosis     ICD-10-CM    1  History of lumbar fusion  Z98 1                   Assessment  Assessment details: Raoul Palma is a 62 y o  female who presents with pain, decreased strength, decreased ROM, ambulatory dysfunction and postural  dysfunction  Due to these impairments, Patient has difficulty performing a/iadls  Patient's clinical presentation is consistent with their referring diagnosis of lumbar radiculopathy  Patient would benefit from skilled physical therapy to address their aforementioned impairments, improve their level of function and to improve their overall quality of life    Impairments: abnormal gait, abnormal or restricted ROM, abnormal movement, activity intolerance, impaired balance, impaired physical strength, lacks appropriate home exercise program, pain with function, safety issue, weight-bearing intolerance, poor posture  and poor body mechanics  Understanding of Dx/Px/POC: good   Prognosis: fair    Goals  ST-3 WEEKS  1  Decrease pain by 2 points on VAS at its worst   2   Increase ROM by > 5 deg in all deficients planes  3   Increase CORE/LE by 1/2 MMT grade in all deficient planes  LT-6 WEEKS  1  Patient to be independent with a/iadls and increase walking and sitting tolerance as well as steps  2  Increase functional activities for leisure and home activities to previous LOF    3  Independent with HEP and/or fitness program     Plan  Patient would benefit from: skilled physical therapy  Planned modality interventions: cryotherapy, electrical stimulation/Russian stimulation, thermotherapy: hydrocollator packs and unattended electrical stimulation  Planned therapy interventions: activity modification, behavior modification, body mechanics training, aquatic therapy, flexibility, functional ROM exercises, home exercise program, IADL retraining, joint mobilization, manual therapy, neuromuscular re-education, patient education, postural training, strengthening, stretching, therapeutic activities and therapeutic exercise  Frequency: 2x week (2-3x week)  Duration in weeks: 12  Plan of Care beginning date: 2021  Plan of Care expiration date: 2021  Treatment plan discussed with: patient        Subjective Evaluation    History of Present Illness  Date of onset: 2/3/2012  Date of surgery: 2/3/2012  Mechanism of injury: S/p lumbar fusion  but now has falls and recently had a concussion and  has gained weight, complains of weakness and poor balance          Recurrent probem    Quality of life: good    Pain  Current pain ratin  At best pain ratin  At worst pain ratin  Quality: cramping  Relieving factors: heat, ice and medications  Aggravating factors: standing, walking and stair climbing  Progression: worsening    Social Support  Steps to enter house: yes  Stairs in house: yes   Lives in: [place] house  Lives with: significant other      Diagnostic Tests  X-ray: abnormal  MRI studies: abnormal  Treatments  Previous treatment: physical therapy and injection treatment  Patient Goals  Patient goals for therapy: decreased pain, improved balance, increased motion, increased strength and independence with ADLs/IADLs          Objective     Static Posture     Thoracic Spine  Convex curve right  Lumbar Spine   Increased lordosis  Lumbar spine (Right): Convex curve  Ambulation     Observational Gait   Gait: antalgic, asymmetric and crouched   Decreased walking speed and stride length               Precautions: lumbar fusion, OA, depression, CHF, COPD    Daily Treatment Diary     Manual                                                                                   Exercise Diary  8/27            Water walking 5            Postural training             Gait training             Home exercise pgm/patient education             Wall: t/h raises 1            Hip abd/add 2            Marching 1            squats 1            Knee flex/ext 1            Step-ups (fwd/bkwd/ss)             SLS (eyes open/closed)             SLS w UE mvmt  AROM/ball toss             Weight shifting             UE Noodle work x 4              UE AROM             Resistive UE work (paddles, bells, TB)             Core work on noodle (sitting/stdg)             Sit on noodle with movement             Seated on pool bench w proper posture             Ankle df/pf             marching             Hip Ab/add             Knee flex/ext             Deep water mvmt 5            Deep water tx/stretching 5            Specific self - stretches wall/steps 5                Modalities              whirlpool 5

## 2021-08-27 NOTE — PROGRESS NOTES
PT Evaluation     Today's date: 2021  Patient name: Lang Aase  : 1960  MRN: [id number]  Referring provider: Dereje Mora MD  Dx:   Encounter Diagnosis     ICD-10-CM    1  History of lumbar fusion  Z98 1                   Assessment  Assessment details: Lang Aase is a 62 y o  female who presents with pain, decreased strength, decreased ROM, ambulatory dysfunction and postural  dysfunction  Due to these impairments, Patient has difficulty performing a/iadls  Patient's clinical presentation is consistent with their referring diagnosis of lumbar radiculopathy  Patient would benefit from skilled physical therapy to address their aforementioned impairments, improve their level of function and to improve their overall quality of life  Impairments: abnormal gait, abnormal or restricted ROM, abnormal movement, activity intolerance, impaired balance, impaired physical strength, lacks appropriate home exercise program, pain with function, safety issue, weight-bearing intolerance, poor posture  and poor body mechanics  Understanding of Dx/Px/POC: good   Prognosis: fair    Goals  ST-3 WEEKS  1  Decrease pain by 2 points on VAS at its worst   2   Increase ROM by > 5 deg in all deficients planes  3   Increase CORE/LE by 1/2 MMT grade in all deficient planes  LT-6 WEEKS  1  Patient to be independent with a/iadls and increase walking and sitting tolerance as well as steps  2  Increase functional activities for leisure and home activities to previous LOF    3  Independent with HEP and/or fitness program     Plan  Patient would benefit from: skilled physical therapy  Planned modality interventions: cryotherapy, electrical stimulation/Russian stimulation, thermotherapy: hydrocollator packs and unattended electrical stimulation  Planned therapy interventions: activity modification, behavior modification, body mechanics training, aquatic therapy, flexibility, functional ROM exercises, home exercise program, IADL retraining, joint mobilization, manual therapy, neuromuscular re-education, patient education, postural training, strengthening, stretching, therapeutic activities and therapeutic exercise  Frequency: 2x week (2-3x week)  Duration in weeks: 12  Plan of Care beginning date: 2021  Plan of Care expiration date: 2021  Treatment plan discussed with: patient        Subjective Evaluation    History of Present Illness  Date of onset: 2/3/2012  Date of surgery: 2/3/2012  Mechanism of injury: S/p lumbar fusion  but now has falls and recently had a concussion and  has gained weight, complains of weakness and poor balance          Recurrent probem    Quality of life: good    Pain  Current pain ratin  At best pain ratin  At worst pain ratin  Quality: cramping  Relieving factors: heat, ice and medications  Aggravating factors: standing, walking and stair climbing  Progression: worsening    Social Support  Steps to enter house: yes  Stairs in house: yes   [facility] in: [facility]  Lives with: significant other      Diagnostic Tests  X-ray: abnormal  MRI studies: abnormal  Treatments  Previous treatment: physical therapy and injection treatment  Patient Goals  Patient goals for therapy: decreased pain, improved balance, increased motion, increased strength and independence with ADLs/IADLs          Objective     Static Posture     Thoracic Spine  Convex curve right  Lumbar Spine   Increased lordosis  Lumbar spine (Right): Convex curve  Ambulation     Observational Gait   Gait: antalgic, asymmetric and crouched   Decreased walking speed and stride length               Precautions: lumbar fusion, OA, depression, CHF, COPD    Daily Treatment Diary     Manual                                                                                   Exercise Diary              Water walking 5            Postural training             Gait training             Home exercise pgm/patient education             Wall: t/h raises 1            Hip abd/add 2            Marching 1            squats 1            Knee flex/ext 1            Step-ups (fwd/bkwd/ss)             SLS (eyes open/closed)             SLS w UE mvmt  AROM/ball toss             Weight shifting             UE Noodle work x 4              UE AROM             Resistive UE work (paddles, bells, TB)             Core work on noodle (sitting/stdg)             Sit on noodle with movement             Seated on pool bench w proper posture             Ankle df/pf             marching             Hip Ab/add             Knee flex/ext             Deep water mvmt 5            Deep water tx/stretching 5            Specific self - stretches wall/steps 5                Modalities              whirlpool 5

## 2021-08-30 ENCOUNTER — OFFICE VISIT (OUTPATIENT)
Dept: PHYSICAL THERAPY | Age: 61
End: 2021-08-30
Payer: COMMERCIAL

## 2021-08-30 DIAGNOSIS — Z98.1 HISTORY OF LUMBAR FUSION: Primary | ICD-10-CM

## 2021-08-30 PROCEDURE — 97113 AQUATIC THERAPY/EXERCISES: CPT

## 2021-08-30 NOTE — PROGRESS NOTES
Daily Note     Today's date: 2021  Patient name: Jarad Jara  : 1960  MRN: [id number]  Referring provider: Marciano Macario MD  Dx:   Encounter Diagnosis     ICD-10-CM    1  History of lumbar fusion  Z98 1                   Subjective: Patient reports she felt good after her initial visit, c/o cramping in her LE/feet today with water walking  Objective: See treatment diary below  1:1 30 minutes with therapist    Assessment: Tolerated treatment fairly well, added seated TE with good tolerance, some cramping with standing TE and walking, cramping subsided with rest breaks  Patient demonstrated fatigue post treatment and would benefit from continued PT      Plan: Continue per plan of care        Precautions: lumbar fusion, OA, depression, CHF, COPD    Daily Treatment Diary     Manual                                                                                   Exercise Diary             Water walking 5 10           Postural training             Gait training             Home exercise pgm/patient education             Wall: t/h raises 1 1           Hip abd/add 2 2            1 1           squats 1 1           Knee flex/ext 1 1           Step-ups (fwd/bkwd/ss)             SLS (eyes open/closed)             SLS w UE mvmt  AROM/ball toss             Weight shifting             UE Noodle work x 4              UE AROM             Resistive UE work (paddles, bells, TB)             Core work on noodle (sitting/stdg)             Sit on noodle with movement             Seated on pool bench w proper posture  5           Ankle df/pf  1                      Hip Ab/add  1           Knee flex/ext  1           Deep water mvmt 5 5           Deep water tx/stretching 5 10           Specific self - stretches wall/steps 5 3               Modalities              whirlpool 5 10

## 2021-08-31 ENCOUNTER — TELEPHONE (OUTPATIENT)
Dept: INTERNAL MEDICINE CLINIC | Facility: CLINIC | Age: 61
End: 2021-08-31

## 2021-08-31 DIAGNOSIS — G25.0 BENIGN ESSENTIAL TREMOR: Primary | ICD-10-CM

## 2021-08-31 DIAGNOSIS — G62.9 PERIPHERAL NERVE DISORDER: ICD-10-CM

## 2021-08-31 DIAGNOSIS — G57.11 MERALGIA PARESTHETICA OF RIGHT SIDE: ICD-10-CM

## 2021-08-31 NOTE — TELEPHONE ENCOUNTER
Not sure if area code is correct for the p#    A referral is needed for the pt's appt on Sept 8      Seeing a neurologist   Insurance [facility]    Diagnosis: G25 0    G62 9    G57 11

## 2021-09-02 ENCOUNTER — HOSPITAL ENCOUNTER (OUTPATIENT)
Dept: NON INVASIVE DIAGNOSTICS | Facility: CLINIC | Age: 61
Discharge: HOME | End: 2021-09-02
Payer: COMMERCIAL

## 2021-09-02 DIAGNOSIS — Z72.0 TOBACCO ABUSE: ICD-10-CM

## 2021-09-02 DIAGNOSIS — I73.9 PAD (PERIPHERAL ARTERY DISEASE) (HCC): ICD-10-CM

## 2021-09-02 DIAGNOSIS — G62.9 PERIPHERAL POLYNEUROPATHY: ICD-10-CM

## 2021-09-02 PROCEDURE — 93923 UPR/LXTR ART STDY 3+ LVLS: CPT

## 2021-09-02 PROCEDURE — 93925 LOWER EXTREMITY STUDY: CPT

## 2021-09-03 PROCEDURE — 93922 UPR/L XTREMITY ART 2 LEVELS: CPT | Performed by: SURGERY

## 2021-09-03 PROCEDURE — 93925 LOWER EXTREMITY STUDY: CPT | Performed by: SURGERY

## 2021-09-08 ENCOUNTER — OFFICE VISIT (OUTPATIENT)
Dept: NEUROLOGY | Facility: CLINIC | Age: 61
End: 2021-09-08
Payer: COMMERCIAL

## 2021-09-08 VITALS
SYSTOLIC BLOOD PRESSURE: 122 MMHG | TEMPERATURE: 97.5 F | RESPIRATION RATE: 16 BRPM | WEIGHT: 190 LBS | DIASTOLIC BLOOD PRESSURE: 74 MMHG | BODY MASS INDEX: 33.66 KG/M2 | HEIGHT: 63 IN | HEART RATE: 82 BPM

## 2021-09-08 DIAGNOSIS — I95.1 ORTHOSTATIC HYPOTENSION: ICD-10-CM

## 2021-09-08 DIAGNOSIS — G25.0 BENIGN ESSENTIAL TREMOR: ICD-10-CM

## 2021-09-08 DIAGNOSIS — G57.11 MERALGIA PARESTHETICA OF RIGHT SIDE: ICD-10-CM

## 2021-09-08 DIAGNOSIS — G62.9 PERIPHERAL NEUROPATHY: ICD-10-CM

## 2021-09-08 DIAGNOSIS — R20.2 PARESTHESIA OF BOTH HANDS: Primary | ICD-10-CM

## 2021-09-08 PROCEDURE — 99213 OFFICE O/P EST LOW 20 MIN: CPT | Performed by: PSYCHIATRY & NEUROLOGY

## 2021-09-08 RX ORDER — MIDODRINE HYDROCHLORIDE 5 MG/1
TABLET ORAL
Qty: 135 TABLET | Refills: 1 | Status: SHIPPED | OUTPATIENT
Start: 2021-09-08 | End: 2021-09-21 | Stop reason: SDUPTHER
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
## 2021-09-13 ENCOUNTER — TELEPHONE (OUTPATIENT)
Dept: GASTROENTEROLOGY | Facility: HOSPITAL | Age: 61
End: 2021-09-13

## 2021-09-14 ENCOUNTER — TELEPHONE (OUTPATIENT)
Dept: GASTROENTEROLOGY | Facility: CLINIC | Age: 61
End: 2021-09-14

## 2021-09-21 ENCOUNTER — PROCEDURE VISIT (OUTPATIENT)
Dept: NEUROLOGY | Facility: CLINIC | Age: 61
End: 2021-09-21
Payer: COMMERCIAL

## 2021-09-21 DIAGNOSIS — R20.2 PARESTHESIA OF BOTH HANDS: ICD-10-CM

## 2021-09-21 DIAGNOSIS — I95.1 ORTHOSTATIC HYPOTENSION: ICD-10-CM

## 2021-09-21 PROCEDURE — 95886 MUSC TEST DONE W/N TEST COMP: CPT | Performed by: PSYCHIATRY & NEUROLOGY

## 2021-09-21 PROCEDURE — 95910 NRV CNDJ TEST 7-8 STUDIES: CPT | Performed by: PSYCHIATRY & NEUROLOGY

## 2021-09-21 RX ORDER — MIDODRINE HYDROCHLORIDE 5 MG/1
TABLET ORAL
Qty: 135 TABLET | Refills: 1 | Status: SHIPPED | OUTPATIENT
Start: 2021-09-21 | End: 2021-11-29

## 2021-09-21 NOTE — PROGRESS NOTES
EMG 2 Limb Upper Extremity     Date/Time 9/21/2021 2:47 PM     Performed by  Kristina Ariza MD     Authorized by Kristina Ariza MD              EMG BILATERAL UPPER EXTREMITIES    Motor and sensory conduction studies were performed on the median and ulnar nerves and radial sensory nerves bilaterally  The distal motor latencies were normal  The motor action potential amplitudes were normal  Motor conduction velocities were normal including conduction of the ulnar nerve across the elbow  The  median and ulnar F waves were normal bilaterally  Median, radial and ulnar sensory latencies were normal bilaterally including median palmar stimulation with normal sensory action potential amplitudes  The median palmar latencies were delayed relative to the ulnar palmar latencies by 0 8 milliseconds on the right and 0 7 milliseconds on the left (normal limit 0 4 milliseconds)  Concentric needle EMG was performed in various distal and proximal muscles of the upper extremities bilaterally including APB, FDI, EDC, brachioradialis, biceps, triceps in the low cervical paraspinal region  There was no evidence of spontaneous activity seen  The compound motor unit potentials were of normal configuration and interference patterns were full or full for effort    IMPRESSION: This is an abnormal EMG of the bilateral upper extremities due to evidence of a localized neuropathic process involving the median nerve at the wrist bilaterally consistent with mild-to-moderate carpal tunnel syndrome with predominantly sensory and demyelinative change    Addendum: Patient reports recent episode of standing up from the toilet becoming extremely lightheaded and disoriented and falling  She states she has been getting more lightheaded upon standing    Will increase her ProAmatine to 1 5 pills (5 mg) t i d     LORI Abad

## 2021-09-22 ENCOUNTER — HOSPITAL ENCOUNTER (OUTPATIENT)
Dept: NON INVASIVE DIAGNOSTICS | Facility: CLINIC | Age: 61
Discharge: HOME | End: 2021-09-22
Payer: COMMERCIAL

## 2021-09-22 LAB
ARRHY DURING EX: NORMAL
CHEST PAIN STATEMENT: NORMAL
MAX DIASTOLIC BP: 70 MMHG
MAX HEART RATE: 100 BPM
MAX PREDICTED HEART RATE: 160 BPM
MAX. SYSTOLIC BP: 106 MMHG
PROTOCOL NAME: NORMAL
REASON FOR TERMINATION: NORMAL
TARGET HR FORMULA: NORMAL
TEST INDICATION: NORMAL
TIME IN EXERCISE PHASE: NORMAL

## 2021-09-22 PROCEDURE — 93017 CV STRESS TEST TRACING ONLY: CPT

## 2021-09-22 PROCEDURE — G1004 CDSM NDSC: HCPCS

## 2021-09-22 PROCEDURE — 78452 HT MUSCLE IMAGE SPECT MULT: CPT | Performed by: INTERNAL MEDICINE

## 2021-09-22 PROCEDURE — 93018 CV STRESS TEST I&R ONLY: CPT | Performed by: INTERNAL MEDICINE

## 2021-09-22 PROCEDURE — A9502 TC99M TETROFOSMIN: HCPCS

## 2021-09-22 PROCEDURE — 93016 CV STRESS TEST SUPVJ ONLY: CPT | Performed by: INTERNAL MEDICINE

## 2021-09-22 PROCEDURE — 78452 HT MUSCLE IMAGE SPECT MULT: CPT

## 2021-09-22 RX ADMIN — REGADENOSON 0.4 MG: 0.08 INJECTION, SOLUTION INTRAVENOUS at 13:34

## 2021-09-28 ENCOUNTER — OFFICE VISIT (OUTPATIENT)
Dept: VASCULAR SURGERY | Facility: CLINIC | Age: 61
End: 2021-09-28
Payer: COMMERCIAL

## 2021-09-28 VITALS
BODY MASS INDEX: 32.25 KG/M2 | SYSTOLIC BLOOD PRESSURE: 100 MMHG | HEART RATE: 94 BPM | TEMPERATURE: 99.2 F | DIASTOLIC BLOOD PRESSURE: 60 MMHG | HEIGHT: 63 IN | WEIGHT: 182 LBS

## 2021-09-28 DIAGNOSIS — E66.01 OBESITY, MORBID (HCC): ICD-10-CM

## 2021-09-28 DIAGNOSIS — I73.9 PERIPHERAL ARTERIAL DISEASE (HCC): ICD-10-CM

## 2021-09-28 DIAGNOSIS — G62.9 PERIPHERAL POLYNEUROPATHY: ICD-10-CM

## 2021-09-28 DIAGNOSIS — Z72.0 TOBACCO ABUSE: Primary | ICD-10-CM

## 2021-09-28 PROCEDURE — 4004F PT TOBACCO SCREEN RCVD TLK: CPT | Performed by: PHYSICIAN ASSISTANT

## 2021-09-28 PROCEDURE — 99214 OFFICE O/P EST MOD 30 MIN: CPT | Performed by: PHYSICIAN ASSISTANT

## 2021-09-28 PROCEDURE — 3008F BODY MASS INDEX DOCD: CPT | Performed by: PHYSICIAN ASSISTANT

## 2021-09-28 NOTE — PATIENT INSTRUCTIONS
Peripheral arterial disease  Diabetic neuropathy  Static hypotension  Ambulatory dysfunction         We reviewed her lower extremity arterial duplex study which is stable  There is a moderate to severe stenosis in the distal left SFA  She may benefit from aspirin and statin therapy  However she has had a history of gastric bypass and ulcer so would like to avoid aspirin  Given her only known vascular disease at this point is the left SFA stenosis, the risk benefit of an aspirin at this time may favor that we hold off on that  However, should still consider statin therapy for plaque stabilization  We discussed the importance of smoking cessation
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
## 2021-10-18 ENCOUNTER — OFFICE VISIT (OUTPATIENT)
Dept: INTERNAL MEDICINE CLINIC | Facility: CLINIC | Age: 61
End: 2021-10-18
Payer: COMMERCIAL

## 2021-10-18 VITALS
DIASTOLIC BLOOD PRESSURE: 68 MMHG | OXYGEN SATURATION: 95 % | RESPIRATION RATE: 17 BRPM | TEMPERATURE: 98 F | HEIGHT: 63 IN | SYSTOLIC BLOOD PRESSURE: 106 MMHG | BODY MASS INDEX: 34.09 KG/M2 | WEIGHT: 192.4 LBS | HEART RATE: 82 BPM

## 2021-10-18 DIAGNOSIS — Z12.11 SCREENING FOR COLORECTAL CANCER: ICD-10-CM

## 2021-10-18 DIAGNOSIS — G25.81 RESTLESS LEG: ICD-10-CM

## 2021-10-18 DIAGNOSIS — K92.1 HEMATOCHEZIA: ICD-10-CM

## 2021-10-18 DIAGNOSIS — Z23 ENCOUNTER FOR VACCINATION: Primary | ICD-10-CM

## 2021-10-18 DIAGNOSIS — D50.9 IRON DEFICIENCY ANEMIA, UNSPECIFIED IRON DEFICIENCY ANEMIA TYPE: ICD-10-CM

## 2021-10-18 DIAGNOSIS — Z12.12 SCREENING FOR COLORECTAL CANCER: ICD-10-CM

## 2021-10-18 DIAGNOSIS — R29.898 MUSCULAR DECONDITIONING: ICD-10-CM

## 2021-10-18 DIAGNOSIS — L30.4 INTERTRIGO: ICD-10-CM

## 2021-10-18 PROBLEM — J44.1 COPD WITH EXACERBATION (HCC): Status: RESOLVED | Noted: 2018-10-08 | Resolved: 2021-10-18

## 2021-10-18 PROCEDURE — 4004F PT TOBACCO SCREEN RCVD TLK: CPT | Performed by: INTERNAL MEDICINE

## 2021-10-18 PROCEDURE — G0008 ADMIN INFLUENZA VIRUS VAC: HCPCS | Performed by: INTERNAL MEDICINE

## 2021-10-18 PROCEDURE — 90682 RIV4 VACC RECOMBINANT DNA IM: CPT | Performed by: INTERNAL MEDICINE

## 2021-10-18 PROCEDURE — 99214 OFFICE O/P EST MOD 30 MIN: CPT | Performed by: INTERNAL MEDICINE

## 2021-10-18 PROCEDURE — 3008F BODY MASS INDEX DOCD: CPT | Performed by: INTERNAL MEDICINE

## 2021-10-18 RX ORDER — ROPINIROLE 0.25 MG/1
0.25 TABLET, FILM COATED ORAL 3 TIMES DAILY
Qty: 270 TABLET | Refills: 1 | Status: SHIPPED | OUTPATIENT
Start: 2021-10-18

## 2021-10-18 RX ORDER — NYSTATIN 100000 [USP'U]/G
POWDER TOPICAL 2 TIMES DAILY
Qty: 45 G | Refills: 11 | Status: SHIPPED | OUTPATIENT
Start: 2021-10-18

## 2021-10-19 ENCOUNTER — TELEPHONE (OUTPATIENT)
Dept: HEMATOLOGY ONCOLOGY | Facility: CLINIC | Age: 61
End: 2021-10-19

## 2021-10-20 ENCOUNTER — TELEPHONE (OUTPATIENT)
Dept: INTERNAL MEDICINE CLINIC | Facility: CLINIC | Age: 61
End: 2021-10-20

## 2021-11-04 ENCOUNTER — OFFICE VISIT (OUTPATIENT)
Dept: PULMONOLOGY | Facility: CLINIC | Age: 61
End: 2021-11-04
Payer: COMMERCIAL

## 2021-11-04 ENCOUNTER — TELEPHONE (OUTPATIENT)
Dept: INTERNAL MEDICINE CLINIC | Facility: CLINIC | Age: 61
End: 2021-11-04

## 2021-11-04 VITALS
DIASTOLIC BLOOD PRESSURE: 80 MMHG | OXYGEN SATURATION: 94 % | SYSTOLIC BLOOD PRESSURE: 110 MMHG | HEART RATE: 89 BPM | HEIGHT: 63 IN | TEMPERATURE: 97 F | BODY MASS INDEX: 34.08 KG/M2

## 2021-11-04 DIAGNOSIS — J41.0 SIMPLE CHRONIC BRONCHITIS (HCC): Primary | ICD-10-CM

## 2021-11-04 DIAGNOSIS — R06.02 SHORTNESS OF BREATH: ICD-10-CM

## 2021-11-04 DIAGNOSIS — Z72.0 TOBACCO ABUSE: ICD-10-CM

## 2021-11-04 DIAGNOSIS — R91.8 LUNG NODULES: ICD-10-CM

## 2021-11-04 DIAGNOSIS — R91.8 GROUND GLASS OPACITY PRESENT ON IMAGING OF LUNG: ICD-10-CM

## 2021-11-04 PROCEDURE — 99214 OFFICE O/P EST MOD 30 MIN: CPT | Performed by: INTERNAL MEDICINE

## 2021-11-05 ENCOUNTER — TELEPHONE (OUTPATIENT)
Dept: HEMATOLOGY ONCOLOGY | Facility: CLINIC | Age: 61
End: 2021-11-05

## 2021-11-08 ENCOUNTER — APPOINTMENT (OUTPATIENT)
Dept: LAB | Facility: HOSPITAL | Age: 61
End: 2021-11-08
Payer: COMMERCIAL

## 2021-11-08 ENCOUNTER — OFFICE VISIT (OUTPATIENT)
Dept: HEMATOLOGY ONCOLOGY | Facility: CLINIC | Age: 61
End: 2021-11-08
Payer: COMMERCIAL

## 2021-11-08 VITALS
OXYGEN SATURATION: 92 % | HEIGHT: 63 IN | TEMPERATURE: 98.8 F | HEART RATE: 90 BPM | RESPIRATION RATE: 16 BRPM | SYSTOLIC BLOOD PRESSURE: 104 MMHG | WEIGHT: 193 LBS | DIASTOLIC BLOOD PRESSURE: 72 MMHG | BODY MASS INDEX: 34.2 KG/M2

## 2021-11-08 DIAGNOSIS — D50.8 IRON DEFICIENCY ANEMIA SECONDARY TO INADEQUATE DIETARY IRON INTAKE: ICD-10-CM

## 2021-11-08 DIAGNOSIS — R74.8 ELEVATED ALKALINE PHOSPHATASE LEVEL: ICD-10-CM

## 2021-11-08 DIAGNOSIS — E27.40 LOW SERUM CORTISOL LEVEL (HCC): Primary | ICD-10-CM

## 2021-11-08 DIAGNOSIS — R91.1 LUNG NODULE: Primary | ICD-10-CM

## 2021-11-08 DIAGNOSIS — E04.1 THYROID NODULE: ICD-10-CM

## 2021-11-08 DIAGNOSIS — R73.9 HYPERGLYCEMIA: ICD-10-CM

## 2021-11-08 DIAGNOSIS — D50.8 OTHER IRON DEFICIENCY ANEMIA: ICD-10-CM

## 2021-11-08 PROBLEM — D50.9 IRON DEFICIENCY ANEMIA, UNSPECIFIED: Status: ACTIVE | Noted: 2021-11-08

## 2021-11-08 LAB
ALBUMIN SERPL BCP-MCNC: 2.8 G/DL (ref 3.5–5)
ALP SERPL-CCNC: 162 U/L (ref 46–116)
ALT SERPL W P-5'-P-CCNC: 13 U/L (ref 12–78)
ANION GAP SERPL CALCULATED.3IONS-SCNC: 6 MMOL/L (ref 4–13)
AST SERPL W P-5'-P-CCNC: 9 U/L (ref 5–45)
BASOPHILS # BLD AUTO: 0.05 THOUSANDS/ΜL (ref 0–0.1)
BASOPHILS NFR BLD AUTO: 0 % (ref 0–1)
BILIRUB SERPL-MCNC: 0.24 MG/DL (ref 0.2–1)
BUN SERPL-MCNC: 10 MG/DL (ref 5–25)
CALCIUM ALBUM COR SERPL-MCNC: 9.5 MG/DL (ref 8.3–10.1)
CALCIUM SERPL-MCNC: 8.5 MG/DL (ref 8.3–10.1)
CHLORIDE SERPL-SCNC: 105 MMOL/L (ref 100–108)
CO2 SERPL-SCNC: 29 MMOL/L (ref 21–32)
CREAT SERPL-MCNC: 0.94 MG/DL (ref 0.6–1.3)
EOSINOPHIL # BLD AUTO: 0.39 THOUSAND/ΜL (ref 0–0.61)
EOSINOPHIL NFR BLD AUTO: 3 % (ref 0–6)
ERYTHROCYTE [DISTWIDTH] IN BLOOD BY AUTOMATED COUNT: 19.4 % (ref 11.6–15.1)
GFR SERPL CREATININE-BSD FRML MDRD: 66 ML/MIN/1.73SQ M
GLUCOSE P FAST SERPL-MCNC: 82 MG/DL (ref 65–99)
HCT VFR BLD AUTO: 35.3 % (ref 34.8–46.1)
HGB BLD-MCNC: 10.7 G/DL (ref 11.5–15.4)
IMM GRANULOCYTES # BLD AUTO: 0.07 THOUSAND/UL (ref 0–0.2)
IMM GRANULOCYTES NFR BLD AUTO: 1 % (ref 0–2)
LYMPHOCYTES # BLD AUTO: 3.43 THOUSANDS/ΜL (ref 0.6–4.47)
LYMPHOCYTES NFR BLD AUTO: 28 % (ref 14–44)
MCH RBC QN AUTO: 25.3 PG (ref 26.8–34.3)
MCHC RBC AUTO-ENTMCNC: 30.3 G/DL (ref 31.4–37.4)
MCV RBC AUTO: 84 FL (ref 82–98)
MONOCYTES # BLD AUTO: 0.58 THOUSAND/ΜL (ref 0.17–1.22)
MONOCYTES NFR BLD AUTO: 5 % (ref 4–12)
NEUTROPHILS # BLD AUTO: 7.8 THOUSANDS/ΜL (ref 1.85–7.62)
NEUTS SEG NFR BLD AUTO: 63 % (ref 43–75)
NRBC BLD AUTO-RTO: 0 /100 WBCS
PLATELET # BLD AUTO: 354 THOUSANDS/UL (ref 149–390)
PMV BLD AUTO: 10.8 FL (ref 8.9–12.7)
POTASSIUM SERPL-SCNC: 4.8 MMOL/L (ref 3.5–5.3)
PROT SERPL-MCNC: 7.4 G/DL (ref 6.4–8.2)
RBC # BLD AUTO: 4.23 MILLION/UL (ref 3.81–5.12)
SODIUM SERPL-SCNC: 140 MMOL/L (ref 136–145)
TSH SERPL DL<=0.05 MIU/L-ACNC: 1.92 UIU/ML (ref 0.36–3.74)
WBC # BLD AUTO: 12.32 THOUSAND/UL (ref 4.31–10.16)

## 2021-11-08 PROCEDURE — 84165 PROTEIN E-PHORESIS SERUM: CPT | Performed by: PATHOLOGY

## 2021-11-08 PROCEDURE — 84439 ASSAY OF FREE THYROXINE: CPT

## 2021-11-08 PROCEDURE — 84080 ASSAY ALKALINE PHOSPHATASES: CPT

## 2021-11-08 PROCEDURE — 83550 IRON BINDING TEST: CPT

## 2021-11-08 PROCEDURE — 84481 FREE ASSAY (FT-3): CPT

## 2021-11-08 PROCEDURE — 86334 IMMUNOFIX E-PHORESIS SERUM: CPT | Performed by: PATHOLOGY

## 2021-11-08 PROCEDURE — 84443 ASSAY THYROID STIM HORMONE: CPT

## 2021-11-08 PROCEDURE — 85025 COMPLETE CBC W/AUTO DIFF WBC: CPT

## 2021-11-08 PROCEDURE — 3008F BODY MASS INDEX DOCD: CPT | Performed by: INTERNAL MEDICINE

## 2021-11-08 PROCEDURE — 83540 ASSAY OF IRON: CPT

## 2021-11-08 PROCEDURE — 84165 PROTEIN E-PHORESIS SERUM: CPT

## 2021-11-08 PROCEDURE — 82728 ASSAY OF FERRITIN: CPT

## 2021-11-08 PROCEDURE — 86334 IMMUNOFIX E-PHORESIS SERUM: CPT

## 2021-11-08 PROCEDURE — 82607 VITAMIN B-12: CPT

## 2021-11-08 PROCEDURE — 4004F PT TOBACCO SCREEN RCVD TLK: CPT | Performed by: INTERNAL MEDICINE

## 2021-11-08 PROCEDURE — 99214 OFFICE O/P EST MOD 30 MIN: CPT | Performed by: INTERNAL MEDICINE

## 2021-11-08 PROCEDURE — 80053 COMPREHEN METABOLIC PANEL: CPT

## 2021-11-08 PROCEDURE — 84075 ASSAY ALKALINE PHOSPHATASE: CPT

## 2021-11-08 PROCEDURE — 36415 COLL VENOUS BLD VENIPUNCTURE: CPT

## 2021-11-08 PROCEDURE — 82746 ASSAY OF FOLIC ACID SERUM: CPT

## 2021-11-08 PROCEDURE — 83883 ASSAY NEPHELOMETRY NOT SPEC: CPT

## 2021-11-09 LAB
ALBUMIN SERPL ELPH-MCNC: 3.38 G/DL (ref 3.5–5)
ALBUMIN SERPL ELPH-MCNC: 46.3 % (ref 52–65)
ALPHA1 GLOB SERPL ELPH-MCNC: 0.59 G/DL (ref 0.1–0.4)
ALPHA1 GLOB SERPL ELPH-MCNC: 8.1 % (ref 2.5–5)
ALPHA2 GLOB SERPL ELPH-MCNC: 1.09 G/DL (ref 0.4–1.2)
ALPHA2 GLOB SERPL ELPH-MCNC: 14.9 % (ref 7–13)
BETA GLOB ABNORMAL SERPL ELPH-MCNC: 0.41 G/DL (ref 0.4–0.8)
BETA1 GLOB SERPL ELPH-MCNC: 5.6 % (ref 5–13)
BETA2 GLOB SERPL ELPH-MCNC: 5.1 % (ref 2–8)
BETA2+GAMMA GLOB SERPL ELPH-MCNC: 0.37 G/DL (ref 0.2–0.5)
FERRITIN SERPL-MCNC: 46 NG/ML (ref 8–388)
FOLATE SERPL-MCNC: 16.8 NG/ML (ref 3.1–17.5)
GAMMA GLOB ABNORMAL SERPL ELPH-MCNC: 1.46 G/DL (ref 0.5–1.6)
GAMMA GLOB SERPL ELPH-MCNC: 20 % (ref 12–22)
IGG/ALB SER: 0.86 {RATIO} (ref 1.1–1.8)
INTERPRETATION UR IFE-IMP: NORMAL
IRON SATN MFR SERPL: 7 % (ref 15–50)
IRON SERPL-MCNC: 23 UG/DL (ref 50–170)
KAPPA LC FREE SER-MCNC: 69.6 MG/L (ref 3.3–19.4)
KAPPA LC FREE/LAMBDA FREE SER: 1.37 {RATIO} (ref 0.26–1.65)
LAMBDA LC FREE SERPL-MCNC: 50.9 MG/L (ref 5.7–26.3)
M PEAK ID 2: 1.4 %
M PROTEIN 1 MFR SERPL ELPH: 4 %
M PROTEIN 1 SERPL ELPH-MCNC: 0.29 G/DL
M PROTEIN 2 SERPL ELPH-MCNC: 0.1 G/DL
PROT PATTERN SERPL ELPH-IMP: ABNORMAL
PROT SERPL-MCNC: 7.3 G/DL (ref 6.4–8.2)
T3FREE SERPL-MCNC: 2.12 PG/ML (ref 2.3–4.2)
T4 FREE SERPL-MCNC: 0.79 NG/DL (ref 0.76–1.46)
TIBC SERPL-MCNC: 318 UG/DL (ref 250–450)
VIT B12 SERPL-MCNC: 191 PG/ML (ref 100–900)

## 2021-11-11 LAB
ALP BONE CFR SERPL: 26 % (ref 14–68)
ALP INTEST CFR SERPL: 0 % (ref 0–18)
ALP LIVER CFR SERPL: 74 % (ref 18–85)
ALP SERPL-CCNC: 158 IU/L (ref 44–121)

## 2021-11-12 ENCOUNTER — EVALUATION (OUTPATIENT)
Dept: PHYSICAL THERAPY | Age: 61
End: 2021-11-12
Payer: COMMERCIAL

## 2021-11-12 DIAGNOSIS — R26.0 ATAXIC GAIT: Primary | ICD-10-CM

## 2021-11-12 DIAGNOSIS — R53.81 DEBILITY: ICD-10-CM

## 2021-11-12 PROCEDURE — 97113 AQUATIC THERAPY/EXERCISES: CPT | Performed by: PHYSICAL THERAPIST

## 2021-11-12 PROCEDURE — 97162 PT EVAL MOD COMPLEX 30 MIN: CPT | Performed by: PHYSICAL THERAPIST

## 2021-11-16 ENCOUNTER — TELEPHONE (OUTPATIENT)
Dept: PULMONOLOGY | Facility: CLINIC | Age: 61
End: 2021-11-16

## 2021-11-17 ENCOUNTER — OFFICE VISIT (OUTPATIENT)
Dept: PHYSICAL THERAPY | Age: 61
End: 2021-11-17
Payer: COMMERCIAL

## 2021-11-17 ENCOUNTER — TELEPHONE (OUTPATIENT)
Dept: HEMATOLOGY ONCOLOGY | Facility: CLINIC | Age: 61
End: 2021-11-17

## 2021-11-17 DIAGNOSIS — R26.0 ATAXIC GAIT: ICD-10-CM

## 2021-11-17 DIAGNOSIS — R53.81 DEBILITY: Primary | ICD-10-CM

## 2021-11-17 PROCEDURE — 97113 AQUATIC THERAPY/EXERCISES: CPT

## 2021-11-19 ENCOUNTER — TELEPHONE (OUTPATIENT)
Dept: HEMATOLOGY ONCOLOGY | Facility: CLINIC | Age: 61
End: 2021-11-19

## 2021-11-22 ENCOUNTER — APPOINTMENT (OUTPATIENT)
Dept: PHYSICAL THERAPY | Age: 61
End: 2021-11-22
Payer: COMMERCIAL

## 2021-11-26 ENCOUNTER — OFFICE VISIT (OUTPATIENT)
Dept: PHYSICAL THERAPY | Age: 61
End: 2021-11-26
Payer: COMMERCIAL

## 2021-11-26 DIAGNOSIS — R26.0 ATAXIC GAIT: ICD-10-CM

## 2021-11-26 DIAGNOSIS — R53.81 DEBILITY: Primary | ICD-10-CM

## 2021-11-26 PROCEDURE — 97113 AQUATIC THERAPY/EXERCISES: CPT

## 2021-11-28 DIAGNOSIS — I95.1 ORTHOSTATIC HYPOTENSION: ICD-10-CM

## 2021-11-29 ENCOUNTER — OFFICE VISIT (OUTPATIENT)
Dept: PHYSICAL THERAPY | Age: 61
End: 2021-11-29
Payer: COMMERCIAL

## 2021-11-29 DIAGNOSIS — R53.81 DEBILITY: Primary | ICD-10-CM

## 2021-11-29 DIAGNOSIS — R26.0 ATAXIC GAIT: ICD-10-CM

## 2021-11-29 PROCEDURE — 97113 AQUATIC THERAPY/EXERCISES: CPT | Performed by: PHYSICAL THERAPIST

## 2021-11-29 RX ORDER — MIDODRINE HYDROCHLORIDE 5 MG/1
TABLET ORAL
Qty: 405 TABLET | Refills: 1 | Status: SHIPPED | OUTPATIENT
Start: 2021-11-29 | End: 2021-12-09

## 2021-11-30 ENCOUNTER — TELEPHONE (OUTPATIENT)
Dept: PULMONOLOGY | Facility: CLINIC | Age: 61
End: 2021-11-30

## 2021-12-03 ENCOUNTER — TELEPHONE (OUTPATIENT)
Dept: INTERNAL MEDICINE CLINIC | Facility: CLINIC | Age: 61
End: 2021-12-03

## 2021-12-03 ENCOUNTER — APPOINTMENT (OUTPATIENT)
Dept: PHYSICAL THERAPY | Age: 61
End: 2021-12-03
Payer: COMMERCIAL

## 2021-12-06 ENCOUNTER — APPOINTMENT (OUTPATIENT)
Dept: PHYSICAL THERAPY | Age: 61
End: 2021-12-06
Payer: COMMERCIAL

## 2021-12-06 NOTE — PROGRESS NOTES
Daily Note     Today's date: 2021  Patient name: Adam Chester  : 1960  MRN: [id number]  Referring provider: Radha Luis MD  Dx:   Encounter Diagnosis     ICD-10-CM    1  Debility  R53 81    2  Ataxic gait  R26 0                   Subjective: ***      Objective: See treatment diary below      Assessment: Tolerated treatment {Tolerated treatment :5260261373}   Patient {assessment:8302912805}      Plan: {PLAN:8985366020}     Precautions: COPD, depression, CHF, FMS    Daily Treatment Diary     Manual                                                                                   Exercise Diary           Water walking 5 10 10 10         Postural training             Gait training             Home exercise pgm/patient education             Wall: t/h raises 1 1 1 1         Hip abd/add 2 2 2 2          1 1 1 1         squats 1 1 1 1         Knee flex/ext 1 1 1 1         Step-ups (fwd/bkwd/ss)             SLS (eyes open/closed)    2         SLS w UE mvmt  AROM/ball toss             Weight shifting             UE Noodle work x 4   3 4 4         UE AROM             Resistive UE work (paddles, bells, TB)             Core work on noodle (sitting/stdg)             Sit on noodle with movement             Seated on pool bench w proper posture 1            Ankle df/pf 1 1 1 1          1 1 1 1         Hip Ab/add 1 1 1 1         Knee flex/ext 1 1 1 1         Deep water mvmt 5 5 5 5         Deep water tx/stretching             Specific self - stretches wall/steps 5 5 5 5             Modalities            whirlpool  5   5 5

## 2021-12-09 ENCOUNTER — OFFICE VISIT (OUTPATIENT)
Dept: PHYSICAL THERAPY | Age: 61
End: 2021-12-09
Payer: COMMERCIAL

## 2021-12-09 DIAGNOSIS — I95.1 ORTHOSTATIC HYPOTENSION: ICD-10-CM

## 2021-12-09 DIAGNOSIS — R53.81 DEBILITY: Primary | ICD-10-CM

## 2021-12-09 DIAGNOSIS — R26.0 ATAXIC GAIT: ICD-10-CM

## 2021-12-09 PROCEDURE — 97113 AQUATIC THERAPY/EXERCISES: CPT

## 2021-12-09 RX ORDER — MIDODRINE HYDROCHLORIDE 5 MG/1
TABLET ORAL
Qty: 405 TABLET | Refills: 1 | Status: SHIPPED | OUTPATIENT
Start: 2021-12-09

## 2021-12-13 ENCOUNTER — APPOINTMENT (OUTPATIENT)
Dept: PHYSICAL THERAPY | Age: 61
End: 2021-12-13
Payer: COMMERCIAL

## 2021-12-14 ENCOUNTER — OFFICE VISIT (OUTPATIENT)
Dept: HEMATOLOGY ONCOLOGY | Facility: CLINIC | Age: 61
End: 2021-12-14
Payer: COMMERCIAL

## 2021-12-14 VITALS
RESPIRATION RATE: 18 BRPM | WEIGHT: 187 LBS | BODY MASS INDEX: 33.13 KG/M2 | SYSTOLIC BLOOD PRESSURE: 118 MMHG | TEMPERATURE: 99.1 F | HEIGHT: 63 IN | HEART RATE: 94 BPM | OXYGEN SATURATION: 97 % | DIASTOLIC BLOOD PRESSURE: 78 MMHG

## 2021-12-14 DIAGNOSIS — D50.8 IRON DEFICIENCY ANEMIA SECONDARY TO INADEQUATE DIETARY IRON INTAKE: ICD-10-CM

## 2021-12-14 DIAGNOSIS — R77.8 ABNORMAL SPEP: Primary | ICD-10-CM

## 2021-12-14 PROCEDURE — 99214 OFFICE O/P EST MOD 30 MIN: CPT | Performed by: INTERNAL MEDICINE

## 2021-12-14 PROCEDURE — 3008F BODY MASS INDEX DOCD: CPT | Performed by: INTERNAL MEDICINE

## 2021-12-14 PROCEDURE — 4004F PT TOBACCO SCREEN RCVD TLK: CPT | Performed by: INTERNAL MEDICINE

## 2021-12-14 RX ORDER — SODIUM CHLORIDE 9 MG/ML
20 INJECTION, SOLUTION INTRAVENOUS ONCE
Status: CANCELLED | OUTPATIENT
Start: 2021-12-14

## 2021-12-15 ENCOUNTER — HOSPITAL ENCOUNTER (OUTPATIENT)
Dept: CT IMAGING | Facility: CLINIC | Age: 61
Discharge: HOME | End: 2021-12-15
Payer: COMMERCIAL

## 2021-12-15 DIAGNOSIS — R93.89 ABNORMAL CHEST CT: ICD-10-CM

## 2021-12-15 DIAGNOSIS — R06.02 SHORTNESS OF BREATH: ICD-10-CM

## 2021-12-15 DIAGNOSIS — Z72.0 TOBACCO ABUSE: ICD-10-CM

## 2021-12-15 DIAGNOSIS — R91.8 GROUND GLASS OPACITY PRESENT ON IMAGING OF LUNG: ICD-10-CM

## 2021-12-15 DIAGNOSIS — R91.8 LUNG NODULES: ICD-10-CM

## 2021-12-15 PROCEDURE — G1004 CDSM NDSC: HCPCS

## 2021-12-15 PROCEDURE — 71250 CT THORAX DX C-: CPT

## 2021-12-16 ENCOUNTER — HOSPITAL ENCOUNTER (OUTPATIENT)
Dept: INFUSION CENTER | Facility: CLINIC | Age: 61
Discharge: HOME | End: 2021-12-16
Payer: COMMERCIAL

## 2021-12-16 VITALS
DIASTOLIC BLOOD PRESSURE: 73 MMHG | RESPIRATION RATE: 16 BRPM | TEMPERATURE: 97 F | SYSTOLIC BLOOD PRESSURE: 121 MMHG | HEART RATE: 93 BPM | OXYGEN SATURATION: 95 %

## 2021-12-16 DIAGNOSIS — D50.8 IRON DEFICIENCY ANEMIA SECONDARY TO INADEQUATE DIETARY IRON INTAKE: Primary | ICD-10-CM

## 2021-12-16 PROCEDURE — 96365 THER/PROPH/DIAG IV INF INIT: CPT

## 2021-12-16 PROCEDURE — 96375 TX/PRO/DX INJ NEW DRUG ADDON: CPT

## 2021-12-16 RX ORDER — FAMOTIDINE 20 MG/50ML
20 INJECTION, SOLUTION INTRAVENOUS ONCE
Status: COMPLETED | OUTPATIENT
Start: 2021-12-16 | End: 2021-12-16

## 2021-12-16 RX ORDER — SODIUM CHLORIDE 9 MG/ML
20 INJECTION, SOLUTION INTRAVENOUS ONCE
Status: CANCELLED | OUTPATIENT
Start: 2021-12-21

## 2021-12-16 RX ORDER — SODIUM CHLORIDE 9 MG/ML
20 INJECTION, SOLUTION INTRAVENOUS ONCE
Status: COMPLETED | OUTPATIENT
Start: 2021-12-16 | End: 2021-12-16

## 2021-12-16 RX ORDER — DIPHENHYDRAMINE HYDROCHLORIDE 50 MG/ML
50 INJECTION INTRAMUSCULAR; INTRAVENOUS ONCE
Status: COMPLETED | OUTPATIENT
Start: 2021-12-16 | End: 2021-12-16

## 2021-12-16 RX ADMIN — FERUMOXYTOL 510 MG: 510 INJECTION INTRAVENOUS at 15:10

## 2021-12-16 RX ADMIN — DIPHENHYDRAMINE HYDROCHLORIDE 50 MG: 50 INJECTION, SOLUTION INTRAMUSCULAR; INTRAVENOUS at 15:18

## 2021-12-16 RX ADMIN — FAMOTIDINE 20 MG: 20 INJECTION, SOLUTION INTRAVENOUS at 15:19

## 2021-12-16 RX ADMIN — SODIUM CHLORIDE 20 ML/HR: 9 INJECTION, SOLUTION INTRAVENOUS at 15:08

## 2021-12-16 RX ADMIN — HYDROCORTISONE SODIUM SUCCINATE 100 MG: 100 INJECTION, POWDER, FOR SOLUTION INTRAMUSCULAR; INTRAVENOUS at 15:18

## 2021-12-16 RX ADMIN — SODIUM CHLORIDE 500 ML: 9 INJECTION, SOLUTION INTRAVENOUS at 15:20

## 2021-12-17 ENCOUNTER — TELEPHONE (OUTPATIENT)
Dept: HEMATOLOGY ONCOLOGY | Facility: CLINIC | Age: 61
End: 2021-12-17

## 2021-12-17 ENCOUNTER — OFFICE VISIT (OUTPATIENT)
Dept: PHYSICAL THERAPY | Age: 61
End: 2021-12-17
Payer: COMMERCIAL

## 2021-12-17 DIAGNOSIS — R53.81 DEBILITY: Primary | ICD-10-CM

## 2021-12-17 DIAGNOSIS — R26.0 ATAXIC GAIT: ICD-10-CM

## 2021-12-17 DIAGNOSIS — J41.0 SIMPLE CHRONIC BRONCHITIS (HCC): ICD-10-CM

## 2021-12-17 DIAGNOSIS — R10.13 EPIGASTRIC PAIN: ICD-10-CM

## 2021-12-17 PROCEDURE — 97113 AQUATIC THERAPY/EXERCISES: CPT

## 2021-12-17 RX ORDER — FAMOTIDINE 20 MG/1
TABLET, FILM COATED ORAL
Qty: 180 TABLET | Refills: 3 | Status: SHIPPED | OUTPATIENT
Start: 2021-12-17

## 2021-12-17 RX ORDER — ZAFIRLUKAST 20 MG/1
20 TABLET, FILM COATED ORAL
Qty: 180 TABLET | Refills: 3 | Status: SHIPPED | OUTPATIENT
Start: 2021-12-17 | End: 2021-12-27 | Stop reason: SDUPTHER

## 2021-12-20 ENCOUNTER — OFFICE VISIT (OUTPATIENT)
Dept: PHYSICAL THERAPY | Age: 61
End: 2021-12-20
Payer: COMMERCIAL

## 2021-12-20 DIAGNOSIS — R53.81 DEBILITY: Primary | ICD-10-CM

## 2021-12-20 DIAGNOSIS — R26.0 ATAXIC GAIT: ICD-10-CM

## 2021-12-20 PROCEDURE — 97113 AQUATIC THERAPY/EXERCISES: CPT

## 2021-12-21 ENCOUNTER — HOSPITAL ENCOUNTER (OUTPATIENT)
Dept: INFUSION CENTER | Facility: CLINIC | Age: 61
Discharge: HOME | End: 2021-12-21
Payer: COMMERCIAL

## 2021-12-21 VITALS
RESPIRATION RATE: 20 BRPM | SYSTOLIC BLOOD PRESSURE: 109 MMHG | TEMPERATURE: 97.9 F | HEART RATE: 76 BPM | DIASTOLIC BLOOD PRESSURE: 58 MMHG | OXYGEN SATURATION: 92 %

## 2021-12-21 DIAGNOSIS — D50.8 IRON DEFICIENCY ANEMIA SECONDARY TO INADEQUATE DIETARY IRON INTAKE: Primary | ICD-10-CM

## 2021-12-21 PROCEDURE — 96367 TX/PROPH/DG ADDL SEQ IV INF: CPT

## 2021-12-21 PROCEDURE — 96365 THER/PROPH/DIAG IV INF INIT: CPT

## 2021-12-21 RX ORDER — SODIUM CHLORIDE 9 MG/ML
20 INJECTION, SOLUTION INTRAVENOUS ONCE
Status: COMPLETED | OUTPATIENT
Start: 2021-12-21 | End: 2021-12-21

## 2021-12-21 RX ORDER — SODIUM CHLORIDE 9 MG/ML
20 INJECTION, SOLUTION INTRAVENOUS ONCE
Status: CANCELLED | OUTPATIENT
Start: 2021-12-23

## 2021-12-21 RX ADMIN — FERUMOXYTOL 510 MG: 510 INJECTION INTRAVENOUS at 16:25

## 2021-12-21 RX ADMIN — DIPHENHYDRAMINE HYDROCHLORIDE 25 MG: 50 INJECTION, SOLUTION INTRAMUSCULAR; INTRAVENOUS at 15:07

## 2021-12-21 RX ADMIN — DEXAMETHASONE SODIUM PHOSPHATE 10 MG: 10 INJECTION, SOLUTION INTRAMUSCULAR; INTRAVENOUS at 15:33

## 2021-12-21 RX ADMIN — SODIUM CHLORIDE 20 ML/HR: 9 INJECTION, SOLUTION INTRAVENOUS at 15:07

## 2021-12-23 ENCOUNTER — APPOINTMENT (OUTPATIENT)
Dept: PHYSICAL THERAPY | Age: 61
End: 2021-12-23
Payer: COMMERCIAL

## 2021-12-27 ENCOUNTER — APPOINTMENT (OUTPATIENT)
Dept: PHYSICAL THERAPY | Age: 61
End: 2021-12-27
Payer: COMMERCIAL

## 2021-12-27 DIAGNOSIS — J41.0 SIMPLE CHRONIC BRONCHITIS (HCC): ICD-10-CM

## 2021-12-28 RX ORDER — ZAFIRLUKAST 20 MG/1
20 TABLET, FILM COATED ORAL
Qty: 180 TABLET | Refills: 3 | Status: SHIPPED | OUTPATIENT
Start: 2021-12-28

## 2021-12-28 RX ORDER — ALBUTEROL SULFATE 90 UG/1
2 AEROSOL, METERED RESPIRATORY (INHALATION) EVERY 6 HOURS PRN
Qty: 8.5 G | Refills: 3 | Status: SHIPPED | OUTPATIENT
Start: 2021-12-28

## 2021-12-29 DIAGNOSIS — J41.0 SIMPLE CHRONIC BRONCHITIS (HCC): ICD-10-CM

## 2021-12-30 ENCOUNTER — OFFICE VISIT (OUTPATIENT)
Dept: PHYSICAL THERAPY | Age: 61
End: 2021-12-30
Payer: COMMERCIAL

## 2021-12-30 DIAGNOSIS — R53.81 DEBILITY: Primary | ICD-10-CM

## 2021-12-30 DIAGNOSIS — R26.0 ATAXIC GAIT: ICD-10-CM

## 2021-12-30 PROCEDURE — 97113 AQUATIC THERAPY/EXERCISES: CPT

## 2022-01-03 ENCOUNTER — EVALUATION (OUTPATIENT)
Dept: PHYSICAL THERAPY | Age: 62
End: 2022-01-03
Payer: COMMERCIAL

## 2022-01-03 DIAGNOSIS — R26.0 ATAXIC GAIT: ICD-10-CM

## 2022-01-03 DIAGNOSIS — R53.81 DEBILITY: Primary | ICD-10-CM

## 2022-01-03 PROCEDURE — 97113 AQUATIC THERAPY/EXERCISES: CPT | Performed by: PHYSICAL THERAPIST

## 2022-01-03 RX ORDER — ALBUTEROL SULFATE 90 UG/1
POWDER, METERED RESPIRATORY (INHALATION)
Qty: 3 EACH | Refills: 3 | Status: SHIPPED | OUTPATIENT
Start: 2022-01-03

## 2022-01-03 NOTE — PROGRESS NOTES
PT Re-Evaluation     Today's date: 1/3/2022  Patient name: Oneyda iWtt  : 1960  MRN: [id number]  Referring provider: Marleny Soriano MD  Dx:   Encounter Diagnosis     ICD-10-CM    1  Debility  R53 81    2  Ataxic gait  R26 0        Start Time: 1545  Stop Time: 1640  Total time in clinic (min): 55 minutes    Assessment  Assessment details: 1/3/2022, patient demonstrates increase ROM and strength as well as improved balance after 10 PT visits and can now drive to therapy and fold  Laundry  Patient continues to have good days and bad days and she ambulates with RW but notes some relief with aquatic therapy  Impairments: abnormal gait, abnormal muscle firing, abnormal muscle tone, abnormal or restricted ROM, abnormal movement, activity intolerance, impaired balance, impaired physical strength, lacks appropriate home exercise program, pain with function, safety issue, weight-bearing intolerance, poor posture  and poor body mechanics  Understanding of Dx/Px/POC: good   Prognosis: good    Goals  ST-3 WEEKS  1  Decrease pain by 2 points on VAS at its worst MET  2  Increase ROM by > 5 deg in all deficients planes  MET  3  Increase CORE/LE by 1/2 MMT grade in all deficient planes  WORKING TOWARDS    LT-6 WEEKS  1  Patient to be independent with a/iadls and improve SLS balance and TUG score and walking tolerance WORKING TOWARDS  2  Increase functional activities for leisure and home activities to previous LOF and eliminate SOB  3  Independent with HEP and/or fitness program   NEW GOAL  1   Patient will continue with aggressive strengthening exercises so that she will be able to sit and drive herself 2 hours to her daughters house    Plan  Patient would benefit from: skilled physical therapy  Planned modality interventions: cryotherapy, electrical stimulation/Russian stimulation, thermotherapy: hydrocollator packs and unattended electrical stimulation  Planned therapy interventions: activity modification, behavior modification, body mechanics training, aquatic therapy, flexibility, functional ROM exercises, home exercise program, IADL retraining, joint mobilization, manual therapy, neuromuscular re-education, patient education, postural training, strengthening, stretching, therapeutic activities and therapeutic exercise  Frequency: 2x week (2-3x week)  Duration in weeks: 6  Plan of Care beginning date: 1/3/2022  Plan of Care expiration date: 2022  Treatment plan discussed with: patient        Subjective Evaluation    History of Present Illness  Date of onset: 2021  Mechanism of injury: Patient reports increased stamina,energy and mobility after 10 aquatic PT visits  Recurrent probem    Quality of life: fair    Pain  Current pain ratin  At best pain ratin  At worst pain ratin  Quality: dull ache and discomfort  Relieving factors: ice and rest  Aggravating factors: standing, walking and stair climbing  Progression: worsening    Social Support  Steps to enter house: no  Stairs in house: no   Lives in: [facility]  Lives with: alone      Diagnostic Tests  X-ray: abnormal  Treatments  Previous treatment: physical therapy and injection treatment  Patient Goals  Patient goals for therapy: improved balance, increased motion, increased strength, decreased pain and independence with ADLs/IADLs          Objective     Palpation   Left   Hypertonic in the erector spinae, lumbar paraspinals and quadratus lumborum  Tenderness of the quadratus lumborum  Right   Hypertonic in the erector spinae, lumbar paraspinals and quadratus lumborum  Tenderness     Lumbar Spine  Tenderness in the spinous process       Active Range of Motion   Cervical/Thoracic Spine       Thoracic    Extension:  Restriction level: moderate    Lumbar   Flexion: 50 degrees   Extension: 10 degrees   Left lateral flexion: 15 degrees       Right lateral flexion: 20 degrees     Strength/Myotome Testing     Left Hip   Planes of Motion   Flexion: 4-  Extension: 4-  Abduction: 4-  Adduction: 4-    Right Hip   Planes of Motion   Flexion: 4-  Extension: 4-  Abduction: 4-  Adduction: 4-    Left Knee   Flexion: 4-  Extension: 4-    Right Knee   Flexion: 4-  Extension: 4-    Left Ankle/Foot   Dorsiflexion: 3+  Plantar flexion: 4-    Right Ankle/Foot   Dorsiflexion: 3+  Plantar flexion: 4-    Additional Strength Details  CORE is 3/5    Tests     Lumbar     Left   Negative passive SLR and slump test      Right   Negative passive SLR and slump test      Ambulation     Observational Gait   Gait: antalgic, asymmetric and crouched   Decreased walking speed and stride length       Functional Assessment        Single Leg Stance   Left: 3 seconds  Right: 3 seconds  Neuro Exam:     Functional outcomes   TU (seconds)           Precautions: COPD, depression, CHF, FMS    Daily Treatment Diary     Manual                                                                                   Exercise Diary  11/12 11/17 11/26 11/29 12/9 12/17 12/20 12/30 1/3    Water walking 5 10 10 10 10 10 10 10 10    Postural training             Gait training             Home exercise pgm/patient education             Wall: t/h raises 1 1 1 1 1 1 1 1 1    Hip abd/add 2 2 2 2 2 2 2 2 2    Marching 1 1 1 1 1 1 1 1 1    squats 1 1 1 1 1 1 1 1 1    Knee flex/ext 1 1 1 1 1 1 1 1 1    Step-ups (fwd/bkwd/ss)             SLS (eyes open/closed)    2 2 2       SLS w UE mvmt  AROM/ball toss             Weight shifting             UE Noodle work x 4   3 4 4 4 4 4 4 4    UE AROM       5 5 5    Resistive UE work (paddles, bells, TB)             Core work on noodle (sitting/stdg)             Sit on noodle with movement             Seated on pool bench w proper posture 1    1 1 1  1    Ankle df/pf 1 1 1 1 1 1 1 1 1    marching 1 1 1 1 1 1 1 1 1    Hip Ab/add 1 1 1 1 1 1 1 1 1    Knee flex/ext 1 1 1 1 1 1 1 1 1    Deep water mvmt 5 5 5 5 6 6 6 6 6    Deep water tx/stretching     10 10 10 10 10    Specific self - stretches wall/steps 5 5 5 5 2 2 2 2 2        Modalities   11/17 11/26 11/29 12/9 12/17 12/20 12/30 1/3    whirlpool  5   5 5 10 10 10 10 10

## 2022-01-04 ENCOUNTER — OFFICE VISIT (OUTPATIENT)
Dept: GASTROENTEROLOGY | Facility: CLINIC | Age: 62
End: 2022-01-04
Payer: COMMERCIAL

## 2022-01-04 VITALS
SYSTOLIC BLOOD PRESSURE: 122 MMHG | WEIGHT: 185 LBS | HEART RATE: 97 BPM | DIASTOLIC BLOOD PRESSURE: 88 MMHG | HEIGHT: 63 IN | BODY MASS INDEX: 32.78 KG/M2

## 2022-01-04 DIAGNOSIS — Z86.010 HISTORY OF COLON POLYPS: ICD-10-CM

## 2022-01-04 DIAGNOSIS — K62.5 RECTAL BLEEDING: Primary | ICD-10-CM

## 2022-01-04 PROCEDURE — 99213 OFFICE O/P EST LOW 20 MIN: CPT | Performed by: INTERNAL MEDICINE

## 2022-01-04 RX ORDER — TIZANIDINE HYDROCHLORIDE 4 MG/1
CAPSULE, GELATIN COATED ORAL
COMMUNITY
Start: 2021-12-27
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
## 2022-01-05 ENCOUNTER — TELEPHONE (OUTPATIENT)
Dept: INTERNAL MEDICINE CLINIC | Facility: CLINIC | Age: 62
End: 2022-01-05

## 2022-01-05 NOTE — TELEPHONE ENCOUNTER
NOW  ANTONINO      HOME    CALLED  BACK   WITH  HER  CASE  NUMBER  [id number]  TIME  OF  DEATH  7;50 AM   THEY  SAID  RHEA   CAN  SIGN  ON  LINE   ANY  QUESTIONS   CALL   Sumit Read  [phone]

## 2022-01-05 NOTE — TELEPHONE ENCOUNTER
PT  PASSED  AWAY  IN  HER  BED   LAST  NIGHT  FOUND  HER  THIS  AM     WOULD  WILKERSON    SIGN  DEATH  CERTI

## 2022-01-06 ENCOUNTER — TELEPHONE (OUTPATIENT)
Dept: INTERNAL MEDICINE CLINIC | Facility: CLINIC | Age: 62
End: 2022-01-06

## 2022-01-06 ENCOUNTER — HOSPITAL ENCOUNTER (OUTPATIENT)
Dept: CT IMAGING | Facility: HOSPITAL | Age: 62
Discharge: HOME | End: 2022-01-06
Attending: INTERNAL MEDICINE

## 2022-01-06 NOTE — TELEPHONE ENCOUNTER
LUCILA for    Pt passed away at home yesterday    she fell asleep and didn't wake up  Lenore Burow Her boy friend wanted to let Dr know    he is also a pt of Dr Gerson Suresh & needs to know what to do with a large bag of medication    some opioids    where can he get rid of it    he doesn't want to throw it in the dumpster  Lenore Burow and have homeless people get hold of it         / [phone]

## 2022-01-06 NOTE — TELEPHONE ENCOUNTER
Hemant Valenzuela from [facility] sent information for Dr Omari Little to complete the Death Certificate for Queenie Sat in the [address]  She passed away yesterday

## 2022-01-07 ENCOUNTER — RA CDI HCC (OUTPATIENT)
Dept: OTHER | Facility: HOSPITAL | Age: 62
End: 2022-01-07

## 2022-01-07 NOTE — PROGRESS NOTES
[facility]  coding opportunities          Number of diagnosis code(s) already on the problem list added to FYI fla                     Patients insurance company: [facility]     Visit status: Patient canceled the appointment        [place] [id number]  coding opportunities       Please review/recertify the BPA diagnoses for      Number of diagnosis code(s) already on the problem list added to  fla                     Patients insurance company: [facility]

## 2022-01-07 NOTE — TELEPHONE ENCOUNTER
Pete Shetty / [facility] calling re death cert that needs to be completed by Dr Yareli Carmichael     The case # in the [address] is  [id number]  Danny Vaughan and the 3 day window is closing for this to be completed    so if he can please complete this today    would be helpful and if she can get a call back to notify her that is was done    she would appreciate it        [phone]

## 2022-01-14 ENCOUNTER — APPOINTMENT (OUTPATIENT)
Dept: PHYSICAL THERAPY | Age: 62
End: 2022-01-14
Payer: COMMERCIAL

## 2022-01-17 ENCOUNTER — APPOINTMENT (OUTPATIENT)
Dept: PHYSICAL THERAPY | Age: 62
End: 2022-01-17
Payer: COMMERCIAL

## 2022-01-21 ENCOUNTER — APPOINTMENT (OUTPATIENT)
Dept: PHYSICAL THERAPY | Age: 62
End: 2022-01-21
Payer: COMMERCIAL

## 2022-01-24 ENCOUNTER — APPOINTMENT (OUTPATIENT)
Dept: PHYSICAL THERAPY | Age: 62
End: 2022-01-24
Payer: COMMERCIAL

## 2022-01-28 ENCOUNTER — APPOINTMENT (OUTPATIENT)
Dept: PHYSICAL THERAPY | Age: 62
End: 2022-01-28
Payer: COMMERCIAL

## 2022-01-31 ENCOUNTER — APPOINTMENT (OUTPATIENT)
Dept: PHYSICAL THERAPY | Age: 62
End: 2022-01-31
Payer: COMMERCIAL

## 2025-03-21 NOTE — PROGRESS NOTES
Assessment/Plan:       Diagnoses and all orders for this visit:    Dental caries    Simple chronic bronchitis (HCC)    Abnormal myocardial perfusion study  -     Echo complete with contrast if indicated; Future    Hoarse          Patient Instructions    Lightheadedness and dizziness is improved  Abnormal perfusion study with a fixed deficit; this needs come from a shin with an echocardiogram       Bad teeth  Plan of care is extraction under general anesthesia  I her agree with this plan     Continues to smoke    Getting lab work and I would like to see her back here in about another month or so before the extractions  Subjective:      Patient ID: Kayley Watson is a 63 y o  female  Follow-up visit of a 78-year-old female  A lot of orthopedic issues with multiple fractures of small bones of feet  Follows with Orthopedics this is superimposed on some kind of chronic pain of fibromyalgia syndrome and she follows with a chronic pain specialist for this  Orthostasis had been noted in the past   Propanolol was discontinued  This appears to be improved with no recent syncopal episodes  Has now seen Cardiology  A nonwalking stress test was done and reported to demonstrate a moderately large fixed inferior and apical perfusion deficit  Has seen Neurology  No real change the with the exception of a trial of primidone for tremor  Continues to feel weak  Lightheadedness with upright posture is improved  Continues to have pain in the feet from bilateral fractures; the left foot is still in a boot  Continues to follow with pain management  On multiple medications including oxycodone which she takes on a regular basis plus fentanyl 75 mcg patch  She has multiple caries and multiple missing teeth  She has repetitive gingival infections  Complete extraction has been recommended  This will be done and a hospital setting for general anesthesia in December          The following portions of the patient's history were reviewed and updated as appropriate:   She has a past medical history of Anemia; Anemia; Asthma; Cardiac disorder; CHF (congestive heart failure) ([facility] ); [facility] ); Constipation; COPD (chronic obstructive pulmonary disease) ([facility] ); Depression; Diabetes insipidus ([facility] ); Diarrhea; Fibromyalgia; Heart disorder; Malignant neoplasm ([facility] ); Migraines; Mild persistent asthma; Occasional tremors; Osteoarthritis; Osteoporosis; Problems with swallowing; Restless leg syndrome; Skin cancer; Stomach problems; and Tobacco abuse ,   does not have any pertinent problems on file  ,   has a past surgical history that includes Gallbladder surgery; Tonsillectomy; Gastric bypass; Back surgery; Other surgical history (Left); Arthrodesis; Bladder surgery; Foot surgery (Right); Abdominal surgery; Hand surgery (Left); Adenoidectomy; Toe Surgery (Right); Ankle surgery (Right); Femur Surgery (Right); Hemorrhoid surgery; and Toenail excision  ,  family history includes Arthritis in her mother; Cancer in her mother and sister; Diabetes in her brother; Heart attack in her father; Heart disease in her brother and father; Hypertension in her father and mother; Mental illness in her brother and father; Neuropathy in her father; Obesity in her mother and sister; Osteoarthritis in her family; Osteoporosis in her family; Scoliosis in her family  ,   reports that she has been smoking  She has never used smokeless tobacco  She reports that she uses drugs, including Marijuana  She reports that she does not drink alcohol ,  is allergic to morphine and related; quetiapine; and sulfa antibiotics     Current Outpatient Prescriptions   Medication Sig Dispense Refill    ADVAIR DISKUS 250-50 MCG/DOSE inhaler INHALE 1 DOSE BY MOUTH TWICE DAILY   RINSE MOUTH AFTER USE 3 Inhaler 3    albuterol (2 5 mg/3 mL) 0 083 % nebulizer solution Take 1 vial (2 5 mg total) by nebulization every 6 (six) hours as needed for wheezing or shortness of breath 1080 mL 3    albuterol (PROVENTIL HFA,VENTOLIN HFA) 90 mcg/act inhaler Inhale 2 puffs every 6 (six) hours as needed for wheezing      ARIPiprazole (ABILIFY) 2 mg tablet Take 2 mg by mouth daily at bedtime  0    butalbital-aspirin-caffeine (FIORINAL) -40 MG per tablet Take 1 tablet by mouth every 4 (four) hours as needed for headaches      clonazePAM (KlonoPIN) 0 5 mg tablet Take 0 5 mg by mouth 3 (three) times a day    1    Diclofenac Sodium 3 % GEL Place on the skin daily        diphenhydrAMINE (BENADRYL) 25 mg tablet Take 25 mg by mouth every 6 (six) hours as needed for itching      doxepin (SINEquan) 150 MG capsule 150 mg daily at bedtime  1    famotidine (PEPCID) 20 mg tablet TAKE 1 TABLET BY MOUTH EVERY DAY AT BEDTIME 90 tablet 3    fentaNYL (DURAGESIC) 75 mcg/hr APPLY 1 PATCH EVERY 72 HOURS  0    fluticasone (FLONASE) 50 mcg/act nasal spray 1 spray 2 (two) times a day  2    gabapentin (NEURONTIN) 800 mg tablet Take 800 mg by mouth 4 (four) times a day  3    lidocaine (XYLOCAINE) 5 % ointment apply locally FOUR TIMES DAILY  1    mupirocin (BACTROBAN) 2 % ointment Apply topically 3 (three) times a day      naproxen (NAPROSYN) 500 mg tablet Take 1 tablet (500 mg total) by mouth 2 (two) times a day with meals 30 tablet 0    nystatin (MYCOSTATIN) powder Apply topically 4 (four) times a day as needed        oxyCODONE-acetaminophen (PERCOCET) 5-325 mg per tablet Take 1-2 tablets by mouth every 4 (four) hours      pantoprazole (PROTONIX) 40 mg tablet TAKE 1 TABLET BY MOUTH EVERY DAY 90 tablet 3    primidone (MYSOLINE) 50 mg tablet 1/2 to 1 p o  q h s  30 tablet 5    rOPINIRole (REQUIP) 0 25 mg tablet Take 0 25 mg by mouth 3 (three) times a day  4    venlafaxine 150 MG TB24 Take 150 mg by mouth daily  0    venlafaxine 225 MG TB24 Take 225 mg by mouth daily  1    zafirlukast (ACCOLATE) 20 MG tablet TAKE 1 TABLET BY MOUTH TWICE A DAY BEFORE MEALS 180 tablet 3     No current facility-administered medications for this visit  Review of Systems   Constitutional: Positive for fatigue  Negative for fever  HENT: Positive for dental problem  Negative for sore throat and trouble swallowing  Respiratory: Positive for cough, shortness of breath and wheezing  Cardiovascular: Negative for palpitations and leg swelling  Genitourinary: Negative for difficulty urinating  Musculoskeletal: Positive for arthralgias, back pain, gait problem and joint swelling  Neurological: Positive for tremors and weakness  Negative for dizziness, seizures and light-headedness  Psychiatric/Behavioral: Positive for dysphoric mood  Objective:  Vitals:    10/08/18 1525   BP: 110/64      Physical Exam   Constitutional: She is oriented to person, place, and time  She appears well-developed  A patient who appears stated age  She is in a wheelchair because she cannot walk without pain of both forefeet  Verbalizing about complaints  HENT:   Head: Normocephalic  Multiple broken and missing teeth  Eyes: Pupils are equal, round, and reactive to light  Neck: Normal range of motion  Cardiovascular: Normal rate and regular rhythm  Murmur heard  Decrescendo systolic murmur is present with a grade of 2/6     Grade 2/6 fairly low-grade decrescendo murmur heard best in the right sternal border 2nd intercostal space without radiation  Pulmonary/Chest: Effort normal  No respiratory distress  Abdominal: Soft  Musculoskeletal: She exhibits deformity  Right foot boot and ankle brace  Left foot soft boot   Neurological: She is alert and oriented to person, place, and time  Skin: Skin is warm  Ecchymoses as noted earlier     Psychiatric: She has a normal mood and affect  .